# Patient Record
Sex: FEMALE | Race: BLACK OR AFRICAN AMERICAN | NOT HISPANIC OR LATINO | Employment: FULL TIME | URBAN - METROPOLITAN AREA
[De-identification: names, ages, dates, MRNs, and addresses within clinical notes are randomized per-mention and may not be internally consistent; named-entity substitution may affect disease eponyms.]

---

## 2018-04-04 ENCOUNTER — HOSPITAL ENCOUNTER (EMERGENCY)
Facility: HOSPITAL | Age: 58
Discharge: HOME | End: 2018-04-04

## 2018-04-04 VITALS
BODY MASS INDEX: 30.73 KG/M2 | DIASTOLIC BLOOD PRESSURE: 98 MMHG | HEIGHT: 64 IN | SYSTOLIC BLOOD PRESSURE: 178 MMHG | TEMPERATURE: 98 F | OXYGEN SATURATION: 97 % | WEIGHT: 180 LBS | RESPIRATION RATE: 16 BRPM | HEART RATE: 83 BPM

## 2018-04-04 DIAGNOSIS — A38.9 SCARLET FEVER: Primary | ICD-10-CM

## 2018-04-04 PROCEDURE — 99283 EMERGENCY DEPT VISIT LOW MDM: CPT

## 2018-04-04 PROCEDURE — 25000003 PHARM REV CODE 250

## 2018-04-04 RX ORDER — PENICILLIN V POTASSIUM 250 MG/1
500 TABLET, FILM COATED ORAL
Status: COMPLETED | OUTPATIENT
Start: 2018-04-04 | End: 2018-04-04

## 2018-04-04 RX ORDER — HYDROXYZINE HYDROCHLORIDE 25 MG/1
25 TABLET, FILM COATED ORAL EVERY 6 HOURS
Qty: 12 TABLET | Refills: 0 | Status: SHIPPED | OUTPATIENT
Start: 2018-04-04 | End: 2021-11-30

## 2018-04-04 RX ORDER — PENICILLIN V POTASSIUM 250 MG/1
500 TABLET, FILM COATED ORAL 2 TIMES DAILY
Qty: 40 TABLET | Refills: 0 | Status: SHIPPED | OUTPATIENT
Start: 2018-04-04 | End: 2018-04-14

## 2018-04-04 RX ORDER — HYDROXYZINE PAMOATE 25 MG/1
25 CAPSULE ORAL
Status: COMPLETED | OUTPATIENT
Start: 2018-04-04 | End: 2018-04-04

## 2018-04-04 RX ADMIN — PENICILLIN V POTASSIUM 500 MG: 250 TABLET, FILM COATED ORAL at 07:04

## 2018-04-04 RX ADMIN — HYDROXYZINE PAMOATE 25 MG: 25 CAPSULE ORAL at 07:04

## 2018-04-04 NOTE — ED PROVIDER NOTES
Encounter Date: 4/4/2018    SCRIBE #1 NOTE: I, Jaun Scott, am scribing for, and in the presence of,  Dr. Don. I have scribed the entire note.       History     Chief Complaint   Patient presents with    Rash     pt c/o rash to back x 5 days. used benadryl cream with no relief      Time seen by provider: 6:48 PM    This is a 57 y.o. female who presents with complaint of rash to back for the past 5 days. She reports only intermittent itchiness of the rash, and recalls the rash began in the middle of her back and it spread out and towards her neck. This patient denies any trouble breathing or swallowing. She does report that she had a cold and sore throat with associated laryngitis shortly before onset of this rash, which has since resolved. Patient reports using Benadryl cream without any relief. This patient also reports regular consumption of EtOH, but denies any change in diet or use of new soaps or cosmetics. She reports no long-term steroid usage or cancer treatment, and she does not report any known allergies. No other palliative or provocative factors except as noted.      The history is provided by the patient.     Review of patient's allergies indicates:  No Known Allergies  History reviewed. No pertinent past medical history.  History reviewed. No pertinent surgical history.  History reviewed. No pertinent family history.  Social History   Substance Use Topics    Smoking status: Never Smoker    Smokeless tobacco: Never Used    Alcohol use Yes     Review of Systems   HENT: Negative for trouble swallowing.    Respiratory:        No trouble breathing   Skin: Positive for rash.   All other systems reviewed and are negative.      Physical Exam     Initial Vitals [04/04/18 1828]   BP Pulse Resp Temp SpO2   (!) 188/100 83 16 98.1 °F (36.7 °C) 97 %      MAP       129.33         Physical Exam    Nursing note and vitals reviewed.  Constitutional: She appears well-developed and well-nourished.   HENT:   Head:  Normocephalic.   Mouth/Throat: Oropharynx is clear and moist.   Normal oropharynx   Eyes: EOM are normal.   Neck: Normal range of motion. Neck supple.   Pulmonary/Chest: No respiratory distress.   Abdominal: Soft.   Musculoskeletal: Normal range of motion.   Neurological: She is alert and oriented to person, place, and time.   Skin: Skin is warm and dry. Rash noted.   Fine scattered, raised, sandpaperlike rash over back diffusely   Psychiatric: She has a normal mood and affect.         ED Course   Procedures  Labs Reviewed - No data to display          Medical Decision Making:   ED Management:  Patient is nontoxic appearing in the ED.  No persistent emergent issues detected.  Exam benign.  We will discharge home to follow up with PCP.  Patient will return to the ED as needed for any deterioration or any other concerns.  Verbal discharge instructions and return precautions given.    Rash concerning for scarlatina.  Patient is nontoxic and otherwise benign appearing.  We will treat with Atarax and penicillin and follow up with primary care.                      Clinical Impression:     1. Scarlet fever              I, Keyur Don, personally performed the services described in this documentation. All medical record entries made by the scribe were at my direction and in my presence.  I have reviewed the chart and agree that the record reflects my personal performance and is accurate and complete. Keyur Don MD  04/04/18 3507

## 2020-03-30 ENCOUNTER — OFFICE VISIT (OUTPATIENT)
Dept: URGENT CARE | Facility: CLINIC | Age: 60
End: 2020-03-30
Payer: COMMERCIAL

## 2020-03-30 VITALS
OXYGEN SATURATION: 99 % | SYSTOLIC BLOOD PRESSURE: 130 MMHG | HEART RATE: 100 BPM | BODY MASS INDEX: 30.73 KG/M2 | RESPIRATION RATE: 19 BRPM | HEIGHT: 64 IN | DIASTOLIC BLOOD PRESSURE: 80 MMHG | WEIGHT: 180 LBS | TEMPERATURE: 100 F

## 2020-03-30 DIAGNOSIS — R05.9 COUGH: ICD-10-CM

## 2020-03-30 DIAGNOSIS — Z20.822 SUSPECTED COVID-19 VIRUS INFECTION: ICD-10-CM

## 2020-03-30 DIAGNOSIS — R50.9 FEVER, UNSPECIFIED FEVER CAUSE: ICD-10-CM

## 2020-03-30 DIAGNOSIS — J12.9 VIRAL PNEUMONIA: Primary | ICD-10-CM

## 2020-03-30 PROCEDURE — 99203 PR OFFICE/OUTPT VISIT, NEW, LEVL III, 30-44 MIN: ICD-10-PCS | Mod: S$GLB,,, | Performed by: PHYSICIAN ASSISTANT

## 2020-03-30 PROCEDURE — 99203 OFFICE O/P NEW LOW 30 MIN: CPT | Mod: S$GLB,,, | Performed by: PHYSICIAN ASSISTANT

## 2020-03-30 PROCEDURE — 71046 XR CHEST PA AND LATERAL: ICD-10-PCS | Mod: FY,S$GLB,, | Performed by: RADIOLOGY

## 2020-03-30 PROCEDURE — 71046 X-RAY EXAM CHEST 2 VIEWS: CPT | Mod: FY,S$GLB,, | Performed by: RADIOLOGY

## 2020-03-30 RX ORDER — BENZONATATE 100 MG/1
200 CAPSULE ORAL 3 TIMES DAILY PRN
Qty: 40 CAPSULE | Refills: 0 | Status: SHIPPED | OUTPATIENT
Start: 2020-03-30 | End: 2021-11-30

## 2020-03-30 RX ORDER — AZITHROMYCIN 250 MG/1
TABLET, FILM COATED ORAL
Qty: 6 TABLET | Refills: 0 | Status: SHIPPED | OUTPATIENT
Start: 2020-03-30 | End: 2020-04-23 | Stop reason: SDUPTHER

## 2020-03-30 RX ORDER — ALBUTEROL SULFATE 90 UG/1
2 AEROSOL, METERED RESPIRATORY (INHALATION) EVERY 6 HOURS PRN
Qty: 18 G | Refills: 0 | Status: SHIPPED | OUTPATIENT
Start: 2020-03-30 | End: 2022-04-04

## 2020-03-30 NOTE — PROGRESS NOTES
"Subjective:       Patient ID: Veronika Blackmon is a 59 y.o. female.    Vitals:  height is 5' 4" (1.626 m) and weight is 81.6 kg (180 lb). Her oral temperature is 99.7 °F (37.6 °C). Her blood pressure is 130/80 and her pulse is 100. Her respiration is 19 and oxygen saturation is 99%.     Chief Complaint: Cough    Cough   This is a new problem. The current episode started in the past 7 days. The problem has been gradually worsening. The problem occurs constantly. The cough is productive of sputum. Associated symptoms include nasal congestion and postnasal drip. Pertinent negatives include no chest pain, chills, ear pain, fever, headaches, myalgias, rash, sore throat or shortness of breath. Nothing aggravates the symptoms. She has tried OTC cough suppressant for the symptoms. The treatment provided no relief. There is no history of asthma, bronchitis or pneumonia.       Constitution: Negative for chills, fatigue and fever.   HENT: Positive for postnasal drip. Negative for ear pain, congestion and sore throat.    Neck: Negative for painful lymph nodes.   Cardiovascular: Negative for chest pain and leg swelling.   Eyes: Negative for double vision and blurred vision.   Respiratory: Positive for cough. Negative for shortness of breath.    Gastrointestinal: Negative for nausea, vomiting and diarrhea.   Genitourinary: Negative for dysuria, frequency, urgency and history of kidney stones.   Musculoskeletal: Negative for joint pain, joint swelling, muscle cramps and muscle ache.   Skin: Negative for color change, pale, rash and bruising.   Allergic/Immunologic: Negative for seasonal allergies.   Neurological: Negative for dizziness, history of vertigo, light-headedness, passing out and headaches.   Hematologic/Lymphatic: Negative for swollen lymph nodes.   Psychiatric/Behavioral: Negative for nervous/anxious, sleep disturbance and depression. The patient is not nervous/anxious.        Objective:      Physical Exam "   Constitutional: She is oriented to person, place, and time. She appears well-developed and well-nourished. She is cooperative.  Non-toxic appearance. She does not have a sickly appearance. She does not appear ill. No distress.   HENT:   Head: Normocephalic and atraumatic.   Right Ear: Hearing, tympanic membrane, external ear and ear canal normal.   Left Ear: Hearing, tympanic membrane, external ear and ear canal normal.   Nose: Nose normal. No mucosal edema, rhinorrhea or nasal deformity. No epistaxis. Right sinus exhibits no maxillary sinus tenderness and no frontal sinus tenderness. Left sinus exhibits no maxillary sinus tenderness and no frontal sinus tenderness.   Mouth/Throat: Uvula is midline, oropharynx is clear and moist and mucous membranes are normal. No trismus in the jaw. Normal dentition. No uvula swelling. Cobblestoning present. No oropharyngeal exudate, posterior oropharyngeal edema, posterior oropharyngeal erythema or tonsillar abscesses. Tonsils are 1+ on the right. Tonsils are 1+ on the left. No tonsillar exudate.   Eyes: Conjunctivae and lids are normal. No scleral icterus.   Neck: Trachea normal, full passive range of motion without pain and phonation normal. Neck supple. No neck rigidity. No edema and no erythema present.   Cardiovascular: Normal rate, regular rhythm, normal heart sounds, intact distal pulses and normal pulses.   Pulmonary/Chest: Effort normal. No accessory muscle usage or stridor. No respiratory distress. She has decreased breath sounds. She has wheezes. She has no rhonchi. She exhibits no tenderness and no bony tenderness.   Abdominal: Normal appearance.   Musculoskeletal: Normal range of motion. She exhibits no edema or deformity.   Neurological: She is alert and oriented to person, place, and time. She exhibits normal muscle tone. Coordination normal.   Skin: Skin is warm, dry, intact, not diaphoretic and not pale.   Psychiatric: She has a normal mood and affect. Her  speech is normal and behavior is normal. Judgment and thought content normal. Cognition and memory are normal.   Nursing note and vitals reviewed.          X-ray Chest Pa And Lateral    Result Date: 3/30/2020  EXAMINATION: XR CHEST PA AND LATERAL CLINICAL HISTORY: Fever, unspecified TECHNIQUE: PA and lateral views of the chest were performed. COMPARISON: None FINDINGS: Multifocal patchy subsegmental opacities are present throughout both lungs with relative peripheral and basilar predominance compatible with viral pneumonia in this patient with fever and cough. I detect no other pulmonary disease and no pleural fluid, lymph node enlargement, cardiac decompensation, pneumothorax, pneumomediastinum, pneumoperitoneum or significant osseous abnormality.     Abnormal chest radiograph with findings concerning for viral pneumonia in this patient with a history of fever and cough. I recommend that the patient undergo repeat chest radiograph after an interval of 6-12 weeks to confirm radiographic resolution of this disease; time course for repeat chest radiograph will be determined by clinical factors. This report was flagged in Epic as abnormal. Electronically signed by: Gabriela Leo MD Date:    03/30/2020 Time:    13:11      Assessment:       1. Viral pneumonia    2. Fever, unspecified fever cause    3. Cough    4. Suspected Covid-19 Virus Infection        Plan:         Viral pneumonia  -     albuterol (PROVENTIL/VENTOLIN HFA) 90 mcg/actuation inhaler; Inhale 2 puffs into the lungs every 6 (six) hours as needed for Wheezing or Shortness of Breath. Rescue  Dispense: 18 g; Refill: 0  -     benzonatate (TESSALON PERLES) 100 MG capsule; Take 2 capsules (200 mg total) by mouth 3 (three) times daily as needed for Cough.  Dispense: 40 capsule; Refill: 0    Fever, unspecified fever cause  -     X-Ray Chest PA And Lateral; Future; Expected date: 03/30/2020    Cough  -     X-Ray Chest PA And Lateral; Future; Expected date:  03/30/2020    Suspected Covid-19 Virus Infection  -     COVID-19 Home Symptom Monitoring  - Duration (days): 14    Other orders  -     azithromycin (ZITHROMAX Z-KENIA) 250 MG tablet; Take 2 tablets (500 mg) on  Day 1,  followed by 1 tablet (250 mg) once daily on Days 2 through 5.  Dispense: 6 tablet; Refill: 0     Reviewed radiographs with patient.  Discussed likely Coronavirus in nature.  She does not meet current criteria for testing.  Discussed Coronavirus precautions as well as discussed CDC fact she.  Provided patient with a copy of this information. Discussed diagnosis with patient as well as treatment and home care. Discussed return to clinic precautions vs ER precautions. All patients questions answered. Patient verbalized understanding. Patient agreed with plan of care.         Treating Pneumonia  Pneumonia is an infection of one or both of the lungs. Pneumonia:  · Is usually caused by either a virus or a bacteria  · Can be very serious, especially in infants, young children, and older adults. Its also serious for those with other long-term health problems or weakened immune systems.  · Is sometimes treated at home and sometimes in the hospital    Antibiotic medicines  Antibiotics may be prescribed for pneumonia caused by bacteria. They may be pills (oral medicines), or shots (injections). Or they may be given by IV (intravenously) into a vein. If you are taking oral medicines at home:  · Fill your prescription and start taking your medicine as soon as you can.  · You will likely start to feel better in a day or 2, but dont stop taking the antibiotic.  · Use a pill organizer to help you remember to take your medicine.  · Let your healthcare provider know if you have side effects.  · Take your medicine exactly as directed on the label. Talk to your provider or pharmacist if you have any questions.  Antiviral medicines  Antiviral medicine may be prescribed for pneumonia caused by a virus. For example,  antiviral medicine may be prescribed for pneumonia caused by the flu virus. Antibiotics do not work against viruses. If you are taking antiviral medicine at home:  · Fill your prescription and start taking your medicine as soon as you can.  · Talk with your provider or pharmacist about possible side effects.  · Take the medicine exactly as instructed.  To relieve symptoms  There are many medicines that can help relieve symptoms of pneumonia. Some are prescription and some are over-the-counter.  Your healthcare provider may recommend:  · Acetaminophen or ibuprofen to lower your fever and to lessen headache or other pain  · Cough medicine to loosen mucus or to reduce coughing  Make sure you check with your healthcare provider or pharmacist before taking any over-the-counter medicines.  Special treatments  If you are hospitalized for pneumonia, you may have other therapies, including:  · Inhaled medicines to help with breathing or chest congestion  · Supplemental oxygen to increase low oxygen levels  Drink fluids and eat healthy  You should eat healthy to help your body fight the infection. Drinking a lot of fluids helps to replace fluids lost from fever and to loosen mucus in your chest.  · Diet. Make healthy food choices, including fruits and vegetables, lean meats and other proteins, 100% whole grain and low- or no-fat dairy products.  · Fluids. Drink at least 6 to 8 tall glasses a day. Water and 100% fruit or vegetable juice are best.  Get plenty of rest and sleep  You may be more tired than usual for a while. It is important to get enough sleep at night. Its also important to rest during the day. Talk with your healthcare provider if coughing or other symptoms are interfering with your sleep.  Preventing the spread of germs  The best thing you can do to prevent spreading germs is to wash your hands often. You should:  · Rub your hand with soap and water for 20 to 30 seconds.  · Clean in between your fingers, the  backs of your hands, and around your nails.  · Dry your hands on a separate towel or use paper towels.  You should also:  · Keep alcohol-based hand  nearby.  · Make sure you also clean surfaces that you touch. Use a product that kills all types of germs.  · Stay away from others until you are feeling better.  When to call your healthcare provider  Call your healthcare provider if you have any of the following:  · Symptoms get worse  · Fever continues  · Shortness of breath gets worse  · Increased mucus or mucus that is darker in color  · Coughing gets worse  · Lips or fingers are bluish in color  · Side effects from your medicine   Date Last Reviewed: 12/1/2016  © 0363-5723 [employer]. [address] [place] [address], [place] [address]. All rights reserved. This information is not intended as a substitute for professional medical care. Always follow your healthcare professional's instructions.      Instructions for Patients Awaiting COVID-19 Test Results    You will either be called with your test result or it will be released to the patient portal.  If you have any questions about your test, please visit [email or web address] or call our COVID-19 information line at [phone].    Prevention steps for patients with confirmed or suspected COVID-19     Stay home except to get medical care.   Separate yourself from other people and animals in your home   Call ahead before visiting your doctor.   Wear a facemask.   Cover your coughs and sneezes.   Clean your hands often.   Avoid sharing personal household items.   Clean all high-touch surfaces every day.   Monitor your symptoms. Seek prompt medical attention if your illness is worsening (e.g., difficulty breathing).    Before seeking care, call your healthcare provider.   If you have a medical emergency and need to call 911, notify the dispatch personnel that you have, or are being evaluated for COVID-19. If possible, put on a  facemask before emergency medical services arrive.   Please stay home and self-quarantine. Per CDC guidance, you can leave home after these three things have happened: You have had no fever for at least 72 hours (that is three full days of no fever without the use ofmedicine that reduces fevers) AND other symptoms have improved (for example, when your cough or shortness of breath have improved) AND at least 7 days have passed since your symptoms first appeared.      Recommended precautions for household members, intimate partners, and caregivers in a nonhealthcare setting of a patient with symptomatic laboratory-confirmed COVID-19 or a patient under investigation.  Household members, intimate partners, and caregivers in a nonhealthcare setting may have close contact with a person with symptomatic, laboratory-confirmed COVID-19 or a person under investigation. Close contacts should monitor their health; they should call their healthcare provider right away if they develop symptoms suggestive of COVID-19 (e.g., fever, cough, shortness of breath).    Close contacts should also follow these recommendations:   Make sure that you understand and can help the patient follow their healthcare provider's instructions for medication(s) and care. You should help the patient with basic needs in the home and provide support for getting groceries, prescriptions, and other personal needs.   Monitor the patient's symptoms. If the patient is getting sicker, call his or her healthcare provider and tell them that the patient has laboratory-confirmed COVID-19. This will help the healthcare provider's office take steps to keep other people in the office or waiting room from getting infected. Ask the healthcare provider to call the local or state health department for additional guidance. If the patient has a medical emergency and you need to call 911, notify the dispatch personnel that the patient has, or is being evaluated for  COVID-19.   Household members should stay in another room or be  from the patient as much as possible. Household members should use a separate bedroom and bathroom, if available.   Prohibit visitors who do not have an essential need to be in the home.   Household members should care for any pets in the home. Do not handle pets or other animals while sick.   Make sure that shared spaces in the home have good air flow, such as by an air conditioner or an opened window, weather permitting.   Perform hand hygiene frequently. Wash your hands often with soap and water for at least 20 seconds or use an alcohol-based hand  that contains 60 to 95% alcohol, covering all surfaces of your hands and rubbing them together until they feel dry. Soap and water should be used preferentially if hands are visibly dirty.   Avoid touching your eyes, nose, and mouth with unwashed hands.   The patient should wear a facemask when you are around other people. If the patient is not able to wear a facemask (for example, because it causes trouble breathing), you, as the caregiver should wear a mask when you are in the same room as the patient.   Wear a disposable facemask and gloves when you touch or have contact with the patient's blood, stool, or body fluids, such as saliva, sputum, nasal mucus, vomit, urine.  o Throw out disposable facemasks and gloves after using them. Do not reuse.  o When removing personal protective equipment, first remove and dispose of gloves. Then, immediately clean your hands with soap and water or alcohol-based hand . Next, remove and dispose of facemask, and immediately clean your hands again with soap and water or alcohol-based hand .   Avoid sharing household items with the patient. You should not share dishes, drinking glasses, cups, eating utensils, towels, bedding, or other items. After the patient uses these items, you should wash them thoroughly (see below Wash  laundry thoroughly).   Clean all high-touch surfaces, such as counters, tabletops, doorknobs, bathroom fixtures, toilets, phones, keyboards, tablets, and bedside tables, every day. Also, clean any surfaces that may have blood, stool, or body fluids on them.   Use a household cleaning spray or wipe, according to the label instructions. Labels contain instructions for safe and effective use of the cleaning product including precautions you should take when applying the product, such as wearing gloves and making sure you have good ventilation during use of the product.   Wash laundry thoroughly.  o Immediately remove and wash clothes or bedding that have blood, stool, or body fluids on them.  o Wear disposable gloves while handling soiled items and keep soiled items away from your body. Clean your hands (with soap and water or an alcohol-based hand ) immediately after removing your gloves.  o Read and follow directions on labels of laundry or clothing items and detergent. In general, using a normal laundry detergent according to washing machine instructions and dry thoroughly using the warmest temperatures recommended on the clothing label.   Place all used disposable gloves, facemasks, and other contaminated items in a lined container before disposing of them with other household waste. Clean your hands (with soap and water or an alcohol-based hand ) immediately after handling these items. Soap and water should be used preferentially if hands are visibly dirty.   Discuss any additional questions with your state or local health department or healthcare provider. Check available hours when contacting your local health department.    For more information see CDC link below.      https://www.cdc.gov/coronavirus/2019-ncov/hcp/guidance-prevent-spread.html#precautions        Sources:  [place], [place] [facility]     Please follow up with your Primary care provider within 2-5  days if your signs and symptoms have not resolved or worsen.     If your condition worsens or fails to improve we recommend that you receive another evaluation at the emergency room immediately or contact your primary medical clinic to discuss your concerns.   You must understand that you have received an Urgent Care treatment only and that you may be released before all of your medical problems are known or treated. You, the patient, will arrange for follow up care as instructed.     RED FLAGS/WARNING SYMPTOMS DISCUSSED WITH PATIENT THAT WOULD WARRANT EMERGENT MEDICAL ATTENTION. PATIENT VERBALIZED UNDERSTANDING.

## 2020-03-30 NOTE — PATIENT INSTRUCTIONS
Treating Pneumonia  Pneumonia is an infection of one or both of the lungs. Pneumonia:  · Is usually caused by either a virus or a bacteria  · Can be very serious, especially in infants, young children, and older adults. Its also serious for those with other long-term health problems or weakened immune systems.  · Is sometimes treated at home and sometimes in the hospital    Antibiotic medicines  Antibiotics may be prescribed for pneumonia caused by bacteria. They may be pills (oral medicines), or shots (injections). Or they may be given by IV (intravenously) into a vein. If you are taking oral medicines at home:  · Fill your prescription and start taking your medicine as soon as you can.  · You will likely start to feel better in a day or 2, but dont stop taking the antibiotic.  · Use a pill organizer to help you remember to take your medicine.  · Let your healthcare provider know if you have side effects.  · Take your medicine exactly as directed on the label. Talk to your provider or pharmacist if you have any questions.  Antiviral medicines  Antiviral medicine may be prescribed for pneumonia caused by a virus. For example, antiviral medicine may be prescribed for pneumonia caused by the flu virus. Antibiotics do not work against viruses. If you are taking antiviral medicine at home:  · Fill your prescription and start taking your medicine as soon as you can.  · Talk with your provider or pharmacist about possible side effects.  · Take the medicine exactly as instructed.  To relieve symptoms  There are many medicines that can help relieve symptoms of pneumonia. Some are prescription and some are over-the-counter.  Your healthcare provider may recommend:  · Acetaminophen or ibuprofen to lower your fever and to lessen headache or other pain  · Cough medicine to loosen mucus or to reduce coughing  Make sure you check with your healthcare provider or pharmacist before taking any over-the-counter  medicines.  Special treatments  If you are hospitalized for pneumonia, you may have other therapies, including:  · Inhaled medicines to help with breathing or chest congestion  · Supplemental oxygen to increase low oxygen levels  Drink fluids and eat healthy  You should eat healthy to help your body fight the infection. Drinking a lot of fluids helps to replace fluids lost from fever and to loosen mucus in your chest.  · Diet. Make healthy food choices, including fruits and vegetables, lean meats and other proteins, 100% whole grain and low- or no-fat dairy products.  · Fluids. Drink at least 6 to 8 tall glasses a day. Water and 100% fruit or vegetable juice are best.  Get plenty of rest and sleep  You may be more tired than usual for a while. It is important to get enough sleep at night. Its also important to rest during the day. Talk with your healthcare provider if coughing or other symptoms are interfering with your sleep.  Preventing the spread of germs  The best thing you can do to prevent spreading germs is to wash your hands often. You should:  · Rub your hand with soap and water for 20 to 30 seconds.  · Clean in between your fingers, the backs of your hands, and around your nails.  · Dry your hands on a separate towel or use paper towels.  You should also:  · Keep alcohol-based hand  nearby.  · Make sure you also clean surfaces that you touch. Use a product that kills all types of germs.  · Stay away from others until you are feeling better.  When to call your healthcare provider  Call your healthcare provider if you have any of the following:  · Symptoms get worse  · Fever continues  · Shortness of breath gets worse  · Increased mucus or mucus that is darker in color  · Coughing gets worse  · Lips or fingers are bluish in color  · Side effects from your medicine   Date Last Reviewed: 12/1/2016  © 0402-8630 [employer]. [address] [place] [address], [place] [address]. All rights reserved.  This information is not intended as a substitute for professional medical care. Always follow your healthcare professional's instructions.      Instructions for Patients Awaiting COVID-19 Test Results    You will either be called with your test result or it will be released to the patient portal.  If you have any questions about your test, please visit [email or web address] or call our COVID-19 information line at [phone].    Prevention steps for patients with confirmed or suspected COVID-19     Stay home except to get medical care.   Separate yourself from other people and animals in your home   Call ahead before visiting your doctor.   Wear a facemask.   Cover your coughs and sneezes.   Clean your hands often.   Avoid sharing personal household items.   Clean all high-touch surfaces every day.   Monitor your symptoms. Seek prompt medical attention if your illness is worsening (e.g., difficulty breathing).    Before seeking care, call your healthcare provider.   If you have a medical emergency and need to call 911, notify the dispatch personnel that you have, or are being evaluated for COVID-19. If possible, put on a facemask before emergency medical services arrive.   Please stay home and self-quarantine. Per CDC guidance, you can leave home after these three things have happened: You have had no fever for at least 72 hours (that is three full days of no fever without the use ofmedicine that reduces fevers) AND other symptoms have improved (for example, when your cough or shortness of breath have improved) AND at least 7 days have passed since your symptoms first appeared.      Recommended precautions for household members, intimate partners, and caregivers in a nonhealthcare setting of a patient with symptomatic laboratory-confirmed COVID-19 or a patient under investigation.  Household members, intimate partners, and caregivers in a nonhealthcare setting may have close contact with a  person with symptomatic, laboratory-confirmed COVID-19 or a person under investigation. Close contacts should monitor their health; they should call their healthcare provider right away if they develop symptoms suggestive of COVID-19 (e.g., fever, cough, shortness of breath).    Close contacts should also follow these recommendations:   Make sure that you understand and can help the patient follow their healthcare provider's instructions for medication(s) and care. You should help the patient with basic needs in the home and provide support for getting groceries, prescriptions, and other personal needs.   Monitor the patient's symptoms. If the patient is getting sicker, call his or her healthcare provider and tell them that the patient has laboratory-confirmed COVID-19. This will help the healthcare provider's office take steps to keep other people in the office or waiting room from getting infected. Ask the healthcare provider to call the local or [facility] health department for additional guidance. If the patient has a medical emergency and you need to call 911, notify the dispatch personnel that the patient has, or is being evaluated for COVID-19.   Household members should stay in another room or be  from the patient as much as possible. Household members should use a separate bedroom and bathroom, if available.   Prohibit visitors who do not have an essential need to be in the home.   Household members should care for any pets in the home. Do not handle pets or other animals while sick.   Make sure that shared spaces in the home have good air flow, such as by an air conditioner or an opened window, weather permitting.   Perform hand hygiene frequently. Wash your hands often with soap and water for at least 20 seconds or use an alcohol-based hand  that contains 60 to 95% alcohol, covering all surfaces of your hands and rubbing them together until they feel dry. Soap and water should be used  preferentially if hands are visibly dirty.   Avoid touching your eyes, nose, and mouth with unwashed hands.   The patient should wear a facemask when you are around other people. If the patient is not able to wear a facemask (for example, because it causes trouble breathing), you, as the caregiver should wear a mask when you are in the same room as the patient.   Wear a disposable facemask and gloves when you touch or have contact with the patient's blood, stool, or body fluids, such as saliva, sputum, nasal mucus, vomit, urine.  o Throw out disposable facemasks and gloves after using them. Do not reuse.  o When removing personal protective equipment, first remove and dispose of gloves. Then, immediately clean your hands with soap and water or alcohol-based hand . Next, remove and dispose of facemask, and immediately clean your hands again with soap and water or alcohol-based hand .   Avoid sharing household items with the patient. You should not share dishes, drinking glasses, cups, eating utensils, towels, bedding, or other items. After the patient uses these items, you should wash them thoroughly (see below Wash laundry thoroughly).   Clean all high-touch surfaces, such as counters, tabletops, doorknobs, bathroom fixtures, toilets, phones, keyboards, tablets, and bedside tables, every day. Also, clean any surfaces that may have blood, stool, or body fluids on them.   Use a household cleaning spray or wipe, according to the label instructions. Labels contain instructions for safe and effective use of the cleaning product including precautions you should take when applying the product, such as wearing gloves and making sure you have good ventilation during use of the product.   Wash laundry thoroughly.  o Immediately remove and wash clothes or bedding that have blood, stool, or body fluids on them.  o Wear disposable gloves while handling soiled items and keep soiled items away from  your body. Clean your hands (with soap and water or an alcohol-based hand ) immediately after removing your gloves.  o Read and follow directions on labels of laundry or clothing items and detergent. In general, using a normal laundry detergent according to washing machine instructions and dry thoroughly using the warmest temperatures recommended on the clothing label.   Place all used disposable gloves, facemasks, and other contaminated items in a lined container before disposing of them with other household waste. Clean your hands (with soap and water or an alcohol-based hand ) immediately after handling these items. Soap and water should be used preferentially if hands are visibly dirty.   Discuss any additional questions with your state or local health department or healthcare provider. Check available hours when contacting your local health department.    For more information see CDC link below.      https://www.cdc.gov/coronavirus/2019-ncov/hcp/guidance-prevent-spread.html#precautions        Sources:  [facility], [facility]     Please follow up with your Primary care provider within 2-5 days if your signs and symptoms have not resolved or worsen.     If your condition worsens or fails to improve we recommend that you receive another evaluation at the emergency room immediately or contact your primary medical clinic to discuss your concerns.   You must understand that you have received an Urgent Care treatment only and that you may be released before all of your medical problems are known or treated. You, the patient, will arrange for follow up care as instructed.     RED FLAGS/WARNING SYMPTOMS DISCUSSED WITH PATIENT THAT WOULD WARRANT EMERGENT MEDICAL ATTENTION. PATIENT VERBALIZED UNDERSTANDING.

## 2020-04-21 ENCOUNTER — OFFICE VISIT (OUTPATIENT)
Dept: URGENT CARE | Facility: CLINIC | Age: 60
End: 2020-04-21
Payer: COMMERCIAL

## 2020-04-21 VITALS
WEIGHT: 180 LBS | HEIGHT: 64 IN | RESPIRATION RATE: 16 BRPM | TEMPERATURE: 98 F | HEART RATE: 90 BPM | OXYGEN SATURATION: 96 % | BODY MASS INDEX: 30.73 KG/M2

## 2020-04-21 DIAGNOSIS — J40 BRONCHITIS: Primary | ICD-10-CM

## 2020-04-21 PROCEDURE — 71046 XR CHEST PA AND LATERAL: ICD-10-PCS | Mod: FY,S$GLB,, | Performed by: RADIOLOGY

## 2020-04-21 PROCEDURE — 71046 X-RAY EXAM CHEST 2 VIEWS: CPT | Mod: FY,S$GLB,, | Performed by: RADIOLOGY

## 2020-04-21 PROCEDURE — 99214 OFFICE O/P EST MOD 30 MIN: CPT | Mod: S$GLB,,, | Performed by: FAMILY MEDICINE

## 2020-04-21 PROCEDURE — 99214 PR OFFICE/OUTPT VISIT, EST, LEVL IV, 30-39 MIN: ICD-10-PCS | Mod: S$GLB,,, | Performed by: FAMILY MEDICINE

## 2020-04-21 RX ORDER — ALBUTEROL SULFATE 90 UG/1
2 AEROSOL, METERED RESPIRATORY (INHALATION) EVERY 6 HOURS PRN
Qty: 18 G | Refills: 3 | Status: SHIPPED | OUTPATIENT
Start: 2020-04-21 | End: 2020-12-05 | Stop reason: SDUPTHER

## 2020-04-21 RX ORDER — BENZONATATE 100 MG/1
200 CAPSULE ORAL 3 TIMES DAILY PRN
Qty: 30 CAPSULE | Refills: 1 | Status: SHIPPED | OUTPATIENT
Start: 2020-04-21 | End: 2021-11-30

## 2020-04-21 NOTE — PATIENT INSTRUCTIONS
What Is Acute Bronchitis?  Acute bronchitis is when the airways in your lungs (bronchial tubes) become red and swollen (inflamed). It is usually caused by a viral infection. But it can also occur because of a bacteria or allergen. Symptoms include a cough that produces yellow or greenish mucus and can last for days or sometimes weeks.  Inside healthy lungs    Air travels in and out of the lungs through the airways. The linings of these airways produce sticky mucus. This mucus traps particles that enter the lungs. Tiny structures called cilia then sweep the particles out of the airways.     Healthy airway: Airways are normally open. Air moves in and out easily.      Healthy cilia: Tiny, hairlike cilia sweep mucus and particles up and out of the airways.   Lungs with bronchitis  Bronchitis often occurs with a cold or the flu virus. The airways become inflamed (red and swollen). There is a deep hacking cough from the extra mucus. Other symptoms may include:  · Wheezing  · Chest discomfort  · Shortness of breath  · Mild fever  A second infection, this time due to bacteria, may then occur. And airways irritated by allergens or smoke are more likely to get infected.        Inflamed airway: Inflammation and extra mucus narrow the airway, causing shortness of breath.      Impaired cilia: Extra mucus impairs cilia, causing congestion and wheezing. Smoking makes the problem worse.   Making a diagnosis  A physical exam, health history, and certain tests help your healthcare provider make the diagnosis.  Health history  Your healthcare provider will ask you about your symptoms.  The exam  Your provider listens to your chest for signs of congestion. He or she may also check your ears, nose, and throat.  Possible tests  · A sputum test for bacteria. This requires a sample of mucus from your lungs.  · A nasal or throat swab. This tests to see if you have a bacterial infection.  · A chest X-ray. This is done if your healthcare  provider thinks you have pneumonia.  · Tests to check for an underlying condition. Other tests may be done to check for things such as allergies, asthma, or COPD (chronic obstructive pulmonary disease). You may need to see a specialist for more lung function testing.  Treating a cough  The main treatment for bronchitis is easing symptoms. Avoiding smoke, allergens, and other things that trigger coughing can often help. If the infection is bacterial, you may be given antibiotics. During the illness, it's important to get plenty of sleep. To ease symptoms:  · Dont smoke. Also avoid secondhand smoke.  · Use a humidifier. Or try breathing in steam from a hot shower. This may help loosen mucus.  · Drink a lot of water and juice. They can soothe the throat and may help thin mucus.  · Sit up or use extra pillows when in bed. This helps to lessen coughing and congestion.  · Ask your provider about using medicine. Ask about using cough medicine, pain and fever medicine, or a decongestant.  Antibiotics  Most cases of bronchitis are caused by cold or flu viruses. They dont need antibiotics to treat them, even if your mucus is thick and green or yellow. Antibiotics dont treat viral illness and antibiotics have not been shown to have any benefit in cases of acute bronchitis. Taking antibiotics when they are not needed increases your risk of getting an infection later that is antibiotic-resistant. Antibiotics can also cause severe cases of diarrhea that require other antibiotics to treat.  It is important that you accept your healthcare provider's opinion to not use antibiotics. Your provider will prescribe antibiotics if the infection is caused by bacteria. If they are prescribed:  · Take all of the medicine. Take the medicine until it is used up, even if symptoms have improved. If you dont, the bronchitis may come back.  · Take the medicines as directed. For instance, some medicines should be taken with food.  · Ask about  side effects. Ask your provider or pharmacist what side effects are common, and what to do about them.  Follow-up care  You should see your provider again in 2 to 3 weeks. By this time, symptoms should have improved. An infection that lasts longer may mean you have a more serious problem.  Prevention  · Avoid tobacco smoke. If you smoke, quit. Stay away from smoky places. Ask friends and family not to smoke around you, or in your home or car.  · Get checked for allergies.  · Ask your provider about getting a yearly flu shot. Also ask about pneumococcal or pneumonia shots.  · Wash your hands often. This helps reduce the chance of picking up viruses that cause colds and flu.  Call your healthcare provider if:  · Symptoms worsen, or you have new symptoms  · Breathing problems worsen or  become severe  · Symptoms dont get better within a week, or within 3 days of taking antibiotics   Date Last Reviewed: 2/1/2017  © 1318-4394 [employer]. [address] [place] [address], [place] [address]. All rights reserved. This information is not intended as a substitute for professional medical care. Always follow your healthcare professional's instructions.

## 2020-04-21 NOTE — PROGRESS NOTES
"Subjective:       Patient ID: Veronika Blackmon is a 60 y.o. female.    Vitals:  height is 5' 4" (1.626 m) and weight is 81.6 kg (180 lb). Her tympanic temperature is 98 °F (36.7 °C). Her pulse is 90. Her respiration is 16 and oxygen saturation is 96%.     Chief Complaint: Cough    60-year-old female with complaint of having persistent but minimal cough and chest congestion was diagnosed with pneumonia 2 weeks ago and treated feels much better but still having some mild cough no fever no chills no achiness was not tested for cough    Cough   This is a new problem. The current episode started 1 to 4 weeks ago (x1-2 weeks). The problem has been unchanged. The problem occurs constantly. The cough is non-productive. Associated symptoms include postnasal drip. Pertinent negatives include no chest pain, chills, fever, headaches, myalgias, rash, sore throat or shortness of breath. Nothing aggravates the symptoms. She has tried OTC inhaler for the symptoms. The treatment provided no relief.       Constitution: Negative for chills, fatigue and fever.   HENT: Positive for congestion and postnasal drip. Negative for sore throat.    Neck: Negative for painful lymph nodes.   Cardiovascular: Negative for chest pain and leg swelling.   Eyes: Negative for double vision and blurred vision.   Respiratory: Positive for cough. Negative for shortness of breath.    Gastrointestinal: Negative for nausea, vomiting and diarrhea.   Genitourinary: Negative for dysuria, frequency, urgency and history of kidney stones.   Musculoskeletal: Negative for joint pain, joint swelling, muscle cramps and muscle ache.   Skin: Negative for color change, pale, rash and bruising.   Allergic/Immunologic: Negative for seasonal allergies.   Neurological: Negative for dizziness, history of vertigo, light-headedness, passing out and headaches.   Hematologic/Lymphatic: Negative for swollen lymph nodes.   Psychiatric/Behavioral: Negative for nervous/anxious, sleep " disturbance and depression. The patient is not nervous/anxious.        Objective:      Physical Exam   Constitutional: She is oriented to person, place, and time. She appears well-developed and well-nourished. She is cooperative.  Non-toxic appearance. She does not appear ill. No distress.   HENT:   Head: Normocephalic and atraumatic.   Right Ear: Hearing, tympanic membrane, external ear and ear canal normal.   Left Ear: Hearing, tympanic membrane, external ear and ear canal normal.   Nose: Nose normal. No mucosal edema, rhinorrhea or nasal deformity. No epistaxis. Right sinus exhibits no maxillary sinus tenderness and no frontal sinus tenderness. Left sinus exhibits no maxillary sinus tenderness and no frontal sinus tenderness.   Mouth/Throat: Uvula is midline, oropharynx is clear and moist and mucous membranes are normal. No trismus in the jaw. Normal dentition. No uvula swelling. No posterior oropharyngeal erythema.   Eyes: Conjunctivae and lids are normal. Right eye exhibits no discharge. Left eye exhibits no discharge. No scleral icterus.   Neck: Trachea normal, normal range of motion, full passive range of motion without pain and phonation normal. Neck supple. No JVD present. No tracheal deviation present. No thyromegaly present.   Cardiovascular: Normal rate, regular rhythm, normal heart sounds, intact distal pulses and normal pulses.   Pulmonary/Chest: Effort normal and breath sounds normal. No stridor. No respiratory distress. She has no wheezes. She has no rales.   Minimal inspiratory wheezes no crackles otherwise good air movement   Abdominal: Soft. Normal appearance and bowel sounds are normal. She exhibits no distension, no pulsatile midline mass and no mass. There is no tenderness.   Musculoskeletal: Normal range of motion. She exhibits no edema or deformity.   Lymphadenopathy:     She has no cervical adenopathy.   Neurological: She is alert and oriented to person, place, and time. She exhibits normal  muscle tone. Coordination normal.   Skin: Skin is warm, dry, intact, not diaphoretic and not pale.   Psychiatric: She has a normal mood and affect. Her speech is normal and behavior is normal. Judgment and thought content normal. Cognition and memory are normal.   Nursing note and vitals reviewed.        Assessment:       1. Bronchitis        Plan:         Bronchitis  -     X-Ray Chest PA And Lateral; Future; Expected date: 04/21/2020    Other orders  -     benzonatate (TESSALON PERLES) 100 MG capsule; Take 2 capsules (200 mg total) by mouth 3 (three) times daily as needed for Cough.  Dispense: 30 capsule; Refill: 1  -     albuterol (PROVENTIL/VENTOLIN HFA) 90 mcg/actuation inhaler; Inhale 2 puffs into the lungs every 6 (six) hours as needed. Rescue  Dispense: 18 g; Refill: 3        S since patient patient was diagnosed with pneumonia will redo the chest x-ray and since she is not running fever at this point will not opted to any COVID 19 testing because she looks overall improved at present

## 2020-04-23 RX ORDER — AZITHROMYCIN 250 MG/1
TABLET, FILM COATED ORAL
Qty: 6 TABLET | Refills: 0 | Status: SHIPPED | OUTPATIENT
Start: 2020-04-23 | End: 2021-11-30

## 2020-12-05 ENCOUNTER — OFFICE VISIT (OUTPATIENT)
Dept: URGENT CARE | Facility: CLINIC | Age: 60
End: 2020-12-05
Payer: COMMERCIAL

## 2020-12-05 VITALS
BODY MASS INDEX: 29.53 KG/M2 | HEIGHT: 64 IN | WEIGHT: 173 LBS | HEART RATE: 80 BPM | SYSTOLIC BLOOD PRESSURE: 206 MMHG | DIASTOLIC BLOOD PRESSURE: 144 MMHG | OXYGEN SATURATION: 97 % | TEMPERATURE: 98 F | RESPIRATION RATE: 20 BRPM

## 2020-12-05 DIAGNOSIS — J45.991 COUGH VARIANT ASTHMA: ICD-10-CM

## 2020-12-05 DIAGNOSIS — J06.9 URI, ACUTE: Primary | ICD-10-CM

## 2020-12-05 PROCEDURE — 3008F BODY MASS INDEX DOCD: CPT | Mod: CPTII,S$GLB,, | Performed by: FAMILY MEDICINE

## 2020-12-05 PROCEDURE — 99213 PR OFFICE/OUTPT VISIT, EST, LEVL III, 20-29 MIN: ICD-10-PCS | Mod: S$GLB,,, | Performed by: FAMILY MEDICINE

## 2020-12-05 PROCEDURE — 99213 OFFICE O/P EST LOW 20 MIN: CPT | Mod: S$GLB,,, | Performed by: FAMILY MEDICINE

## 2020-12-05 PROCEDURE — 3008F PR BODY MASS INDEX (BMI) DOCUMENTED: ICD-10-PCS | Mod: CPTII,S$GLB,, | Performed by: FAMILY MEDICINE

## 2020-12-05 RX ORDER — ALBUTEROL SULFATE 90 UG/1
2 AEROSOL, METERED RESPIRATORY (INHALATION) EVERY 6 HOURS PRN
Qty: 18 G | Refills: 3 | Status: SHIPPED | OUTPATIENT
Start: 2020-12-05 | End: 2020-12-05 | Stop reason: SDUPTHER

## 2020-12-05 RX ORDER — LORATADINE 10 MG/1
10 TABLET ORAL DAILY
Qty: 30 TABLET | Refills: 2 | Status: SHIPPED | OUTPATIENT
Start: 2020-12-05 | End: 2021-11-30

## 2020-12-05 RX ORDER — ALBUTEROL SULFATE 90 UG/1
2 AEROSOL, METERED RESPIRATORY (INHALATION) EVERY 6 HOURS PRN
Qty: 18 G | Refills: 3 | Status: SHIPPED | OUTPATIENT
Start: 2020-12-05 | End: 2021-11-28 | Stop reason: ALTCHOICE

## 2020-12-05 NOTE — PROGRESS NOTES
"Subjective:       Patient ID: Veronika Blackmon is a 60 y.o. female.    Vitals:  height is 5' 4" (1.626 m) and weight is 78.5 kg (173 lb). Her temporal temperature is 98.3 °F (36.8 °C). Her blood pressure is 206/144 (abnormal) and her pulse is 80. Her respiration is 20 and oxygen saturation is 97%.     Chief Complaint: Bronchitis    PATIENT IS HERE FOR A MEDICATION REFILL OF HER INHALER  Patient has history of exercise induced and allergy related asthma and uses inhaler on a as needed basis denies any fever chills or    Cough  This is a recurrent problem. The current episode started more than 1 year ago. The problem has been gradually worsening. The problem occurs every few hours. The cough is non-productive. Pertinent negatives include no chest pain, chills, fever, headaches, myalgias, rash, sore throat or shortness of breath. Nothing aggravates the symptoms. She has tried nothing for the symptoms. Her past medical history is significant for bronchitis.       Constitution: Negative for chills, fatigue and fever.   HENT: Negative for congestion and sore throat.    Neck: Negative for painful lymph nodes.   Cardiovascular: Negative for chest pain and leg swelling.   Eyes: Negative for double vision and blurred vision.   Respiratory: Positive for cough. Negative for shortness of breath.    Gastrointestinal: Negative for nausea, vomiting and diarrhea.   Genitourinary: Negative for dysuria, frequency, urgency and history of kidney stones.   Musculoskeletal: Negative for joint pain, joint swelling, muscle cramps and muscle ache.   Skin: Negative for color change, pale, rash and bruising.   Allergic/Immunologic: Negative for seasonal allergies.   Neurological: Negative for dizziness, history of vertigo, light-headedness, passing out and headaches.   Hematologic/Lymphatic: Negative for swollen lymph nodes.   Psychiatric/Behavioral: Negative for nervous/anxious, sleep disturbance and depression. The patient is not " nervous/anxious.        Objective:      Physical Exam   Constitutional: She is oriented to person, place, and time. She appears well-developed. She is cooperative.  Non-toxic appearance. She does not appear ill. No distress.   HENT:   Head: Normocephalic and atraumatic.   Ears:   Right Ear: Hearing, tympanic membrane, external ear and ear canal normal.   Left Ear: Hearing, tympanic membrane, external ear and ear canal normal.   Nose: Nose normal. No mucosal edema, rhinorrhea or nasal deformity. No epistaxis. Right sinus exhibits no maxillary sinus tenderness and no frontal sinus tenderness. Left sinus exhibits no maxillary sinus tenderness and no frontal sinus tenderness.   Mouth/Throat: Uvula is midline, oropharynx is clear and moist and mucous membranes are normal. No trismus in the jaw. Normal dentition. No uvula swelling. No posterior oropharyngeal erythema.   Eyes: Conjunctivae and lids are normal. Right eye exhibits no discharge. Left eye exhibits no discharge. No scleral icterus.   Neck: Trachea normal, normal range of motion, full passive range of motion without pain and phonation normal. Neck supple.   Cardiovascular: Normal rate, regular rhythm, normal heart sounds and normal pulses.   Pulmonary/Chest: Effort normal and breath sounds normal. No respiratory distress.   Abdominal: Soft. Normal appearance and bowel sounds are normal. She exhibits no distension, no pulsatile midline mass and no mass. There is no abdominal tenderness.   Musculoskeletal: Normal range of motion.         General: No deformity.   Neurological: She is alert and oriented to person, place, and time. She exhibits normal muscle tone. Coordination normal.   Skin: Skin is warm, dry, intact, not diaphoretic and not pale. Psychiatric: Her speech is normal and behavior is normal. Judgment and thought content normal.   Nursing note and vitals reviewed.        Assessment:       1. URI, acute    2. Cough variant asthma        Plan:         URI,  acute    Cough variant asthma    Other orders  -     loratadine (CLARITIN) 10 mg tablet; Take 1 tablet (10 mg total) by mouth once daily.  Dispense: 30 tablet; Refill: 2  -     albuterol (PROVENTIL/VENTOLIN HFA) 90 mcg/actuation inhaler; Inhale 2 puffs into the lungs every 6 (six) hours as needed. Rescue  Dispense: 18 g; Refill: 3

## 2020-12-10 RX ORDER — AZITHROMYCIN 250 MG/1
TABLET, FILM COATED ORAL
Qty: 6 TABLET | Refills: 0 | Status: SHIPPED | OUTPATIENT
Start: 2020-12-10 | End: 2021-11-30

## 2021-02-10 ENCOUNTER — CLINICAL SUPPORT (OUTPATIENT)
Dept: URGENT CARE | Facility: CLINIC | Age: 61
End: 2021-02-10
Payer: COMMERCIAL

## 2021-02-10 DIAGNOSIS — Z78.9 NO KNOWN HEALTH PROBLEMS: Primary | ICD-10-CM

## 2021-02-10 LAB
CTP QC/QA: YES
SARS-COV-2 RDRP RESP QL NAA+PROBE: NEGATIVE

## 2021-02-10 PROCEDURE — U0002: ICD-10-PCS | Mod: QW,S$GLB,, | Performed by: PHYSICIAN ASSISTANT

## 2021-02-10 PROCEDURE — U0002 COVID-19 LAB TEST NON-CDC: HCPCS | Mod: QW,S$GLB,, | Performed by: PHYSICIAN ASSISTANT

## 2021-02-10 PROCEDURE — 99211 OFF/OP EST MAY X REQ PHY/QHP: CPT | Mod: S$GLB,CS,, | Performed by: PHYSICIAN ASSISTANT

## 2021-02-10 PROCEDURE — 99211 PR OFFICE/OUTPT VISIT, EST, LEVL I: ICD-10-PCS | Mod: S$GLB,CS,, | Performed by: PHYSICIAN ASSISTANT

## 2021-11-28 ENCOUNTER — OFFICE VISIT (OUTPATIENT)
Dept: URGENT CARE | Facility: CLINIC | Age: 61
End: 2021-11-28
Payer: COMMERCIAL

## 2021-11-28 VITALS
DIASTOLIC BLOOD PRESSURE: 94 MMHG | SYSTOLIC BLOOD PRESSURE: 170 MMHG | RESPIRATION RATE: 18 BRPM | HEART RATE: 72 BPM | TEMPERATURE: 98 F | HEIGHT: 64 IN | WEIGHT: 179 LBS | BODY MASS INDEX: 30.56 KG/M2

## 2021-11-28 DIAGNOSIS — Z76.0 ENCOUNTER FOR MEDICATION REFILL: ICD-10-CM

## 2021-11-28 DIAGNOSIS — Z76.89 ENCOUNTER TO ESTABLISH CARE: Primary | ICD-10-CM

## 2021-11-28 DIAGNOSIS — J45.21 MILD INTERMITTENT ASTHMA WITH ACUTE EXACERBATION: ICD-10-CM

## 2021-11-28 DIAGNOSIS — R03.0 ELEVATED BLOOD PRESSURE READING: ICD-10-CM

## 2021-11-28 PROCEDURE — 99213 OFFICE O/P EST LOW 20 MIN: CPT | Mod: S$GLB,,, | Performed by: NURSE PRACTITIONER

## 2021-11-28 PROCEDURE — 99213 PR OFFICE/OUTPT VISIT, EST, LEVL III, 20-29 MIN: ICD-10-PCS | Mod: S$GLB,,, | Performed by: NURSE PRACTITIONER

## 2021-11-28 RX ORDER — ALBUTEROL SULFATE 90 UG/1
1-2 AEROSOL, METERED RESPIRATORY (INHALATION) EVERY 6 HOURS PRN
Qty: 54 G | Refills: 0 | Status: SHIPPED | OUTPATIENT
Start: 2021-11-28 | End: 2021-12-05

## 2021-11-30 ENCOUNTER — OFFICE VISIT (OUTPATIENT)
Dept: FAMILY MEDICINE | Facility: CLINIC | Age: 61
End: 2021-11-30
Payer: COMMERCIAL

## 2021-11-30 ENCOUNTER — LAB VISIT (OUTPATIENT)
Dept: LAB | Facility: HOSPITAL | Age: 61
End: 2021-11-30
Attending: FAMILY MEDICINE
Payer: COMMERCIAL

## 2021-11-30 VITALS
OXYGEN SATURATION: 99 % | HEART RATE: 86 BPM | BODY MASS INDEX: 30.98 KG/M2 | HEIGHT: 64 IN | DIASTOLIC BLOOD PRESSURE: 110 MMHG | WEIGHT: 181.44 LBS | SYSTOLIC BLOOD PRESSURE: 180 MMHG

## 2021-11-30 DIAGNOSIS — Z12.4 PAP SMEAR FOR CERVICAL CANCER SCREENING: ICD-10-CM

## 2021-11-30 DIAGNOSIS — I10 ESSENTIAL HYPERTENSION: Primary | ICD-10-CM

## 2021-11-30 DIAGNOSIS — I10 ESSENTIAL HYPERTENSION: ICD-10-CM

## 2021-11-30 DIAGNOSIS — R94.31 ABNORMAL EKG: ICD-10-CM

## 2021-11-30 DIAGNOSIS — Z12.31 ENCOUNTER FOR SCREENING MAMMOGRAM FOR BREAST CANCER: ICD-10-CM

## 2021-11-30 DIAGNOSIS — Z11.59 ENCOUNTER FOR HCV SCREENING TEST FOR LOW RISK PATIENT: ICD-10-CM

## 2021-11-30 DIAGNOSIS — Z11.4 ENCOUNTER FOR SCREENING FOR HIV: ICD-10-CM

## 2021-11-30 LAB
ALBUMIN SERPL BCP-MCNC: 4 G/DL (ref 3.5–5.2)
ALP SERPL-CCNC: 75 U/L (ref 55–135)
ALT SERPL W/O P-5'-P-CCNC: 14 U/L (ref 10–44)
ANION GAP SERPL CALC-SCNC: 10 MMOL/L (ref 8–16)
AST SERPL-CCNC: 21 U/L (ref 10–40)
BASOPHILS # BLD AUTO: 0.05 K/UL (ref 0–0.2)
BASOPHILS NFR BLD: 0.7 % (ref 0–1.9)
BILIRUB SERPL-MCNC: 0.5 MG/DL (ref 0.1–1)
BUN SERPL-MCNC: 12 MG/DL (ref 6–20)
CALCIUM SERPL-MCNC: 9.9 MG/DL (ref 8.7–10.5)
CHLORIDE SERPL-SCNC: 104 MMOL/L (ref 95–110)
CHOLEST SERPL-MCNC: 327 MG/DL (ref 120–199)
CHOLEST/HDLC SERPL: 3.8 {RATIO} (ref 2–5)
CO2 SERPL-SCNC: 27 MMOL/L (ref 23–29)
CREAT SERPL-MCNC: 0.9 MG/DL (ref 0.5–1.4)
DIFFERENTIAL METHOD: ABNORMAL
EOSINOPHIL # BLD AUTO: 0.1 K/UL (ref 0–0.5)
EOSINOPHIL NFR BLD: 0.9 % (ref 0–8)
ERYTHROCYTE [DISTWIDTH] IN BLOOD BY AUTOMATED COUNT: 15.2 % (ref 11.5–14.5)
EST. GFR  (AFRICAN AMERICAN): >60 ML/MIN/1.73 M^2
EST. GFR  (NON AFRICAN AMERICAN): >60 ML/MIN/1.73 M^2
GLUCOSE SERPL-MCNC: 96 MG/DL (ref 70–110)
HCT VFR BLD AUTO: 42.9 % (ref 37–48.5)
HDLC SERPL-MCNC: 86 MG/DL (ref 40–75)
HDLC SERPL: 26.3 % (ref 20–50)
HGB BLD-MCNC: 14.1 G/DL (ref 12–16)
IMM GRANULOCYTES # BLD AUTO: 0.01 K/UL (ref 0–0.04)
IMM GRANULOCYTES NFR BLD AUTO: 0.1 % (ref 0–0.5)
LDLC SERPL CALC-MCNC: 215 MG/DL (ref 63–159)
LYMPHOCYTES # BLD AUTO: 4 K/UL (ref 1–4.8)
LYMPHOCYTES NFR BLD: 52.4 % (ref 18–48)
MCH RBC QN AUTO: 30.9 PG (ref 27–31)
MCHC RBC AUTO-ENTMCNC: 32.9 G/DL (ref 32–36)
MCV RBC AUTO: 94 FL (ref 82–98)
MONOCYTES # BLD AUTO: 0.5 K/UL (ref 0.3–1)
MONOCYTES NFR BLD: 6.6 % (ref 4–15)
NEUTROPHILS # BLD AUTO: 3 K/UL (ref 1.8–7.7)
NEUTROPHILS NFR BLD: 39.3 % (ref 38–73)
NONHDLC SERPL-MCNC: 241 MG/DL
NRBC BLD-RTO: 0 /100 WBC
PLATELET # BLD AUTO: 288 K/UL (ref 150–450)
PMV BLD AUTO: 11.8 FL (ref 9.2–12.9)
POTASSIUM SERPL-SCNC: 3.4 MMOL/L (ref 3.5–5.1)
PROT SERPL-MCNC: 7.8 G/DL (ref 6–8.4)
RBC # BLD AUTO: 4.56 M/UL (ref 4–5.4)
SODIUM SERPL-SCNC: 141 MMOL/L (ref 136–145)
TRIGL SERPL-MCNC: 130 MG/DL (ref 30–150)
TSH SERPL DL<=0.005 MIU/L-ACNC: 1.15 UIU/ML (ref 0.4–4)
WBC # BLD AUTO: 7.69 K/UL (ref 3.9–12.7)

## 2021-11-30 PROCEDURE — 93005 EKG 12-LEAD: ICD-10-PCS | Mod: S$GLB,,, | Performed by: FAMILY MEDICINE

## 2021-11-30 PROCEDURE — 80053 COMPREHEN METABOLIC PANEL: CPT | Performed by: FAMILY MEDICINE

## 2021-11-30 PROCEDURE — 99999 PR PBB SHADOW E&M-EST. PATIENT-LVL IV: ICD-10-PCS | Mod: PBBFAC,,, | Performed by: FAMILY MEDICINE

## 2021-11-30 PROCEDURE — 99204 PR OFFICE/OUTPT VISIT, NEW, LEVL IV, 45-59 MIN: ICD-10-PCS | Mod: S$GLB,,, | Performed by: FAMILY MEDICINE

## 2021-11-30 PROCEDURE — 84443 ASSAY THYROID STIM HORMONE: CPT | Performed by: FAMILY MEDICINE

## 2021-11-30 PROCEDURE — 93010 EKG 12-LEAD: ICD-10-PCS | Mod: S$GLB,,, | Performed by: INTERNAL MEDICINE

## 2021-11-30 PROCEDURE — 85025 COMPLETE CBC W/AUTO DIFF WBC: CPT | Performed by: FAMILY MEDICINE

## 2021-11-30 PROCEDURE — 93005 ELECTROCARDIOGRAM TRACING: CPT | Mod: S$GLB,,, | Performed by: FAMILY MEDICINE

## 2021-11-30 PROCEDURE — 80061 LIPID PANEL: CPT | Performed by: FAMILY MEDICINE

## 2021-11-30 PROCEDURE — 99999 PR PBB SHADOW E&M-EST. PATIENT-LVL IV: CPT | Mod: PBBFAC,,, | Performed by: FAMILY MEDICINE

## 2021-11-30 PROCEDURE — 93010 ELECTROCARDIOGRAM REPORT: CPT | Mod: S$GLB,,, | Performed by: INTERNAL MEDICINE

## 2021-11-30 PROCEDURE — 99204 OFFICE O/P NEW MOD 45 MIN: CPT | Mod: S$GLB,,, | Performed by: FAMILY MEDICINE

## 2021-11-30 PROCEDURE — 86803 HEPATITIS C AB TEST: CPT | Performed by: FAMILY MEDICINE

## 2021-11-30 PROCEDURE — 87389 HIV-1 AG W/HIV-1&-2 AB AG IA: CPT | Performed by: FAMILY MEDICINE

## 2021-11-30 PROCEDURE — 36415 COLL VENOUS BLD VENIPUNCTURE: CPT | Mod: PO | Performed by: FAMILY MEDICINE

## 2021-11-30 RX ORDER — POTASSIUM CHLORIDE 20 MEQ/1
20 TABLET, EXTENDED RELEASE ORAL DAILY
Qty: 30 TABLET | Refills: 0 | Status: SHIPPED | OUTPATIENT
Start: 2021-11-30 | End: 2021-12-23 | Stop reason: SDUPTHER

## 2021-11-30 RX ORDER — AMLODIPINE BESYLATE 5 MG/1
5 TABLET ORAL DAILY
Qty: 30 TABLET | Refills: 0 | Status: SHIPPED | OUTPATIENT
Start: 2021-11-30 | End: 2021-12-23 | Stop reason: SDUPTHER

## 2021-11-30 RX ORDER — CHLORTHALIDONE 25 MG/1
25 TABLET ORAL DAILY
Qty: 30 TABLET | Refills: 0 | Status: SHIPPED | OUTPATIENT
Start: 2021-11-30 | End: 2021-12-23 | Stop reason: SDUPTHER

## 2021-12-01 LAB
HCV AB SERPL QL IA: NEGATIVE
HIV 1+2 AB+HIV1 P24 AG SERPL QL IA: NEGATIVE

## 2021-12-02 ENCOUNTER — OFFICE VISIT (OUTPATIENT)
Dept: CARDIOLOGY | Facility: CLINIC | Age: 61
End: 2021-12-02
Payer: COMMERCIAL

## 2021-12-02 VITALS
HEIGHT: 64 IN | WEIGHT: 177.25 LBS | OXYGEN SATURATION: 99 % | BODY MASS INDEX: 30.26 KG/M2 | DIASTOLIC BLOOD PRESSURE: 86 MMHG | HEART RATE: 80 BPM | SYSTOLIC BLOOD PRESSURE: 136 MMHG

## 2021-12-02 DIAGNOSIS — R94.31 ABNORMAL EKG: ICD-10-CM

## 2021-12-02 DIAGNOSIS — E78.2 MIXED HYPERLIPIDEMIA: Primary | ICD-10-CM

## 2021-12-02 DIAGNOSIS — I10 ESSENTIAL HYPERTENSION: ICD-10-CM

## 2021-12-02 DIAGNOSIS — R94.31 ABNORMAL ELECTROCARDIOGRAM (ECG) (EKG): ICD-10-CM

## 2021-12-02 DIAGNOSIS — I45.10 RBBB: Chronic | ICD-10-CM

## 2021-12-02 PROCEDURE — 99204 PR OFFICE/OUTPT VISIT, NEW, LEVL IV, 45-59 MIN: ICD-10-PCS | Mod: S$GLB,,, | Performed by: INTERNAL MEDICINE

## 2021-12-02 PROCEDURE — 99999 PR PBB SHADOW E&M-EST. PATIENT-LVL III: CPT | Mod: PBBFAC,,, | Performed by: INTERNAL MEDICINE

## 2021-12-02 PROCEDURE — 99204 OFFICE O/P NEW MOD 45 MIN: CPT | Mod: S$GLB,,, | Performed by: INTERNAL MEDICINE

## 2021-12-02 PROCEDURE — 99999 PR PBB SHADOW E&M-EST. PATIENT-LVL III: ICD-10-PCS | Mod: PBBFAC,,, | Performed by: INTERNAL MEDICINE

## 2021-12-02 RX ORDER — ROSUVASTATIN CALCIUM 40 MG/1
40 TABLET, COATED ORAL NIGHTLY
Qty: 90 TABLET | Refills: 3 | Status: SHIPPED | OUTPATIENT
Start: 2021-12-02 | End: 2021-12-23 | Stop reason: SDUPTHER

## 2021-12-07 ENCOUNTER — OFFICE VISIT (OUTPATIENT)
Dept: OBSTETRICS AND GYNECOLOGY | Facility: CLINIC | Age: 61
End: 2021-12-07
Payer: COMMERCIAL

## 2021-12-07 VITALS
DIASTOLIC BLOOD PRESSURE: 70 MMHG | BODY MASS INDEX: 30.71 KG/M2 | WEIGHT: 178.88 LBS | SYSTOLIC BLOOD PRESSURE: 115 MMHG

## 2021-12-07 DIAGNOSIS — Z12.4 PAP SMEAR FOR CERVICAL CANCER SCREENING: ICD-10-CM

## 2021-12-07 DIAGNOSIS — Z01.419 WELL WOMAN EXAM: Primary | ICD-10-CM

## 2021-12-07 PROCEDURE — 88175 CYTOPATH C/V AUTO FLUID REDO: CPT | Performed by: STUDENT IN AN ORGANIZED HEALTH CARE EDUCATION/TRAINING PROGRAM

## 2021-12-07 PROCEDURE — 87624 HPV HI-RISK TYP POOLED RSLT: CPT | Performed by: STUDENT IN AN ORGANIZED HEALTH CARE EDUCATION/TRAINING PROGRAM

## 2021-12-07 PROCEDURE — 99386 PR PREVENTIVE VISIT,NEW,40-64: ICD-10-PCS | Mod: S$GLB,,, | Performed by: STUDENT IN AN ORGANIZED HEALTH CARE EDUCATION/TRAINING PROGRAM

## 2021-12-07 PROCEDURE — 99999 PR PBB SHADOW E&M-EST. PATIENT-LVL III: ICD-10-PCS | Mod: PBBFAC,,, | Performed by: STUDENT IN AN ORGANIZED HEALTH CARE EDUCATION/TRAINING PROGRAM

## 2021-12-07 PROCEDURE — 99999 PR PBB SHADOW E&M-EST. PATIENT-LVL III: CPT | Mod: PBBFAC,,, | Performed by: STUDENT IN AN ORGANIZED HEALTH CARE EDUCATION/TRAINING PROGRAM

## 2021-12-07 PROCEDURE — 87625 HPV TYPES 16 & 18 ONLY: CPT | Performed by: STUDENT IN AN ORGANIZED HEALTH CARE EDUCATION/TRAINING PROGRAM

## 2021-12-07 PROCEDURE — 99386 PREV VISIT NEW AGE 40-64: CPT | Mod: S$GLB,,, | Performed by: STUDENT IN AN ORGANIZED HEALTH CARE EDUCATION/TRAINING PROGRAM

## 2021-12-09 ENCOUNTER — HOSPITAL ENCOUNTER (OUTPATIENT)
Dept: RADIOLOGY | Facility: HOSPITAL | Age: 61
Discharge: HOME | End: 2021-12-09
Attending: INTERNAL MEDICINE
Payer: COMMERCIAL

## 2021-12-09 ENCOUNTER — HOSPITAL ENCOUNTER (OUTPATIENT)
Dept: CARDIOLOGY | Facility: HOSPITAL | Age: 61
Discharge: HOME | End: 2021-12-09
Attending: INTERNAL MEDICINE
Payer: COMMERCIAL

## 2021-12-09 DIAGNOSIS — E78.2 MIXED HYPERLIPIDEMIA: ICD-10-CM

## 2021-12-09 DIAGNOSIS — R94.31 ABNORMAL ELECTROCARDIOGRAM (ECG) (EKG): ICD-10-CM

## 2021-12-09 DIAGNOSIS — R94.31 ABNORMAL EKG: ICD-10-CM

## 2021-12-09 DIAGNOSIS — I10 ESSENTIAL HYPERTENSION: ICD-10-CM

## 2021-12-09 LAB
CV STRESS BASE HR: 83 BPM
DIASTOLIC BLOOD PRESSURE: 99 MMHG
OHS CV CPX 85 PERCENT MAX PREDICTED HEART RATE MALE: 131
OHS CV CPX ESTIMATED METS: 5
OHS CV CPX MAX PREDICTED HEART RATE: 154
OHS CV CPX PATIENT IS FEMALE: 1
OHS CV CPX PATIENT IS MALE: 0
OHS CV CPX PEAK DIASTOLIC BLOOD PRESSURE: 115 MMHG
OHS CV CPX PEAK HEAR RATE: 144 BPM
OHS CV CPX PEAK RATE PRESSURE PRODUCT: NORMAL
OHS CV CPX PEAK SYSTOLIC BLOOD PRESSURE: 185 MMHG
OHS CV CPX PERCENT MAX PREDICTED HEART RATE ACHIEVED: 93
OHS CV CPX RATE PRESSURE PRODUCT PRESENTING: NORMAL
STRESS ECHO POST EXERCISE DUR MIN: 3 MINUTES
STRESS ECHO POST EXERCISE DUR SEC: 7 SECONDS
STRESS ST DEPRESSION: 2 MM
SYSTOLIC BLOOD PRESSURE: 127 MMHG

## 2021-12-09 PROCEDURE — 78452 HT MUSCLE IMAGE SPECT MULT: CPT | Mod: TC

## 2021-12-09 PROCEDURE — 93018 NUCLEAR STRESS TEST (CUPID ONLY): ICD-10-PCS | Mod: ,,, | Performed by: INTERNAL MEDICINE

## 2021-12-09 PROCEDURE — 93016 CV STRESS TEST SUPVJ ONLY: CPT | Mod: ,,, | Performed by: INTERNAL MEDICINE

## 2021-12-09 PROCEDURE — 93016 NUCLEAR STRESS TEST (CUPID ONLY): ICD-10-PCS | Mod: ,,, | Performed by: INTERNAL MEDICINE

## 2021-12-09 PROCEDURE — 78452 HT MUSCLE IMAGE SPECT MULT: CPT | Mod: 26,,, | Performed by: RADIOLOGY

## 2021-12-09 PROCEDURE — 93017 CV STRESS TEST TRACING ONLY: CPT

## 2021-12-09 PROCEDURE — 93018 CV STRESS TEST I&R ONLY: CPT | Mod: ,,, | Performed by: INTERNAL MEDICINE

## 2021-12-09 PROCEDURE — A9502 TC99M TETROFOSMIN: HCPCS

## 2021-12-09 PROCEDURE — 78452 NM MYOCARDIAL PERFUSION SPECT MULTI STUDY: ICD-10-PCS | Mod: 26,,, | Performed by: RADIOLOGY

## 2021-12-10 DIAGNOSIS — I20.89 ANGINAL EQUIVALENT: ICD-10-CM

## 2021-12-10 DIAGNOSIS — R94.39 ABNORMAL CARDIOVASCULAR STRESS TEST: Primary | ICD-10-CM

## 2021-12-10 RX ORDER — METOPROLOL SUCCINATE 25 MG/1
25 TABLET, EXTENDED RELEASE ORAL DAILY
Qty: 30 TABLET | Refills: 11 | Status: ON HOLD | OUTPATIENT
Start: 2021-12-10 | End: 2021-12-14 | Stop reason: SDUPTHER

## 2021-12-10 RX ORDER — CLOPIDOGREL BISULFATE 75 MG/1
75 TABLET ORAL DAILY
Qty: 90 TABLET | Refills: 3 | Status: SHIPPED | OUTPATIENT
Start: 2021-12-10 | End: 2021-12-23

## 2021-12-13 ENCOUNTER — LAB VISIT (OUTPATIENT)
Dept: LAB | Facility: HOSPITAL | Age: 61
End: 2021-12-13
Attending: INTERNAL MEDICINE
Payer: COMMERCIAL

## 2021-12-13 DIAGNOSIS — Z01.818 PREOP TESTING: ICD-10-CM

## 2021-12-13 LAB
ANION GAP SERPL CALC-SCNC: 11 MMOL/L (ref 8–16)
BASOPHILS # BLD AUTO: 0.05 K/UL (ref 0–0.2)
BASOPHILS NFR BLD: 0.8 % (ref 0–1.9)
BUN SERPL-MCNC: 21 MG/DL (ref 6–20)
CALCIUM SERPL-MCNC: 10.2 MG/DL (ref 8.7–10.5)
CHLORIDE SERPL-SCNC: 98 MMOL/L (ref 95–110)
CO2 SERPL-SCNC: 32 MMOL/L (ref 23–29)
CREAT SERPL-MCNC: 1.3 MG/DL (ref 0.5–1.4)
DIFFERENTIAL METHOD: ABNORMAL
EOSINOPHIL # BLD AUTO: 0.1 K/UL (ref 0–0.5)
EOSINOPHIL NFR BLD: 0.9 % (ref 0–8)
ERYTHROCYTE [DISTWIDTH] IN BLOOD BY AUTOMATED COUNT: 14.4 % (ref 11.5–14.5)
EST. GFR  (AFRICAN AMERICAN): 52 ML/MIN/1.73 M^2
EST. GFR  (NON AFRICAN AMERICAN): 45 ML/MIN/1.73 M^2
GLUCOSE SERPL-MCNC: 111 MG/DL (ref 70–110)
HCT VFR BLD AUTO: 44.9 % (ref 37–48.5)
HGB BLD-MCNC: 14.9 G/DL (ref 12–16)
IMM GRANULOCYTES # BLD AUTO: 0.01 K/UL (ref 0–0.04)
IMM GRANULOCYTES NFR BLD AUTO: 0.2 % (ref 0–0.5)
LYMPHOCYTES # BLD AUTO: 3.1 K/UL (ref 1–4.8)
LYMPHOCYTES NFR BLD: 48.3 % (ref 18–48)
MCH RBC QN AUTO: 30.7 PG (ref 27–31)
MCHC RBC AUTO-ENTMCNC: 33.2 G/DL (ref 32–36)
MCV RBC AUTO: 92 FL (ref 82–98)
MONOCYTES # BLD AUTO: 0.6 K/UL (ref 0.3–1)
MONOCYTES NFR BLD: 8.6 % (ref 4–15)
NEUTROPHILS # BLD AUTO: 2.6 K/UL (ref 1.8–7.7)
NEUTROPHILS NFR BLD: 41.2 % (ref 38–73)
NRBC BLD-RTO: 0 /100 WBC
PLATELET # BLD AUTO: 319 K/UL (ref 150–450)
PMV BLD AUTO: 11.2 FL (ref 9.2–12.9)
POTASSIUM SERPL-SCNC: 3.5 MMOL/L (ref 3.5–5.1)
RBC # BLD AUTO: 4.86 M/UL (ref 4–5.4)
SODIUM SERPL-SCNC: 141 MMOL/L (ref 136–145)
WBC # BLD AUTO: 6.4 K/UL (ref 3.9–12.7)

## 2021-12-13 PROCEDURE — 80048 BASIC METABOLIC PNL TOTAL CA: CPT | Performed by: INTERNAL MEDICINE

## 2021-12-13 PROCEDURE — 36415 COLL VENOUS BLD VENIPUNCTURE: CPT | Performed by: INTERNAL MEDICINE

## 2021-12-13 PROCEDURE — 85025 COMPLETE CBC W/AUTO DIFF WBC: CPT | Performed by: INTERNAL MEDICINE

## 2021-12-14 ENCOUNTER — HOSPITAL ENCOUNTER (OUTPATIENT)
Facility: HOSPITAL | Age: 61
Discharge: HOME | End: 2021-12-14
Attending: INTERNAL MEDICINE | Admitting: INTERNAL MEDICINE
Payer: COMMERCIAL

## 2021-12-14 VITALS
SYSTOLIC BLOOD PRESSURE: 135 MMHG | DIASTOLIC BLOOD PRESSURE: 64 MMHG | RESPIRATION RATE: 16 BRPM | BODY MASS INDEX: 30.39 KG/M2 | OXYGEN SATURATION: 98 % | TEMPERATURE: 98 F | WEIGHT: 178 LBS | HEART RATE: 60 BPM | HEIGHT: 64 IN

## 2021-12-14 DIAGNOSIS — I20.89 ANGINAL EQUIVALENT: ICD-10-CM

## 2021-12-14 DIAGNOSIS — Z01.810 PRE-OPERATIVE CARDIOVASCULAR EXAMINATION: ICD-10-CM

## 2021-12-14 DIAGNOSIS — Z01.818 PREOP TESTING: Primary | ICD-10-CM

## 2021-12-14 DIAGNOSIS — R94.39 ABNORMAL CARDIOVASCULAR STRESS TEST: ICD-10-CM

## 2021-12-14 LAB
ESTIMATED AVG GLUCOSE: 134 MG/DL (ref 68–131)
HBA1C MFR BLD: 6.3 % (ref 4–5.6)

## 2021-12-14 PROCEDURE — 99152 PR MOD CONSCIOUS SEDATION, SAME PHYS, 5+ YRS, FIRST 15 MIN: ICD-10-PCS | Mod: ,,, | Performed by: INTERNAL MEDICINE

## 2021-12-14 PROCEDURE — 93458 L HRT ARTERY/VENTRICLE ANGIO: CPT | Performed by: INTERNAL MEDICINE

## 2021-12-14 PROCEDURE — 93010 EKG 12-LEAD: ICD-10-PCS | Mod: ,,, | Performed by: INTERNAL MEDICINE

## 2021-12-14 PROCEDURE — 36415 COLL VENOUS BLD VENIPUNCTURE: CPT | Performed by: INTERNAL MEDICINE

## 2021-12-14 PROCEDURE — 93010 ELECTROCARDIOGRAM REPORT: CPT | Mod: 76,,, | Performed by: INTERNAL MEDICINE

## 2021-12-14 PROCEDURE — 99152 MOD SED SAME PHYS/QHP 5/>YRS: CPT | Mod: ,,, | Performed by: INTERNAL MEDICINE

## 2021-12-14 PROCEDURE — C1887 CATHETER, GUIDING: HCPCS | Performed by: INTERNAL MEDICINE

## 2021-12-14 PROCEDURE — C1769 GUIDE WIRE: HCPCS | Performed by: INTERNAL MEDICINE

## 2021-12-14 PROCEDURE — 63600175 PHARM REV CODE 636 W HCPCS: Performed by: INTERNAL MEDICINE

## 2021-12-14 PROCEDURE — 99152 MOD SED SAME PHYS/QHP 5/>YRS: CPT | Performed by: INTERNAL MEDICINE

## 2021-12-14 PROCEDURE — 93005 ELECTROCARDIOGRAM TRACING: CPT | Mod: 59

## 2021-12-14 PROCEDURE — 25000003 PHARM REV CODE 250: Performed by: INTERNAL MEDICINE

## 2021-12-14 PROCEDURE — 83036 HEMOGLOBIN GLYCOSYLATED A1C: CPT | Performed by: INTERNAL MEDICINE

## 2021-12-14 PROCEDURE — 25500020 PHARM REV CODE 255: Performed by: INTERNAL MEDICINE

## 2021-12-14 PROCEDURE — C1894 INTRO/SHEATH, NON-LASER: HCPCS | Performed by: INTERNAL MEDICINE

## 2021-12-14 RX ORDER — HEPARIN SODIUM 1000 [USP'U]/ML
INJECTION, SOLUTION INTRAVENOUS; SUBCUTANEOUS
Status: DISCONTINUED | OUTPATIENT
Start: 2021-12-14 | End: 2021-12-14 | Stop reason: HOSPADM

## 2021-12-14 RX ORDER — IODIXANOL 320 MG/ML
INJECTION, SOLUTION INTRAVASCULAR
Status: DISCONTINUED | OUTPATIENT
Start: 2021-12-14 | End: 2021-12-14 | Stop reason: HOSPADM

## 2021-12-14 RX ORDER — MIDAZOLAM HYDROCHLORIDE 1 MG/ML
INJECTION, SOLUTION INTRAMUSCULAR; INTRAVENOUS
Status: DISCONTINUED | OUTPATIENT
Start: 2021-12-14 | End: 2021-12-14 | Stop reason: HOSPADM

## 2021-12-14 RX ORDER — FENTANYL CITRATE 50 UG/ML
INJECTION, SOLUTION INTRAMUSCULAR; INTRAVENOUS
Status: DISCONTINUED | OUTPATIENT
Start: 2021-12-14 | End: 2021-12-14 | Stop reason: HOSPADM

## 2021-12-14 RX ORDER — LIDOCAINE HYDROCHLORIDE 10 MG/ML
INJECTION, SOLUTION EPIDURAL; INFILTRATION; INTRACAUDAL; PERINEURAL
Status: DISCONTINUED | OUTPATIENT
Start: 2021-12-14 | End: 2021-12-14 | Stop reason: HOSPADM

## 2021-12-14 RX ORDER — ASPIRIN 81 MG/1
81 TABLET ORAL DAILY
COMMUNITY
End: 2021-12-23 | Stop reason: SDUPTHER

## 2021-12-14 RX ORDER — DIPHENHYDRAMINE HYDROCHLORIDE 50 MG/ML
INJECTION INTRAMUSCULAR; INTRAVENOUS
Status: DISCONTINUED | OUTPATIENT
Start: 2021-12-14 | End: 2021-12-14 | Stop reason: HOSPADM

## 2021-12-14 RX ORDER — HEPARIN SODIUM 200 [USP'U]/100ML
INJECTION, SOLUTION INTRAVENOUS
Status: DISCONTINUED | OUTPATIENT
Start: 2021-12-14 | End: 2021-12-14 | Stop reason: HOSPADM

## 2021-12-14 RX ORDER — METOPROLOL SUCCINATE 25 MG/1
25 TABLET, EXTENDED RELEASE ORAL 2 TIMES DAILY
Qty: 60 TABLET | Refills: 11 | Status: SHIPPED | OUTPATIENT
Start: 2021-12-14 | End: 2021-12-23 | Stop reason: SDUPTHER

## 2021-12-14 RX ORDER — LABETALOL HYDROCHLORIDE 5 MG/ML
INJECTION, SOLUTION INTRAVENOUS
Status: DISCONTINUED | OUTPATIENT
Start: 2021-12-14 | End: 2021-12-14 | Stop reason: HOSPADM

## 2021-12-14 RX ORDER — METHYLPREDNISOLONE SOD SUCC 125 MG
125 VIAL (EA) INJECTION ONCE
Status: DISCONTINUED | OUTPATIENT
Start: 2021-12-14 | End: 2021-12-14 | Stop reason: HOSPADM

## 2021-12-14 RX ORDER — SODIUM CHLORIDE 9 MG/ML
INJECTION, SOLUTION INTRAVENOUS
Status: DISCONTINUED | OUTPATIENT
Start: 2021-12-14 | End: 2021-12-14 | Stop reason: HOSPADM

## 2021-12-14 RX ORDER — SODIUM CHLORIDE 9 MG/ML
INJECTION, SOLUTION INTRAVENOUS CONTINUOUS
Status: DISCONTINUED | OUTPATIENT
Start: 2021-12-14 | End: 2021-12-14 | Stop reason: HOSPADM

## 2021-12-14 RX ORDER — DIPHENHYDRAMINE HCL 25 MG
50 CAPSULE ORAL ONCE
Status: COMPLETED | OUTPATIENT
Start: 2021-12-14 | End: 2021-12-14

## 2021-12-14 RX ADMIN — SODIUM CHLORIDE: 0.9 INJECTION, SOLUTION INTRAVENOUS at 10:12

## 2021-12-14 RX ADMIN — DIPHENHYDRAMINE HYDROCHLORIDE 50 MG: 25 CAPSULE ORAL at 11:12

## 2021-12-15 LAB
CATH EF ESTIMATED: 53 %
CLINICAL INFO: NORMAL
CYTO CVX: NORMAL
CYTOLOGIST CVX/VAG CYTO: NORMAL
CYTOLOGIST CVX/VAG CYTO: NORMAL
CYTOLOGY CMNT CVX/VAG CYTO-IMP: NORMAL
CYTOLOGY PAP THIN PREP EXPLANATION: NORMAL
DATE OF PREVIOUS PAP: NO
DATE PREVIOUS BX: NO
GEN CATEG CVX/VAG CYTO-IMP: NORMAL
HPV I/H RISK 4 DNA CVX QL NAA+PROBE: DETECTED
HPV16 DNA CVX QL PROBE+SIG AMP: NORMAL
HPV18 DNA CVX QL PROBE+SIG AMP: NORMAL
LMP START DATE: NORMAL
MICROORGANISM CVX/VAG CYTO: NORMAL
PATHOLOGIST CVX/VAG CYTO: NORMAL
SERVICE CMNT-IMP: NORMAL
SPECIMEN SOURCE CVX/VAG CYTO: NORMAL
STAT OF ADQ CVX/VAG CYTO-IMP: NORMAL

## 2021-12-16 ENCOUNTER — TELEPHONE (OUTPATIENT)
Dept: OBSTETRICS AND GYNECOLOGY | Facility: CLINIC | Age: 61
End: 2021-12-16
Payer: COMMERCIAL

## 2021-12-17 ENCOUNTER — TELEPHONE (OUTPATIENT)
Dept: OBSTETRICS AND GYNECOLOGY | Facility: CLINIC | Age: 61
End: 2021-12-17
Payer: COMMERCIAL

## 2021-12-22 ENCOUNTER — OFFICE VISIT (OUTPATIENT)
Dept: CARDIOTHORACIC SURGERY | Facility: CLINIC | Age: 61
End: 2021-12-22
Payer: COMMERCIAL

## 2021-12-22 VITALS
RESPIRATION RATE: 18 BRPM | BODY MASS INDEX: 29.49 KG/M2 | DIASTOLIC BLOOD PRESSURE: 84 MMHG | WEIGHT: 172.75 LBS | SYSTOLIC BLOOD PRESSURE: 129 MMHG | OXYGEN SATURATION: 98 % | HEART RATE: 66 BPM | HEIGHT: 64 IN

## 2021-12-22 DIAGNOSIS — I25.118 CORONARY ARTERY DISEASE OF NATIVE ARTERY OF NATIVE HEART WITH STABLE ANGINA PECTORIS: ICD-10-CM

## 2021-12-22 PROBLEM — I25.10 CORONARY ARTERY DISEASE INVOLVING NATIVE CORONARY ARTERY OF NATIVE HEART: Status: ACTIVE | Noted: 2021-12-22

## 2021-12-22 PROCEDURE — 3074F PR MOST RECENT SYSTOLIC BLOOD PRESSURE < 130 MM HG: ICD-10-PCS | Mod: CPTII,S$GLB,, | Performed by: THORACIC SURGERY (CARDIOTHORACIC VASCULAR SURGERY)

## 2021-12-22 PROCEDURE — 1159F PR MEDICATION LIST DOCUMENTED IN MEDICAL RECORD: ICD-10-PCS | Mod: CPTII,S$GLB,, | Performed by: THORACIC SURGERY (CARDIOTHORACIC VASCULAR SURGERY)

## 2021-12-22 PROCEDURE — 3044F PR MOST RECENT HEMOGLOBIN A1C LEVEL <7.0%: ICD-10-PCS | Mod: CPTII,S$GLB,, | Performed by: THORACIC SURGERY (CARDIOTHORACIC VASCULAR SURGERY)

## 2021-12-22 PROCEDURE — 3079F PR MOST RECENT DIASTOLIC BLOOD PRESSURE 80-89 MM HG: ICD-10-PCS | Mod: CPTII,S$GLB,, | Performed by: THORACIC SURGERY (CARDIOTHORACIC VASCULAR SURGERY)

## 2021-12-22 PROCEDURE — 1159F MED LIST DOCD IN RCRD: CPT | Mod: CPTII,S$GLB,, | Performed by: THORACIC SURGERY (CARDIOTHORACIC VASCULAR SURGERY)

## 2021-12-22 PROCEDURE — 99205 OFFICE O/P NEW HI 60 MIN: CPT | Mod: S$GLB,,, | Performed by: THORACIC SURGERY (CARDIOTHORACIC VASCULAR SURGERY)

## 2021-12-22 PROCEDURE — 3008F PR BODY MASS INDEX (BMI) DOCUMENTED: ICD-10-PCS | Mod: CPTII,S$GLB,, | Performed by: THORACIC SURGERY (CARDIOTHORACIC VASCULAR SURGERY)

## 2021-12-22 PROCEDURE — 99205 PR OFFICE/OUTPT VISIT, NEW, LEVL V, 60-74 MIN: ICD-10-PCS | Mod: S$GLB,,, | Performed by: THORACIC SURGERY (CARDIOTHORACIC VASCULAR SURGERY)

## 2021-12-22 PROCEDURE — 3008F BODY MASS INDEX DOCD: CPT | Mod: CPTII,S$GLB,, | Performed by: THORACIC SURGERY (CARDIOTHORACIC VASCULAR SURGERY)

## 2021-12-22 PROCEDURE — 3044F HG A1C LEVEL LT 7.0%: CPT | Mod: CPTII,S$GLB,, | Performed by: THORACIC SURGERY (CARDIOTHORACIC VASCULAR SURGERY)

## 2021-12-22 PROCEDURE — 3074F SYST BP LT 130 MM HG: CPT | Mod: CPTII,S$GLB,, | Performed by: THORACIC SURGERY (CARDIOTHORACIC VASCULAR SURGERY)

## 2021-12-22 PROCEDURE — 99999 PR PBB SHADOW E&M-EST. PATIENT-LVL III: ICD-10-PCS | Mod: PBBFAC,,, | Performed by: THORACIC SURGERY (CARDIOTHORACIC VASCULAR SURGERY)

## 2021-12-22 PROCEDURE — 99999 PR PBB SHADOW E&M-EST. PATIENT-LVL III: CPT | Mod: PBBFAC,,, | Performed by: THORACIC SURGERY (CARDIOTHORACIC VASCULAR SURGERY)

## 2021-12-22 PROCEDURE — 3079F DIAST BP 80-89 MM HG: CPT | Mod: CPTII,S$GLB,, | Performed by: THORACIC SURGERY (CARDIOTHORACIC VASCULAR SURGERY)

## 2021-12-23 ENCOUNTER — OFFICE VISIT (OUTPATIENT)
Dept: CARDIOLOGY | Facility: CLINIC | Age: 61
End: 2021-12-23
Payer: COMMERCIAL

## 2021-12-23 VITALS
BODY MASS INDEX: 29.37 KG/M2 | OXYGEN SATURATION: 98 % | SYSTOLIC BLOOD PRESSURE: 110 MMHG | HEART RATE: 66 BPM | WEIGHT: 172 LBS | DIASTOLIC BLOOD PRESSURE: 78 MMHG | HEIGHT: 64 IN

## 2021-12-23 DIAGNOSIS — I45.10 RBBB: Chronic | ICD-10-CM

## 2021-12-23 DIAGNOSIS — E78.01 FAMILIAL HYPERCHOLESTEROLEMIA: Chronic | ICD-10-CM

## 2021-12-23 DIAGNOSIS — I10 ESSENTIAL HYPERTENSION: ICD-10-CM

## 2021-12-23 DIAGNOSIS — I25.118 CORONARY ARTERY DISEASE OF NATIVE ARTERY OF NATIVE HEART WITH STABLE ANGINA PECTORIS: Primary | ICD-10-CM

## 2021-12-23 DIAGNOSIS — R94.39 ABNORMAL CARDIOVASCULAR STRESS TEST: ICD-10-CM

## 2021-12-23 DIAGNOSIS — E78.2 MIXED HYPERLIPIDEMIA: ICD-10-CM

## 2021-12-23 PROCEDURE — 99214 OFFICE O/P EST MOD 30 MIN: CPT | Mod: S$GLB,,, | Performed by: INTERNAL MEDICINE

## 2021-12-23 PROCEDURE — 3078F DIAST BP <80 MM HG: CPT | Mod: CPTII,S$GLB,, | Performed by: INTERNAL MEDICINE

## 2021-12-23 PROCEDURE — 3008F BODY MASS INDEX DOCD: CPT | Mod: CPTII,S$GLB,, | Performed by: INTERNAL MEDICINE

## 2021-12-23 PROCEDURE — 1159F PR MEDICATION LIST DOCUMENTED IN MEDICAL RECORD: ICD-10-PCS | Mod: CPTII,S$GLB,, | Performed by: INTERNAL MEDICINE

## 2021-12-23 PROCEDURE — 3074F SYST BP LT 130 MM HG: CPT | Mod: CPTII,S$GLB,, | Performed by: INTERNAL MEDICINE

## 2021-12-23 PROCEDURE — 99214 PR OFFICE/OUTPT VISIT, EST, LEVL IV, 30-39 MIN: ICD-10-PCS | Mod: S$GLB,,, | Performed by: INTERNAL MEDICINE

## 2021-12-23 PROCEDURE — 3044F PR MOST RECENT HEMOGLOBIN A1C LEVEL <7.0%: ICD-10-PCS | Mod: CPTII,S$GLB,, | Performed by: INTERNAL MEDICINE

## 2021-12-23 PROCEDURE — 99999 PR PBB SHADOW E&M-EST. PATIENT-LVL III: ICD-10-PCS | Mod: PBBFAC,,, | Performed by: INTERNAL MEDICINE

## 2021-12-23 PROCEDURE — 99999 PR PBB SHADOW E&M-EST. PATIENT-LVL III: CPT | Mod: PBBFAC,,, | Performed by: INTERNAL MEDICINE

## 2021-12-23 PROCEDURE — 3008F PR BODY MASS INDEX (BMI) DOCUMENTED: ICD-10-PCS | Mod: CPTII,S$GLB,, | Performed by: INTERNAL MEDICINE

## 2021-12-23 PROCEDURE — 1159F MED LIST DOCD IN RCRD: CPT | Mod: CPTII,S$GLB,, | Performed by: INTERNAL MEDICINE

## 2021-12-23 PROCEDURE — 3044F HG A1C LEVEL LT 7.0%: CPT | Mod: CPTII,S$GLB,, | Performed by: INTERNAL MEDICINE

## 2021-12-23 PROCEDURE — 3078F PR MOST RECENT DIASTOLIC BLOOD PRESSURE < 80 MM HG: ICD-10-PCS | Mod: CPTII,S$GLB,, | Performed by: INTERNAL MEDICINE

## 2021-12-23 PROCEDURE — 3074F PR MOST RECENT SYSTOLIC BLOOD PRESSURE < 130 MM HG: ICD-10-PCS | Mod: CPTII,S$GLB,, | Performed by: INTERNAL MEDICINE

## 2021-12-23 RX ORDER — AMLODIPINE BESYLATE 5 MG/1
5 TABLET ORAL DAILY
Qty: 30 TABLET | Refills: 0 | Status: SHIPPED | OUTPATIENT
Start: 2021-12-23 | End: 2022-01-21 | Stop reason: SDUPTHER

## 2021-12-23 RX ORDER — ROSUVASTATIN CALCIUM 40 MG/1
40 TABLET, COATED ORAL NIGHTLY
Qty: 90 TABLET | Refills: 3 | Status: SHIPPED | OUTPATIENT
Start: 2021-12-23 | End: 2022-12-27

## 2021-12-23 RX ORDER — CLOPIDOGREL BISULFATE 75 MG/1
75 TABLET ORAL DAILY
Qty: 90 TABLET | Refills: 3 | Status: SHIPPED | OUTPATIENT
Start: 2021-12-23 | End: 2022-03-17 | Stop reason: SDUPTHER

## 2021-12-23 RX ORDER — CHLORTHALIDONE 25 MG/1
25 TABLET ORAL DAILY
Qty: 30 TABLET | Refills: 0 | Status: SHIPPED | OUTPATIENT
Start: 2021-12-23 | End: 2022-01-21 | Stop reason: SDUPTHER

## 2021-12-23 RX ORDER — POTASSIUM CHLORIDE 20 MEQ/1
20 TABLET, EXTENDED RELEASE ORAL DAILY
Qty: 30 TABLET | Refills: 0 | Status: SHIPPED | OUTPATIENT
Start: 2021-12-23 | End: 2022-01-21 | Stop reason: SDUPTHER

## 2021-12-23 RX ORDER — METOPROLOL SUCCINATE 25 MG/1
25 TABLET, EXTENDED RELEASE ORAL 2 TIMES DAILY
Qty: 60 TABLET | Refills: 11 | Status: SHIPPED | OUTPATIENT
Start: 2021-12-23 | End: 2022-01-21 | Stop reason: SDUPTHER

## 2021-12-23 RX ORDER — ASPIRIN 81 MG/1
81 TABLET ORAL DAILY
Qty: 30 TABLET | Refills: 11 | Status: SHIPPED | OUTPATIENT
Start: 2021-12-23 | End: 2022-04-04

## 2021-12-27 PROBLEM — E78.2 MIXED HYPERLIPIDEMIA: Status: ACTIVE | Noted: 2021-12-27

## 2021-12-27 PROBLEM — I10 ESSENTIAL HYPERTENSION: Status: ACTIVE | Noted: 2021-12-27

## 2021-12-27 PROBLEM — R94.39 ABNORMAL CARDIOVASCULAR STRESS TEST: Status: ACTIVE | Noted: 2021-12-27

## 2021-12-27 PROBLEM — E78.01 FAMILIAL HYPERCHOLESTEROLEMIA: Chronic | Status: ACTIVE | Noted: 2021-12-27

## 2022-01-04 ENCOUNTER — OFFICE VISIT (OUTPATIENT)
Dept: FAMILY MEDICINE | Facility: CLINIC | Age: 62
End: 2022-01-04
Payer: COMMERCIAL

## 2022-01-04 VITALS
HEART RATE: 61 BPM | SYSTOLIC BLOOD PRESSURE: 100 MMHG | DIASTOLIC BLOOD PRESSURE: 62 MMHG | WEIGHT: 169.75 LBS | BODY MASS INDEX: 28.98 KG/M2 | HEIGHT: 64 IN | OXYGEN SATURATION: 97 %

## 2022-01-04 DIAGNOSIS — Z12.4 SCREENING FOR CERVICAL CANCER: ICD-10-CM

## 2022-01-04 DIAGNOSIS — Z12.31 ENCOUNTER FOR SCREENING MAMMOGRAM FOR MALIGNANT NEOPLASM OF BREAST: ICD-10-CM

## 2022-01-04 DIAGNOSIS — I10 ESSENTIAL HYPERTENSION: ICD-10-CM

## 2022-01-04 DIAGNOSIS — E78.01 FAMILIAL HYPERCHOLESTEROLEMIA: Chronic | ICD-10-CM

## 2022-01-04 DIAGNOSIS — N17.9 AKI (ACUTE KIDNEY INJURY): ICD-10-CM

## 2022-01-04 DIAGNOSIS — Z23 IMMUNIZATION DUE: ICD-10-CM

## 2022-01-04 DIAGNOSIS — F43.23 ADJUSTMENT DISORDER WITH MIXED ANXIETY AND DEPRESSED MOOD: ICD-10-CM

## 2022-01-04 DIAGNOSIS — E66.3 OVERWEIGHT WITH BODY MASS INDEX (BMI) OF 29 TO 29.9 IN ADULT: ICD-10-CM

## 2022-01-04 DIAGNOSIS — I45.10 RBBB: Chronic | ICD-10-CM

## 2022-01-04 DIAGNOSIS — Z12.11 COLON CANCER SCREENING: ICD-10-CM

## 2022-01-04 DIAGNOSIS — F51.01 PRIMARY INSOMNIA: ICD-10-CM

## 2022-01-04 DIAGNOSIS — Z28.21 INFLUENZA VACCINATION DECLINED: ICD-10-CM

## 2022-01-04 DIAGNOSIS — R73.03 PREDIABETES: ICD-10-CM

## 2022-01-04 DIAGNOSIS — Z00.01 ENCOUNTER FOR GENERAL ADULT MEDICAL EXAMINATION WITH ABNORMAL FINDINGS: Primary | ICD-10-CM

## 2022-01-04 DIAGNOSIS — I25.118 CORONARY ARTERY DISEASE OF NATIVE ARTERY OF NATIVE HEART WITH STABLE ANGINA PECTORIS: ICD-10-CM

## 2022-01-04 DIAGNOSIS — E78.2 MIXED HYPERLIPIDEMIA: ICD-10-CM

## 2022-01-04 PROCEDURE — 1159F PR MEDICATION LIST DOCUMENTED IN MEDICAL RECORD: ICD-10-PCS | Mod: CPTII,S$GLB,, | Performed by: FAMILY MEDICINE

## 2022-01-04 PROCEDURE — 3074F SYST BP LT 130 MM HG: CPT | Mod: CPTII,S$GLB,, | Performed by: FAMILY MEDICINE

## 2022-01-04 PROCEDURE — 3008F PR BODY MASS INDEX (BMI) DOCUMENTED: ICD-10-PCS | Mod: CPTII,S$GLB,, | Performed by: FAMILY MEDICINE

## 2022-01-04 PROCEDURE — 99999 PR PBB SHADOW E&M-EST. PATIENT-LVL III: ICD-10-PCS | Mod: PBBFAC,,, | Performed by: FAMILY MEDICINE

## 2022-01-04 PROCEDURE — 1160F PR REVIEW ALL MEDS BY PRESCRIBER/CLIN PHARMACIST DOCUMENTED: ICD-10-PCS | Mod: CPTII,S$GLB,, | Performed by: FAMILY MEDICINE

## 2022-01-04 PROCEDURE — 3074F PR MOST RECENT SYSTOLIC BLOOD PRESSURE < 130 MM HG: ICD-10-PCS | Mod: CPTII,S$GLB,, | Performed by: FAMILY MEDICINE

## 2022-01-04 PROCEDURE — 90471 IMMUNIZATION ADMIN: CPT | Mod: S$GLB,,, | Performed by: FAMILY MEDICINE

## 2022-01-04 PROCEDURE — 99999 PR PBB SHADOW E&M-EST. PATIENT-LVL III: CPT | Mod: PBBFAC,,, | Performed by: FAMILY MEDICINE

## 2022-01-04 PROCEDURE — 3078F PR MOST RECENT DIASTOLIC BLOOD PRESSURE < 80 MM HG: ICD-10-PCS | Mod: CPTII,S$GLB,, | Performed by: FAMILY MEDICINE

## 2022-01-04 PROCEDURE — 90715 TDAP VACCINE GREATER THAN OR EQUAL TO 7YO IM: ICD-10-PCS | Mod: S$GLB,,, | Performed by: FAMILY MEDICINE

## 2022-01-04 PROCEDURE — 1159F MED LIST DOCD IN RCRD: CPT | Mod: CPTII,S$GLB,, | Performed by: FAMILY MEDICINE

## 2022-01-04 PROCEDURE — 99396 PREV VISIT EST AGE 40-64: CPT | Mod: 25,S$GLB,, | Performed by: FAMILY MEDICINE

## 2022-01-04 PROCEDURE — 90715 TDAP VACCINE 7 YRS/> IM: CPT | Mod: S$GLB,,, | Performed by: FAMILY MEDICINE

## 2022-01-04 PROCEDURE — 3078F DIAST BP <80 MM HG: CPT | Mod: CPTII,S$GLB,, | Performed by: FAMILY MEDICINE

## 2022-01-04 PROCEDURE — 3008F BODY MASS INDEX DOCD: CPT | Mod: CPTII,S$GLB,, | Performed by: FAMILY MEDICINE

## 2022-01-04 PROCEDURE — 1160F RVW MEDS BY RX/DR IN RCRD: CPT | Mod: CPTII,S$GLB,, | Performed by: FAMILY MEDICINE

## 2022-01-04 PROCEDURE — 99396 PR PREVENTIVE VISIT,EST,40-64: ICD-10-PCS | Mod: 25,S$GLB,, | Performed by: FAMILY MEDICINE

## 2022-01-04 PROCEDURE — 90471 TDAP VACCINE GREATER THAN OR EQUAL TO 7YO IM: ICD-10-PCS | Mod: S$GLB,,, | Performed by: FAMILY MEDICINE

## 2022-01-04 RX ORDER — HYDROXYZINE PAMOATE 25 MG/1
CAPSULE ORAL
Qty: 60 CAPSULE | Refills: 2 | Status: SHIPPED | OUTPATIENT
Start: 2022-01-04 | End: 2022-03-17

## 2022-01-04 NOTE — PROGRESS NOTES
Subjective:       Patient ID: Veronika Blackmon is a 61 y.o. female.    Chief Complaint: Establish Care    Patient Active Problem List   Diagnosis    RBBB    Coronary artery disease involving native coronary artery of native heart    Familial hypercholesterolemia    Abnormal cardiovascular stress test    Essential hypertension    Mixed hyperlipidemia    Prediabetes      HPI  60 yo female with significant CAD noted s/p C in 12/2021. Symptomatic with SANDOVAL with exertion and currently off of work duty, applying for short term disability. S/p cardiology eval with recs to consider CABG with CT surg.   Patient is in process of making decisions, has follow up with cardiology.    Presents today to establish care with PCP. Taking medications regularly and tolerating well. Reports that she is having trouble sleeping and staying asleep, generally just tired from that. She is accepting of her current state of health and uses prayer to cope, overall she is proud that she is not overly anxious about how it is impacting her ability to work. Reports that she does not have great appetite but denies fully depressed mood. Not noting weight loss despite decrease in appetite.     Review of Systems   All other systems reviewed and are negative.       Results for orders placed or performed during the hospital encounter of 12/14/21   Hemoglobin A1c   Result Value Ref Range    Hemoglobin A1C 6.3 (H) 4.0 - 5.6 %    Estimated Avg Glucose 134 (H) 68 - 131 mg/dL   Cardiac catheterization   Result Value Ref Range    Cath EF Estimated 53 %     Objective:     Vitals:    01/04/22 0806   BP: 100/62   Pulse: 61        Physical Exam  Vitals and nursing note reviewed.   Constitutional:       General: She is not in acute distress.     Appearance: Normal appearance. She is not ill-appearing, toxic-appearing or diaphoretic.   HENT:      Head: Normocephalic and atraumatic.   Eyes:      General: No scleral icterus.     Conjunctiva/sclera: Conjunctivae  normal.   Cardiovascular:      Rate and Rhythm: Normal rate.   Pulmonary:      Effort: Pulmonary effort is normal. No respiratory distress.   Skin:     Coloration: Skin is not pale.   Neurological:      Mental Status: She is alert. Mental status is at baseline.   Psychiatric:         Attention and Perception: Attention and perception normal.         Mood and Affect: Mood and affect normal.         Speech: Speech normal.         Behavior: Behavior normal.         Cognition and Memory: Cognition and memory normal.         Judgment: Judgment normal.         Assessment:       1. Encounter for general adult medical examination with abnormal findings    2. Coronary artery disease of native artery of native heart with stable angina pectoris    3. Mixed hyperlipidemia    4. Essential hypertension    5. Familial hypercholesterolemia    6. RBBB    7. Screening for cervical cancer    8. Encounter for screening mammogram for malignant neoplasm of breast    9. Colon cancer screening    10. Prediabetes    11. HARSH (acute kidney injury)    12. Overweight with body mass index (BMI) of 29 to 29.9 in adult    13. Influenza vaccination declined    14. Immunization due    15. Adjustment disorder with mixed anxiety and depressed mood    16. Primary insomnia        Plan:         Encounter for general adult medical examination with abnormal findings  - Risk and age appropriate anticipatory guidance. HM reviewed and updated. Recommendations discussed with patient as appropriate.     Coronary artery disease of native artery of native heart with stable angina pectoris  - Plugged in with Cardiology and discussing next steps, CABG. Patient states she also has pending second opinion appt with [facility].     Mixed hyperlipidemia  -     Basic Metabolic Panel; Future; Expected date: 04/04/2022  -     Lipid Panel; Future; Expected date: 04/04/2022    Essential hypertension  -     Basic Metabolic Panel; Future; Expected date: 04/04/2022  - Controlled. Home  BP in more normal range, no hypotensive symptoms    Familial hypercholesterolemia  RBBB  - Pending decision with patient and cardiology/CT surgery for CABG    Screening for cervical cancer  - Up to date, reviewed    Encounter for screening mammogram for malignant neoplasm of breast  - Scheduled    Colon cancer screening  -     Fecal Immunochemical Test (iFOBT); Future; Expected date: 01/04/2022  - Likely s/p CABG and CV health optimization    Prediabetes  HARSH (acute kidney injury)  Overweight with body mass index (BMI) of 29 to 29.9 in adult  - Reviewed labs with patient. Healthy lifestyle (dietary) modifications. Recheck fasting in 3-6 months. Lipid panel out of control on maximum dose high intensity statin. Consider additional meds upon reassessment.     Influenza vaccination declined    Immunization due  -     (In Office Administered) Tdap Vaccine    Adjustment disorder with mixed anxiety and depressed mood  - Appears to be handling well. Symptoms described can be independently from poor CV function vs underlying depression or from primary insomnia. Encourage continued self care and reframing. Continue to direct priority over heart health and next steps with cardiology/CT surgery. Sleep hygiene and treat insomnia.   - Discussed appetite can be reflection on mood. Encourage regular physical activity to build muscle as well as energy.     Primary insomnia  -     hydrOXYzine pamoate (VISTARIL) 25 MG Cap; Take 1-2 caps po nightly prn sleep difficulty  Dispense: 60 capsule; Refill: 2    Patient's questions answered. Plan reviewed with patient at the end of visit. Relevant precautions to chief complaint and reasons to seek medical care or contact the office sooner reviewed with patient.     Follow up in about 3 months (around 4/4/2022) for Hypertension Follow-up (fasting lipid panel and BMP).

## 2022-01-10 ENCOUNTER — TELEPHONE (OUTPATIENT)
Dept: INTERNAL MEDICINE | Facility: CLINIC | Age: 62
End: 2022-01-10
Payer: COMMERCIAL

## 2022-01-10 ENCOUNTER — TELEPHONE (OUTPATIENT)
Dept: CARDIOLOGY | Facility: CLINIC | Age: 62
End: 2022-01-10
Payer: COMMERCIAL

## 2022-01-10 NOTE — TELEPHONE ENCOUNTER
----- Message from Laura Horan sent at 1/10/2022  9:43 AM CST -----  Contact: pt  Type:  Needs Medical Advice    Who Called:  pt  Symptoms (please be specific):  calling to follow up on information [facility]     Would the patient rather a call back or a response via [facility]?  call  Best Call Back Number:  [phone]  Additional Information:

## 2022-01-10 NOTE — TELEPHONE ENCOUNTER
Contacted patient and scheduled patient to be seen this week. Patient denies change in speech and weakness.

## 2022-01-10 NOTE — TELEPHONE ENCOUNTER
Patient states that she has been cold a lot lately, and her right arm has been tingling a lot lately, and her face has been tingling since starting her blood thinner medication.

## 2022-01-10 NOTE — TELEPHONE ENCOUNTER
----- Message from Elisa Manning sent at 1/10/2022  9:48 AM CST -----  Type:  Patient Requesting call back    Who Called:Veronika Blackmon  Would the patient rather a call back or a response via [facility]? call back  Best Call Back Number:[phone]  Additional Information: Patient Requesting call back regarding primary care.

## 2022-01-11 ENCOUNTER — OFFICE VISIT (OUTPATIENT)
Dept: FAMILY MEDICINE | Facility: CLINIC | Age: 62
End: 2022-01-11
Payer: COMMERCIAL

## 2022-01-11 VITALS
OXYGEN SATURATION: 98 % | HEART RATE: 62 BPM | SYSTOLIC BLOOD PRESSURE: 116 MMHG | WEIGHT: 171.31 LBS | DIASTOLIC BLOOD PRESSURE: 70 MMHG | BODY MASS INDEX: 29.4 KG/M2

## 2022-01-11 DIAGNOSIS — R29.810 FACIAL WEAKNESS: ICD-10-CM

## 2022-01-11 DIAGNOSIS — R20.2 TINGLING OF LEFT ARM AND LEFT SIDE OF FACE: Primary | ICD-10-CM

## 2022-01-11 DIAGNOSIS — G45.9 TRANSIENT CEREBRAL ISCHEMIA, UNSPECIFIED TYPE: ICD-10-CM

## 2022-01-11 PROCEDURE — 3074F SYST BP LT 130 MM HG: CPT | Mod: CPTII,S$GLB,, | Performed by: FAMILY MEDICINE

## 2022-01-11 PROCEDURE — 3074F PR MOST RECENT SYSTOLIC BLOOD PRESSURE < 130 MM HG: ICD-10-PCS | Mod: CPTII,S$GLB,, | Performed by: FAMILY MEDICINE

## 2022-01-11 PROCEDURE — 99213 OFFICE O/P EST LOW 20 MIN: CPT | Mod: S$GLB,,, | Performed by: FAMILY MEDICINE

## 2022-01-11 PROCEDURE — 99213 PR OFFICE/OUTPT VISIT, EST, LEVL III, 20-29 MIN: ICD-10-PCS | Mod: S$GLB,,, | Performed by: FAMILY MEDICINE

## 2022-01-11 PROCEDURE — 1160F RVW MEDS BY RX/DR IN RCRD: CPT | Mod: CPTII,S$GLB,, | Performed by: FAMILY MEDICINE

## 2022-01-11 PROCEDURE — 1159F MED LIST DOCD IN RCRD: CPT | Mod: CPTII,S$GLB,, | Performed by: FAMILY MEDICINE

## 2022-01-11 PROCEDURE — 3008F PR BODY MASS INDEX (BMI) DOCUMENTED: ICD-10-PCS | Mod: CPTII,S$GLB,, | Performed by: FAMILY MEDICINE

## 2022-01-11 PROCEDURE — 1159F PR MEDICATION LIST DOCUMENTED IN MEDICAL RECORD: ICD-10-PCS | Mod: CPTII,S$GLB,, | Performed by: FAMILY MEDICINE

## 2022-01-11 PROCEDURE — 3008F BODY MASS INDEX DOCD: CPT | Mod: CPTII,S$GLB,, | Performed by: FAMILY MEDICINE

## 2022-01-11 PROCEDURE — 3078F DIAST BP <80 MM HG: CPT | Mod: CPTII,S$GLB,, | Performed by: FAMILY MEDICINE

## 2022-01-11 PROCEDURE — 3078F PR MOST RECENT DIASTOLIC BLOOD PRESSURE < 80 MM HG: ICD-10-PCS | Mod: CPTII,S$GLB,, | Performed by: FAMILY MEDICINE

## 2022-01-11 PROCEDURE — 99999 PR PBB SHADOW E&M-EST. PATIENT-LVL IV: ICD-10-PCS | Mod: PBBFAC,,, | Performed by: FAMILY MEDICINE

## 2022-01-11 PROCEDURE — 1160F PR REVIEW ALL MEDS BY PRESCRIBER/CLIN PHARMACIST DOCUMENTED: ICD-10-PCS | Mod: CPTII,S$GLB,, | Performed by: FAMILY MEDICINE

## 2022-01-11 PROCEDURE — 99999 PR PBB SHADOW E&M-EST. PATIENT-LVL IV: CPT | Mod: PBBFAC,,, | Performed by: FAMILY MEDICINE

## 2022-01-11 NOTE — PROGRESS NOTES
Subjective:       Patient ID: Veronika Blackmon is a 61 y.o. female.    Chief Complaint: No chief complaint on file.      Patient Active Problem List   Diagnosis    RBBB    Coronary artery disease involving native coronary artery of native heart    Familial hypercholesterolemia    Abnormal cardiovascular stress test    Essential hypertension    Mixed hyperlipidemia    Prediabetes        HPI  60 yo female presents today with some tingling and strange sensations on her face and arm. Reports that it comes and goes. Denies weakness, vision changes, HA, triggers, slurring of speech, trouble swallowing. Reports that it started 1/8/2022.   Pretty bad CAD noted and pending decision on CABG. Has followed up with cardiology. On ASA, Plavix and Rosuvastatin.       Review of Systems   All other systems reviewed and are negative.       Objective:     Vitals:    01/11/22 0749   BP: 116/70   Pulse: 62        Physical Exam  Vitals and nursing note reviewed.   Constitutional:       General: She is not in acute distress.     Appearance: Normal appearance. She is well-developed. She is not ill-appearing, toxic-appearing or diaphoretic.   HENT:      Head: Normocephalic and atraumatic.   Eyes:      General: No scleral icterus.     Conjunctiva/sclera: Conjunctivae normal.   Cardiovascular:      Rate and Rhythm: Normal rate and regular rhythm.   Pulmonary:      Effort: Pulmonary effort is normal. No respiratory distress.      Breath sounds: Normal breath sounds.   Abdominal:      General: There is no distension.      Palpations: Abdomen is soft.      Tenderness: There is no abdominal tenderness.   Musculoskeletal:      Cervical back: Normal range of motion and neck supple.   Skin:     General: Skin is warm.      Coloration: Skin is not pale.   Neurological:      Mental Status: She is alert and oriented to person, place, and time. Mental status is at baseline.      Cranial Nerves: No cranial nerve deficit.      Sensory: No sensory  deficit.      Motor: No abnormal muscle tone.      Comments: Finger to nose intact  Heel to shin intact  Strength and sensation grossly intract BL UE/LE  CN 2-12 grossly intact  PERRLA  Rapid alternating movement intact    Gait normal  Toe walk normal  Heel walk normal  Tandem gait normal    Normal neuro exam. Romberg normal.    Psychiatric:         Attention and Perception: Attention and perception normal.         Mood and Affect: Mood and affect normal.         Speech: Speech normal.         Behavior: Behavior normal.         Thought Content: Thought content normal.         Cognition and Memory: Cognition and memory normal.         Judgment: Judgment normal.         Assessment:       1. Tingling of left arm and left side of face    2. Facial weakness    3. Transient cerebral ischemia, unspecified type          Plan:         Tingling of left arm and left side of face  -     US Carotid Bilateral; Future; Expected date: 01/11/2022  -     MRI Brain W WO Contrast; Future; Expected date: 01/11/2022    Facial weakness  -     US Carotid Bilateral; Future; Expected date: 01/11/2022  -     MRI Brain W WO Contrast; Future; Expected date: 01/11/2022    Imaging as above. Discussed and emphasized early signs/symptoms of stroke. BP controlled. Possible that this may be some delayed effect of controlling her blood pressure from prior higher baseline. Discussed that vascular disease in CAD may also be present elsewhere. Strict ER precautions. Encourage to minimize and/or stop driving if possible and discuss/ review with loved ones signs and symptoms of stroke.     No facial weakness, asymmetry. Not bilateral or uniform or neuropathy in description that warrants eval of vitamin levels. Reviewed meds list, continue. Complete imaging above. Referral to vascular neurology or neurology if appropriate.     Patient's questions answered. Plan reviewed with patient at the end of visit. Relevant precautions to chief complaint and reasons to  seek medical care or contact the office sooner reviewed with patient.     Follow up if symptoms worsen or fail to improve, for Has upcoming visit in April .

## 2022-01-13 ENCOUNTER — IMMUNIZATION (OUTPATIENT)
Dept: INTERNAL MEDICINE | Facility: CLINIC | Age: 62
End: 2022-01-13
Payer: COMMERCIAL

## 2022-01-13 DIAGNOSIS — Z23 NEED FOR VACCINATION: Primary | ICD-10-CM

## 2022-01-13 PROCEDURE — 0004A COVID-19, MRNA, LNP-S, PF, 30 MCG/0.3 ML DOSE VACCINE: CPT | Mod: PBBFAC | Performed by: INTERNAL MEDICINE

## 2022-01-21 DIAGNOSIS — R94.39 ABNORMAL CARDIOVASCULAR STRESS TEST: ICD-10-CM

## 2022-01-21 DIAGNOSIS — I10 ESSENTIAL HYPERTENSION: ICD-10-CM

## 2022-01-21 RX ORDER — AMLODIPINE BESYLATE 5 MG/1
5 TABLET ORAL DAILY
Qty: 90 TABLET | Refills: 3 | Status: SHIPPED | OUTPATIENT
Start: 2022-01-21 | End: 2022-03-17

## 2022-01-21 RX ORDER — POTASSIUM CHLORIDE 20 MEQ/1
20 TABLET, EXTENDED RELEASE ORAL DAILY
Qty: 90 TABLET | Refills: 3 | Status: SHIPPED | OUTPATIENT
Start: 2022-01-21 | End: 2022-03-17

## 2022-01-21 RX ORDER — METOPROLOL SUCCINATE 25 MG/1
25 TABLET, EXTENDED RELEASE ORAL 2 TIMES DAILY
Qty: 180 TABLET | Refills: 3 | Status: SHIPPED | OUTPATIENT
Start: 2022-01-21 | End: 2022-04-28

## 2022-01-21 RX ORDER — CHLORTHALIDONE 25 MG/1
25 TABLET ORAL DAILY
Qty: 90 TABLET | Refills: 3 | Status: SHIPPED | OUTPATIENT
Start: 2022-01-21 | End: 2022-03-17

## 2022-01-21 NOTE — TELEPHONE ENCOUNTER
No new care gaps identified.  Powered by [employer] by [employer]. Reference number: [id number].   1/21/2022 11:46:50 AM CST

## 2022-01-23 ENCOUNTER — HOSPITAL ENCOUNTER (INPATIENT)
Facility: HOSPITAL | Age: 62
LOS: 4 days | Discharge: ANOTHER HOSPITAL | DRG: 302 | End: 2022-01-27
Attending: EMERGENCY MEDICINE | Admitting: HOSPITALIST
Payer: COMMERCIAL

## 2022-01-23 DIAGNOSIS — I46.9 CARDIAC ARREST: ICD-10-CM

## 2022-01-23 DIAGNOSIS — J96.01 ACUTE HYPOXEMIC RESPIRATORY FAILURE: ICD-10-CM

## 2022-01-23 DIAGNOSIS — R41.82 ALTERED MENTAL STATUS: ICD-10-CM

## 2022-01-23 DIAGNOSIS — R07.9 CHEST PAIN: ICD-10-CM

## 2022-01-23 DIAGNOSIS — I21.4 NSTEMI (NON-ST ELEVATED MYOCARDIAL INFARCTION): ICD-10-CM

## 2022-01-23 DIAGNOSIS — G93.1 ANOXIC BRAIN DAMAGE: Primary | ICD-10-CM

## 2022-01-23 DIAGNOSIS — G93.1 BRAIN ANOXIC INJURY: ICD-10-CM

## 2022-01-23 LAB
ALBUMIN SERPL BCP-MCNC: 3.8 G/DL (ref 3.5–5.2)
ALP SERPL-CCNC: 108 U/L (ref 55–135)
ALT SERPL W/O P-5'-P-CCNC: 316 U/L (ref 10–44)
ANION GAP SERPL CALC-SCNC: 22 MMOL/L (ref 8–16)
AST SERPL-CCNC: 397 U/L (ref 10–40)
BASOPHILS # BLD AUTO: 0.05 K/UL (ref 0–0.2)
BASOPHILS NFR BLD: 0.3 % (ref 0–1.9)
BILIRUB SERPL-MCNC: 0.5 MG/DL (ref 0.1–1)
BNP SERPL-MCNC: 23 PG/ML (ref 0–99)
BUN SERPL-MCNC: 21 MG/DL (ref 6–20)
CALCIUM SERPL-MCNC: 10 MG/DL (ref 8.7–10.5)
CHLORIDE SERPL-SCNC: 99 MMOL/L (ref 95–110)
CO2 SERPL-SCNC: 19 MMOL/L (ref 23–29)
CREAT SERPL-MCNC: 2 MG/DL (ref 0.5–1.4)
DIFFERENTIAL METHOD: ABNORMAL
EOSINOPHIL # BLD AUTO: 0.1 K/UL (ref 0–0.5)
EOSINOPHIL NFR BLD: 0.6 % (ref 0–8)
ERYTHROCYTE [DISTWIDTH] IN BLOOD BY AUTOMATED COUNT: 13.5 % (ref 11.5–14.5)
EST. GFR  (AFRICAN AMERICAN): 31 ML/MIN/1.73 M^2
EST. GFR  (NON AFRICAN AMERICAN): 27 ML/MIN/1.73 M^2
GLUCOSE SERPL-MCNC: 220 MG/DL (ref 70–110)
HCT VFR BLD AUTO: 40.3 % (ref 37–48.5)
HGB BLD-MCNC: 12.8 G/DL (ref 12–16)
IMM GRANULOCYTES # BLD AUTO: 0.11 K/UL (ref 0–0.04)
IMM GRANULOCYTES NFR BLD AUTO: 0.7 % (ref 0–0.5)
LYMPHOCYTES # BLD AUTO: 6.6 K/UL (ref 1–4.8)
LYMPHOCYTES NFR BLD: 44.5 % (ref 18–48)
MCH RBC QN AUTO: 30 PG (ref 27–31)
MCHC RBC AUTO-ENTMCNC: 31.8 G/DL (ref 32–36)
MCV RBC AUTO: 94 FL (ref 82–98)
MONOCYTES # BLD AUTO: 0.5 K/UL (ref 0.3–1)
MONOCYTES NFR BLD: 3.4 % (ref 4–15)
NEUTROPHILS # BLD AUTO: 7.5 K/UL (ref 1.8–7.7)
NEUTROPHILS NFR BLD: 50.5 % (ref 38–73)
NRBC BLD-RTO: 0 /100 WBC
PLATELET # BLD AUTO: 314 K/UL (ref 150–450)
PLATELET BLD QL SMEAR: ABNORMAL
PMV BLD AUTO: 10.9 FL (ref 9.2–12.9)
POTASSIUM SERPL-SCNC: 2.7 MMOL/L (ref 3.5–5.1)
PROT SERPL-MCNC: 7.7 G/DL (ref 6–8.4)
RBC # BLD AUTO: 4.27 M/UL (ref 4–5.4)
SODIUM SERPL-SCNC: 140 MMOL/L (ref 136–145)
TROPONIN I SERPL DL<=0.01 NG/ML-MCNC: 0.06 NG/ML (ref 0–0.03)
WBC # BLD AUTO: 14.87 K/UL (ref 3.9–12.7)

## 2022-01-23 PROCEDURE — 83880 ASSAY OF NATRIURETIC PEPTIDE: CPT | Performed by: EMERGENCY MEDICINE

## 2022-01-23 PROCEDURE — 31615 TRCHEOBRNCHSC EST TRACHS INC: CPT

## 2022-01-23 PROCEDURE — 99900035 HC TECH TIME PER 15 MIN (STAT)

## 2022-01-23 PROCEDURE — 93010 ELECTROCARDIOGRAM REPORT: CPT | Mod: 76,,, | Performed by: INTERNAL MEDICINE

## 2022-01-23 PROCEDURE — 96375 TX/PRO/DX INJ NEW DRUG ADDON: CPT | Mod: 59

## 2022-01-23 PROCEDURE — 12000002 HC ACUTE/MED SURGE SEMI-PRIVATE ROOM

## 2022-01-23 PROCEDURE — 94002 VENT MGMT INPAT INIT DAY: CPT

## 2022-01-23 PROCEDURE — 84484 ASSAY OF TROPONIN QUANT: CPT | Performed by: EMERGENCY MEDICINE

## 2022-01-23 PROCEDURE — 83735 ASSAY OF MAGNESIUM: CPT | Performed by: EMERGENCY MEDICINE

## 2022-01-23 PROCEDURE — 85025 COMPLETE CBC W/AUTO DIFF WBC: CPT | Performed by: EMERGENCY MEDICINE

## 2022-01-23 PROCEDURE — 94761 N-INVAS EAR/PLS OXIMETRY MLT: CPT

## 2022-01-23 PROCEDURE — 99291 CRITICAL CARE FIRST HOUR: CPT | Mod: 25

## 2022-01-23 PROCEDURE — 93010 EKG 12-LEAD: ICD-10-PCS | Mod: ,,, | Performed by: INTERNAL MEDICINE

## 2022-01-23 PROCEDURE — 63600175 PHARM REV CODE 636 W HCPCS: Performed by: EMERGENCY MEDICINE

## 2022-01-23 PROCEDURE — 80053 COMPREHEN METABOLIC PANEL: CPT | Performed by: EMERGENCY MEDICINE

## 2022-01-23 PROCEDURE — 94003 VENT MGMT INPAT SUBQ DAY: CPT

## 2022-01-23 PROCEDURE — 31500 INSERT EMERGENCY AIRWAY: CPT

## 2022-01-23 PROCEDURE — 25000003 PHARM REV CODE 250: Performed by: EMERGENCY MEDICINE

## 2022-01-23 PROCEDURE — 93005 ELECTROCARDIOGRAM TRACING: CPT

## 2022-01-23 PROCEDURE — 51702 INSERT TEMP BLADDER CATH: CPT | Mod: 59

## 2022-01-23 PROCEDURE — 96374 THER/PROPH/DIAG INJ IV PUSH: CPT | Mod: 59

## 2022-01-23 RX ORDER — LEVETIRACETAM 500 MG/5ML
2000 INJECTION, SOLUTION, CONCENTRATE INTRAVENOUS
Status: COMPLETED | OUTPATIENT
Start: 2022-01-23 | End: 2022-01-24

## 2022-01-23 RX ORDER — ETOMIDATE 2 MG/ML
INJECTION INTRAVENOUS
Status: COMPLETED
Start: 2022-01-23 | End: 2022-01-23

## 2022-01-23 RX ORDER — ROCURONIUM BROMIDE 10 MG/ML
75 INJECTION, SOLUTION INTRAVENOUS ONCE
Status: COMPLETED | OUTPATIENT
Start: 2022-01-23 | End: 2022-01-23

## 2022-01-23 RX ORDER — ETOMIDATE 2 MG/ML
20 INJECTION INTRAVENOUS
Status: COMPLETED | OUTPATIENT
Start: 2022-01-23 | End: 2022-01-23

## 2022-01-23 RX ORDER — PROPOFOL 10 MG/ML
0-50 INJECTION, EMULSION INTRAVENOUS CONTINUOUS
Status: DISCONTINUED | OUTPATIENT
Start: 2022-01-24 | End: 2022-01-25

## 2022-01-23 RX ORDER — POTASSIUM CHLORIDE 7.45 MG/ML
20 INJECTION INTRAVENOUS
Status: COMPLETED | OUTPATIENT
Start: 2022-01-24 | End: 2022-01-24

## 2022-01-23 RX ORDER — ROCURONIUM BROMIDE 10 MG/ML
INJECTION, SOLUTION INTRAVENOUS
Status: COMPLETED
Start: 2022-01-23 | End: 2022-01-23

## 2022-01-23 RX ORDER — MUPIROCIN 20 MG/G
OINTMENT TOPICAL 2 TIMES DAILY
Status: DISCONTINUED | OUTPATIENT
Start: 2022-01-24 | End: 2022-01-27 | Stop reason: HOSPADM

## 2022-01-23 RX ORDER — PROPOFOL 10 MG/ML
INJECTION, EMULSION INTRAVENOUS
Status: COMPLETED
Start: 2022-01-23 | End: 2022-01-23

## 2022-01-23 RX ORDER — FENTANYL CITRATE 50 UG/ML
50 INJECTION, SOLUTION INTRAMUSCULAR; INTRAVENOUS
Status: COMPLETED | OUTPATIENT
Start: 2022-01-23 | End: 2022-01-23

## 2022-01-23 RX ORDER — MIDAZOLAM HYDROCHLORIDE 1 MG/ML
1 INJECTION INTRAMUSCULAR; INTRAVENOUS
Status: COMPLETED | OUTPATIENT
Start: 2022-01-23 | End: 2022-01-23

## 2022-01-23 RX ADMIN — AMIODARONE HYDROCHLORIDE 1 MG/MIN: 1.8 INJECTION, SOLUTION INTRAVENOUS at 11:01

## 2022-01-23 RX ADMIN — ETOMIDATE 20 MG: 2 INJECTION INTRAVENOUS at 11:01

## 2022-01-23 RX ADMIN — MIDAZOLAM 1 MG: 1 INJECTION INTRAMUSCULAR; INTRAVENOUS at 10:01

## 2022-01-23 RX ADMIN — ROCURONIUM BROMIDE 75 MG: 10 INJECTION, SOLUTION INTRAVENOUS at 11:01

## 2022-01-23 RX ADMIN — FENTANYL CITRATE 50 MCG: 50 INJECTION INTRAMUSCULAR; INTRAVENOUS at 10:01

## 2022-01-23 RX ADMIN — POTASSIUM CHLORIDE 10 MEQ: 7.46 INJECTION, SOLUTION INTRAVENOUS at 11:01

## 2022-01-23 RX ADMIN — LORAZEPAM 1 MG: 2 INJECTION INTRAMUSCULAR; INTRAVENOUS at 09:01

## 2022-01-23 NOTE — LETTER
"Veronika Esquivel" Neymar was seen and treated in our emergency department on 1/23/2022  She    If you have any questions or concerns, please don't hesitate to call.    Rc Blackmon was here in the ICU with his Mother, Mrs Blackmon on 1/24/22 Monday and also on Wednesday 1/26/22, please excuse him from work during this time. Thank you, ODIN Pedro       [email or web address]      "

## 2022-01-23 NOTE — Clinical Note
Diagnosis: Anoxic brain damage [348.1.ICD-9-CM]   Admitting Provider:: MINA GARCIA [[id number]]   Future Attending Provider: TREVON ZAVALA [[id number]]   Reason for IP Medical Treatment  (Clinical interventions that can only be accomplished in the IP setting? ) :: anoxic brain injury   Estimated Length of Stay:: 2 midnights   I certify that Inpatient services for greater than or equal to 2 midnights are medically necessary:: Yes   Plans for Post-Acute care--if anticipated (pick the single best option):: E. LTAC Placement

## 2022-01-24 ENCOUNTER — ANESTHESIA EVENT (OUTPATIENT)
Dept: INTENSIVE CARE | Facility: HOSPITAL | Age: 62
DRG: 302 | End: 2022-01-24
Payer: COMMERCIAL

## 2022-01-24 ENCOUNTER — ANESTHESIA (OUTPATIENT)
Dept: INTENSIVE CARE | Facility: HOSPITAL | Age: 62
DRG: 302 | End: 2022-01-24
Payer: COMMERCIAL

## 2022-01-24 ENCOUNTER — PATIENT OUTREACH (OUTPATIENT)
Dept: ADMINISTRATIVE | Facility: OTHER | Age: 62
End: 2022-01-24
Payer: COMMERCIAL

## 2022-01-24 PROBLEM — N17.9 AKI (ACUTE KIDNEY INJURY): Status: ACTIVE | Noted: 2022-01-24

## 2022-01-24 PROBLEM — G93.1 BRAIN ANOXIC INJURY: Status: ACTIVE | Noted: 2022-01-24

## 2022-01-24 PROBLEM — I46.9 CARDIAC ARREST: Status: ACTIVE | Noted: 2022-01-24

## 2022-01-24 PROBLEM — R00.1 SINUS BRADYCARDIA: Status: ACTIVE | Noted: 2022-01-24

## 2022-01-24 PROBLEM — E87.6 HYPOKALEMIA: Status: ACTIVE | Noted: 2022-01-24

## 2022-01-24 LAB
ALBUMIN SERPL BCP-MCNC: 3.4 G/DL (ref 3.5–5.2)
ALBUMIN SERPL BCP-MCNC: 3.7 G/DL (ref 3.5–5.2)
ALLENS TEST: ABNORMAL
ALP SERPL-CCNC: 84 U/L (ref 55–135)
ALP SERPL-CCNC: 96 U/L (ref 55–135)
ALT SERPL W/O P-5'-P-CCNC: 242 U/L (ref 10–44)
ALT SERPL W/O P-5'-P-CCNC: 284 U/L (ref 10–44)
ANION GAP SERPL CALC-SCNC: 12 MMOL/L (ref 8–16)
ANION GAP SERPL CALC-SCNC: 14 MMOL/L (ref 8–16)
ANION GAP SERPL CALC-SCNC: 15 MMOL/L (ref 8–16)
ANION GAP SERPL CALC-SCNC: 17 MMOL/L (ref 8–16)
ANION GAP SERPL CALC-SCNC: 18 MMOL/L (ref 8–16)
AORTIC ROOT ANNULUS: 2.73 CM
APTT BLDCRRT: 22.1 SEC (ref 21–32)
APTT BLDCRRT: 46.4 SEC (ref 21–32)
APTT BLDCRRT: 49 SEC (ref 21–32)
APTT BLDCRRT: 90.1 SEC (ref 21–32)
ASCENDING AORTA: 2.79 CM
AST SERPL-CCNC: 394 U/L (ref 10–40)
AST SERPL-CCNC: 399 U/L (ref 10–40)
AV INDEX (PROSTH): 0.63
AV MEAN GRADIENT: 3 MMHG
AV PEAK GRADIENT: 4 MMHG
AV VALVE AREA: 1.98 CM2
AV VELOCITY RATIO: 0.53
BACTERIA #/AREA URNS HPF: ABNORMAL /HPF
BASOPHILS # BLD AUTO: 0.03 K/UL (ref 0–0.2)
BASOPHILS # BLD AUTO: 0.06 K/UL (ref 0–0.2)
BASOPHILS NFR BLD: 0.2 % (ref 0–1.9)
BASOPHILS NFR BLD: 0.3 % (ref 0–1.9)
BILIRUB SERPL-MCNC: 0.4 MG/DL (ref 0.1–1)
BILIRUB SERPL-MCNC: 0.6 MG/DL (ref 0.1–1)
BILIRUB UR QL STRIP: NEGATIVE
BSA FOR ECHO PROCEDURE: 1.84 M2
BUN SERPL-MCNC: 22 MG/DL (ref 6–20)
BUN SERPL-MCNC: 24 MG/DL (ref 6–20)
CALCIUM SERPL-MCNC: 9 MG/DL (ref 8.7–10.5)
CALCIUM SERPL-MCNC: 9.2 MG/DL (ref 8.7–10.5)
CALCIUM SERPL-MCNC: 9.3 MG/DL (ref 8.7–10.5)
CALCIUM SERPL-MCNC: 9.4 MG/DL (ref 8.7–10.5)
CALCIUM SERPL-MCNC: 9.6 MG/DL (ref 8.7–10.5)
CHLORIDE SERPL-SCNC: 100 MMOL/L (ref 95–110)
CHLORIDE SERPL-SCNC: 100 MMOL/L (ref 95–110)
CHLORIDE SERPL-SCNC: 103 MMOL/L (ref 95–110)
CHLORIDE SERPL-SCNC: 104 MMOL/L (ref 95–110)
CHLORIDE SERPL-SCNC: 97 MMOL/L (ref 95–110)
CK SERPL-CCNC: 329 U/L (ref 20–180)
CLARITY UR: ABNORMAL
CO2 SERPL-SCNC: 19 MMOL/L (ref 23–29)
CO2 SERPL-SCNC: 22 MMOL/L (ref 23–29)
CO2 SERPL-SCNC: 22 MMOL/L (ref 23–29)
CO2 SERPL-SCNC: 23 MMOL/L (ref 23–29)
CO2 SERPL-SCNC: 25 MMOL/L (ref 23–29)
COLOR UR: YELLOW
CREAT SERPL-MCNC: 1.6 MG/DL (ref 0.5–1.4)
CREAT SERPL-MCNC: 1.7 MG/DL (ref 0.5–1.4)
CREAT SERPL-MCNC: 1.9 MG/DL (ref 0.5–1.4)
CREAT SERPL-MCNC: 2 MG/DL (ref 0.5–1.4)
CREAT SERPL-MCNC: 2 MG/DL (ref 0.5–1.4)
CTP QC/QA: YES
CV ECHO LV RWT: 0.46 CM
D DIMER PPP IA.FEU-MCNC: 15.52 MG/L FEU
DELSYS: ABNORMAL
DIFFERENTIAL METHOD: ABNORMAL
DIFFERENTIAL METHOD: ABNORMAL
DOP CALC AO PEAK VEL: 1.04 M/S
DOP CALC AO VTI: 19 CM
DOP CALC LVOT AREA: 3.1 CM2
DOP CALC LVOT DIAMETER: 2 CM
DOP CALC LVOT PEAK VEL: 0.55 M/S
DOP CALC LVOT STROKE VOLUME: 37.68 CM3
DOP CALCLVOT PEAK VEL VTI: 12 CM
E WAVE DECELERATION TIME: 216.35 MSEC
E/A RATIO: 0.83
E/E' RATIO: 14.25 M/S
ECHO LV POSTERIOR WALL: 0.98 CM (ref 0.6–1.1)
EJECTION FRACTION: 30 %
EOSINOPHIL # BLD AUTO: 0 K/UL (ref 0–0.5)
EOSINOPHIL # BLD AUTO: 0 K/UL (ref 0–0.5)
EOSINOPHIL NFR BLD: 0 % (ref 0–8)
EOSINOPHIL NFR BLD: 0.1 % (ref 0–8)
ERYTHROCYTE [DISTWIDTH] IN BLOOD BY AUTOMATED COUNT: 13.4 % (ref 11.5–14.5)
ERYTHROCYTE [DISTWIDTH] IN BLOOD BY AUTOMATED COUNT: 13.5 % (ref 11.5–14.5)
ERYTHROCYTE [SEDIMENTATION RATE] IN BLOOD BY WESTERGREN METHOD: 18 MM/H
EST. GFR  (AFRICAN AMERICAN): 31 ML/MIN/1.73 M^2
EST. GFR  (AFRICAN AMERICAN): 31 ML/MIN/1.73 M^2
EST. GFR  (AFRICAN AMERICAN): 33 ML/MIN/1.73 M^2
EST. GFR  (AFRICAN AMERICAN): 38 ML/MIN/1.73 M^2
EST. GFR  (AFRICAN AMERICAN): 40 ML/MIN/1.73 M^2
EST. GFR  (NON AFRICAN AMERICAN): 27 ML/MIN/1.73 M^2
EST. GFR  (NON AFRICAN AMERICAN): 27 ML/MIN/1.73 M^2
EST. GFR  (NON AFRICAN AMERICAN): 28 ML/MIN/1.73 M^2
EST. GFR  (NON AFRICAN AMERICAN): 33 ML/MIN/1.73 M^2
EST. GFR  (NON AFRICAN AMERICAN): 35 ML/MIN/1.73 M^2
FIO2: 30
FIO2: 40
FIO2: 50
FRACTIONAL SHORTENING: 7 % (ref 28–44)
GLUCOSE SERPL-MCNC: 140 MG/DL (ref 70–110)
GLUCOSE SERPL-MCNC: 140 MG/DL (ref 70–110)
GLUCOSE SERPL-MCNC: 143 MG/DL (ref 70–110)
GLUCOSE SERPL-MCNC: 143 MG/DL (ref 70–110)
GLUCOSE SERPL-MCNC: 262 MG/DL (ref 70–110)
GLUCOSE SERPL-MCNC: 262 MG/DL (ref 70–110)
GLUCOSE SERPL-MCNC: 316 MG/DL (ref 70–110)
GLUCOSE SERPL-MCNC: 316 MG/DL (ref 70–110)
GLUCOSE SERPL-MCNC: 334 MG/DL (ref 70–110)
GLUCOSE UR QL STRIP: ABNORMAL
HCO3 UR-SCNC: 25.4 MMOL/L (ref 24–28)
HCO3 UR-SCNC: 25.9 MMOL/L (ref 24–28)
HCO3 UR-SCNC: 26.9 MMOL/L (ref 24–28)
HCT VFR BLD AUTO: 33.7 % (ref 37–48.5)
HCT VFR BLD AUTO: 36.8 % (ref 37–48.5)
HGB BLD-MCNC: 11.4 G/DL (ref 12–16)
HGB BLD-MCNC: 12.1 G/DL (ref 12–16)
HGB UR QL STRIP: ABNORMAL
HYALINE CASTS #/AREA URNS LPF: 1 /LPF
IMM GRANULOCYTES # BLD AUTO: 0.09 K/UL (ref 0–0.04)
IMM GRANULOCYTES # BLD AUTO: 0.17 K/UL (ref 0–0.04)
IMM GRANULOCYTES NFR BLD AUTO: 0.5 % (ref 0–0.5)
IMM GRANULOCYTES NFR BLD AUTO: 0.8 % (ref 0–0.5)
INR PPP: 1.1 (ref 0.8–1.2)
INR PPP: 1.2 (ref 0.8–1.2)
INTERVENTRICULAR SEPTUM: 0.88 CM (ref 0.6–1.1)
IVRT: 108.47 MSEC
KETONES UR QL STRIP: NEGATIVE
LA MAJOR: 5.65 CM
LA MINOR: 4.63 CM
LA WIDTH: 4.56 CM
LACTATE SERPL-SCNC: 2.4 MMOL/L (ref 0.5–2.2)
LACTATE SERPL-SCNC: 4.6 MMOL/L (ref 0.5–2.2)
LEFT ATRIUM SIZE: 3.25 CM
LEFT ATRIUM VOLUME INDEX MOD: 36.8 ML/M2
LEFT ATRIUM VOLUME INDEX: 35.6 ML/M2
LEFT ATRIUM VOLUME MOD: 66.25 CM3
LEFT ATRIUM VOLUME: 64.11 CM3
LEFT INTERNAL DIMENSION IN SYSTOLE: 3.97 CM (ref 2.1–4)
LEFT VENTRICLE DIASTOLIC VOLUME INDEX: 45.19 ML/M2
LEFT VENTRICLE DIASTOLIC VOLUME: 81.35 ML
LEFT VENTRICLE MASS INDEX: 71 G/M2
LEFT VENTRICLE SYSTOLIC VOLUME INDEX: 38.1 ML/M2
LEFT VENTRICLE SYSTOLIC VOLUME: 68.63 ML
LEFT VENTRICULAR INTERNAL DIMENSION IN DIASTOLE: 4.26 CM (ref 3.5–6)
LEFT VENTRICULAR MASS: 126.99 G
LEUKOCYTE ESTERASE UR QL STRIP: NEGATIVE
LV LATERAL E/E' RATIO: 19 M/S
LV SEPTAL E/E' RATIO: 11.4 M/S
LYMPHOCYTES # BLD AUTO: 3.3 K/UL (ref 1–4.8)
LYMPHOCYTES # BLD AUTO: 3.4 K/UL (ref 1–4.8)
LYMPHOCYTES NFR BLD: 16 % (ref 18–48)
LYMPHOCYTES NFR BLD: 19.6 % (ref 18–48)
MAGNESIUM SERPL-MCNC: 2 MG/DL (ref 1.6–2.6)
MAGNESIUM SERPL-MCNC: 2.2 MG/DL (ref 1.6–2.6)
MAGNESIUM SERPL-MCNC: 2.5 MG/DL (ref 1.6–2.6)
MCH RBC QN AUTO: 30.4 PG (ref 27–31)
MCH RBC QN AUTO: 30.7 PG (ref 27–31)
MCHC RBC AUTO-ENTMCNC: 32.9 G/DL (ref 32–36)
MCHC RBC AUTO-ENTMCNC: 33.8 G/DL (ref 32–36)
MCV RBC AUTO: 91 FL (ref 82–98)
MCV RBC AUTO: 93 FL (ref 82–98)
MICROSCOPIC COMMENT: ABNORMAL
MODE: ABNORMAL
MONOCYTES # BLD AUTO: 1.1 K/UL (ref 0.3–1)
MONOCYTES # BLD AUTO: 1.5 K/UL (ref 0.3–1)
MONOCYTES NFR BLD: 6.2 % (ref 4–15)
MONOCYTES NFR BLD: 7.5 % (ref 4–15)
MV A" WAVE DURATION": 13.13 MSEC
MV PEAK A VEL: 0.69 M/S
MV PEAK E VEL: 0.57 M/S
MV STENOSIS PRESSURE HALF TIME: 62.74 MS
MV VALVE AREA P 1/2 METHOD: 3.51 CM2
NEUTROPHILS # BLD AUTO: 12.6 K/UL (ref 1.8–7.7)
NEUTROPHILS # BLD AUTO: 15.5 K/UL (ref 1.8–7.7)
NEUTROPHILS NFR BLD: 73.4 % (ref 38–73)
NEUTROPHILS NFR BLD: 75.4 % (ref 38–73)
NITRITE UR QL STRIP: NEGATIVE
NRBC BLD-RTO: 0 /100 WBC
NRBC BLD-RTO: 0 /100 WBC
PCO2 BLDA: 43 MMHG (ref 35–45)
PCO2 BLDA: 47.1 MMHG (ref 35–45)
PCO2 BLDA: 48.6 MMHG (ref 35–45)
PEEP: 5
PH SMN: 7.33 [PH] (ref 7.35–7.45)
PH SMN: 7.36 [PH] (ref 7.35–7.45)
PH SMN: 7.38 [PH] (ref 7.35–7.45)
PH UR STRIP: 7 [PH] (ref 5–8)
PHOSPHATE SERPL-MCNC: 2.2 MG/DL (ref 2.7–4.5)
PHOSPHATE SERPL-MCNC: 2.6 MG/DL (ref 2.7–4.5)
PHOSPHATE SERPL-MCNC: 3.1 MG/DL (ref 2.7–4.5)
PHOSPHATE SERPL-MCNC: 3.9 MG/DL (ref 2.7–4.5)
PISA TR MAX VEL: 2.27 M/S
PLATELET # BLD AUTO: 313 K/UL (ref 150–450)
PLATELET # BLD AUTO: 341 K/UL (ref 150–450)
PMV BLD AUTO: 10.5 FL (ref 9.2–12.9)
PMV BLD AUTO: 11.3 FL (ref 9.2–12.9)
PO2 BLDA: 159 MMHG (ref 80–100)
PO2 BLDA: 30 MMHG (ref 40–60)
PO2 BLDA: 46 MMHG (ref 80–100)
POC BE: 0 MMOL/L
POC BE: 0 MMOL/L
POC BE: 1 MMOL/L
POC SATURATED O2: 54 % (ref 95–100)
POC SATURATED O2: 78 % (ref 95–100)
POC SATURATED O2: 99 % (ref 95–100)
POC TCO2: 27 MMOL/L (ref 23–27)
POC TCO2: 27 MMOL/L (ref 23–27)
POC TCO2: 28 MMOL/L (ref 24–29)
POCT GLUCOSE: 270 MG/DL (ref 70–110)
POCT GLUCOSE: 285 MG/DL (ref 70–110)
POTASSIUM SERPL-SCNC: 2.3 MMOL/L (ref 3.5–5.1)
POTASSIUM SERPL-SCNC: 2.6 MMOL/L (ref 3.5–5.1)
POTASSIUM SERPL-SCNC: 3.5 MMOL/L (ref 3.5–5.1)
POTASSIUM SERPL-SCNC: 3.7 MMOL/L (ref 3.5–5.1)
POTASSIUM SERPL-SCNC: 4.5 MMOL/L (ref 3.5–5.1)
PROT SERPL-MCNC: 7.1 G/DL (ref 6–8.4)
PROT SERPL-MCNC: 7.3 G/DL (ref 6–8.4)
PROT UR QL STRIP: ABNORMAL
PROTHROMBIN TIME: 10.9 SEC (ref 9–12.5)
PROTHROMBIN TIME: 11.9 SEC (ref 9–12.5)
PULM VEIN S/D RATIO: 0.73
PV PEAK D VEL: 0.26 M/S
PV PEAK S VEL: 0.19 M/S
RA MAJOR: 5.05 CM
RA WIDTH: 3.83 CM
RBC # BLD AUTO: 3.71 M/UL (ref 4–5.4)
RBC # BLD AUTO: 3.98 M/UL (ref 4–5.4)
RBC #/AREA URNS HPF: 15 /HPF (ref 0–4)
RIGHT VENTRICULAR END-DIASTOLIC DIMENSION: 2.11 CM
RV TISSUE DOPPLER FREE WALL SYSTOLIC VELOCITY 1 (APICAL 4 CHAMBER VIEW): 9.51 CM/S
SAMPLE: ABNORMAL
SARS-COV-2 RDRP RESP QL NAA+PROBE: NEGATIVE
SITE: ABNORMAL
SODIUM SERPL-SCNC: 136 MMOL/L (ref 136–145)
SODIUM SERPL-SCNC: 137 MMOL/L (ref 136–145)
SODIUM SERPL-SCNC: 139 MMOL/L (ref 136–145)
SODIUM SERPL-SCNC: 139 MMOL/L (ref 136–145)
SODIUM SERPL-SCNC: 140 MMOL/L (ref 136–145)
SP GR UR STRIP: 1.02 (ref 1–1.03)
SP02: 100
SQUAMOUS #/AREA URNS HPF: 1 /HPF
STJ: 2.49 CM
TDI LATERAL: 0.03 M/S
TDI SEPTAL: 0.05 M/S
TDI: 0.04 M/S
TR MAX PG: 21 MMHG
TRICUSPID ANNULAR PLANE SYSTOLIC EXCURSION: 1.77 CM
TROPONIN I SERPL DL<=0.01 NG/ML-MCNC: 1.14 NG/ML (ref 0–0.03)
TROPONIN I SERPL DL<=0.01 NG/ML-MCNC: 2.58 NG/ML (ref 0–0.03)
URN SPEC COLLECT METH UR: ABNORMAL
UROBILINOGEN UR STRIP-ACNC: ABNORMAL EU/DL
VT: 400
WBC # BLD AUTO: 17.16 K/UL (ref 3.9–12.7)
WBC # BLD AUTO: 20.5 K/UL (ref 3.9–12.7)
WBC #/AREA URNS HPF: 8 /HPF (ref 0–5)

## 2022-01-24 PROCEDURE — 94003 VENT MGMT INPAT SUBQ DAY: CPT

## 2022-01-24 PROCEDURE — 1158F ADVNC CARE PLAN TLK DOCD: CPT | Mod: CPTII,,, | Performed by: STUDENT IN AN ORGANIZED HEALTH CARE EDUCATION/TRAINING PROGRAM

## 2022-01-24 PROCEDURE — 25000003 PHARM REV CODE 250: Performed by: EMERGENCY MEDICINE

## 2022-01-24 PROCEDURE — 83735 ASSAY OF MAGNESIUM: CPT | Performed by: NURSE PRACTITIONER

## 2022-01-24 PROCEDURE — 27200966 HC CLOSED SUCTION SYSTEM

## 2022-01-24 PROCEDURE — 85730 THROMBOPLASTIN TIME PARTIAL: CPT | Performed by: NURSE PRACTITIONER

## 2022-01-24 PROCEDURE — 99291 PR CRITICAL CARE, E/M 30-74 MINUTES: ICD-10-PCS | Mod: ,,, | Performed by: INTERNAL MEDICINE

## 2022-01-24 PROCEDURE — 63600175 PHARM REV CODE 636 W HCPCS: Performed by: SURGERY

## 2022-01-24 PROCEDURE — 36600 WITHDRAWAL OF ARTERIAL BLOOD: CPT

## 2022-01-24 PROCEDURE — 81000 URINALYSIS NONAUTO W/SCOPE: CPT | Performed by: NURSE PRACTITIONER

## 2022-01-24 PROCEDURE — 63600175 PHARM REV CODE 636 W HCPCS: Performed by: EMERGENCY MEDICINE

## 2022-01-24 PROCEDURE — 25000003 PHARM REV CODE 250: Performed by: SURGERY

## 2022-01-24 PROCEDURE — 85379 FIBRIN DEGRADATION QUANT: CPT | Performed by: NURSE PRACTITIONER

## 2022-01-24 PROCEDURE — 87070 CULTURE OTHR SPECIMN AEROBIC: CPT | Performed by: NURSE PRACTITIONER

## 2022-01-24 PROCEDURE — 25000003 PHARM REV CODE 250: Performed by: NURSE PRACTITIONER

## 2022-01-24 PROCEDURE — 80048 BASIC METABOLIC PNL TOTAL CA: CPT | Performed by: NURSE PRACTITIONER

## 2022-01-24 PROCEDURE — 99497 ADVNCD CARE PLAN 30 MIN: CPT | Mod: 25,,, | Performed by: STUDENT IN AN ORGANIZED HEALTH CARE EDUCATION/TRAINING PROGRAM

## 2022-01-24 PROCEDURE — 84100 ASSAY OF PHOSPHORUS: CPT | Mod: 91 | Performed by: NURSE PRACTITIONER

## 2022-01-24 PROCEDURE — 93005 ELECTROCARDIOGRAM TRACING: CPT

## 2022-01-24 PROCEDURE — 85610 PROTHROMBIN TIME: CPT | Performed by: NURSE PRACTITIONER

## 2022-01-24 PROCEDURE — 85730 THROMBOPLASTIN TIME PARTIAL: CPT | Mod: 91 | Performed by: HOSPITALIST

## 2022-01-24 PROCEDURE — 99900035 HC TECH TIME PER 15 MIN (STAT)

## 2022-01-24 PROCEDURE — 93010 ELECTROCARDIOGRAM REPORT: CPT | Mod: ,,, | Performed by: INTERNAL MEDICINE

## 2022-01-24 PROCEDURE — 85025 COMPLETE CBC W/AUTO DIFF WBC: CPT | Mod: 91 | Performed by: NURSE PRACTITIONER

## 2022-01-24 PROCEDURE — 83605 ASSAY OF LACTIC ACID: CPT | Performed by: NURSE PRACTITIONER

## 2022-01-24 PROCEDURE — 20000000 HC ICU ROOM

## 2022-01-24 PROCEDURE — 84484 ASSAY OF TROPONIN QUANT: CPT | Mod: 91 | Performed by: EMERGENCY MEDICINE

## 2022-01-24 PROCEDURE — 87040 BLOOD CULTURE FOR BACTERIA: CPT | Mod: 59 | Performed by: NURSE PRACTITIONER

## 2022-01-24 PROCEDURE — 63600175 PHARM REV CODE 636 W HCPCS: Performed by: NURSE PRACTITIONER

## 2022-01-24 PROCEDURE — U0002 COVID-19 LAB TEST NON-CDC: HCPCS | Performed by: EMERGENCY MEDICINE

## 2022-01-24 PROCEDURE — 25000003 PHARM REV CODE 250: Performed by: STUDENT IN AN ORGANIZED HEALTH CARE EDUCATION/TRAINING PROGRAM

## 2022-01-24 PROCEDURE — 25000003 PHARM REV CODE 250: Performed by: HOSPITALIST

## 2022-01-24 PROCEDURE — 99223 PR INITIAL HOSPITAL CARE,LEVL III: ICD-10-PCS | Mod: ,,, | Performed by: STUDENT IN AN ORGANIZED HEALTH CARE EDUCATION/TRAINING PROGRAM

## 2022-01-24 PROCEDURE — 82550 ASSAY OF CK (CPK): CPT | Performed by: NURSE PRACTITIONER

## 2022-01-24 PROCEDURE — 94761 N-INVAS EAR/PLS OXIMETRY MLT: CPT

## 2022-01-24 PROCEDURE — 83520 IMMUNOASSAY QUANT NOS NONAB: CPT | Performed by: NURSE PRACTITIONER

## 2022-01-24 PROCEDURE — 1158F PR ADVANCE CARE PLANNING DISCUSS DOCUMENTED IN MEDICAL RECORD: ICD-10-PCS | Mod: CPTII,,, | Performed by: STUDENT IN AN ORGANIZED HEALTH CARE EDUCATION/TRAINING PROGRAM

## 2022-01-24 PROCEDURE — 87205 SMEAR GRAM STAIN: CPT | Performed by: NURSE PRACTITIONER

## 2022-01-24 PROCEDURE — 99223 1ST HOSP IP/OBS HIGH 75: CPT | Mod: ,,, | Performed by: STUDENT IN AN ORGANIZED HEALTH CARE EDUCATION/TRAINING PROGRAM

## 2022-01-24 PROCEDURE — 93010 EKG 12-LEAD: ICD-10-PCS | Mod: ,,, | Performed by: INTERNAL MEDICINE

## 2022-01-24 PROCEDURE — 99497 PR ADVNCD CARE PLAN 30 MIN: ICD-10-PCS | Mod: 25,,, | Performed by: STUDENT IN AN ORGANIZED HEALTH CARE EDUCATION/TRAINING PROGRAM

## 2022-01-24 PROCEDURE — 82803 BLOOD GASES ANY COMBINATION: CPT

## 2022-01-24 PROCEDURE — 84484 ASSAY OF TROPONIN QUANT: CPT | Performed by: NURSE PRACTITIONER

## 2022-01-24 PROCEDURE — 99291 CRITICAL CARE FIRST HOUR: CPT | Mod: ,,, | Performed by: INTERNAL MEDICINE

## 2022-01-24 PROCEDURE — 80053 COMPREHEN METABOLIC PANEL: CPT | Mod: 91 | Performed by: NURSE PRACTITIONER

## 2022-01-24 PROCEDURE — 63600175 PHARM REV CODE 636 W HCPCS: Performed by: HOSPITALIST

## 2022-01-24 RX ORDER — MAGNESIUM SULFATE HEPTAHYDRATE 40 MG/ML
2 INJECTION, SOLUTION INTRAVENOUS
Status: DISCONTINUED | OUTPATIENT
Start: 2022-01-24 | End: 2022-01-27 | Stop reason: HOSPADM

## 2022-01-24 RX ORDER — GLUCAGON 1 MG
1 KIT INJECTION
Status: DISCONTINUED | OUTPATIENT
Start: 2022-01-24 | End: 2022-01-27 | Stop reason: HOSPADM

## 2022-01-24 RX ORDER — POTASSIUM CHLORIDE 29.8 MG/ML
40 INJECTION INTRAVENOUS EVERY 4 HOURS
Status: COMPLETED | OUTPATIENT
Start: 2022-01-24 | End: 2022-01-24

## 2022-01-24 RX ORDER — FAMOTIDINE 10 MG/ML
20 INJECTION INTRAVENOUS DAILY
Status: DISCONTINUED | OUTPATIENT
Start: 2022-01-24 | End: 2022-01-25

## 2022-01-24 RX ORDER — HEPARIN SODIUM,PORCINE/D5W 25000/250
0-40 INTRAVENOUS SOLUTION INTRAVENOUS CONTINUOUS
Status: DISCONTINUED | OUTPATIENT
Start: 2022-01-24 | End: 2022-01-27

## 2022-01-24 RX ORDER — ONDANSETRON 2 MG/ML
4 INJECTION INTRAMUSCULAR; INTRAVENOUS EVERY 8 HOURS PRN
Status: DISCONTINUED | OUTPATIENT
Start: 2022-01-24 | End: 2022-01-27 | Stop reason: HOSPADM

## 2022-01-24 RX ORDER — ACETAMINOPHEN 325 MG/1
650 TABLET ORAL EVERY 4 HOURS PRN
Status: DISCONTINUED | OUTPATIENT
Start: 2022-01-24 | End: 2022-01-25 | Stop reason: SDUPTHER

## 2022-01-24 RX ORDER — NAPROXEN SODIUM 220 MG/1
81 TABLET, FILM COATED ORAL DAILY
Status: DISCONTINUED | OUTPATIENT
Start: 2022-01-25 | End: 2022-01-27 | Stop reason: HOSPADM

## 2022-01-24 RX ORDER — ACETAMINOPHEN 650 MG/20.3ML
650 LIQUID ORAL EVERY 6 HOURS
Status: DISCONTINUED | OUTPATIENT
Start: 2022-01-24 | End: 2022-01-25

## 2022-01-24 RX ORDER — ATROPINE SULFATE 0.1 MG/ML
0.5 INJECTION INTRAVENOUS EVERY 5 MIN PRN
Status: DISCONTINUED | OUTPATIENT
Start: 2022-01-24 | End: 2022-01-27 | Stop reason: HOSPADM

## 2022-01-24 RX ORDER — INSULIN ASPART 100 [IU]/ML
0-5 INJECTION, SOLUTION INTRAVENOUS; SUBCUTANEOUS EVERY 6 HOURS PRN
Status: DISCONTINUED | OUTPATIENT
Start: 2022-01-24 | End: 2022-01-27 | Stop reason: HOSPADM

## 2022-01-24 RX ORDER — CLOPIDOGREL BISULFATE 75 MG/1
75 TABLET ORAL DAILY
Status: DISCONTINUED | OUTPATIENT
Start: 2022-01-25 | End: 2022-01-27

## 2022-01-24 RX ORDER — SODIUM CHLORIDE 0.9 % (FLUSH) 0.9 %
10 SYRINGE (ML) INJECTION
Status: DISCONTINUED | OUTPATIENT
Start: 2022-01-24 | End: 2022-01-27 | Stop reason: HOSPADM

## 2022-01-24 RX ORDER — POTASSIUM CHLORIDE 29.8 MG/ML
40 INJECTION INTRAVENOUS EVERY 4 HOURS
Status: DISCONTINUED | OUTPATIENT
Start: 2022-01-24 | End: 2022-01-24

## 2022-01-24 RX ORDER — CLOPIDOGREL BISULFATE 75 MG/1
600 TABLET ORAL ONCE
Status: COMPLETED | OUTPATIENT
Start: 2022-01-24 | End: 2022-01-24

## 2022-01-24 RX ORDER — NOREPINEPHRINE BITARTRATE/D5W 4MG/250ML
0-3 PLASTIC BAG, INJECTION (ML) INTRAVENOUS CONTINUOUS
Status: DISCONTINUED | OUTPATIENT
Start: 2022-01-24 | End: 2022-01-24

## 2022-01-24 RX ORDER — POTASSIUM CHLORIDE 7.45 MG/ML
10 INJECTION INTRAVENOUS
Status: DISPENSED | OUTPATIENT
Start: 2022-01-24 | End: 2022-01-24

## 2022-01-24 RX ORDER — BUSPIRONE HYDROCHLORIDE 5 MG/1
30 TABLET ORAL ONCE
Status: DISCONTINUED | OUTPATIENT
Start: 2022-01-24 | End: 2022-01-25

## 2022-01-24 RX ORDER — ATORVASTATIN CALCIUM 40 MG/1
80 TABLET, FILM COATED ORAL NIGHTLY
Status: DISCONTINUED | OUTPATIENT
Start: 2022-01-24 | End: 2022-01-27 | Stop reason: HOSPADM

## 2022-01-24 RX ADMIN — LEVETIRACETAM 2000 MG: 100 INJECTION, SOLUTION INTRAVENOUS at 12:01

## 2022-01-24 RX ADMIN — POTASSIUM BICARBONATE 50 MEQ: 978 TABLET, EFFERVESCENT ORAL at 11:01

## 2022-01-24 RX ADMIN — POTASSIUM CHLORIDE 40 MEQ: 400 INJECTION, SOLUTION INTRAVENOUS at 12:01

## 2022-01-24 RX ADMIN — HEPARIN SODIUM 12 UNITS/KG/HR: 5000 INJECTION, SOLUTION INTRAVENOUS; SUBCUTANEOUS at 04:01

## 2022-01-24 RX ADMIN — POTASSIUM CHLORIDE 10 MEQ: 7.46 INJECTION, SOLUTION INTRAVENOUS at 03:01

## 2022-01-24 RX ADMIN — POTASSIUM CHLORIDE 10 MEQ: 7.46 INJECTION, SOLUTION INTRAVENOUS at 04:01

## 2022-01-24 RX ADMIN — ACETAMINOPHEN ORAL SOLUTION 650 MG: 650 SOLUTION ORAL at 11:01

## 2022-01-24 RX ADMIN — MUPIROCIN: 20 OINTMENT TOPICAL at 08:01

## 2022-01-24 RX ADMIN — POTASSIUM CHLORIDE 10 MEQ: 7.46 INJECTION, SOLUTION INTRAVENOUS at 12:01

## 2022-01-24 RX ADMIN — POTASSIUM CHLORIDE 10 MEQ: 7.46 INJECTION, SOLUTION INTRAVENOUS at 06:01

## 2022-01-24 RX ADMIN — NOREPINEPHRINE BITARTRATE 0.05 MCG/KG/MIN: 1 INJECTION, SOLUTION, CONCENTRATE INTRAVENOUS at 03:01

## 2022-01-24 RX ADMIN — PROPOFOL 30 MCG/KG/MIN: 10 INJECTION, EMULSION INTRAVENOUS at 07:01

## 2022-01-24 RX ADMIN — PROPOFOL 20 MCG/KG/MIN: 10 INJECTION, EMULSION INTRAVENOUS at 04:01

## 2022-01-24 RX ADMIN — CLOPIDOGREL 600 MG: 75 TABLET, FILM COATED ORAL at 06:01

## 2022-01-24 RX ADMIN — ACETAMINOPHEN ORAL SOLUTION 650 MG: 650 SOLUTION ORAL at 06:01

## 2022-01-24 RX ADMIN — POTASSIUM BICARBONATE 50 MEQ: 978 TABLET, EFFERVESCENT ORAL at 08:01

## 2022-01-24 RX ADMIN — PROPOFOL 15 MCG/KG/MIN: 10 INJECTION, EMULSION INTRAVENOUS at 02:01

## 2022-01-24 RX ADMIN — NOREPINEPHRINE BITARTRATE 0.1 MCG/KG/MIN: 1 INJECTION, SOLUTION, CONCENTRATE INTRAVENOUS at 06:01

## 2022-01-24 RX ADMIN — POTASSIUM CHLORIDE 40 MEQ: 400 INJECTION, SOLUTION INTRAVENOUS at 08:01

## 2022-01-24 RX ADMIN — FAMOTIDINE 20 MG: 10 INJECTION, SOLUTION INTRAVENOUS at 08:01

## 2022-01-24 RX ADMIN — ACETAMINOPHEN ORAL SOLUTION 650 MG: 650 SOLUTION ORAL at 05:01

## 2022-01-24 RX ADMIN — MUPIROCIN: 20 OINTMENT TOPICAL at 09:01

## 2022-01-24 RX ADMIN — AMIODARONE HYDROCHLORIDE 1 MG/MIN: 1.8 INJECTION, SOLUTION INTRAVENOUS at 05:01

## 2022-01-24 RX ADMIN — SODIUM CHLORIDE, SODIUM LACTATE, POTASSIUM CHLORIDE, AND CALCIUM CHLORIDE 500 ML: .6; .31; .03; .02 INJECTION, SOLUTION INTRAVENOUS at 05:01

## 2022-01-24 RX ADMIN — ATORVASTATIN CALCIUM 80 MG: 40 TABLET, FILM COATED ORAL at 09:01

## 2022-01-24 NOTE — NURSING
Pt agitatied prior to line placement. MD Tk at bedside sedation max for placement per MD. R.radial and R tlc lines being placed.

## 2022-01-24 NOTE — EICU
EICU BRIEF INITIAL EVALUATION:       HISTORY:      61-year-old woman with witnessed collapse.  EMS was called and they found her pulseless and apneic.  ROSC after 1 round of compressions and 1 shock but patient was posturing  History of multivessel coronary artery disease, TIA, carotid artery disease, hypertension, hyperlipidemia.  Patient reportedly did not hit her head.   She was started on targeted temperature management , heparin drip, amiodarone drip    DVT prophylaxis on heparin drip  GI prophylaxis famotidine    Camera  BP 89/55 (68), P 74, RR 18/18, O2 sat 98  On vent 400/18/5/40%.  Peak pressure 21  No posturing at present    Chest x-ray with ETT approximately 4 cm from verónica.  Gastric tube with side hole and tip well below diaphragm    Coronary angiography 12/14/2021 with 3 vessel coronary artery disease, EF 50-55%.  She was referred for cardiac surgery evaluation and they felt she was not a surgical candidate and was 6 scheduled for a 2nd opinion  Nuclear stress test 12/09/2021 with reversal ischemia anterior wall with associated wall motion abnormality new.  Possible inferoseptal reversible ischemia as well.  Ejection fraction 36%  Head CT with multifocal areas of white matter low density suspicious for global anoxia    CAMERA ASSESSMENT: Yes      TELEMETRY: Reviewed      NOTES: Reviewed     LABS: Reviewed      IMAGING: Personally reviewed.      DISCUSSED with bedside nurse     ASSESSMENT AND PLAN:     Status post cardiac arrest continue ventilator support, heparin drip, amiodarone drip.  Continue to trend troponin    Anoxic brain injury-new continue TTM    Hyperlipidemia-can restart statin when taking p.o.    Hypertension-blood pressure low, Levophed ordered.  Will treat p.r.n. for SBP greater than 160    Hypokalemia-Replacement has already been ordered.  Pending central line placement for more aggressive repletion    HARSH - avoid nephrotoxins, renally dose medications.  Monitor renal function and urine  output.  Gentle hydration        SHAY Castaneda MD  [facility] Attending  [phone]

## 2022-01-24 NOTE — CONSULTS
[place] - Intensive Care  Cardiology  Consult Note    Patient Name: Veronika Blackmon  MRN: [id number]  Admission Date: 1/23/2022  Hospital Length of Stay: 1 days  Code Status: Full Code   Attending Provider: Andrea Boucher, *   Consulting Provider: PRISCILLA Wheatley, JANNA  Primary Care Physician: Sahara Carrillo MD  Principal Problem:Cardiac arrest    Patient information was obtained from relative(s), past medical records and ER records.     [facility]  Consult performed by: PRISCILLA Woodward, JANNA  Consult ordered by: Rosy Lo NP  Reason for consult: cardiac arrest        Subjective:     Chief Complaint:  Cardiac arrest     HPI:   HPI retrieved from chart- patient is intubated and sedated. 62yo female with CAD, HTN, HLP, cardiac arrest, hypokalemia, HARSH and RBBB who presented to the hospital with out of hospital cardiac arrest. She was at home and collapsed after arguing with the son. She was noted to be pulseless and apneic upon arrival of EMS with ROSC after 1 round of compressions and 1 shock. Apparently, she felt as though she would pass out so her daughter lowered her to the ground, no head trauma. Upon  arrival to ED, she was intubated and was given keppra 2g as well as ativan, versed,  and fentanyl as well. Her CT head demonstrated  global anoxia. She was started on IV Amiodarone drip as well. Her initial troponin 0.055 with trend up to 1.136-2.579. Lactic acid 4.6 down to 2.4. She underwent C last month with severe multivessel CAD and was evaluated for CABG by [facility] on 12/22/21 but was deemed not a candidate and was in the process of getting a second opinion at [facility] but had not yet set up an appt. She was being medically managed on ASA, plavix, and statin in the meantime.       Hospital Course:    1/24/2022 Presented with cardiac arrest. Cardiology consulted. On IV Amiodarone, Heparin, Levophed and Propofol. Echocardiogram pending     No past medical  history on file.    Past Surgical History:   Procedure Laterality Date    CORONARY ANGIOGRAPHY N/A 12/14/2021    Procedure: ANGIOGRAM, CORONARY ARTERY;  Surgeon: Jm Escobar MD;  Location: [facility] CATH LAB/EP;  Service: Cardiology;  Laterality: N/A;    LEFT HEART CATHETERIZATION Left 12/14/2021    Procedure: Left heart cath;  Surgeon: Jm Escobar MD;  Location: [facility] CATH LAB/EP;  Service: Cardiology;  Laterality: Left;       Review of patient's allergies indicates:  No Known Allergies    No current facility-administered medications on file prior to encounter.     Current Outpatient Medications on File Prior to Encounter   Medication Sig    albuterol (PROVENTIL/VENTOLIN HFA) 90 mcg/actuation inhaler Inhale 2 puffs into the lungs every 6 (six) hours as needed for Wheezing or Shortness of Breath. Rescue    amLODIPine (NORVASC) 5 MG tablet Take 1 tablet (5 mg total) by mouth once daily.    aspirin (ECOTRIN) 81 MG EC tablet Take 1 tablet (81 mg total) by mouth once daily.    chlorthalidone (HYGROTEN) 25 MG Tab Take 1 tablet (25 mg total) by mouth once daily.    clopidogreL (PLAVIX) 75 mg tablet Take 1 tablet (75 mg total) by mouth once daily.    hydrOXYzine pamoate (VISTARIL) 25 MG Cap Take 1-2 caps po nightly prn sleep difficulty    metoprolol succinate (TOPROL-XL) 25 MG 24 hr tablet Take 1 tablet (25 mg total) by mouth 2 (two) times daily.    potassium chloride SA (K-DUR,KLOR-CON) 20 MEQ tablet Take 1 tablet (20 mEq total) by mouth once daily.    rosuvastatin (CRESTOR) 40 MG Tab Take 1 tablet (40 mg total) by mouth every evening.     Family History     Problem Relation (Age of Onset)    Heart attack Brother (62)        Tobacco Use    Smoking status: Never Smoker    Smokeless tobacco: Never Used   Substance and Sexual Activity    Alcohol use: Yes    Drug use: No    Sexual activity: Not on file     Review of Systems   Unable to perform ROS: intubated     Objective:     Vital Signs (Most  Recent):  Temp: 97.52 °F (36.4 °C) (01/24/22 1200)  Pulse: (!) 51 (01/24/22 1200)  Resp: (!) 25 (01/24/22 1200)  BP: 134/61 (01/24/22 1200)  SpO2: 98 % (01/24/22 1200) Vital Signs (24h Range):  Temp:  [94.46 °F (34.7 °C)-100 °F (37.8 °C)] 97.52 °F (36.4 °C)  Pulse:  [] 51  Resp:  [0-38] 25  SpO2:  [90 %-100 %] 98 %  BP: ()/() 134/61  Arterial Line BP: ()/(50-82) 122/71     Weight: 75 kg (165 lb 5.5 oz)  Body mass index is 28.38 kg/m².    SpO2: 98 %  O2 Device (Oxygen Therapy): ventilator      Intake/Output Summary (Last 24 hours) at 1/24/2022 1225  Last data filed at 1/24/2022 1203  Gross per 24 hour   Intake 2022.14 ml   Output 855 ml   Net 1167.14 ml       Lines/Drains/Airways     Central Venous Catheter Line            Percutaneous Central Line Insertion/Assessment - Triple Lumen  01/24/22 0355 right internal jugular <1 day          Drain                 NG/OG Tube 01/23/22 2328 orogastric 14 Fr. Center mouth <1 day         Urethral Catheter 01/23/22 2215 16 Fr. <1 day          Airway                 Airway - Non-Surgical 01/23/22 2322 Endotracheal Tube <1 day          Arterial Line            Arterial Line 01/24/22 0330 Right Radial <1 day          Peripheral Intravenous Line                 Peripheral IV - Single Lumen 01/23/22 2210 20 G Left Wrist <1 day         Peripheral IV - Single Lumen 01/23/22 2211 20 G Right Wrist <1 day         Peripheral IV - Single Lumen 01/24/22 0005 18 G Right Antecubital <1 day         Peripheral IV - Single Lumen 01/24/22 0044 18 G Left Antecubital <1 day                Physical Exam  Constitutional:       Interventions: She is intubated.   Cardiovascular:      Rate and Rhythm: Bradycardia present. Frequent extrasystoles are present.     Heart sounds: No murmur heard.  No gallop.    Pulmonary:      Effort: She is intubated.   Neurological:      Comments: Intubated and sedated          Significant Labs:   ABG:   Recent Labs   Lab 01/24/22  0138  01/24/22  0453 01/24/22  0730   PH 7.335* 7.379 7.364   PCO2 48.6* 43.0 47.1*   HCO3 25.9 25.4 26.9   POCSATURATED 78* 99 54*   BE 0 0 1   , BMP:   Recent Labs   Lab 01/23/22 2214 01/23/22 2214 01/24/22  0156 01/24/22  0508 01/24/22  1058   *   < > 262*  262* 334* 316*  316*      < > 140 136 137   K 2.7*   < > 2.3* 2.6* 3.5   CL 99   < > 100 100 97   CO2 19*   < > 22* 22* 25   BUN 21*   < > 22* 22* 24*   CREATININE 2.0*   < > 2.0* 1.9* 2.0*   CALCIUM 10.0   < > 9.3 9.0 9.2   MG 2.5  --   --  2.0 2.2    < > = values in this interval not displayed.   , CBC   Recent Labs   Lab 01/23/22 2214 01/23/22 2214 01/24/22  0155 01/24/22  0155 01/24/22  0508   WBC 14.87*  --  20.50*  --  17.16*   HGB 12.8  --  12.1  --  11.4*   HCT 40.3   < > 36.8*   < > 33.7*     --  341  --  313    < > = values in this interval not displayed.    and Troponin   Recent Labs   Lab 01/23/22 2214 01/24/22  0156 01/24/22  0508   TROPONINI 0.055* 1.136* 2.579*       Significant Imaging: Echocardiogram: Transthoracic echo (TTE) complete (Cupid Only): pending     Assessment and Plan:     * Cardiac arrest  - out of hospital arrest with ROSC after CPR and defibrillation  - appears to have been down for up to 10 minutes prior to EMS arrival per daughter  - treated with IV Amiodarone overnight; will stop this AM given bradycardia  - telemetry reviewed frequent PVCs with intermittent junctional rhythm and SB; no recurrent VTach noted  - etiology of arrest ischemic vs electrolyte derangement  - echo pending  - continue supportive care     Sinus bradycardia  - HR 40s this AM down to 30s overnight  - telemetry reviewed with SB with intermittent junctional bradycardia; no AVB or pauses noted  - will hold IV Amiodarone and continue to monitor closely     HARSH (acute kidney injury)  - creatinine 1.9-2.0 upon admission  - baseline creatinine .9  - multifactoral but most concerning for ATN with hypotension and cardiac arrest      Hypokalemia  - presented with K+ 2.7; aggressively replaced with continued hypokalemia  - repeat K+ this AM 2.6- IV K+ rider as well as Klyte ordered  - goal K+ >4.0    Mixed hyperlipidemia  - continue statin therapy     Essential hypertension  - hold antihypertensives given pressor use     Coronary artery disease involving native coronary artery of native heart  - multivessel CAD per [facility]  - strong family history and positive stress test  - seen by CTS at [facility] but deemed not a candidate for CABG; in process of obtaining second opinion; followed by Dr. Escobar as an outpatient  - conservative medical management with IV Heparin drip, ASA, statin and Plavix; no BB due to pressor use  - repeat echo pending         VTE Risk Mitigation (From admission, onward)         Ordered     heparin 25,000 units in dextrose 5% (100 units/ml) IV bolus from bag - ADDITIONAL PRN BOLUS - 60 units/kg (max bolus 4000 units)  As needed (PRN)        Question:  Heparin Infusion Adjustment (DO NOT MODIFY ANSWER)  Answer:  \\[email or web address]\epic\Images\Pharmacy\HeparinInfusions\heparin LOW INTENSITY nomogram for [facility] [id number].pdf    01/24/22 0113     heparin 25,000 units in dextrose 5% (100 units/ml) IV bolus from bag - ADDITIONAL PRN BOLUS - 30 units/kg (max bolus 4000 units)  As needed (PRN)        Question:  Heparin Infusion Adjustment (DO NOT MODIFY ANSWER)  Answer:  \\[email or web address]\epic\Images\Pharmacy\HeparinInfusions\heparin LOW INTENSITY nomogram for [facility] [id number].pdf    01/24/22 0113     heparin 25,000 units in dextrose 5% 250 mL (100 units/mL) infusion LOW INTENSITY nomogram - [facility]  Continuous        Question Answer Comment   Heparin Infusion Adjustment (DO NOT MODIFY ANSWER) \\[email or web address]\epic\Images\Pharmacy\HeparinInfusions\heparin LOW INTENSITY nomogram for [facility] [id number].pdf    Begin at (in units/kg/hr) 12        01/24/22 0113     Place sequential compression device  Until discontinued         01/24/22 0111     IP VTE LOW RISK PATIENT  Once          01/24/22 0111                Thank you for your consult. I will follow-up with patient. Please contact us if you have any additional questions.    PRISCILLA Wheatley, ANP  Cardiology   [place] - Intensive Care

## 2022-01-24 NOTE — ANESTHESIA PROCEDURE NOTES
Arterial    Diagnosis: post-cardiac arrest    Patient location during procedure: ICU  Procedure start time: 1/24/2022 3:30 AM  Timeout: 1/24/2022 3:30 AM  Procedure end time: 1/24/2022 3:31 AM    Staffing  Authorizing Provider: Alycia Frey MD  Performing Provider: Alycia Frey MD    Anesthesiologist was present at the time of the procedure.    Preanesthetic Checklist  Completed: patient identified, IV checked, site marked, risks and benefits discussed, surgical consent, monitors and equipment checked, pre-op evaluation, timeout performed and anesthesia consent givenArterial  Skin Prep: chlorhexidine gluconate  Local Infiltration: none  Orientation: right  Location: radial    Catheter Size: 20 G  Catheter placement by Ultrasound guidance. Heme positive aspiration all ports.  Vessel Caliber: medium, patent  Needle advanced into vessel with real time Ultrasound guidance.Insertion Attempts: 1  Assessment  Dressing: secured with tape and tegaderm  Patient: Tolerated well

## 2022-01-24 NOTE — ASSESSMENT & PLAN NOTE
- out of hospital arrest with ROSC after CPR and defibrillation  - appears to have been down for up to 10 minutes prior to EMS arrival per daughter  - treated with IV Amiodarone overnight; will stop this AM given bradycardia  - telemetry reviewed frequent PVCs with intermittent junctional rhythm and SB; no recurrent VTach noted  - etiology of arrest ischemic vs electrolyte derangement  - echo pending  - continue supportive care

## 2022-01-24 NOTE — ASSESSMENT & PLAN NOTE
Coronary artery disease involving native coronary artery of native heart    Recently deemed not a candidate for CABG  CT head with global anoxia    -initiate heparin drip  -cont amiodarone drip  -initiate TTM protocol  -repeat labs pending  -consult cardiology  -consult pulmonology for vent management  -propofol for sedation  -consult anesthesia for central and arterial line placement    Discussed patient condition with daughter. Patient remains a full code at this time.

## 2022-01-24 NOTE — ED NOTES
Attempted report again, told nurse was not assigned, stated will call back once charge nurse assigns patient.

## 2022-01-24 NOTE — ASSESSMENT & PLAN NOTE
- presented with K+ 2.7; aggressively replaced with continued hypokalemia  - repeat K+ this AM 2.6- IV K+ rider as well as Klyte ordered  - goal K+ >4.0

## 2022-01-24 NOTE — PROGRESS NOTES
Health Maintenance Due   Topic Date Due    Pneumococcal Vaccines (Age 0-64) (1 of 2 - PPSV23) Never done    Mammogram  Never done    Colorectal Cancer Screening  Never done    Shingles Vaccine (1 of 2) Never done    Influenza Vaccine (1) Never done     Updates were requested from care everywhere.  Chart was reviewed for overdue Proactive [facility] Encounters (SON[facility]) topics (CRS, Breast Cancer Screening, Eye exam)  Health Maintenance has been updated.  LINKS immunization registry triggered.  Immunizations were reconciled.

## 2022-01-24 NOTE — SUBJECTIVE & OBJECTIVE
Interval History: s/p cardiac arrest. Paused sedation this afternoon and following commands. Discussed with son and daughter at bedside. Will need to determine revascularization plan.    Review of Systems   Unable to perform ROS: Intubated     Objective:     Vital Signs (Most Recent):  Temp: (!) 95.9 °F (35.5 °C) (01/24/22 1730)  Pulse: (!) 58 (01/24/22 1730)  Resp: (!) 27 (01/24/22 1730)  BP: 118/70 (01/24/22 1730)  SpO2: 99 % (01/24/22 1730) Vital Signs (24h Range):  Temp:  [94.46 °F (34.7 °C)-100 °F (37.8 °C)] 95.9 °F (35.5 °C)  Pulse:  [] 58  Resp:  [0-38] 27  SpO2:  [90 %-100 %] 99 %  BP: ()/() 118/70  Arterial Line BP: ()/(50-87) 110/70     Weight: 74.8 kg (165 lb)  Body mass index is 28.32 kg/m².    Intake/Output Summary (Last 24 hours) at 1/24/2022 1739  Last data filed at 1/24/2022 1705  Gross per 24 hour   Intake 2242.62 ml   Output 1905 ml   Net 337.62 ml      Physical Exam  Vitals and nursing note reviewed.   Constitutional:       Appearance: She is ill-appearing.      Interventions: She is intubated.   HENT:      Head: Normocephalic and atraumatic.      Nose: Nose normal.      Mouth/Throat:      Mouth: Mucous membranes are moist.   Eyes:      Pupils: Pupils are equal, round, and reactive to light.   Cardiovascular:      Rate and Rhythm: Regular rhythm. Tachycardia present.      Pulses: Normal pulses.      Heart sounds: Normal heart sounds.   Pulmonary:      Effort: She is intubated.      Comments: Vented breath sounds  Abdominal:      General: Bowel sounds are normal.      Palpations: Abdomen is soft.   Musculoskeletal:         General: No swelling.      Cervical back: Neck supple.   Skin:     General: Skin is warm and dry.   Neurological:      Comments: Pupils equal and reactive, following commands off sedation         Significant Labs: All pertinent labs within the past 24 hours have been reviewed.    Significant Imaging: I have reviewed all pertinent imaging results/findings  within the past 24 hours.

## 2022-01-24 NOTE — ASSESSMENT & PLAN NOTE
- multivessel CAD per [facility]  - strong family history and positive stress test  - seen by CTS at [facility] but deemed not a candidate for CABG; in process of obtaining second opinion; followed by Dr. Escobar as an outpatient  - conservative medical management with IV Heparin drip, ASA, statin and Plavix; no BB due to pressor use  - repeat echo pending

## 2022-01-24 NOTE — ASSESSMENT & PLAN NOTE
- HR 40s this AM down to 30s overnight  - telemetry reviewed with SB with intermittent junctional bradycardia; no AVB or pauses noted  - will hold IV Amiodarone and continue to monitor closely

## 2022-01-24 NOTE — H&P
[facility] Medicine  History & Physical    Patient Name: Veronika Blackmon  MRN: [id number]  Patient Class: IP- Inpatient  Admission Date: 1/23/2022  Attending Physician: Andrea Boucher, *   Primary Care Provider: Sahara Carrillo MD         Patient information was obtained from relative(s), past medical records and ER records.     Subjective:     Principal Problem:Cardiac arrest    Chief Complaint:   Chief Complaint   Patient presents with    Altered Mental Status     Collapsed after arguing with son, pulseless and apneic upon FD arrival, 1 round compressions with 1 shock, nasally intubated, posturing         HPI: Veronika Blackmon is a 62 yo female with a pmh of multivessel CAD and HTN who was brought is after collapsing at home. She was dealing with a family argument and stated she felt as though she would pass out so her daughter lowered her to the ground, no head trauma. The patient was unresponsive from that point on per daughter. On EMS arrival, the patient was pulseless and apneic. EMS performed chest compressions and gave 1 shock then nasally intubated the patient who was noted to be posturing with clenched teeth. No sedation given for intubation in the field. On arrival to ED, an ETT was placed. Propofol was held due to hypotension. The patient continued to have posturing and was given keppra 2g. She was given ativan, versed,  and fentanyl as well. CT head showing global anoxia. Amiodarone drip was initiated. Renal and liver function worsening on labs. Initial troponin 0.055. The patient was having SANDOVAL and underwent a LHC last month when she was found to have severe multivessel CAD. She was evaluated for CABG by CTS on 12/22/21 but was deemed not a candidate. She was referred by cardiology for a second opinion at [facility] but had not yet set up an appt. She was being medically managed on ASA, plavix, and statin in the meantime. She has a strong family history of cardiac disease.       No past  medical history on file.    Past Surgical History:   Procedure Laterality Date    CORONARY ANGIOGRAPHY N/A 12/14/2021    Procedure: ANGIOGRAM, CORONARY ARTERY;  Surgeon: Jm Escobar MD;  Location: [facility] CATH LAB/EP;  Service: Cardiology;  Laterality: N/A;    LEFT HEART CATHETERIZATION Left 12/14/2021    Procedure: Left heart cath;  Surgeon: Jm Escobar MD;  Location: [facility] CATH LAB/EP;  Service: Cardiology;  Laterality: Left;       Review of patient's allergies indicates:  No Known Allergies    No current facility-administered medications on file prior to encounter.     Current Outpatient Medications on File Prior to Encounter   Medication Sig    albuterol (PROVENTIL/VENTOLIN HFA) 90 mcg/actuation inhaler Inhale 2 puffs into the lungs every 6 (six) hours as needed for Wheezing or Shortness of Breath. Rescue    amLODIPine (NORVASC) 5 MG tablet Take 1 tablet (5 mg total) by mouth once daily.    aspirin (ECOTRIN) 81 MG EC tablet Take 1 tablet (81 mg total) by mouth once daily.    chlorthalidone (HYGROTEN) 25 MG Tab Take 1 tablet (25 mg total) by mouth once daily.    clopidogreL (PLAVIX) 75 mg tablet Take 1 tablet (75 mg total) by mouth once daily.    hydrOXYzine pamoate (VISTARIL) 25 MG Cap Take 1-2 caps po nightly prn sleep difficulty    metoprolol succinate (TOPROL-XL) 25 MG 24 hr tablet Take 1 tablet (25 mg total) by mouth 2 (two) times daily.    potassium chloride SA (K-DUR,KLOR-CON) 20 MEQ tablet Take 1 tablet (20 mEq total) by mouth once daily.    rosuvastatin (CRESTOR) 40 MG Tab Take 1 tablet (40 mg total) by mouth every evening.     Family History     Problem Relation (Age of Onset)    Heart attack Brother (62)        Tobacco Use    Smoking status: Never Smoker    Smokeless tobacco: Never Used   Substance and Sexual Activity    Alcohol use: Yes    Drug use: No    Sexual activity: Not on file     Review of Systems   Unable to perform ROS: Patient unresponsive     Objective:     Vital  Signs (Most Recent):  Temp: 100 °F (37.8 °C) (01/23/22 2240)  Pulse: (!) 52 (01/24/22 0143)  Resp: (!) 23 (01/24/22 0143)  BP: 123/60 (01/24/22 0100)  SpO2: 99 % (01/24/22 0143) Vital Signs (24h Range):  Temp:  [100 °F (37.8 °C)] 100 °F (37.8 °C)  Pulse:  [] 52  Resp:  [5-38] 23  SpO2:  [90 %-100 %] 99 %  BP: ()/() 123/60     Weight: 77.1 kg (170 lb)  Body mass index is 29.18 kg/m².    Physical Exam  Vitals and nursing note reviewed.   Constitutional:       Appearance: She is ill-appearing.   HENT:      Head: Normocephalic and atraumatic.      Nose: Nose normal.      Mouth/Throat:      Mouth: Mucous membranes are moist.   Eyes:      Pupils: Pupils are equal, round, and reactive to light.   Cardiovascular:      Rate and Rhythm: Regular rhythm. Tachycardia present.      Pulses: Normal pulses.      Heart sounds: Normal heart sounds.   Pulmonary:      Comments: Vented breath sounds  Abdominal:      General: Bowel sounds are normal.      Palpations: Abdomen is soft.   Musculoskeletal:         General: No swelling.      Cervical back: Neck supple.   Skin:     General: Skin is warm and dry.   Neurological:      Comments: Pupils equal and reactive, unresponsive to pain off sedation           CRANIAL NERVES     CN III, IV, VI   Pupils are equal, round, and reactive to light.       Significant Labs: All pertinent labs within the past 24 hours have been reviewed.    Significant Imaging: I have reviewed all pertinent imaging results/findings within the past 24 hours.    Assessment/Plan:     * Cardiac arrest  Coronary artery disease involving native coronary artery of native heart    Recently deemed not a candidate for CABG  CT head with global anoxia    -initiate heparin drip  -cont amiodarone drip  -initiate TTM protocol  -repeat labs pending  -consult cardiology  -consult pulmonology for vent management  -propofol for sedation  -consult anesthesia for central and arterial line placement  - echo  pending    Discussed patient condition with daughter. Patient remains a full code at this time.     Hypokalemia  replete      Mixed hyperlipidemia  Hold home meds      Essential hypertension  Hold home meds        Coronary artery disease involving native coronary artery of native heart  See above    Anoxic Brain Injury  2/2 cardiac arrest  -TTM protocol   -pupils reactive, not responding off sedation    HARSH  s/p cardiac arrest  -IV fluids      VTE Risk Mitigation (From admission, onward)         Ordered     heparin 25,000 units in dextrose 5% (100 units/ml) IV bolus from bag - ADDITIONAL PRN BOLUS - 60 units/kg (max bolus 4000 units)  As needed (PRN)        Question:  Heparin Infusion Adjustment (DO NOT MODIFY ANSWER)  Answer:  [email or web address]\epic\Images\Pharmacy\HeparinInfusions\heparin LOW INTENSITY nomogram for [facility] [id number].pdf    01/24/22 0113     heparin 25,000 units in dextrose 5% (100 units/ml) IV bolus from bag - ADDITIONAL PRN BOLUS - 30 units/kg (max bolus 4000 units)  As needed (PRN)        Question:  Heparin Infusion Adjustment (DO NOT MODIFY ANSWER)  Answer:  \\[email or web address]\epic\Images\Pharmacy\HeparinInfusions\heparin LOW INTENSITY nomogram for [facility] [id number].pdf    01/24/22 0113     heparin 25,000 units in dextrose 5% (100 units/ml) IV bolus from bag INITIAL BOLUS (max bolus 4000 units)  Once        Question:  Heparin Infusion Adjustment (DO NOT MODIFY ANSWER)  Answer:  \\[email or web address]\epic\Images\Pharmacy\HeparinInfusions\heparin LOW INTENSITY nomogram for [facility] [id number].pdf    01/24/22 0113     heparin 25,000 units in dextrose 5% 250 mL (100 units/mL) infusion LOW INTENSITY nomogram - [facility]  Continuous        Question Answer Comment   Heparin Infusion Adjustment (DO NOT MODIFY ANSWER) \\[email or web address]\epic\Images\Pharmacy\HeparinInfusions\heparin LOW INTENSITY nomogram for [facility] [id number].pdf    Begin at (in units/kg/hr) 12        01/24/22 0113     Place sequential compression device  Until discontinued         01/24/22  0111     IP VTE LOW RISK PATIENT  Once         01/24/22 0111              Critical care time spent on the evaluation and treatment of severe organ dysfunction, review of pertinent labs and imaging studies, discussions with consulting providers and discussions with patient/family: 90 minutes.     Rosy Lo NP  Department of [facility] Medicine   [place] - Intensive Care

## 2022-01-24 NOTE — ED PROVIDER NOTES
Encounter Date: 1/23/2022       History     Chief Complaint   Patient presents with    Altered Mental Status     Collapsed after arguing with son, pulseless and apneic upon FD arrival, 1 round compressions with 1 shock, nasally intubated, posturing      61-year-old female with a history of CAD, on Plavix, hypertension, hyperlipidemia presenting with sudden collapse after an argument with her son  Per her daughter, it the daughter caught her and lowered her to the ground, did not hit her head  EMS arrived and shocked her once, did CPR, and nasally intubated her as her jaw was clenched  Fingerstick within normal limits, and no sedation was given    The history is provided by the EMS personnel and a relative. The history is limited by the condition of the patient.     Review of patient's allergies indicates:  No Known Allergies  No past medical history on file.  Past Surgical History:   Procedure Laterality Date    CORONARY ANGIOGRAPHY N/A 12/14/2021    Procedure: ANGIOGRAM, CORONARY ARTERY;  Surgeon: Jm Escobar MD;  Location: [facility] CATH LAB/EP;  Service: Cardiology;  Laterality: N/A;    LEFT HEART CATHETERIZATION Left 12/14/2021    Procedure: Left heart cath;  Surgeon: Jm Escobar MD;  Location: [facility] CATH LAB/EP;  Service: Cardiology;  Laterality: Left;     Family History   Problem Relation Age of Onset    Heart attack Brother 62     Social History     Tobacco Use    Smoking status: Never Smoker    Smokeless tobacco: Never Used   Substance Use Topics    Alcohol use: Yes    Drug use: No     Review of Systems   Unable to perform ROS: Acuity of condition       Physical Exam     Initial Vitals   BP Pulse Resp Temp SpO2   01/23/22 2202 01/23/22 2200 01/23/22 2207 01/23/22 2240 01/23/22 2200   90/65 (!) 46 (!) 24 100 °F (37.8 °C) 100 %      MAP       --                Physical Exam    Nursing note and vitals reviewed.  Constitutional:   EXAM  General:  Intubated, it is 3 T on no sedation.     Head:  normocephalic and atraumatic     Eyes: Conjunctivae are clear without exudates or hemorrhage. Sclera is non-icteric. PERRL. Pinpoint. Eyelids are normal in appearance without swelling or lesions.     Ears: The external ear and ear canal are non-tender and without swelling. The canal is clear without discharge.      Nose: Nares are patent bilaterally.     Neck:  In C-collar, exam deferred     Cardiac: Regular rate.     Respiratory:  Intubated.  Clear lung sounds.      Abdominal: Non-distended.     Extremities: Upper and lower extremities are atraumatic in appearance without deformity. No swelling or erythema.      Skin: Appropriate color for ethnicity.     Neurological:  Intermittently posturing, decerebrate.      Psychiatric: Unable to assess      In light of current/ongoing global covid-19 pandemic, all my encounters w pt were with full ppe including but not limited to gown, gloves, n95, eye protection OR from >6 ft away.             ED Course   Intubation    Date/Time: 1/24/2022 12:22 AM  Location procedure was performed: [facility] EMERGENCY DEPARTMENT  Performed by: Kendal Dave MD  Authorized by: Kendal Dave MD   Consent Done: Emergent Situation  Indications: airway protection  Intubation method: video-assisted  Patient status: paralyzed (RSI)  Preoxygenation: BVM  Pretreatment medications: fentanyl  Sedatives: etomidate  Paralytic: rocuronium  Laryngoscope size: Mac 3  Tube size: 7.5 mm  Tube type: cuffed  Number of attempts: 1  Cricoid pressure: no  Cords visualized: yes  Breath sounds: equal and equal and absent over the epigastrium  Cuff inflated: yes  ETT to lip: 25 cm  Tube secured with: ETT merlos  Chest x-ray interpreted by radiologist.  Patient tolerance: Patient tolerated the procedure well with no immediate complications  Complications: No        Labs Reviewed   CBC W/ AUTO DIFFERENTIAL - Abnormal; Notable for the following components:       Result Value    WBC 14.87 (*)     MCHC 31.8 (*)     Immature  Granulocytes 0.7 (*)     Immature Grans (Abs) 0.11 (*)     Lymph # 6.6 (*)     Mono % 3.4 (*)     All other components within normal limits   COMPREHENSIVE METABOLIC PANEL - Abnormal; Notable for the following components:    Potassium 2.7 (*)     CO2 19 (*)     Glucose 220 (*)     BUN 21 (*)     Creatinine 2.0 (*)      (*)      (*)     Anion Gap 22 (*)     eGFR if  31 (*)     eGFR if non  27 (*)     All other components within normal limits    Narrative:        K critical result(s) called and verbal readback obtained from   homero in er by IMTIAZ 01/23/2022 23:03   TROPONIN I - Abnormal; Notable for the following components:    Troponin I 0.055 (*)     All other components within normal limits   CBC W/ AUTO DIFFERENTIAL - Abnormal; Notable for the following components:    WBC 20.50 (*)     RBC 3.98 (*)     Hematocrit 36.8 (*)     Immature Granulocytes 0.8 (*)     Gran # (ANC) 15.5 (*)     Immature Grans (Abs) 0.17 (*)     Mono # 1.5 (*)     Gran % 75.4 (*)     Lymph % 16.0 (*)     All other components within normal limits    Narrative:     (if patient is on warfarin prior to heparin therapy)  Obtain from central line or arterial line as peripheral  checks may be inaccurate.   ISTAT PROCEDURE - Abnormal; Notable for the following components:    POC PH 7.335 (*)     POC PCO2 48.6 (*)     POC PO2 46 (*)     POC SATURATED O2 78 (*)     All other components within normal limits   CULTURE, RESPIRATORY   CULTURE, BLOOD   CULTURE, BLOOD   B-TYPE NATRIURETIC PEPTIDE   MAGNESIUM   MAGNESIUM   COMPREHENSIVE METABOLIC PANEL   MAGNESIUM   LACTIC ACID, PLASMA   CBC W/ AUTO DIFFERENTIAL   URINALYSIS, REFLEX TO URINE CULTURE   D DIMER, QUANTITATIVE   APTT   APTT   PROTIME-INR   COMPREHENSIVE METABOLIC PANEL   BASIC METABOLIC PANEL   MAGNESIUM   PHOSPHORUS   CK   CBC W/ AUTO DIFFERENTIAL   PROTIME-INR   APTT   NEURON SPECIFIC ENOLASE, SERUM   GLUCOSE, RANDOM   GLUCOSE, RANDOM   GLUCOSE,  RANDOM   GLUCOSE, RANDOM   TROPONIN I   URINALYSIS MICROSCOPIC   SARS-COV-2 RDRP GENE     EKG Readings: (Independently Interpreted)   Sinus tachycardia, bigeminy, RBBB, no ST elevations       Imaging Results          X-Ray Chest AP Portable (Final result)  Result time 01/23/22 23:54:27    Final result by Manfred Rahman MD (01/23/22 23:54:27)                 Impression:      Status post endotracheal intubation without evidence of pneumothorax    Status post gastric tube placement which terminates within the stomach lumen.    Cardiomegaly.      Electronically signed by: Manfred Rahman  Date:    01/23/2022  Time:    23:54             Narrative:    EXAMINATION:  XR CHEST AP PORTABLE    CLINICAL HISTORY:  Chest Pain;    TECHNIQUE:  Single frontal view of the chest was performed.    COMPARISON:  April 21, 2020    FINDINGS:  Single view of the chest reveals placement of a endotracheal tube which terminates 39 mm above the verónica.  Also there is been placement of a gastric tube which terminates below the level of the left hemidiaphragm over the stomach bubble.    No indication of a pneumothorax.  There is cardiomegaly present.                               CT Cervical Spine Without Contrast (Edited Result - FINAL)  Result time 01/23/22 23:35:44    Addendum 1 of 1 by Bong Sigala MD (01/23/22 23:35:44)      Is cervical spondylosis is noted throughout the cervical spine.  There is no evidence of cortical fracture or subluxation.  The posterior elements appear intact.    The central neural canal and neural foramen are patent.    Atherosclerotic calcifications are noted within the carotid bulbs bilaterally.  The visceral spaces are noteworthy for nasopharyngeal airway 2 extending to the verónica.  The lung apices demonstrate patchy ground-glass opacities without nodular consistency or consolidation.  There is no pneumothorax.    Impression cervical spine: No acute findings in the cervical spine with generalized  spondylosis in airway as described.      Electronically signed by: Bong Sigala  Date:    01/23/2022  Time:    23:35               Final result by Bong Sigala MD (01/23/22 22:59:51)                 Impression:      Multifocal areas of low density involving primarily white matter.  Correlate for global anoxia.    No evidence of major arterial infarction, hemorrhage or herniation.    Naso pharyngeal airway.    Findings were discussed withKendal Dave MD via epic secure chat with confirmation of receipt    This report was flagged in Epic as abnormal.      Electronically signed by: Bong Sigala  Date:    01/23/2022  Time:    22:59             Narrative:    EXAMINATION:  CT HEAD WITHOUT CONTRAST; CT CERVICAL SPINE WITHOUT CONTRAST    CLINICAL HISTORY:  Altered mental status, nontraumatic (Ped 0-18y);; Abnormal posture;    TECHNIQUE:  Low dose axial images were obtained through the head and cervical spine.  Coronal and sagittal reformations were also performed. Contrast was not administered.    COMPARISON:  None.    FINDINGS:  Brain:    There is vague low-density in the deep white matter of the is left occipital pole without loss of gray-white junction.  The remainder of the brain parenchyma appears to contain low densities in the heads of the caudate nucleus bilaterally and in the basal ganglia.  The remainder of the brain parenchyma attenuation appears unremarkable.  There is no pathologic fluid collection or ventriculomegaly.    The calvarium is intact.  Naso pharyngeal airway is present.  Air cells are clear.  Orbits unremarkable.  The calvarium appears intact.                               CT Head Without Contrast (Edited Result - FINAL)  Result time 01/23/22 23:35:44    Addendum 1 of 1 by Bong Sigala MD (01/23/22 23:35:44)      Is cervical spondylosis is noted throughout the cervical spine.  There is no evidence of cortical fracture or subluxation.  The posterior elements appear  intact.    The central neural canal and neural foramen are patent.    Atherosclerotic calcifications are noted within the carotid bulbs bilaterally.  The visceral spaces are noteworthy for nasopharyngeal airway 2 extending to the verónica.  The lung apices demonstrate patchy ground-glass opacities without nodular consistency or consolidation.  There is no pneumothorax.    Impression cervical spine: No acute findings in the cervical spine with generalized spondylosis in airway as described.      Electronically signed by: Bong Sigala  Date:    01/23/2022  Time:    23:35               Final result by Bong Sigala MD (01/23/22 22:59:51)                 Impression:      Multifocal areas of low density involving primarily white matter.  Correlate for global anoxia.    No evidence of major arterial infarction, hemorrhage or herniation.    Naso pharyngeal airway.    Findings were discussed withKendal Dave MD via epic secure chat with confirmation of receipt    This report was flagged in Epic as abnormal.      Electronically signed by: Bong Sigala  Date:    01/23/2022  Time:    22:59             Narrative:    EXAMINATION:  CT HEAD WITHOUT CONTRAST; CT CERVICAL SPINE WITHOUT CONTRAST    CLINICAL HISTORY:  Altered mental status, nontraumatic (Ped 0-18y);; Abnormal posture;    TECHNIQUE:  Low dose axial images were obtained through the head and cervical spine.  Coronal and sagittal reformations were also performed. Contrast was not administered.    COMPARISON:  None.    FINDINGS:  Brain:    There is vague low-density in the deep white matter of the is left occipital pole without loss of gray-white junction.  The remainder of the brain parenchyma appears to contain low densities in the heads of the caudate nucleus bilaterally and in the basal ganglia.  The remainder of the brain parenchyma attenuation appears unremarkable.  There is no pathologic fluid collection or ventriculomegaly.    The calvarium is intact.   Naso pharyngeal airway is present.  Air cells are clear.  Orbits unremarkable.  The calvarium appears intact.                                 Medications   propofol (DIPRIVAN) 10 mg/mL infusion (15 mcg/kg/min × 77.1 kg Intravenous New Bag 1/24/22 0207)   mupirocin 2 % ointment (has no administration in time range)   sodium chloride 0.9% flush 10 mL (has no administration in time range)   acetaminophen tablet 650 mg (has no administration in time range)   famotidine (PF) injection 20 mg (has no administration in time range)   ondansetron injection 4 mg (has no administration in time range)   heparin 25,000 units in dextrose 5% (100 units/ml) IV bolus from bag INITIAL BOLUS (max bolus 4000 units) (has no administration in time range)   heparin 25,000 units in dextrose 5% 250 mL (100 units/mL) infusion LOW INTENSITY nomogram - [facility] (has no administration in time range)   heparin 25,000 units in dextrose 5% (100 units/ml) IV bolus from bag - ADDITIONAL PRN BOLUS - 60 units/kg (max bolus 4000 units) (has no administration in time range)   heparin 25,000 units in dextrose 5% (100 units/ml) IV bolus from bag - ADDITIONAL PRN BOLUS - 30 units/kg (max bolus 4000 units) (has no administration in time range)   potassium chloride 10 mEq in 100 mL IVPB (has no administration in time range)   busPIRone tablet 30 mg (has no administration in time range)   acetaminophen oral solution 650 mg (has no administration in time range)   magnesium sulfate 2g in water 50mL IVPB (premix) (has no administration in time range)   lorazepam (ATIVAN) injection 1 mg (1 mg Intravenous Given 1/23/22 2159)   fentaNYL 50 mcg/mL injection 50 mcg (50 mcg Intravenous Given 1/23/22 2209)   midazolam (VERSED) 1 mg/mL injection 1 mg (1 mg Intravenous Given 1/23/22 2209)   propofoL (DIPRIVAN) 10 mg/mL infusion (  Override Pull 1/23/22 2300)   etomidate (AMIDATE) 2 mg/mL injection (  Override Pull 1/23/22 2315)   rocuronium 10 mg/mL injection (  Override Pull  1/23/22 2315)   etomidate injection 20 mg (20 mg Intravenous Given 1/23/22 2311)   rocuronium injection 75 mg (75 mg Intravenous Given 1/23/22 2312)   potassium chloride 10 mEq in 100 mL IVPB (0 mEq Intravenous Stopped 1/24/22 0143)   amiodarone 360 mg/200 mL (1.8 mg/mL) infusion (1 mg/min Intravenous New Bag 1/23/22 2356)   levETIRAcetam injection 2,000 mg (2,000 mg Intravenous Given 1/24/22 0006)     Medical Decision Making:   Clinical Tests:   Lab Tests: Ordered and Reviewed  Radiological Study: Ordered and Reviewed  Medical Tests: Ordered and Reviewed  ED Management:  61-year-old female presenting in cardiac arrest, now with hypoxic brain injury.  Given her history of very significant CAD, most likely had a cardiac event and subsequent anoxic brain injury.  She was nasally intubated upon arrival, and that was removed and I orally intubated her.  C-collar cleared by me, placed by EMS for presumed trauma, although a better history obtained via daughter revealed that she never hit her head on the ground.  Patient was given Ativan, and now on a propofol drip for intermittent posturing.  Labs significant for hypokalemia, and mildly elevated troponin, as well as evidence of end-organ damage with elevated liver enzymes and acute kidney injury.  I spoke with Dr. Escobar, who recommended an amiodarone drip as well as hypothermic protocol.  Patient's daughter informed of the findings, guarded prognosis, and patient admitted to the ICU.            Attending Attestation:         Attending Critical Care:   Critical Care Comments: I have personally provided 65 minutes of critical care time exclusive of time spent on separately billable procedures. Time includes review of laboratory data, radiology results, discussion with consultants, and monitoring for potential decompensation. Interventions were performed as documented above.                      Clinical Impression:   Final diagnoses:  [R41.82] Altered mental  status  [R07.9] Chest pain  [G93.1] Anoxic brain damage (Primary)  [I46.9] Cardiac arrest          ED Disposition Condition    Admit               Kendal Dave MD  01/24/22 4205

## 2022-01-24 NOTE — PLAN OF CARE
Patient on vent with documented settings.  Alarms are set and functioning with adequate volumes.  AMBU bag and mask at bedside. VBG drawn and reported to RN. Will continue to monitor.

## 2022-01-24 NOTE — CONSULTS
Consult Note  Palliative Care      Consult Requested By: Andrea Boucher, *  Reason for Consult: Goals of care    SUBJECTIVE:     History of Present Illness:  Disease Process: Advanced Cardiac Disease    61F with PMH of severe CAD (and family hx of same) with recent onset of exertional dyspnea for which she underwent LHC confirming multi-vessel CAD; pt was not a candidate for CABG and was awaiting a second opinion while on DAPT and statin.    Pt comes from home and was arguing with her son late 1/23 when she felt lightheaded and family lowered her to the floor at which point she became unresponsive. Family called 911 and pt was found pulseless and apneic, underwent CPR and shocked x 1 with ROSC, then intubated nasally (due to jaw clenching and posturing) and BIBEMS.    ETT placed in ED and pt was noted with GCS 3T off sedation. CT head showed multifocal hypodensities in the bilateral white matter and basal ganglia consistent with global anoxic injury. Pt was started on TTM and heparin gtt. Became agitated when central line placement was attempted requiring max sedation with propofol. Subsequently developed bradycardia with ectopy requiring levophed and amio, atropine brought to bedside.    Palliative has been consulted for goals of care.    At time of writing pt is intubated on CMV and on propofol, moderately sedated and still on TTM. Primary team reports pt's neuro exam is better than expected given her grave initial presentation apparently owing to effective bystander CPR. Pt was able to follow complex commands today during SAT and may be a candidate for extubation tomorrow if cardiac issues (labile BP, ectopy) can be optimized.     She has no formal MPOA or advance directives. Her daughter, Rizwana, is at bedside and reports that she and her brother Rc are pt's primary caretakers; she has another adult son who is estranged so Rizwana and Rc are co-equal legal NOK and will be making decisions for pt while  she is incapacitated. At baseline pt is independent with both ambulation and ADLs, has no DME.    Reviewed clinical course including pt's severe CAD recently assessed as non-interventionable by CT surgery. Family has appropriate understanding that if pt cannot be revascularized her prognosis is bleak given the near certainty of recurrent unstable arrhythmia or ACS. CT surgery has been re-consulted by ICU team in light of improving neuro exam to reassess for any potential life-prolonging interventions. Rizwana wishes for reintubation if warranted and would accept repeat CPR given response to date; remains FULL CODE at this time. She reports that her brother will be visiting at bedside tomorrow morning.    No past medical history on file.  Past Surgical History:   Procedure Laterality Date    CORONARY ANGIOGRAPHY N/A 12/14/2021    Procedure: ANGIOGRAM, CORONARY ARTERY;  Surgeon: Jm Escobar MD;  Location: [facility] CATH LAB/EP;  Service: Cardiology;  Laterality: N/A;    LEFT HEART CATHETERIZATION Left 12/14/2021    Procedure: Left heart cath;  Surgeon: Jm Escobar MD;  Location: [facility] CATH LAB/EP;  Service: Cardiology;  Laterality: Left;     Family History   Problem Relation Age of Onset    Heart attack Brother 62     Social History     Tobacco Use    Smoking status: Never Smoker    Smokeless tobacco: Never Used   Substance Use Topics    Alcohol use: Yes    Drug use: No       Mental Status: Non-responsive, intubated    ECOG Performance Status Grade: 1 - Ambulates, capable of light work    Review of Systems:  Review of systems not obtained due to patient factors intubated.    OBJECTIVE:     Physical Exam  Gen: Acutely ill appearing middle aged female lying in bed, intubated and moderately sedated in NAD. On TTM.  HEENT: ETT and OGT in place. Small dried blood around oropharynx. Pupils constricted and midline, does not open eyes spontaneously.  CV: RRR S1S2 no m/r/g with occasional extrasystoles  Pulm: CTAB  anteriorly with symmetric chest rise, no r/w/r, no dyssynchrony  Abdomen: Soft, nt/nd with hypoactive BS  Extremities: Cool to touch.  Neuro: No focal deficits.    Pain Assessment: No pain reported at this time    Decision-Making Capacity: Family answered questions, Patient unable to communicate due to disease severity/cognitive impairment    Advanced Directives:  Living Will: No  Do Not Resuscitate Status: No  Medical Power of : No  Registered Organ Donor: No    Living Arrangements: Lives with family, Lives in home    Psychosocial, Spiritual, Cultural:  Patient's most important priorities:  Unable to assess; intubated    ASSESSMENT/PLAN:     61F with PMH of severe CAD (and family hx of same) with recent onset of exertional dyspnea for which she underwent LHC confirming multi-vessel CAD; pt was not a candidate for CABG and was awaiting a second opinion while on DAPT and statin when she suffered a cardiac arrest at home suspected 2/2 pulseless VT. ROSC achieved in field and admitted to ICU for post arrest care with NCHCT findings worrisome for global anoxia but significantly improved neuro exam. Remains intubated due to frequent ectopy but with good pulm status. Team contemplating extubation tomorrow and has recalled CT surgery to reassess for any means of coronary revascularization or cardiac transplant. Palliative following for goals of care.    Pt's CAD is demonstrably life limiting given presenting cardiac arrest but poor CABG candidate at baseline, failed GDMT and condition has only worsened since CPR. Family remains hopeful for tx as per pt's last expressed wishes. If pt's coronaries remain inoperable then we have little to offer beyond comfort measures. Pt is hospice appropriate however given her curative-restorative goals we will need further input from CT surgery to guide family decision making.    Recommendations:  Medical: post arrest care per ICU  Symptom Management: sedation per  ICU  Psychosocial: incapacitated; has two supportive adult children and good home environment  Legal: Pt's daughter and son, Rizwana and Rc, are co-equal NOK  Prognosis: poor    Total time spent: 98 minutes  > 50% of 73 minute visit spent in chart review, face to face discussion of symptoms assessment and coordination of care with primary and ICU teams.    25 min ACP time spent: goals of care, emotional support, formulating and communicating prognosis and exploring previous wishes, expectations and information from CT surgery visits.    Ervin Hernandez MD  Hospice and Palliative Medicine  Palliative Care Pager: [phone]    Advance Care Planning     Date: 01/24/2022    [facility]  I engaged the family in a conversation about advance care planning and we specifically addressed what the goals of care would be moving forward, in light of the patient's change in clinical status, specifically cardiac arrest.  We did specifically address the patient's likely prognosis, which is poor.  We explored the patient's values and preferences for future care.  The family endorses that what is most important right now is to focus on comfort and QOL     Accordingly, we have decided that the best plan to meet the patient's goals includes continuing with treatment    I did not explain the role for hospice care at this stage of the patient's illness, including its ability to help the patient live with the best quality of life possible.  We will not be making a hospice referral.    I spent a total of 25 minutes engaging the family in this advance care planning discussion.

## 2022-01-24 NOTE — HPI
Veronika Blackmon is a 62 yo female with a pmh of multivessel CAD and HTN who was brought is after collapsing at home. She was dealing with a family argument and stated she felt as though she would pass out so her daughter lowered her to the ground, no head trauma. The patient was unresponsive from that point on per daughter. On EMS arrival, the patient was pulseless and apneic. EMS performed chest compressions and gave 1 shock then nasally intubated the patient who was noted to be posturing with clenched teeth. No sedation given for intubation in the field. On arrival to ED, an ETT was placed. Propofol was held due to hypotension. The patient continued to have posturing and was given keppra 2g. She was given ativan, versed,  and fentanyl as well. CT head showing global anoxia. Amiodarone drip was initiated. Renal and liver function worsening on labs. Initial troponin 0.055. The patient was having SANDOVAL and underwent a LHC last month when she was found to have severe multivessel CAD. She was evaluated for CABG by CTS on 12/22/21 but was deemed not a candidate. She was referred by cardiology for a second opinion at [facility] but had not yet set up an appt. She was being medically managed on ASA, plavix, and statin in the meantime. She has a strong family history of cardiac disease.

## 2022-01-24 NOTE — SUBJECTIVE & OBJECTIVE
No past medical history on file.    Past Surgical History:   Procedure Laterality Date    CORONARY ANGIOGRAPHY N/A 12/14/2021    Procedure: ANGIOGRAM, CORONARY ARTERY;  Surgeon: Jm Escobar MD;  Location: [facility] CATH LAB/EP;  Service: Cardiology;  Laterality: N/A;    LEFT HEART CATHETERIZATION Left 12/14/2021    Procedure: Left heart cath;  Surgeon: Jm Escobar MD;  Location: [facility] CATH LAB/EP;  Service: Cardiology;  Laterality: Left;       Review of patient's allergies indicates:  No Known Allergies    No current facility-administered medications on file prior to encounter.     Current Outpatient Medications on File Prior to Encounter   Medication Sig    albuterol (PROVENTIL/VENTOLIN HFA) 90 mcg/actuation inhaler Inhale 2 puffs into the lungs every 6 (six) hours as needed for Wheezing or Shortness of Breath. Rescue    amLODIPine (NORVASC) 5 MG tablet Take 1 tablet (5 mg total) by mouth once daily.    aspirin (ECOTRIN) 81 MG EC tablet Take 1 tablet (81 mg total) by mouth once daily.    chlorthalidone (HYGROTEN) 25 MG Tab Take 1 tablet (25 mg total) by mouth once daily.    clopidogreL (PLAVIX) 75 mg tablet Take 1 tablet (75 mg total) by mouth once daily.    hydrOXYzine pamoate (VISTARIL) 25 MG Cap Take 1-2 caps po nightly prn sleep difficulty    metoprolol succinate (TOPROL-XL) 25 MG 24 hr tablet Take 1 tablet (25 mg total) by mouth 2 (two) times daily.    potassium chloride SA (K-DUR,KLOR-CON) 20 MEQ tablet Take 1 tablet (20 mEq total) by mouth once daily.    rosuvastatin (CRESTOR) 40 MG Tab Take 1 tablet (40 mg total) by mouth every evening.     Family History     Problem Relation (Age of Onset)    Heart attack Brother (62)        Tobacco Use    Smoking status: Never Smoker    Smokeless tobacco: Never Used   Substance and Sexual Activity    Alcohol use: Yes    Drug use: No    Sexual activity: Not on file     Review of Systems   Unable to perform ROS: intubated     Objective:     Vital Signs  (Most Recent):  Temp: 97.52 °F (36.4 °C) (01/24/22 1200)  Pulse: (!) 51 (01/24/22 1200)  Resp: (!) 25 (01/24/22 1200)  BP: 134/61 (01/24/22 1200)  SpO2: 98 % (01/24/22 1200) Vital Signs (24h Range):  Temp:  [94.46 °F (34.7 °C)-100 °F (37.8 °C)] 97.52 °F (36.4 °C)  Pulse:  [] 51  Resp:  [0-38] 25  SpO2:  [90 %-100 %] 98 %  BP: ()/() 134/61  Arterial Line BP: ()/(50-82) 122/71     Weight: 75 kg (165 lb 5.5 oz)  Body mass index is 28.38 kg/m².    SpO2: 98 %  O2 Device (Oxygen Therapy): ventilator      Intake/Output Summary (Last 24 hours) at 1/24/2022 1225  Last data filed at 1/24/2022 1203  Gross per 24 hour   Intake 2022.14 ml   Output 855 ml   Net 1167.14 ml       Lines/Drains/Airways     Central Venous Catheter Line            Percutaneous Central Line Insertion/Assessment - Triple Lumen  01/24/22 0355 right internal jugular <1 day          Drain                 NG/OG Tube 01/23/22 2328 orogastric 14 Fr. Center mouth <1 day         Urethral Catheter 01/23/22 2215 16 Fr. <1 day          Airway                 Airway - Non-Surgical 01/23/22 2322 Endotracheal Tube <1 day          Arterial Line            Arterial Line 01/24/22 0330 Right Radial <1 day          Peripheral Intravenous Line                 Peripheral IV - Single Lumen 01/23/22 2210 20 G Left Wrist <1 day         Peripheral IV - Single Lumen 01/23/22 2211 20 G Right Wrist <1 day         Peripheral IV - Single Lumen 01/24/22 0005 18 G Right Antecubital <1 day         Peripheral IV - Single Lumen 01/24/22 0044 18 G Left Antecubital <1 day                Physical Exam  Constitutional:       Interventions: She is intubated.   Cardiovascular:      Rate and Rhythm: Bradycardia present. Frequent extrasystoles are present.     Heart sounds: No murmur heard.  No gallop.    Pulmonary:      Effort: She is intubated.   Neurological:      Comments: Intubated and sedated          Significant Labs:   ABG:   Recent Labs   Lab 01/24/22  0007  01/24/22  0453 01/24/22  0730   PH 7.335* 7.379 7.364   PCO2 48.6* 43.0 47.1*   HCO3 25.9 25.4 26.9   POCSATURATED 78* 99 54*   BE 0 0 1   , BMP:   Recent Labs   Lab 01/23/22 2214 01/23/22 2214 01/24/22  0156 01/24/22  0508 01/24/22  1058   *   < > 262*  262* 334* 316*  316*      < > 140 136 137   K 2.7*   < > 2.3* 2.6* 3.5   CL 99   < > 100 100 97   CO2 19*   < > 22* 22* 25   BUN 21*   < > 22* 22* 24*   CREATININE 2.0*   < > 2.0* 1.9* 2.0*   CALCIUM 10.0   < > 9.3 9.0 9.2   MG 2.5  --   --  2.0 2.2    < > = values in this interval not displayed.   , CBC   Recent Labs   Lab 01/23/22 2214 01/23/22 2214 01/24/22  0155 01/24/22  0155 01/24/22  0508   WBC 14.87*  --  20.50*  --  17.16*   HGB 12.8  --  12.1  --  11.4*   HCT 40.3   < > 36.8*   < > 33.7*     --  341  --  313    < > = values in this interval not displayed.    and Troponin   Recent Labs   Lab 01/23/22 2214 01/24/22  0156 01/24/22  0508   TROPONINI 0.055* 1.136* 2.579*       Significant Imaging: Echocardiogram: Transthoracic echo (TTE) complete (Cupid Only): pending

## 2022-01-24 NOTE — SUBJECTIVE & OBJECTIVE
No past medical history on file.    Past Surgical History:   Procedure Laterality Date    CORONARY ANGIOGRAPHY N/A 12/14/2021    Procedure: ANGIOGRAM, CORONARY ARTERY;  Surgeon: Jm Escobar MD;  Location: [facility] CATH LAB/EP;  Service: Cardiology;  Laterality: N/A;    LEFT HEART CATHETERIZATION Left 12/14/2021    Procedure: Left heart cath;  Surgeon: Jm Escobar MD;  Location: [facility] CATH LAB/EP;  Service: Cardiology;  Laterality: Left;       Review of patient's allergies indicates:  No Known Allergies    No current facility-administered medications on file prior to encounter.     Current Outpatient Medications on File Prior to Encounter   Medication Sig    albuterol (PROVENTIL/VENTOLIN HFA) 90 mcg/actuation inhaler Inhale 2 puffs into the lungs every 6 (six) hours as needed for Wheezing or Shortness of Breath. Rescue    amLODIPine (NORVASC) 5 MG tablet Take 1 tablet (5 mg total) by mouth once daily.    aspirin (ECOTRIN) 81 MG EC tablet Take 1 tablet (81 mg total) by mouth once daily.    chlorthalidone (HYGROTEN) 25 MG Tab Take 1 tablet (25 mg total) by mouth once daily.    clopidogreL (PLAVIX) 75 mg tablet Take 1 tablet (75 mg total) by mouth once daily.    hydrOXYzine pamoate (VISTARIL) 25 MG Cap Take 1-2 caps po nightly prn sleep difficulty    metoprolol succinate (TOPROL-XL) 25 MG 24 hr tablet Take 1 tablet (25 mg total) by mouth 2 (two) times daily.    potassium chloride SA (K-DUR,KLOR-CON) 20 MEQ tablet Take 1 tablet (20 mEq total) by mouth once daily.    rosuvastatin (CRESTOR) 40 MG Tab Take 1 tablet (40 mg total) by mouth every evening.     Family History     Problem Relation (Age of Onset)    Heart attack Brother (62)        Tobacco Use    Smoking status: Never Smoker    Smokeless tobacco: Never Used   Substance and Sexual Activity    Alcohol use: Yes    Drug use: No    Sexual activity: Not on file     Review of Systems   Unable to perform ROS: Patient unresponsive     Objective:      Vital Signs (Most Recent):  Temp: 100 °F (37.8 °C) (01/23/22 2240)  Pulse: (!) 52 (01/24/22 0143)  Resp: (!) 23 (01/24/22 0143)  BP: 123/60 (01/24/22 0100)  SpO2: 99 % (01/24/22 0143) Vital Signs (24h Range):  Temp:  [100 °F (37.8 °C)] 100 °F (37.8 °C)  Pulse:  [] 52  Resp:  [5-38] 23  SpO2:  [90 %-100 %] 99 %  BP: ()/() 123/60     Weight: 77.1 kg (170 lb)  Body mass index is 29.18 kg/m².    Physical Exam  Vitals and nursing note reviewed.   Constitutional:       Appearance: She is ill-appearing.   HENT:      Head: Normocephalic and atraumatic.      Nose: Nose normal.      Mouth/Throat:      Mouth: Mucous membranes are moist.   Eyes:      Pupils: Pupils are equal, round, and reactive to light.   Cardiovascular:      Rate and Rhythm: Regular rhythm. Tachycardia present.      Pulses: Normal pulses.      Heart sounds: Normal heart sounds.   Pulmonary:      Comments: Vented breath sounds  Abdominal:      General: Bowel sounds are normal.      Palpations: Abdomen is soft.   Musculoskeletal:         General: No swelling.      Cervical back: Neck supple.   Skin:     General: Skin is warm and dry.   Neurological:      Comments: Pupils equal and reactive, unresponsive to pain off sedation           CRANIAL NERVES     CN III, IV, VI   Pupils are equal, round, and reactive to light.       Significant Labs: All pertinent labs within the past 24 hours have been reviewed.    Significant Imaging: I have reviewed all pertinent imaging results/findings within the past 24 hours.

## 2022-01-24 NOTE — ASSESSMENT & PLAN NOTE
- creatinine 1.9-2.0 upon admission  - baseline creatinine .9  - multifactoral but most concerning for ATN with hypotension and cardiac arrest

## 2022-01-24 NOTE — PLAN OF CARE
Tmax 99.9 with hypothermia protocol initiated at 0246 with goal reached at 0540. Sats >95 on current vent settings. UOP per norman ~375ml during shift. Bradycardic with ectopy noted overnight. BP stable with pressor currently infusing. K replacements completed overnight with infusion running. Propofol, levo, amio, heparin gtts currently infusing. LR over 4 hours infusing. B/L wrist restraints in place. Atropine at bedside. TTM protocol maintaining. Mariana(friend) currently at bedside with updates given to Rizwana(daughter) overnight. Tejinder checks for safety done during shift. Report given to ODIN Jacobson.

## 2022-01-24 NOTE — NURSING TRANSFER
Nursing Transfer Note      1/24/2022     Reason patient is being transferred:TTM post arrest    Transfer From: ED    Transfer via stretcher    Transfer with Vent to O2, cardiac monitoring    Transported by [facility] RN    Medicines sent: Gtts    Any special needs or follow-up needed: TTM    Chart send with patient: Yes    Notified: Rizwana(daughter) and Mariana(friend) at bedside    Patient reassessed at: 0225 1/24/2022     Upon arrival to floor: cardiac monitor applied, patient oriented to room, call bell in reach and bed in lowest position

## 2022-01-24 NOTE — ANESTHESIA PROCEDURE NOTES
Central Line    Diagnosis: s/p cardiac arrest   Patient location during procedure: ICU  Procedure start time: 1/24/2022 3:55 AM  Timeout: 1/24/2022 3:50 AM  Procedure end time: 1/24/2022 4:20 AM    Staffing  Authorizing Provider: Alycia Frey MD  Performing Provider: Alycia Frey MD    Staffing  Anesthesiologist: Alycia Frey MD  Anesthesiologist was present at the time of the procedure.  Preanesthetic Checklist  Completed: patient identified, IV checked, site marked, risks and benefits discussed, surgical consent, monitors and equipment checked, pre-op evaluation, timeout performed and anesthesia consent given  Indication   Indication: vascular access, med administration     Anesthesia   local infiltration    Central Line   Skin Prep: skin prepped with ChloraPrep, skin prep agent completely dried prior to procedure  Sterile Barriers Followed: Yes    All five maximal barriers used- gloves, gown, cap, mask, and large sterile sheet    hand hygiene performed prior to central venous catheter insertion  Location: right internal jugular.   Catheter type: triple lumen  Catheter Size: 7 Fr  Ultrasound: vascular probe with ultrasound  Vessel Caliber: large, patent, compressibility normal  Needle advanced into vessel with real time Ultrasound guidance.  sterile gel and probe cover used in ultrasound-guided central venous catheter insertion  Manometry: none  Insertion Attempts: 1   Securement:line sutured, chlorhexidine patch, sterile dressing applied and blood return through all ports    Post-Procedure   X-Ray: placement verified by x-ray, successful placement and no pneumothorax on x-ray  Adverse Events:none      Guidewire   Additional Notes  Procedure performed using sterile technique. Patient intubated on propofol with norephinephrine for pressor support. 3 cc of lidocaine used for local anesthetic skin wheel. Successful right internal jugular vein CVC placement under ultrasound guidance. No  complications noted.    Medications:  Medication Administration Time: 1/24/2022 3:55 AM

## 2022-01-24 NOTE — ASSESSMENT & PLAN NOTE
Coronary artery disease involving native coronary artery of native heart    Recently deemed not a candidate for CABG due to lack of targets; seeking 2nd opinion at   CT head with global anoxia  - likely 2/2 arrhythmia in the setting of known severe CAD; will need to determine strategy for revascularization    -initiated heparin drip; ASA, statin, plavix  -initially on amiodarone drip; hold for bradycardia  -continue TTM protocol  -serial labs per protocol  -consult cardiology  -consult pulmonology for vent management  -propofol for sedation  -echo with EF 30%, segmental WMA

## 2022-01-24 NOTE — EICU
eICU Physician Virtual/Remote Brief Evaluation Note      Message from RN  Patient bradycardic in 50s, Levophed  Requesting Levophed to be concentrated and atropine p.r.n.  On TPN status post cardiac arrest  Chart reviewed  Levophed concentrated and atropine p.r.n. ordered      SHAY Castaneda MD  [facility] Attending  [phone]    This report has been created through the use of M-Modal dictation software. Typographical and content errors may occur with this process. While efforts are made to detect and correct such errors, in some cases errors will persist. For this reason, wording in this document should be considered in the proper context and not strictly verbatim

## 2022-01-24 NOTE — PLAN OF CARE
The pt's currently intubated in the ICU,so the sw met with the pt's dtr Rizwana Neymar [phone] who was at bedside to complete the assessment. The pt lives with Rizwana in [place]. The pt's independent with her adl's and doesn't use dme. 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 The pt has a very supportive family. The sw completed the white board in the pt's room and gave her dtr a d/c brochure. The sw encouraged her to call if she has any questions or concerns. The sw will continue to follow the pt throughout her transitions of care and will assist with any d/c needs.        01/24/22 9083   Discharge Assessment   Assessment Type Discharge Planning Assessment   Confirmed/corrected address, phone number and insurance Yes   Confirmed Demographics Correct on Facesheet   Source of Information family   If unable to respond/provide information was family/caregiver contacted? Yes   Contact Name/Number Rizwana Blackmon(dtr)[phone]   When was your last doctors appointment?   (about 2 weeks ago)   Communicated DEYSI with patient/caregiver Date not available/Unable to determine   Reason For Admission Cardiac arrest   Lives With child(geetha), adult   Do you expect to return to your current living situation? Yes   Do you have help at home or someone to help you manage your care at home? Yes   Who are your caregiver(s) and their phone number(s)? Rizwana Blackmon(dtr)[phone]   Prior to hospitilization cognitive status: Alert/Oriented   Current cognitive status: Coma/Sedated/Intubated   Walking or Climbing Stairs Difficulty none   Dressing/Bathing Difficulty none   Home Accessibility not wheelchair accessible;stairs within home   Stairs, Within Home, Primary 13   Equipment Currently Used at Home none   Readmission within 30 days? No   Patient currently being followed by outpatient case management? No   Do you currently have service(s) that help you manage your care at home? No   Do you take prescription medications? Yes   Do  you have prescription coverage? Yes   Coverage BCBS   Do you have any problems affording any of your prescribed medications? No  (the pt receives her meds affordably at [facility] in [place])   Is the patient taking medications as prescribed? yes   Who is going to help you get home at discharge? Rizwana Blackmon(dtr)[phone]   How do you get to doctors appointments? car, drives self   Are you on dialysis? No   Do you take coumadin? No   Discharge Plan A Home with family   Discharge Plan B Home Health   DME Needed Upon Discharge  other (see comments)  (TBD)   Discharge Plan discussed with: Adult children   Discharge Barriers Identified None   Relationship/Environment   Name(s) of Who Lives With Patient Rizwana Blackmon(dtr)[phone]

## 2022-01-24 NOTE — HPI
HPI retrieved from chart- patient is intubated and sedated. 62yo female with CAD, HTN, HLP, cardiac arrest, hypokalemia, HARSH and RBBB who presented to the hospital with out of hospital cardiac arrest. She was at home and collapsed after arguing with the son. She was noted to be pulseless and apneic upon arrival of EMS with ROSC after 1 round of compressions and 1 shock. Apparently, she felt as though she would pass out so her daughter lowered her to the ground, no head trauma. Upon  arrival to ED, she was intubated and was given keppra 2g as well as ativan, versed,  and fentanyl as well. Her CT head demonstrated  global anoxia. She was started on IV Amiodarone drip as well. Her initial troponin 0.055 with trend up to 1.136-2.579. Lactic acid 4.6 down to 2.4. She underwent LHC last month with severe multivessel CAD and was evaluated for CABG by [facility] on 12/22/21 but was deemed not a candidate and was in the process of getting a second opinion at [facility] but had not yet set up an appt. She was being medically managed on ASA, plavix, and statin in the meantime.

## 2022-01-24 NOTE — ASSESSMENT & PLAN NOTE
2/2 cardiac arrest  -TTM protocol  -evidence of anoxic injury on admission CT, but currently is following commands when sedation paused

## 2022-01-24 NOTE — HOSPITAL COURSE
1/24/2022 Presented with cardiac arrest. Cardiology consulted. On IV Amiodarone, Heparin, Levophed and Propofol. Echocardiogram pending   1/25/2022 HR and BP stable. No recurrent VTach noted overnight. Occasional intermittent junctional rhythm. Off pressors. More alert and responsive today. SBT in process with plans for extubation per Pulm CC today. Echo with EF 30% and LV WMA. CBC WNL. BMP with K+ 3.4 BUN 21 creatinine 1.6  1/26/2022 Extubated yesterday. Hypotensive overnight with 250cc LR with resumption of low dose Levophed. SBP 90s with MAP 66. No arrhythmias overnight on telemetry. H&H 8.9/26.3 K+ 3.6-replacement ordered. Creatinine up slightly to 2.0  1/27/2022 Back on Levophed this AM. H&H down to 7.8/23.7. Creatinine down to 1.9. HR and BP stable. No recurrent VTach noted on telemetry overnight. Awaiting transfer to [facility]

## 2022-01-25 LAB
ALBUMIN SERPL BCP-MCNC: 3.3 G/DL (ref 3.5–5.2)
ALLENS TEST: ABNORMAL
ALP SERPL-CCNC: 86 U/L (ref 55–135)
ALT SERPL W/O P-5'-P-CCNC: 170 U/L (ref 10–44)
ANION GAP SERPL CALC-SCNC: 13 MMOL/L (ref 8–16)
ANION GAP SERPL CALC-SCNC: 13 MMOL/L (ref 8–16)
ANION GAP SERPL CALC-SCNC: 14 MMOL/L (ref 8–16)
ANION GAP SERPL CALC-SCNC: 17 MMOL/L (ref 8–16)
ANION GAP SERPL CALC-SCNC: 18 MMOL/L (ref 8–16)
ANION GAP SERPL CALC-SCNC: 18 MMOL/L (ref 8–16)
APTT BLDCRRT: 51 SEC (ref 21–32)
AST SERPL-CCNC: 177 U/L (ref 10–40)
BASOPHILS # BLD AUTO: 0.07 K/UL (ref 0–0.2)
BASOPHILS NFR BLD: 0.4 % (ref 0–1.9)
BILIRUB SERPL-MCNC: 0.8 MG/DL (ref 0.1–1)
BUN SERPL-MCNC: 17 MG/DL (ref 6–20)
BUN SERPL-MCNC: 21 MG/DL (ref 6–20)
BUN SERPL-MCNC: 22 MG/DL (ref 6–20)
BUN SERPL-MCNC: 23 MG/DL (ref 6–20)
CALCIUM SERPL-MCNC: 8.9 MG/DL (ref 8.7–10.5)
CALCIUM SERPL-MCNC: 9.2 MG/DL (ref 8.7–10.5)
CALCIUM SERPL-MCNC: 9.3 MG/DL (ref 8.7–10.5)
CALCIUM SERPL-MCNC: 9.7 MG/DL (ref 8.7–10.5)
CALCIUM SERPL-MCNC: 9.9 MG/DL (ref 8.7–10.5)
CALCIUM SERPL-MCNC: 9.9 MG/DL (ref 8.7–10.5)
CHLORIDE SERPL-SCNC: 100 MMOL/L (ref 95–110)
CHLORIDE SERPL-SCNC: 100 MMOL/L (ref 95–110)
CHLORIDE SERPL-SCNC: 101 MMOL/L (ref 95–110)
CHLORIDE SERPL-SCNC: 101 MMOL/L (ref 95–110)
CHLORIDE SERPL-SCNC: 103 MMOL/L (ref 95–110)
CHLORIDE SERPL-SCNC: 99 MMOL/L (ref 95–110)
CO2 SERPL-SCNC: 20 MMOL/L (ref 23–29)
CO2 SERPL-SCNC: 21 MMOL/L (ref 23–29)
CO2 SERPL-SCNC: 21 MMOL/L (ref 23–29)
CO2 SERPL-SCNC: 23 MMOL/L (ref 23–29)
CO2 SERPL-SCNC: 23 MMOL/L (ref 23–29)
CO2 SERPL-SCNC: 24 MMOL/L (ref 23–29)
CREAT SERPL-MCNC: 1.5 MG/DL (ref 0.5–1.4)
CREAT SERPL-MCNC: 1.6 MG/DL (ref 0.5–1.4)
CREAT SERPL-MCNC: 1.7 MG/DL (ref 0.5–1.4)
CREAT SERPL-MCNC: 1.8 MG/DL (ref 0.5–1.4)
DELSYS: ABNORMAL
DIFFERENTIAL METHOD: ABNORMAL
EOSINOPHIL # BLD AUTO: 0 K/UL (ref 0–0.5)
EOSINOPHIL NFR BLD: 0.1 % (ref 0–8)
ERYTHROCYTE [DISTWIDTH] IN BLOOD BY AUTOMATED COUNT: 13.4 % (ref 11.5–14.5)
ERYTHROCYTE [SEDIMENTATION RATE] IN BLOOD BY WESTERGREN METHOD: 18 MM/H
EST. GFR  (AFRICAN AMERICAN): 35 ML/MIN/1.73 M^2
EST. GFR  (AFRICAN AMERICAN): 38 ML/MIN/1.73 M^2
EST. GFR  (AFRICAN AMERICAN): 40 ML/MIN/1.73 M^2
EST. GFR  (AFRICAN AMERICAN): 44 ML/MIN/1.73 M^2
EST. GFR  (NON AFRICAN AMERICAN): 30 ML/MIN/1.73 M^2
EST. GFR  (NON AFRICAN AMERICAN): 33 ML/MIN/1.73 M^2
EST. GFR  (NON AFRICAN AMERICAN): 35 ML/MIN/1.73 M^2
EST. GFR  (NON AFRICAN AMERICAN): 38 ML/MIN/1.73 M^2
FIO2: 30
GLUCOSE SERPL-MCNC: 128 MG/DL (ref 70–110)
GLUCOSE SERPL-MCNC: 128 MG/DL (ref 70–110)
GLUCOSE SERPL-MCNC: 131 MG/DL (ref 70–110)
GLUCOSE SERPL-MCNC: 136 MG/DL (ref 70–110)
GLUCOSE SERPL-MCNC: 136 MG/DL (ref 70–110)
GLUCOSE SERPL-MCNC: 149 MG/DL (ref 70–110)
GLUCOSE SERPL-MCNC: 149 MG/DL (ref 70–110)
GLUCOSE SERPL-MCNC: 154 MG/DL (ref 70–110)
GLUCOSE SERPL-MCNC: 154 MG/DL (ref 70–110)
GLUCOSE SERPL-MCNC: 159 MG/DL (ref 70–110)
GLUCOSE SERPL-MCNC: 159 MG/DL (ref 70–110)
HCO3 UR-SCNC: 26.1 MMOL/L (ref 24–28)
HCT VFR BLD AUTO: 35.8 % (ref 37–48.5)
HGB BLD-MCNC: 12 G/DL (ref 12–16)
IMM GRANULOCYTES # BLD AUTO: 0.1 K/UL (ref 0–0.04)
IMM GRANULOCYTES NFR BLD AUTO: 0.6 % (ref 0–0.5)
INR PPP: 1 (ref 0.8–1.2)
LACTATE SERPL-SCNC: 1.2 MMOL/L (ref 0.5–2.2)
LYMPHOCYTES # BLD AUTO: 2.9 K/UL (ref 1–4.8)
LYMPHOCYTES NFR BLD: 17.7 % (ref 18–48)
MAGNESIUM SERPL-MCNC: 2 MG/DL (ref 1.6–2.6)
MAGNESIUM SERPL-MCNC: 2.1 MG/DL (ref 1.6–2.6)
MAGNESIUM SERPL-MCNC: 2.2 MG/DL (ref 1.6–2.6)
MAGNESIUM SERPL-MCNC: 2.3 MG/DL (ref 1.6–2.6)
MAGNESIUM SERPL-MCNC: 2.4 MG/DL (ref 1.6–2.6)
MAGNESIUM SERPL-MCNC: 2.4 MG/DL (ref 1.6–2.6)
MCH RBC QN AUTO: 29.9 PG (ref 27–31)
MCHC RBC AUTO-ENTMCNC: 33.5 G/DL (ref 32–36)
MCV RBC AUTO: 89 FL (ref 82–98)
MODE: ABNORMAL
MONOCYTES # BLD AUTO: 1.1 K/UL (ref 0.3–1)
MONOCYTES NFR BLD: 6.8 % (ref 4–15)
NEUTROPHILS # BLD AUTO: 12.1 K/UL (ref 1.8–7.7)
NEUTROPHILS NFR BLD: 74.4 % (ref 38–73)
NRBC BLD-RTO: 0 /100 WBC
PCO2 BLDA: 40.9 MMHG (ref 35–45)
PEEP: 5
PH SMN: 7.41 [PH] (ref 7.35–7.45)
PHOSPHATE SERPL-MCNC: 2.8 MG/DL (ref 2.7–4.5)
PHOSPHATE SERPL-MCNC: 3.5 MG/DL (ref 2.7–4.5)
PHOSPHATE SERPL-MCNC: 3.9 MG/DL (ref 2.7–4.5)
PHOSPHATE SERPL-MCNC: 3.9 MG/DL (ref 2.7–4.5)
PHOSPHATE SERPL-MCNC: 4.3 MG/DL (ref 2.7–4.5)
PLATELET # BLD AUTO: 269 K/UL (ref 150–450)
PMV BLD AUTO: 10.6 FL (ref 9.2–12.9)
PO2 BLDA: 104 MMHG (ref 80–100)
POC BE: 2 MMOL/L
POC SATURATED O2: 98 % (ref 95–100)
POC TCO2: 27 MMOL/L (ref 23–27)
POCT GLUCOSE: 126 MG/DL (ref 70–110)
POCT GLUCOSE: 150 MG/DL (ref 70–110)
POTASSIUM SERPL-SCNC: 3.3 MMOL/L (ref 3.5–5.1)
POTASSIUM SERPL-SCNC: 3.4 MMOL/L (ref 3.5–5.1)
POTASSIUM SERPL-SCNC: 3.4 MMOL/L (ref 3.5–5.1)
POTASSIUM SERPL-SCNC: 3.6 MMOL/L (ref 3.5–5.1)
POTASSIUM SERPL-SCNC: 4 MMOL/L (ref 3.5–5.1)
POTASSIUM SERPL-SCNC: 4.3 MMOL/L (ref 3.5–5.1)
PROT SERPL-MCNC: 7.4 G/DL (ref 6–8.4)
PROTHROMBIN TIME: 10.7 SEC (ref 9–12.5)
RBC # BLD AUTO: 4.01 M/UL (ref 4–5.4)
SAMPLE: ABNORMAL
SITE: ABNORMAL
SODIUM SERPL-SCNC: 137 MMOL/L (ref 136–145)
SODIUM SERPL-SCNC: 139 MMOL/L (ref 136–145)
SODIUM SERPL-SCNC: 139 MMOL/L (ref 136–145)
SODIUM SERPL-SCNC: 140 MMOL/L (ref 136–145)
SP02: 97
TROPONIN I SERPL DL<=0.01 NG/ML-MCNC: 0.64 NG/ML (ref 0–0.03)
TROPONIN I SERPL DL<=0.01 NG/ML-MCNC: 0.66 NG/ML (ref 0–0.03)
VT: 400
WBC # BLD AUTO: 16.22 K/UL (ref 3.9–12.7)

## 2022-01-25 PROCEDURE — 83735 ASSAY OF MAGNESIUM: CPT | Mod: 91 | Performed by: NURSE PRACTITIONER

## 2022-01-25 PROCEDURE — 99233 PR SUBSEQUENT HOSPITAL CARE,LEVL III: ICD-10-PCS | Mod: ,,, | Performed by: STUDENT IN AN ORGANIZED HEALTH CARE EDUCATION/TRAINING PROGRAM

## 2022-01-25 PROCEDURE — 80048 BASIC METABOLIC PNL TOTAL CA: CPT | Mod: 91 | Performed by: NURSE PRACTITIONER

## 2022-01-25 PROCEDURE — 82803 BLOOD GASES ANY COMBINATION: CPT

## 2022-01-25 PROCEDURE — 94003 VENT MGMT INPAT SUBQ DAY: CPT

## 2022-01-25 PROCEDURE — 63600175 PHARM REV CODE 636 W HCPCS: Performed by: INTERNAL MEDICINE

## 2022-01-25 PROCEDURE — 84100 ASSAY OF PHOSPHORUS: CPT | Mod: 91 | Performed by: NURSE PRACTITIONER

## 2022-01-25 PROCEDURE — 93010 EKG 12-LEAD: ICD-10-PCS | Mod: ,,, | Performed by: INTERNAL MEDICINE

## 2022-01-25 PROCEDURE — 85025 COMPLETE CBC W/AUTO DIFF WBC: CPT | Performed by: NURSE PRACTITIONER

## 2022-01-25 PROCEDURE — 85730 THROMBOPLASTIN TIME PARTIAL: CPT | Performed by: NURSE PRACTITIONER

## 2022-01-25 PROCEDURE — 93010 ELECTROCARDIOGRAM REPORT: CPT | Mod: ,,, | Performed by: INTERNAL MEDICINE

## 2022-01-25 PROCEDURE — 36415 COLL VENOUS BLD VENIPUNCTURE: CPT | Performed by: NURSE PRACTITIONER

## 2022-01-25 PROCEDURE — 99900035 HC TECH TIME PER 15 MIN (STAT)

## 2022-01-25 PROCEDURE — 80053 COMPREHEN METABOLIC PANEL: CPT | Performed by: NURSE PRACTITIONER

## 2022-01-25 PROCEDURE — 85610 PROTHROMBIN TIME: CPT | Performed by: NURSE PRACTITIONER

## 2022-01-25 PROCEDURE — 99233 SBSQ HOSP IP/OBS HIGH 50: CPT | Mod: ,,, | Performed by: STUDENT IN AN ORGANIZED HEALTH CARE EDUCATION/TRAINING PROGRAM

## 2022-01-25 PROCEDURE — 99291 CRITICAL CARE FIRST HOUR: CPT | Mod: ,,, | Performed by: INTERNAL MEDICINE

## 2022-01-25 PROCEDURE — 37799 UNLISTED PX VASCULAR SURGERY: CPT

## 2022-01-25 PROCEDURE — 25000003 PHARM REV CODE 250: Performed by: HOSPITALIST

## 2022-01-25 PROCEDURE — 25000003 PHARM REV CODE 250: Performed by: STUDENT IN AN ORGANIZED HEALTH CARE EDUCATION/TRAINING PROGRAM

## 2022-01-25 PROCEDURE — 25000003 PHARM REV CODE 250: Performed by: NURSE PRACTITIONER

## 2022-01-25 PROCEDURE — 63600175 PHARM REV CODE 636 W HCPCS: Performed by: NURSE PRACTITIONER

## 2022-01-25 PROCEDURE — 84484 ASSAY OF TROPONIN QUANT: CPT | Performed by: INTERNAL MEDICINE

## 2022-01-25 PROCEDURE — 83735 ASSAY OF MAGNESIUM: CPT | Mod: 91 | Performed by: INTERNAL MEDICINE

## 2022-01-25 PROCEDURE — 97535 SELF CARE MNGMENT TRAINING: CPT

## 2022-01-25 PROCEDURE — 63600175 PHARM REV CODE 636 W HCPCS: Performed by: EMERGENCY MEDICINE

## 2022-01-25 PROCEDURE — 92610 EVALUATE SWALLOWING FUNCTION: CPT

## 2022-01-25 PROCEDURE — 20000000 HC ICU ROOM

## 2022-01-25 PROCEDURE — 93005 ELECTROCARDIOGRAM TRACING: CPT

## 2022-01-25 PROCEDURE — 94761 N-INVAS EAR/PLS OXIMETRY MLT: CPT

## 2022-01-25 PROCEDURE — 84100 ASSAY OF PHOSPHORUS: CPT | Mod: 91 | Performed by: INTERNAL MEDICINE

## 2022-01-25 PROCEDURE — 27000221 HC OXYGEN, UP TO 24 HOURS

## 2022-01-25 PROCEDURE — 83605 ASSAY OF LACTIC ACID: CPT | Performed by: INTERNAL MEDICINE

## 2022-01-25 PROCEDURE — 99900017 HC EXTUBATION W/PARAMETERS (STAT)

## 2022-01-25 PROCEDURE — 99291 PR CRITICAL CARE, E/M 30-74 MINUTES: ICD-10-PCS | Mod: ,,, | Performed by: INTERNAL MEDICINE

## 2022-01-25 PROCEDURE — 80048 BASIC METABOLIC PNL TOTAL CA: CPT | Performed by: NURSE PRACTITIONER

## 2022-01-25 PROCEDURE — 80048 BASIC METABOLIC PNL TOTAL CA: CPT | Mod: 91 | Performed by: INTERNAL MEDICINE

## 2022-01-25 RX ORDER — POTASSIUM CHLORIDE 20 MEQ/1
40 TABLET, EXTENDED RELEASE ORAL ONCE
Status: COMPLETED | OUTPATIENT
Start: 2022-01-25 | End: 2022-01-25

## 2022-01-25 RX ORDER — BUSPIRONE HYDROCHLORIDE 5 MG/1
30 TABLET ORAL ONCE
Status: DISCONTINUED | OUTPATIENT
Start: 2022-01-25 | End: 2022-01-25

## 2022-01-25 RX ORDER — SODIUM CHLORIDE 9 MG/ML
INJECTION, SOLUTION INTRAVENOUS CONTINUOUS
Status: DISCONTINUED | OUTPATIENT
Start: 2022-01-25 | End: 2022-01-27 | Stop reason: HOSPADM

## 2022-01-25 RX ORDER — NOREPINEPHRINE BITARTRATE/D5W 8 MG/250ML
0-3 PLASTIC BAG, INJECTION (ML) INTRAVENOUS CONTINUOUS
Status: DISCONTINUED | OUTPATIENT
Start: 2022-01-25 | End: 2022-01-25

## 2022-01-25 RX ORDER — POTASSIUM CHLORIDE 29.8 MG/ML
40 INJECTION INTRAVENOUS ONCE
Status: COMPLETED | OUTPATIENT
Start: 2022-01-25 | End: 2022-01-25

## 2022-01-25 RX ORDER — ACETAMINOPHEN 650 MG/20.3ML
650 LIQUID ORAL EVERY 6 HOURS PRN
Status: DISCONTINUED | OUTPATIENT
Start: 2022-01-25 | End: 2022-01-27 | Stop reason: HOSPADM

## 2022-01-25 RX ORDER — TALC
6 POWDER (GRAM) TOPICAL NIGHTLY PRN
Status: DISCONTINUED | OUTPATIENT
Start: 2022-01-25 | End: 2022-01-27

## 2022-01-25 RX ADMIN — ACETAMINOPHEN ORAL SOLUTION 650 MG: 650 SOLUTION ORAL at 05:01

## 2022-01-25 RX ADMIN — MELATONIN TAB 3 MG 6 MG: 3 TAB at 11:01

## 2022-01-25 RX ADMIN — CLOPIDOGREL 75 MG: 75 TABLET, FILM COATED ORAL at 09:01

## 2022-01-25 RX ADMIN — ACETAMINOPHEN ORAL SOLUTION 650 MG: 650 SOLUTION ORAL at 12:01

## 2022-01-25 RX ADMIN — SODIUM CHLORIDE: 0.9 INJECTION, SOLUTION INTRAVENOUS at 02:01

## 2022-01-25 RX ADMIN — ONDANSETRON 4 MG: 2 INJECTION INTRAMUSCULAR; INTRAVENOUS at 08:01

## 2022-01-25 RX ADMIN — PROPOFOL 15 MCG/KG/MIN: 10 INJECTION, EMULSION INTRAVENOUS at 05:01

## 2022-01-25 RX ADMIN — MUPIROCIN: 20 OINTMENT TOPICAL at 08:01

## 2022-01-25 RX ADMIN — ASPIRIN 81 MG CHEWABLE TABLET 81 MG: 81 TABLET CHEWABLE at 09:01

## 2022-01-25 RX ADMIN — SODIUM CHLORIDE 25 ML/HR: 0.9 INJECTION, SOLUTION INTRAVENOUS at 09:01

## 2022-01-25 RX ADMIN — MUPIROCIN: 20 OINTMENT TOPICAL at 09:01

## 2022-01-25 RX ADMIN — FAMOTIDINE 20 MG: 10 INJECTION, SOLUTION INTRAVENOUS at 09:01

## 2022-01-25 RX ADMIN — ATORVASTATIN CALCIUM 80 MG: 40 TABLET, FILM COATED ORAL at 08:01

## 2022-01-25 RX ADMIN — SODIUM CHLORIDE, SODIUM LACTATE, POTASSIUM CHLORIDE, AND CALCIUM CHLORIDE 250 ML: .6; .31; .03; .02 INJECTION, SOLUTION INTRAVENOUS at 10:01

## 2022-01-25 RX ADMIN — POTASSIUM PHOSPHATE, MONOBASIC POTASSIUM PHOSPHATE, DIBASIC 15 MMOL: 224; 236 INJECTION, SOLUTION, CONCENTRATE INTRAVENOUS at 02:01

## 2022-01-25 RX ADMIN — HEPARIN SODIUM 12 UNITS/KG/HR: 5000 INJECTION, SOLUTION INTRAVENOUS; SUBCUTANEOUS at 07:01

## 2022-01-25 RX ADMIN — POTASSIUM CHLORIDE 40 MEQ: 1500 TABLET, EXTENDED RELEASE ORAL at 02:01

## 2022-01-25 RX ADMIN — POTASSIUM CHLORIDE 40 MEQ: 400 INJECTION, SOLUTION INTRAVENOUS at 11:01

## 2022-01-25 NOTE — ASSESSMENT & PLAN NOTE
Coronary artery disease involving native coronary artery of native heart    Recently deemed not a candidate for CABG due to lack of targets; seeking 2nd opinion at   CT head with global anoxia  - likely 2/2 arrhythmia in the setting of known severe CAD; will need to determine strategy for revascularization    -initiated heparin drip; ASA, statin, plavix  -initially on amiodarone drip; hold for bradycardia  -completed TTM protocol  -consulted cardiology  -consult pulmonology for vent management  -propofol for sedation  -echo with EF 30%, segmental WMA  -Successfully extubated 1/25  -Cardio to discuss possible CABG with

## 2022-01-25 NOTE — ASSESSMENT & PLAN NOTE
- HR 40s down to 30s yesterday; HR 50s-60s overnight  - continue to monitor on telemetry   - will continue to hold IV Amiodarone for now; consider low dose Amio once extubated

## 2022-01-25 NOTE — CONSULTS
"Pulm/CC Fellow Consult Note    Attending Physician: Andrea Boucher, *      Date of Admit: 1/23/2022    Chief Complaint: Ventilator management     History of Present Illness:   Ms. MARKS is a 61 y.o. F w/ CAD (on Plavix), HTN, HLD, that presented to [facility] after cardiac arrest suffered PTA at home. Per Son and Daughter,  Pt was in an argument with her son when she expressed sensation that she was going to "pass out," when she then collapsed suddenly to the floor. Daughter caught the Pt before falling to the floor; denies any head trauma. Upon EMS arrival, Pt found apneic and pulseless; initiated CPR and x1 shock; nasally intubated the Pt 2/2 her jaw being clenched; fingerstick BG WNL.     Last month, Pt visited the ED for SANDOVAL and subsequently underwent left heart cath. Found to have significant multi-vessel CAD (see Cath report below) and is followed by CTS and Cardio for frequent PVC's and continued management. Per CTS note: Pt is not a candidate for CABG is undergoing assessment and discussion for PCI vs. med management. Currently on Metoprolol, with recent dosage increase last month. See below for Myocardial Perfusion Study. At home, is on Plavix, Rosuvastatin, and ASA. Has a referral to [facility] for second opinion but has not yet had that appointment.     On arrival to ED: Pt intubated. given Keppra 2g along with Ativan, Versed, Fentanyl. CT Head indicated global anoxia. Amiodarone drip initiated for continued ectopy. Renal and liver function declining, suspect 2/2 arrest. Initial Trop at 0.055. Pulm/ICU Team consulted for ventilator management s/p cardiac arrest.     Past Medical History:  No past medical history on file.    Past Surgical History:  Past Surgical History:   Procedure Laterality Date    CORONARY ANGIOGRAPHY N/A 12/14/2021    Procedure: ANGIOGRAM, CORONARY ARTERY;  Surgeon: Jm Escobar MD;  Location: [facility] CATH LAB/EP;  Service: Cardiology;  Laterality: N/A;    LEFT HEART " "CATHETERIZATION Left 2021    Procedure: Left heart cath;  Surgeon: Jm Escobar MD;  Location: [facility] CATH LAB/EP;  Service: Cardiology;  Laterality: Left;       Allergies:  Review of patient's allergies indicates:  No Known Allergies      Family History:  Family History   Problem Relation Age of Onset    Heart attack Brother 62       Social History:  Social History     Tobacco Use    Smoking status: Never Smoker    Smokeless tobacco: Never Used   Substance Use Topics    Alcohol use: Yes    Drug use: No       Review of Systems:  Comprehensive ROS negative except as per HPI       Objective:   Last 24 Hour Vital Signs:  BP  Min: 74/50  Max: 196/97  Temp  Av.6 °F (35.9 °C)  Min: 94.46 °F (34.7 °C)  Max: 100 °F (37.8 °C)  Pulse  Av  Min: 30  Max: 149  Resp  Av.3  Min: 0  Max: 38  SpO2  Av.8 %  Min: 90 %  Max: 100 %  Height  Av' 4" (162.6 cm)  Min: 5' 4" (162.6 cm)  Max: 5' 4" (162.6 cm)  Weight  Av.7 kg (166 lb 12.5 oz)  Min: 74.8 kg (165 lb)  Max: 77.1 kg (170 lb)  Body mass index is 28.32 kg/m².  I/O last 3 completed shifts:  In: 2266.4 [I.V.:856.4; NG/GT:450; IV Piggyback:960]  Out: 0 [Urine:2080]    Physical Exam:  Vitals and nursing note reviewed.  Constitutional:       Appearance: She is ill-appearing. On ventilator.   HENT:     Head: Normocephalic and atraumatic.     Nose: Nose normal.     Mouth/Throat:     Mouth: Mucous membranes are moist.  Eyes:     Pupils: Pupils are equal, round, and reactive to light.  Cardiovascular:     Rate and Rhythm: Regular rhythm. Bradycardic to 46.      Pulses: 1+ R DP/PT. Undetectable L DP/PT     Heart sounds: Normal heart sounds.  Pulmonary:     Comments: Vented breath sounds  Abdominal:     General: Bowel sounds are normal.     Palpations: Abdomen is soft.  Musculoskeletal:         General: No swelling.  Skin:     General: Skin is warm and dry.  Neurological:     Comments: Pupils equal and reactive, unresponsive to pain off sedation. " Following commands.   Being cooled to 36 Celsius per TTM protocol     Personal Review and Summary of Interval Diagnostics    Laboratory Studies:   Recent Labs   Lab 01/24/22  0730   PH 7.364   PCO2 47.1*   PO2 30*   HCO3 26.9   POCSATURATED 54*   BE 1     Recent Labs   Lab 01/24/22  0508   WBC 17.16*   RBC 3.71*   HGB 11.4*   HCT 33.7*      MCV 91   MCH 30.7   MCHC 33.8     Recent Labs   Lab 01/24/22  1653      K 4.5      CO2 23   BUN 22*   CREATININE 1.7*   MG 2.2       Microbiology Data:   Microbiology Results (last 7 days)     Procedure Component Value Units Date/Time    Blood culture [[id number]] Collected: 01/24/22 0200    Order Status: Completed Specimen: Blood Updated: 01/24/22 1715     Blood Culture, Routine No Growth to date    Blood culture [[id number]] Collected: 01/24/22 0156    Order Status: Completed Specimen: Blood Updated: 01/24/22 1715     Blood Culture, Routine No Growth to date    Culture, Respiratory with Gram Stain [[id number]] Collected: 01/24/22 0334    Order Status: Completed Specimen: Respiratory from Sputum Updated: 01/24/22 1200     Gram Stain (Respiratory) <10 epithelial cells per low power field.     Gram Stain (Respiratory) Rare WBC's     Gram Stain (Respiratory) Rare Gram positive cocci        Previous results:  Cardiac Cath 12/14/2021  · There was three vessel coronary artery disease.  · The ejection fraction was 50-55% by visual estimate.  · The pre-procedure left ventricular end diastolic pressure was 7.  · The Prox LAD lesion was 70% stenosed.  · The Mid LAD lesion was 80% stenosed.  · The Dist LAD lesion was 95% stenosed.  · The 1st Diag lesion was 95% stenosed.  · The 1st Mrg lesion was 90% stenosed.  · The Prox Cx to Mid Cx lesion was 80% stenosed.  · The Prox RCA to Mid RCA lesion was 100% stenosed.  · The estimated blood loss was none.    NM Myocardial Perfusion Spect 12/02/21   1. Findings concerning for reversible ischemia in the anterior wall.  There is an  associated wall motion abnormality.  2. Possible separate area of reversible ischemia along the inferoseptal wall, though artifact at this location can occasionally give a similar appearance.  3. Left ventricular dysfunction.  Estimated ejection fraction 36% during stress and 45% during rest.  This report was flagged in Epic as abnormal.    Chest Imaging:   -A/P CXR: No indication of a pneumothorax.  There is cardiomegaly present.  -CT Head: Multifocal areas of low density involving primarily white matter.  Correlate for global anoxia. No evidence of major arterial infarction, hemorrhage or herniation. Naso pharyngeal airway.   -Echo - Final Results pending at this time.  -CT Cervical Spine w/o: Impression cervical spine: No acute findings in the cervical spine with generalized spondylosis in airway as described.     Assessment & Plan:     Ms. MARKS is a 61 y.o. F w/ CAD (on Plavix), HTN, HLD, that presented to [facility] after cardiac arrest suffered PTA at home. ROSC achieved after CPR and defib x1 on scene; nasally intubated. Upon arrival to the ED orally intubated and placed on Amio and Levo GTT with significant hemodynamic variability/instability. Initial K+ at 2.6; after repletion, currently 3.5. CT Head without acute bleed, but notable for signs of anoxic brain injury. HARSH and Transaminitis present; suspect 2/2 to arrest. ICU/Pulm Team consulted for ventilator management. Currently being cooled per TTM protocol.        Neuro  #S/p Cardiac Arrest  -Was being cooled to 36 C, per TTM protocol  -Rewarming initiated.  -CT Head: indicated global anoxia. No bleed, infarction, or herniation.  -On minimal sedation with propofol. Titrate to a RASS of 0 to -1.   -Continue to assess neuro status    CVS  #S/p Cardiac Arrest  -Given MVCAD and hx of PVC's: Suspect arrest 2/2 to arrhythmia induced by sympathetic surge in the context of an argument with a relative  -Significant hemodynamic instability o/n with frequent atopy  and currently bradycardic to the 40's. Improved with with Amio GTT  -Continue Amio GTT  -Currently, Levo GTT stopped.   -Echo - Final Results pending at this time.  -Monitor lytes.    Pulm  #On Mechanical Ventilation for airway protection  -Initial VB.36 / 47 / 30 /26.9  -Satting well on current vent setting: VC / FIO2 30% / PEEP 5  -Given HD instability, recommend continued intubation   -SBT/SAT in AM for possible extubation tomorrow    Renal   #HARSH  -Bun/Cr 24/2.0. Baseline 12 / 0.9  -Suspect 2/2 hypoperfusion 2/2 cardiac arrest  -Adequate UOP   -Continue to monitor    FEN/GI  F: Net -750 since admission.   E: Initial K+ 2.6. Repleted to 3.5. Continue to monitor.   N: Was NPO. Tube feeds started.   GI: Transaminitis AST//394. Suspect shock liver. Continue to monitor.    Endo  -Glucose 334 today  -Insulin initially held given hypokalemia. Hypokalemia now resolved.  -SSI ordered      HEME/ID  -H/H 11.4/33.7. Stable  -WBC 17. Suspect 2/2 stress response  -No Abx. No suspicion for infection at this time.    PPX:   -Heparin GTT  -Famotidine IV    Dario Torres, MS4  L[employer] BARON, EM Candidate     I evaluated the patient and made changes to the above information as deemed appropriate.    Rhianna Chakraborty MD  Fellow  Pulmonary & CCM

## 2022-01-25 NOTE — PLAN OF CARE
Problem: SLP Goal  Goal: SLP Goal  Description: Short Term Goals:  1. Pt will participate in ongoing swallow assessment to determine least restrictive diet.   2.Pt will tolerate clear liquid diet with no audible dysphagia signs.  3. Further assessment of cognition to be completed next date of service.     Outcome: Ongoing, Progressing   Clinical swallow eval initiated, rec: clear liquids, whole meds by one by [facility] staff. Pt is awake and alert, extubated at 9am to NC. Dtr at [facility] and notified of POC.

## 2022-01-25 NOTE — PLAN OF CARE
Pt remains intubated, sedated, with restraints in place. TTM continued per orders, with cooling phase completed, and rewarming phase started @ 0540. Propofol and Heparin gtt remain infusing. Pt continues to respond to voice, and able to follow simple commands. NSR on monitor. SpO2 maintained >98%, vent settings: TV: 400/ R: 16/ P: 5/ FiO2: 30%. Total UOP: 1180mL. Safety, skin, and Q1H neuro assessments in place, and maintained during shift.

## 2022-01-25 NOTE — PROGRESS NOTES
[place] - Intensive Care  Cardiology  Progress Note    Patient Name: Veronika Blackmon  MRN: [id number]  Admission Date: 1/23/2022  Hospital Length of Stay: 2 days  Code Status: Full Code   Attending Physician: Aly oTvar MD   Primary Care Physician: Sahara Carrillo MD  Expected Discharge Date: 1/26/2022  Principal Problem:Cardiac arrest    Subjective:     Hospital Course:   1/24/2022 Presented with cardiac arrest. Cardiology consulted. On IV Amiodarone, Heparin, Levophed and Propofol. Echocardiogram pending   1/25/2022 HR and BP stable. No recurrent VTach noted overnight. Occasional intermittent junctional rhythm. Off pressors. More alert and responsive today. SBT in process with plans for extubation per Pulm CC today. Echo with EF 30% and LV WMA. CBC WNL. BMP with K+ 3.4 BUN 21 creatinine 1.6          Review of Systems   Unable to perform ROS: intubated     Objective:     Vital Signs (Most Recent):  Temp: 98.2 °F (36.8 °C) (01/25/22 1100)  Pulse: 82 (01/25/22 1156)  Resp: (!) 25 (01/25/22 1156)  BP: (!) 88/55 (01/25/22 1130)  SpO2: 98 % (01/25/22 1156) Vital Signs (24h Range):  Temp:  [95.5 °F (35.3 °C)-98.2 °F (36.8 °C)] 98.2 °F (36.8 °C)  Pulse:  [46-82] 82  Resp:  [6-35] 25  SpO2:  [92 %-100 %] 98 %  BP: ()/(50-92) 88/55  Arterial Line BP: ()/(51-87) 88/56     Weight: 82 kg (180 lb 12.4 oz)  Body mass index is 31.03 kg/m².     SpO2: 98 %  O2 Device (Oxygen Therapy): nasal cannula      Intake/Output Summary (Last 24 hours) at 1/25/2022 1254  Last data filed at 1/25/2022 1211  Gross per 24 hour   Intake 955.2 ml   Output 2570 ml   Net -1614.8 ml       Lines/Drains/Airways     Central Venous Catheter Line            Percutaneous Central Line Insertion/Assessment - Triple Lumen  01/24/22 0355 right internal jugular 1 day          Drain                 Urethral Catheter 01/23/22 2215 16 Fr. 1 day          Arterial Line            Arterial Line 01/24/22 0330 Right Radial 1 day          Peripheral Intravenous Line                  Peripheral IV - Single Lumen 01/23/22 2210 20 G Left Wrist 1 day         Peripheral IV - Single Lumen 01/23/22 2211 20 G Right Wrist 1 day         Peripheral IV - Single Lumen 01/24/22 0005 18 G Right Antecubital 1 day         Peripheral IV - Single Lumen 01/24/22 0044 18 G Left Antecubital 1 day                Physical Exam  Constitutional:       General: She is not in acute distress.     Appearance: She is well-developed and well-nourished.   Cardiovascular:      Rate and Rhythm: Normal rate and regular rhythm.      Heart sounds: No murmur heard.  No gallop.    Pulmonary:      Effort: Pulmonary effort is normal. No respiratory distress.      Breath sounds: Normal breath sounds. No wheezing.   Abdominal:      General: Bowel sounds are normal. There is no distension.      Palpations: Abdomen is soft.      Tenderness: There is no abdominal tenderness.   Skin:     General: Skin is warm and dry.   Neurological:      Mental Status: She is alert.      Comments: Intubated; off sedation and following commands          Significant Labs:   BMP:   Recent Labs   Lab 01/25/22  0551 01/25/22  0910 01/25/22  1147   * 136*  136* 154*  154*    137 137   K 3.4* 3.4* 3.3*   CL 99 100 100   CO2 21* 24 23   BUN 21* 22* 22*   CREATININE 1.6* 1.5* 1.7*   CALCIUM 9.9 9.3 9.2   MG 2.3 2.1 2.4   , CBC   Recent Labs   Lab 01/24/22  0155 01/24/22  0155 01/24/22  0508 01/24/22  0508 01/25/22  0551   WBC 20.50*  --  17.16*  --  16.22*   HGB 12.1  --  11.4*  --  12.0   HCT 36.8*   < > 33.7*   < > 35.8*     --  313  --  269    < > = values in this interval not displayed.    and Troponin   Recent Labs   Lab 01/23/22  2214 01/24/22  0156 01/24/22  0508   TROPONINI 0.055* 1.136* 2.579*       Significant Imaging: Echocardiogram:   Transthoracic echo (TTE) complete ([facility] Only):   Results for orders placed or performed during the hospital encounter of 01/23/22   Echo   Result Value Ref Range    BSA 1.84 m2    TDI  "SEPTAL 0.05 m/s    LV LATERAL E/E' RATIO 19.00 m/s    LV SEPTAL E/E' RATIO 11.40 m/s    LA WIDTH 4.56 cm    TDI LATERAL 0.03 m/s    LVIDd 4.26 3.5 - 6.0 cm    IVS 0.88 0.6 - 1.1 cm    Posterior Wall 0.98 0.6 - 1.1 cm    Ao root annulus 2.73 cm    LVIDs 3.97 2.1 - 4.0 cm    FS 7 28 - 44 %    LA volume 64.11 cm3    STJ 2.49 cm    Ascending aorta 2.79 cm    LV mass 126.99 g    LA size 3.25 cm    RVDD 2.11 cm    TAPSE 1.77 cm    RV S' 9.51 cm/s    Left Ventricle Relative Wall Thickness 0.46 cm    AV mean gradient 3 mmHg    AV valve area 1.98 cm2    AV Velocity Ratio 0.53     AV index (prosthetic) 0.63     MV valve area p 1/2 method 3.51 cm2    E/A ratio 0.83     Mean e' 0.04 m/s    E wave deceleration time 216.35 msec    IVRT 108.47 msec    MV "A" wave duration 13.13 msec    Pulm vein S/D ratio 0.73     LVOT diameter 2.00 cm    LVOT area 3.1 cm2    LVOT peak jordi 0.55 m/s    LVOT peak VTI 12.00 cm    Ao peak jordi 1.04 m/s    Ao VTI 19.00 cm    LVOT stroke volume 37.68 cm3    AV peak gradient 4 mmHg    E/E' ratio 14.25 m/s    MV Peak E Jordi 0.57 m/s    TR Max Jordi 2.27 m/s    MV stenosis pressure 1/2 time 62.74 ms    MV Peak A Jordi 0.69 m/s    PV Peak S Jordi 0.19 m/s    PV Peak D Jordi 0.26 m/s    LV Systolic Volume 68.63 mL    LV Systolic Volume Index 38.1 mL/m2    LV Diastolic Volume 81.35 mL    LV Diastolic Volume Index 45.19 mL/m2    LA Volume Index 35.6 mL/m2    LV Mass Index 71 g/m2    RA Major Axis 5.05 cm    Left Atrium Minor Axis 4.63 cm    Left Atrium Major Axis 5.65 cm    Triscuspid Valve Regurgitation Peak Gradient 21 mmHg    LA Volume Index (Mod) 36.8 mL/m2    LA volume (mod) 66.25 cm3    RA Width 3.83 cm    EF 30 %    Narrative    · The left ventricle is normal in size with concentric remodeling and   moderately decreased systolic function.  · The estimated ejection fraction is 30%.  · There are segmental left ventricular wall motion abnormalities.  · Grade II left ventricular diastolic dysfunction.  · With normal " right ventricular systolic function.  · Mechanically ventilated; cannot use inferior caval vein diameter to   estimate central venous pressure.        Assessment and Plan:     Brief HPI: Seen this morning on AM rounds with daughter at bedside. Patient off sedation and following commands. Discussed cardiac  POC as detailed below-verbalized understanding and agrees with POC     * Cardiac arrest  - out of hospital arrest with ROSC after CPR and defibrillation  - appears to have been down for up to 10 minutes prior to EMS arrival per daughter  - treated with IV Amiodarone overnight but stopped yesterday due to  bradycardia  - telemetry reviewed with improvement of frequent PVCs; continued intermittent junctional rhythm and SB but improving; once extubated and able to take po medications will likely start low dose Amiodarone   - etiology of arrest ischemic vs electrolyte derangement- coneerned about ischemic etiology given extend of CAD   - echo with EF 30% and WMA   - continue supportive care     Sinus bradycardia  - HR 40s down to 30s yesterday; HR 50s-60s overnight  - continue to monitor on telemetry   - will continue to hold IV Amiodarone for now; consider low dose Amio once extubated    HARSH (acute kidney injury)  - creatinine 1.9-2.0 upon admission; down to 1.6 this AM   - baseline creatinine .9  - multifactoral but most concerning for ATN with hypotension and cardiac arrest     Hypokalemia  - presented with K+ 2.7; aggressively replaced with continued hypokalemia  - repeat K+ this AM 3.0   - goal K+ >4.0    Mixed hyperlipidemia  - continue statin therapy     Essential hypertension  - SBP 80s-100s  - continue to hold antihypertensives   - no BB due to previously stated reason     Coronary artery disease involving native coronary artery of native heart  - multivessel CAD per [facility]  - strong family history and positive stress test  - seen by CTS at [facility] but deemed not a candidate for CABG; in process of obtaining  second opinion; followed by Dr. Escobar as an outpatient  - continue ASA, Plavix and statin as well as IV Heparin; no BB due to bradycardia   - repeat echo yesterday with EF 30% and LV WMA         VTE Risk Mitigation (From admission, onward)         Ordered     heparin 25,000 units in dextrose 5% (100 units/ml) IV bolus from bag - ADDITIONAL PRN BOLUS - 60 units/kg (max bolus 4000 units)  As needed (PRN)        Question:  Heparin Infusion Adjustment (DO NOT MODIFY ANSWER)  Answer:  [email or web address]\Images\Pharmacy\HeparinInfusions\heparin LOW INTENSITY nomogram for [facility] [id number].pdf    01/24/22 0113     heparin 25,000 units in dextrose 5% (100 units/ml) IV bolus from bag - ADDITIONAL PRN BOLUS - 30 units/kg (max bolus 4000 units)  As needed (PRN)        Question:  Heparin Infusion Adjustment (DO NOT MODIFY ANSWER)  Answer:  [email or web address]\Images\Pharmacy\HeparinInfusions\heparin LOW INTENSITY nomogram for [facility] [id number].[email or web address]    01/24/22 0113     heparin 25,000 units in dextrose 5% 250 mL (100 units/mL) infusion LOW INTENSITY nomogram - [facility]  Continuous        Question Answer Comment   Heparin Infusion Adjustment (DO NOT MODIFY ANSWER) [email or web address]\Images\Pharmacy\HeparinInfusions\heparin LOW INTENSITY nomogram for [facility] [id number].[email or web address]    Begin at (in units/kg/hr) 12        01/24/22 0113     Place sequential compression device  Until discontinued         01/24/22 0111     IP VTE LOW RISK PATIENT  Once         01/24/22 0111              > 40 minutes was spent in the care of this patient for evaluation, critical thinking and decision making. Also discussion with patient's daughter as to present condition. Not all time was spent in direct face to face with patient as time was spent reviewing ECGs, CXR, labs, medications and past studies.      Robyn Crow, APRN, ANP  Cardiology  [facility] - Intensive Care

## 2022-01-25 NOTE — NURSING
Pt successfully extubated to 2L NC, SpO2: 97%. VSS. Alert and calm. Restraints d/c. Pt c/o nausea, PRN zofran admin. POC reviewed with pt and family at bedside. Report given to ODIN Pedro assuming care at this time.

## 2022-01-25 NOTE — ASSESSMENT & PLAN NOTE
- multivessel CAD per [facility]  - strong family history and positive stress test  - seen by CTS at [facility] but deemed not a candidate for CABG; in process of obtaining second opinion; followed by Dr. Escobar as an outpatient  - continue ASA, Plavix and statin as well as IV Heparin; no BB due to bradycardia   - repeat echo yesterday with EF 30% and LV WMA

## 2022-01-25 NOTE — ASSESSMENT & PLAN NOTE
2/2 cardiac arrest  -TTM protocol  -evidence of anoxic injury on admission CT, but currently is following commands when sedation paused  -mental status at baseline. No neuro deficits

## 2022-01-25 NOTE — PLAN OF CARE
Patient on vent with documented settings.  Alarms are set and functioning with adequate volumes.  AMBU bag and mask at bedside. Will continue to monitor.
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
## 2022-01-25 NOTE — ASSESSMENT & PLAN NOTE
- out of hospital arrest with ROSC after CPR and defibrillation  - appears to have been down for up to 10 minutes prior to EMS arrival per daughter  - treated with IV Amiodarone overnight but stopped yesterday due to  bradycardia  - telemetry reviewed with improvement of frequent PVCs; continued intermittent junctional rhythm and SB but improving; once extubated and able to take po medications will likely start low dose Amiodarone   - etiology of arrest ischemic vs electrolyte derangement- coneerned about ischemic etiology given extend of CAD   - echo with EF 30% and WMA   - continue supportive care

## 2022-01-25 NOTE — PLAN OF CARE
PT IS AAOX4, VSS, NADN, NO C/O PAIN/SOB/N/V, EATING AND DRINKING, SR NOTED ON MON. ON 1L O2 PER NC, O2 SATS WNLS. HEP GTT INFUSING AS ORDERED. DAUGHTER AT BS. PT TO GET TRANSFERRED TO ANOTHER FACILITY FOR CABG APPROVED BY DR. MENDEZ. CARDIO.

## 2022-01-25 NOTE — PROGRESS NOTES
Palliative Care Daily Progress Note     S: Medical record reviewed, followed up with patient regarding patient's condition. Pt was successfully extubated to 2LNC this AM and is conversant, neurologically intact, fatigued but with pleasant affect. She does not recall most of the past few days and family reports she only recently learned she had suffered a cardiac arrest. Family reports they are sending imaging over to CT surgeon Dr. Paz at [facility] who was originally planning to see pt as a second opinion regarding CABG and has accepted her for transfer.    Decreasing ICU needs and pt is closer to being stepped down to telemetry avina. Remains on heparin gtt, off pressors, minimal O2 req 1 L/min. Both children are at bedside. Offered encouragement to all and pt is very appreciative.    B: Code Status: Full Code   Advanced Directives:   Son and daughter are NOK, other adult son is estranged  Family/Support: [place] and involved.   Physician's Plan of Care Goal is proceed with transfer for CABG.   Labs: noncontributory   Diagnostics: noncontributory     A:   Physical Exam  Gen: Fatigued appearing middle aged female sitting up in bed, AAOx2 on NC. On TTM.  HEENT: PERRL+EOMI, MMM  CV: RRR S1S2 no m/r/g  Pulm: CTAB anteriorly with symmetric chest rise, no r/w/r  Abdomen: Soft, nt/nd with normoactive BS  Extremities: Cool to touch.  Neuro: No focal deficits.    Patient's current condition fair.   Patient Symptom Assessment Flowsheet has been completed.   Family is at bedside.  Discharge Planning: Transfer to [facility] CT surgery under Dr. Paz     R: Support offered at this time.   Palliative Care Team will sign off at this time.    Total time spent: 40 minutes  > 50% of 40 minute visit spent in chart review, face to face discussion of symptoms assessment, coordination of care with ICU team, and transfer planning.    Ervin Hernandez MD  Hospice and Palliative Medicine  Palliative Care Pager: [phone]

## 2022-01-25 NOTE — MEDICAL/APP STUDENT
"Pulm/CC Fellow Consult Note  01/25/2022       Attending Physician: Aly Tovar MD    Chief Complaint: Ventilator management, s/p cardiac arrest     History of Present Illness:   Ms. MARKS is a 61 y.o. F w/ CAD (on Plavix), HTN, HLD, that presented to [facility] after cardiac arrest suffered PTA at home. Per Son and Daughter,  Pt was in an argument with her son when she expressed sensation that she was going to "pass out," when she then collapsed suddenly to the floor. Daughter caught the Pt before falling to the floor; denies any head trauma. Upon EMS arrival, Pt found apneic and pulseless; initiated CPR and x1 shock; nasally intubated the Pt 2/2 her jaw being clenched; fingerstick BG WNL.      Last month, Pt visited the ED for SANDOVAL and subsequently underwent left heart cath. Found to have significant multi-vessel CAD (see Cath report below) and is followed by CTS and Cardio for frequent PVC's and continued management. Per CTS note: Pt is not a candidate for CABG is undergoing assessment and discussion for PCI vs. med management. Currently on Metoprolol, with recent dosage increase last month. See below for Myocardial Perfusion Study. At home, is on Plavix, Rosuvastatin, and ASA. Has a referral to [facility] for second opinion but has not yet had that appointment.      On arrival to ED: Pt intubated. given Keppra 2g along with Ativan, Versed, Fentanyl. CT Head indicated global anoxia. Amiodarone drip initiated for continued ectopy. Renal and liver function declining, suspect 2/2 arrest. Initial Trop at 0.055. Pulm/ICU Team consulted for ventilator management s/p cardiac arrest.      Past Medical History:  No past medical history on file.    Past Surgical History:  Past Surgical History:   Procedure Laterality Date    CORONARY ANGIOGRAPHY N/A 12/14/2021    Procedure: ANGIOGRAM, CORONARY ARTERY;  Surgeon: Jm Escobar MD;  Location: [facility] CATH LAB/EP;  Service: Cardiology;  Laterality: N/A;    LEFT HEART " "CATHETERIZATION Left 2021    Procedure: Left heart cath;  Surgeon: Jm Escobar MD;  Location: [facility] CATH LAB/EP;  Service: Cardiology;  Laterality: Left;       Allergies:  Review of patient's allergies indicates:  No Known Allergies      Family History:  Family History   Problem Relation Age of Onset    Heart attack Brother 62       Social History:  Social History     Tobacco Use    Smoking status: Never Smoker    Smokeless tobacco: Never Used   Substance Use Topics    Alcohol use: Yes    Drug use: No       Review of Systems:  Comprehensive ROS negative except as per HPI       Objective:   Last 24 Hour Vital Signs:  BP  Min: 81/52  Max: 143/82  Temp  Av.5 °F (35.8 °C)  Min: 94.46 °F (34.7 °C)  Max: 97.88 °F (36.6 °C)  Pulse  Av  Min: 45  Max: 73  Resp  Av.4  Min: 0  Max: 35  SpO2  Av.3 %  Min: 92 %  Max: 100 %  Height  Av' 4" (162.6 cm)  Min: 5' 4" (162.6 cm)  Max: 5' 4" (162.6 cm)  Weight  Av.4 kg (172 lb 14.2 oz)  Min: 74.8 kg (165 lb)  Max: 82 kg (180 lb 12.4 oz)  Body mass index is 31.03 kg/m².  I/O last 3 completed shifts:  In: 2846.2 [I.V.:1046.2; NG/GT:660; IV Piggyback:1140]  Out: 3260 [Urine:3260]    Physical Exam:  General: NAD. AxOx3   HENT:  NC/AT; anicteric sclera; OP clear with MMM  Cardio:  Regular rate and rhythm with quiet heart sounds. no murmurs or rubs  Resp:  On 1L NC. CTAB. Breathing unlabored; no wheezes, crackles or rhonchi  Abdom: Soft, NTND with normoactive bowel sounds  Extrem: No clubbing, cyanosis. B/L feet cool to touch  Pulses: Pulses: 1+ R DP/PT. Undetectable L DP/PT  Neuro:  No gross focal motor deficits. Sensation grossly intact B/L. PERRL.    Personal Review and Summary of Interval Diagnostics    Laboratory Studies:   Recent Labs   Lab 22  0344   PH 7.414   PCO2 40.9   PO2 104*   HCO3 26.1   POCSATURATED 98   BE 2     Recent Labs   Lab 22  0551   WBC 16.22*   RBC 4.01   HGB 12.0   HCT 35.8*      MCV 89   MCH 29.9 "   MCHC 33.5     Recent Labs   Lab 01/25/22  0551      K 3.4*   CL 99   CO2 21*   BUN 21*   CREATININE 1.6*   MG 2.3       Microbiology Data:   Microbiology Results (last 7 days)     Procedure Component Value Units Date/Time    Culture, Respiratory with Gram Stain [[id number]] Collected: 01/24/22 0334    Order Status: Completed Specimen: Respiratory from Sputum Updated: 01/25/22 0740     Respiratory Culture Normal respiratory calvin     Gram Stain (Respiratory) <10 epithelial cells per low power field.     Gram Stain (Respiratory) Rare WBC's     Gram Stain (Respiratory) Rare Gram positive cocci    Blood culture [[id number]] Collected: 01/24/22 0200    Order Status: Completed Specimen: Blood Updated: 01/24/22 1715     Blood Culture, Routine No Growth to date    Blood culture [[id number]] Collected: 01/24/22 0156    Order Status: Completed Specimen: Blood Updated: 01/24/22 1715     Blood Culture, Routine No Growth to date          Chest Imaging:   TTE Resulting 01/24:  · The left ventricle is normal in size with concentric remodeling and moderately decreased systolic function.  · The estimated ejection fraction is 30%.  · There are segmental left ventricular wall motion abnormalities.  · Grade II left ventricular diastolic dysfunction.  · With normal right ventricular systolic function.  · Mechanically ventilated; cannot use inferior caval vein diameter to estimate central venous pressure.      Assessment & Plan:   Ms. MARKS is a 61 y.o. F w/ CAD (on Plavix), HTN, HLD, that presented to [facility] after cardiac arrest suffered PTA at home. ROSC achieved after CPR and defib x1 on scene; nasally intubated. Upon arrival to the ED orally intubated and placed on Amio and Levo GTT with significant hemodynamic variability/instability. Initial K+ 2.6; repletion; currently 3.4. CT Head notable for signs of anoxic brain injury. HARSH and Transaminitis present intiially; suspect 2/2 to arrest; showing improvement. ICU/Pulm Team  consulted for ventilator management. Off of Levo and Amio GTT. Extubated this AM. Cooled initially per TTM protocol; re-warming now complete. Now AxOx3, answering appropriately. No complaints.    Neuro  #S/p Cardiac Arrest  -Was being cooled to 36 C, per TTM protocol  -Rewarming initiated at 05:40 this AM; now complete.  -CT Head: indicated global anoxia. No bleed, infarction, or herniation.  -On minimal sedation with propofol o/n titrated to RASS of 0 to -1.   -Sedation turned off this AM for SAT/SBT - passed       CVS  #S/p Cardiac Arrest  Given MVCAD and hx of PVC's: Suspect arrest 2/2 to arrhythmia induced by sympathetic surge in the context of argument with a relative  -HDS today. Bradycardia improved. Rate in 60's now.  -Off Levo GTT since yesterday PM  -Off Amio GTT since yesterday AM  -Echo resulted  - See above for results. EF 30%.   -Trend Troponin q6hr. Last 2.58 .  -Repeat EKG ordered. Monitor  -Lytes stable. Monitor.   -Cardio following. Appreciate reccs.     Pulm  #Intubated initialy for airway protection, s/p cardiac arrest  -AB.4 / 40.9 / 104 / 26.1 / 98%  -SBT/SAT this AM - Passed.   -Satting well on 1L NC.     Renal   #HARSH  Suspect 2/2 hypoperfusion 2/2 cardiac arrest  -BUN/Cr 21/2.0 on admit. Baseline 12 / 0.9.   -Trending toward baseline: 21/1.6  -Adequate UOP   -Monitor     FEN/GI  F: Net - since admission.   E: Initial K+ 2.6. Repleted to 3.4. Avoid any hypokalemia-causing drugs. Continue to monitor.   N: Cardiac diet   GI:   #Transaminitis  Suspect 2/2 shock liver  -Continuing to improve. AST//316 initially. Now 177/170   -Continue to monitor.      Endo  -Glucose 154 today  -SSI ordered      HEME/ID  -H/H 12/35.8. Stable  -WBC 16. Suspect 2/2 stress response  -No Abx. No suspicion for infection at this time.  -Monitor     PPX:   -Heparin GTT  -Famotidine IV    Dario Torres, MS4  [facility] [employer]     I evaluated the patient and made changes to the above  information as deemed appropriate.      Rhianna Chakraborty MD  Fellow  Pulmonary & CCM

## 2022-01-25 NOTE — ASSESSMENT & PLAN NOTE
- creatinine 1.9-2.0 upon admission; down to 1.6 this AM   - baseline creatinine .9  - multifactoral but most concerning for ATN with hypotension and cardiac arrest

## 2022-01-25 NOTE — ASSESSMENT & PLAN NOTE
- presented with K+ 2.7; aggressively replaced with continued hypokalemia  - repeat K+ this AM 3.0   - goal K+ >4.0

## 2022-01-25 NOTE — SUBJECTIVE & OBJECTIVE
Interval History: Tolerated SAT/SBT this AM and was successfully extubated. Mental status improved significantly. A&O x3 and follows commands appropriately. No neuro deficits.     Review of Systems   Constitutional: Positive for fatigue. Negative for activity change, appetite change, chills, diaphoresis and fever.   HENT: Negative for congestion, postnasal drip, rhinorrhea, sinus pressure, sinus pain and sneezing.    Respiratory: Negative for cough, chest tightness, shortness of breath, wheezing and stridor.    Cardiovascular: Negative for chest pain, palpitations and leg swelling.   Gastrointestinal: Negative for abdominal distention, abdominal pain, blood in stool, constipation, diarrhea and nausea.   Endocrine: Negative for cold intolerance and heat intolerance.   Genitourinary: Negative for dysuria, flank pain and hematuria.   Musculoskeletal: Negative for gait problem.   Neurological: Negative for dizziness and weakness.   Psychiatric/Behavioral: Negative for agitation and behavioral problems.     Objective:     Vital Signs (Most Recent):  Temp: 98.1 °F (36.7 °C) (01/25/22 1745)  Pulse: 87 (01/25/22 1740)  Resp: 17 (01/25/22 1740)  BP: 90/60 (01/25/22 1725)  SpO2: 97 % (01/25/22 1740) Vital Signs (24h Range):  Temp:  [94 °F (34.4 °C)-99.3 °F (37.4 °C)] 98.1 °F (36.7 °C)  Pulse:  [57-88] 87  Resp:  [6-35] 17  SpO2:  [91 %-100 %] 97 %  BP: ()/(50-80) 90/60  Arterial Line BP: ()/(50-83) 85/57     Weight: 113 kg (249 lb 1.9 oz)  Body mass index is 42.76 kg/m².    Intake/Output Summary (Last 24 hours) at 1/25/2022 1752  Last data filed at 1/25/2022 1746  Gross per 24 hour   Intake 1014.11 ml   Output 1700 ml   Net -685.89 ml      Physical Exam  Vitals and nursing note reviewed.   Constitutional:       Appearance: She is ill-appearing.   HENT:      Head: Normocephalic and atraumatic.      Nose: Nose normal.      Mouth/Throat:      Mouth: Mucous membranes are moist.   Eyes:      Pupils: Pupils are equal,  round, and reactive to light.   Cardiovascular:      Rate and Rhythm: Normal rate and regular rhythm.      Pulses: Normal pulses.      Heart sounds: Normal heart sounds.   Abdominal:      General: Bowel sounds are normal.      Palpations: Abdomen is soft.   Musculoskeletal:         General: No swelling.      Cervical back: Neck supple.   Skin:     General: Skin is warm and dry.   Neurological:      General: No focal deficit present.      Mental Status: She is oriented to person, place, and time. Mental status is at baseline.   Psychiatric:         Mood and Affect: Mood normal.         Significant Labs: All pertinent labs within the past 24 hours have been reviewed.    Significant Imaging: I have reviewed all pertinent imaging results/findings within the past 24 hours.

## 2022-01-25 NOTE — SUBJECTIVE & OBJECTIVE
Review of Systems   Unable to perform ROS: intubated     Objective:     Vital Signs (Most Recent):  Temp: 98.2 °F (36.8 °C) (01/25/22 1100)  Pulse: 82 (01/25/22 1156)  Resp: (!) 25 (01/25/22 1156)  BP: (!) 88/55 (01/25/22 1130)  SpO2: 98 % (01/25/22 1156) Vital Signs (24h Range):  Temp:  [95.5 °F (35.3 °C)-98.2 °F (36.8 °C)] 98.2 °F (36.8 °C)  Pulse:  [46-82] 82  Resp:  [6-35] 25  SpO2:  [92 %-100 %] 98 %  BP: ()/(50-92) 88/55  Arterial Line BP: ()/(51-87) 88/56     Weight: 82 kg (180 lb 12.4 oz)  Body mass index is 31.03 kg/m².     SpO2: 98 %  O2 Device (Oxygen Therapy): nasal cannula      Intake/Output Summary (Last 24 hours) at 1/25/2022 1254  Last data filed at 1/25/2022 1211  Gross per 24 hour   Intake 955.2 ml   Output 2570 ml   Net -1614.8 ml       Lines/Drains/Airways     Central Venous Catheter Line            Percutaneous Central Line Insertion/Assessment - Triple Lumen  01/24/22 0355 right internal jugular 1 day          Drain                 Urethral Catheter 01/23/22 2215 16 Fr. 1 day          Arterial Line            Arterial Line 01/24/22 0330 Right Radial 1 day          Peripheral Intravenous Line                 Peripheral IV - Single Lumen 01/23/22 2210 20 G Left Wrist 1 day         Peripheral IV - Single Lumen 01/23/22 2211 20 G Right Wrist 1 day         Peripheral IV - Single Lumen 01/24/22 0005 18 G Right Antecubital 1 day         Peripheral IV - Single Lumen 01/24/22 0044 18 G Left Antecubital 1 day                Physical Exam  Constitutional:       General: She is not in acute distress.     Appearance: She is well-developed and well-nourished.   Cardiovascular:      Rate and Rhythm: Normal rate and regular rhythm.      Heart sounds: No murmur heard.  No gallop.    Pulmonary:      Effort: Pulmonary effort is normal. No respiratory distress.      Breath sounds: Normal breath sounds. No wheezing.   Abdominal:      General: Bowel sounds are normal. There is no distension.       "Palpations: Abdomen is soft.      Tenderness: There is no abdominal tenderness.   Skin:     General: Skin is warm and dry.   Neurological:      Mental Status: She is alert.      Comments: Intubated; off sedation and following commands          Significant Labs:   BMP:   Recent Labs   Lab 01/25/22  0551 01/25/22  0910 01/25/22  1147   * 136*  136* 154*  154*    137 137   K 3.4* 3.4* 3.3*   CL 99 100 100   CO2 21* 24 23   BUN 21* 22* 22*   CREATININE 1.6* 1.5* 1.7*   CALCIUM 9.9 9.3 9.2   MG 2.3 2.1 2.4   , CBC   Recent Labs   Lab 01/24/22  0155 01/24/22  0155 01/24/22  0508 01/24/22  0508 01/25/22  0551   WBC 20.50*  --  17.16*  --  16.22*   HGB 12.1  --  11.4*  --  12.0   HCT 36.8*   < > 33.7*   < > 35.8*     --  313  --  269    < > = values in this interval not displayed.    and Troponin   Recent Labs   Lab 01/23/22  2214 01/24/22  0156 01/24/22  0508   TROPONINI 0.055* 1.136* 2.579*       Significant Imaging: Echocardiogram:   Transthoracic echo (TTE) complete (Cupid Only):   Results for orders placed or performed during the hospital encounter of 01/23/22   Echo   Result Value Ref Range    BSA 1.84 m2    TDI SEPTAL 0.05 m/s    LV LATERAL E/E' RATIO 19.00 m/s    LV SEPTAL E/E' RATIO 11.40 m/s    LA WIDTH 4.56 cm    TDI LATERAL 0.03 m/s    LVIDd 4.26 3.5 - 6.0 cm    IVS 0.88 0.6 - 1.1 cm    Posterior Wall 0.98 0.6 - 1.1 cm    Ao root annulus 2.73 cm    LVIDs 3.97 2.1 - 4.0 cm    FS 7 28 - 44 %    LA volume 64.11 cm3    STJ 2.49 cm    Ascending aorta 2.79 cm    LV mass 126.99 g    LA size 3.25 cm    RVDD 2.11 cm    TAPSE 1.77 cm    RV S' 9.51 cm/s    Left Ventricle Relative Wall Thickness 0.46 cm    AV mean gradient 3 mmHg    AV valve area 1.98 cm2    AV Velocity Ratio 0.53     AV index (prosthetic) 0.63     MV valve area p 1/2 method 3.51 cm2    E/A ratio 0.83     Mean e' 0.04 m/s    E wave deceleration time 216.35 msec    IVRT 108.47 msec    MV "A" wave duration 13.13 msec    Pulm vein S/D " ratio 0.73     LVOT diameter 2.00 cm    LVOT area 3.1 cm2    LVOT peak jordi 0.55 m/s    LVOT peak VTI 12.00 cm    Ao peak jordi 1.04 m/s    Ao VTI 19.00 cm    LVOT stroke volume 37.68 cm3    AV peak gradient 4 mmHg    E/E' ratio 14.25 m/s    MV Peak E Jordi 0.57 m/s    TR Max Jordi 2.27 m/s    MV stenosis pressure 1/2 time 62.74 ms    MV Peak A Jordi 0.69 m/s    PV Peak S Jordi 0.19 m/s    PV Peak D Jordi 0.26 m/s    LV Systolic Volume 68.63 mL    LV Systolic Volume Index 38.1 mL/m2    LV Diastolic Volume 81.35 mL    LV Diastolic Volume Index 45.19 mL/m2    LA Volume Index 35.6 mL/m2    LV Mass Index 71 g/m2    RA Major Axis 5.05 cm    Left Atrium Minor Axis 4.63 cm    Left Atrium Major Axis 5.65 cm    Triscuspid Valve Regurgitation Peak Gradient 21 mmHg    LA Volume Index (Mod) 36.8 mL/m2    LA volume (mod) 66.25 cm3    RA Width 3.83 cm    EF 30 %    Narrative    · The left ventricle is normal in size with concentric remodeling and   moderately decreased systolic function.  · The estimated ejection fraction is 30%.  · There are segmental left ventricular wall motion abnormalities.  · Grade II left ventricular diastolic dysfunction.  · With normal right ventricular systolic function.  · Mechanically ventilated; cannot use inferior caval vein diameter to   estimate central venous pressure.

## 2022-01-26 LAB
ALBUMIN SERPL BCP-MCNC: 3 G/DL (ref 3.5–5.2)
ALP SERPL-CCNC: 89 U/L (ref 55–135)
ALT SERPL W/O P-5'-P-CCNC: 103 U/L (ref 10–44)
ANION GAP SERPL CALC-SCNC: 10 MMOL/L (ref 8–16)
ANION GAP SERPL CALC-SCNC: 10 MMOL/L (ref 8–16)
ANION GAP SERPL CALC-SCNC: 9 MMOL/L (ref 8–16)
APTT BLDCRRT: 40.2 SEC (ref 21–32)
AST SERPL-CCNC: 79 U/L (ref 10–40)
BACTERIA SPEC AEROBE CULT: NORMAL
BACTERIA SPEC AEROBE CULT: NORMAL
BASOPHILS # BLD AUTO: 0.07 K/UL (ref 0–0.2)
BASOPHILS # BLD AUTO: 0.07 K/UL (ref 0–0.2)
BASOPHILS NFR BLD: 0.4 % (ref 0–1.9)
BASOPHILS NFR BLD: 0.4 % (ref 0–1.9)
BILIRUB SERPL-MCNC: 0.9 MG/DL (ref 0.1–1)
BUN SERPL-MCNC: 24 MG/DL (ref 6–20)
BUN SERPL-MCNC: 25 MG/DL (ref 6–20)
BUN SERPL-MCNC: 25 MG/DL (ref 6–20)
CALCIUM SERPL-MCNC: 8.8 MG/DL (ref 8.7–10.5)
CHLORIDE SERPL-SCNC: 102 MMOL/L (ref 95–110)
CHLORIDE SERPL-SCNC: 103 MMOL/L (ref 95–110)
CHLORIDE SERPL-SCNC: 103 MMOL/L (ref 95–110)
CO2 SERPL-SCNC: 24 MMOL/L (ref 23–29)
CO2 SERPL-SCNC: 24 MMOL/L (ref 23–29)
CO2 SERPL-SCNC: 26 MMOL/L (ref 23–29)
CREAT SERPL-MCNC: 2 MG/DL (ref 0.5–1.4)
DIFFERENTIAL METHOD: ABNORMAL
DIFFERENTIAL METHOD: ABNORMAL
EOSINOPHIL # BLD AUTO: 0 K/UL (ref 0–0.5)
EOSINOPHIL # BLD AUTO: 0 K/UL (ref 0–0.5)
EOSINOPHIL NFR BLD: 0 % (ref 0–8)
EOSINOPHIL NFR BLD: 0.1 % (ref 0–8)
ERYTHROCYTE [DISTWIDTH] IN BLOOD BY AUTOMATED COUNT: 13.7 % (ref 11.5–14.5)
ERYTHROCYTE [DISTWIDTH] IN BLOOD BY AUTOMATED COUNT: 13.7 % (ref 11.5–14.5)
EST. GFR  (AFRICAN AMERICAN): 31 ML/MIN/1.73 M^2
EST. GFR  (NON AFRICAN AMERICAN): 27 ML/MIN/1.73 M^2
FERRITIN SERPL-MCNC: 1093 NG/ML (ref 20–300)
GLUCOSE SERPL-MCNC: 171 MG/DL (ref 70–110)
GLUCOSE SERPL-MCNC: 175 MG/DL (ref 70–110)
GLUCOSE SERPL-MCNC: 175 MG/DL (ref 70–110)
GRAM STN SPEC: NORMAL
HCT VFR BLD AUTO: 25.9 % (ref 37–48.5)
HCT VFR BLD AUTO: 26.3 % (ref 37–48.5)
HGB BLD-MCNC: 8.4 G/DL (ref 12–16)
HGB BLD-MCNC: 8.7 G/DL (ref 12–16)
HGB BLD-MCNC: 8.9 G/DL (ref 12–16)
IMM GRANULOCYTES # BLD AUTO: 0.12 K/UL (ref 0–0.04)
IMM GRANULOCYTES # BLD AUTO: 0.14 K/UL (ref 0–0.04)
IMM GRANULOCYTES NFR BLD AUTO: 0.7 % (ref 0–0.5)
IMM GRANULOCYTES NFR BLD AUTO: 0.8 % (ref 0–0.5)
INR PPP: 1.1 (ref 0.8–1.2)
IRON SERPL-MCNC: 30 UG/DL (ref 30–160)
LYMPHOCYTES # BLD AUTO: 2.6 K/UL (ref 1–4.8)
LYMPHOCYTES # BLD AUTO: 2.9 K/UL (ref 1–4.8)
LYMPHOCYTES NFR BLD: 14.5 % (ref 18–48)
LYMPHOCYTES NFR BLD: 16.5 % (ref 18–48)
MAGNESIUM SERPL-MCNC: 2 MG/DL (ref 1.6–2.6)
MAGNESIUM SERPL-MCNC: 2.2 MG/DL (ref 1.6–2.6)
MCH RBC QN AUTO: 30.3 PG (ref 27–31)
MCH RBC QN AUTO: 30.8 PG (ref 27–31)
MCHC RBC AUTO-ENTMCNC: 33.6 G/DL (ref 32–36)
MCHC RBC AUTO-ENTMCNC: 33.8 G/DL (ref 32–36)
MCV RBC AUTO: 90 FL (ref 82–98)
MCV RBC AUTO: 91 FL (ref 82–98)
MONOCYTES # BLD AUTO: 1.4 K/UL (ref 0.3–1)
MONOCYTES # BLD AUTO: 1.6 K/UL (ref 0.3–1)
MONOCYTES NFR BLD: 7.9 % (ref 4–15)
MONOCYTES NFR BLD: 8.9 % (ref 4–15)
NEUTROPHILS # BLD AUTO: 13.2 K/UL (ref 1.8–7.7)
NEUTROPHILS # BLD AUTO: 13.7 K/UL (ref 1.8–7.7)
NEUTROPHILS NFR BLD: 74.5 % (ref 38–73)
NEUTROPHILS NFR BLD: 75.3 % (ref 38–73)
NRBC BLD-RTO: 0 /100 WBC
NRBC BLD-RTO: 0 /100 WBC
NSE SERPL-MCNC: 66 NG/ML
PHOSPHATE SERPL-MCNC: 2.5 MG/DL (ref 2.7–4.5)
PHOSPHATE SERPL-MCNC: 3 MG/DL (ref 2.7–4.5)
PLATELET # BLD AUTO: 235 K/UL (ref 150–450)
PLATELET # BLD AUTO: 244 K/UL (ref 150–450)
PMV BLD AUTO: 10.8 FL (ref 9.2–12.9)
PMV BLD AUTO: 10.9 FL (ref 9.2–12.9)
POCT GLUCOSE: 152 MG/DL (ref 70–110)
POCT GLUCOSE: 156 MG/DL (ref 70–110)
POTASSIUM SERPL-SCNC: 3.5 MMOL/L (ref 3.5–5.1)
POTASSIUM SERPL-SCNC: 3.6 MMOL/L (ref 3.5–5.1)
POTASSIUM SERPL-SCNC: 3.6 MMOL/L (ref 3.5–5.1)
PROT SERPL-MCNC: 6.1 G/DL (ref 6–8.4)
PROTHROMBIN TIME: 11 SEC (ref 9–12.5)
RBC # BLD AUTO: 2.87 M/UL (ref 4–5.4)
RBC # BLD AUTO: 2.89 M/UL (ref 4–5.4)
SATURATED IRON: 12 % (ref 20–50)
SODIUM SERPL-SCNC: 137 MMOL/L (ref 136–145)
TOTAL IRON BINDING CAPACITY: 246 UG/DL (ref 250–450)
TRANSFERRIN SERPL-MCNC: 166 MG/DL (ref 200–375)
TROPONIN I SERPL DL<=0.01 NG/ML-MCNC: 0.57 NG/ML (ref 0–0.03)
VIT B12 SERPL-MCNC: 706 PG/ML (ref 210–950)
WBC # BLD AUTO: 17.74 K/UL (ref 3.9–12.7)
WBC # BLD AUTO: 18.18 K/UL (ref 3.9–12.7)

## 2022-01-26 PROCEDURE — 63600175 PHARM REV CODE 636 W HCPCS: Performed by: NURSE PRACTITIONER

## 2022-01-26 PROCEDURE — 84466 ASSAY OF TRANSFERRIN: CPT | Performed by: INTERNAL MEDICINE

## 2022-01-26 PROCEDURE — 84100 ASSAY OF PHOSPHORUS: CPT | Performed by: INTERNAL MEDICINE

## 2022-01-26 PROCEDURE — 85610 PROTHROMBIN TIME: CPT | Performed by: NURSE PRACTITIONER

## 2022-01-26 PROCEDURE — 63600175 PHARM REV CODE 636 W HCPCS: Performed by: INTERNAL MEDICINE

## 2022-01-26 PROCEDURE — 84484 ASSAY OF TROPONIN QUANT: CPT | Performed by: INTERNAL MEDICINE

## 2022-01-26 PROCEDURE — 99291 PR CRITICAL CARE, E/M 30-74 MINUTES: ICD-10-PCS | Mod: 25,,, | Performed by: INTERNAL MEDICINE

## 2022-01-26 PROCEDURE — 85025 COMPLETE CBC W/AUTO DIFF WBC: CPT | Performed by: NURSE PRACTITIONER

## 2022-01-26 PROCEDURE — 80048 BASIC METABOLIC PNL TOTAL CA: CPT | Performed by: INTERNAL MEDICINE

## 2022-01-26 PROCEDURE — 83520 IMMUNOASSAY QUANT NOS NONAB: CPT | Performed by: NURSE PRACTITIONER

## 2022-01-26 PROCEDURE — 82728 ASSAY OF FERRITIN: CPT | Performed by: INTERNAL MEDICINE

## 2022-01-26 PROCEDURE — 20000000 HC ICU ROOM

## 2022-01-26 PROCEDURE — 92526 ORAL FUNCTION THERAPY: CPT

## 2022-01-26 PROCEDURE — 25000003 PHARM REV CODE 250: Performed by: NURSE PRACTITIONER

## 2022-01-26 PROCEDURE — 93005 ELECTROCARDIOGRAM TRACING: CPT

## 2022-01-26 PROCEDURE — 85025 COMPLETE CBC W/AUTO DIFF WBC: CPT | Mod: 91 | Performed by: STUDENT IN AN ORGANIZED HEALTH CARE EDUCATION/TRAINING PROGRAM

## 2022-01-26 PROCEDURE — 93010 EKG 12-LEAD: ICD-10-PCS | Mod: ,,, | Performed by: INTERNAL MEDICINE

## 2022-01-26 PROCEDURE — 83735 ASSAY OF MAGNESIUM: CPT | Performed by: INTERNAL MEDICINE

## 2022-01-26 PROCEDURE — 93010 ELECTROCARDIOGRAM REPORT: CPT | Mod: ,,, | Performed by: INTERNAL MEDICINE

## 2022-01-26 PROCEDURE — 82607 VITAMIN B-12: CPT | Performed by: INTERNAL MEDICINE

## 2022-01-26 PROCEDURE — 85018 HEMOGLOBIN: CPT | Performed by: INTERNAL MEDICINE

## 2022-01-26 PROCEDURE — 99291 CRITICAL CARE FIRST HOUR: CPT | Mod: 25,,, | Performed by: INTERNAL MEDICINE

## 2022-01-26 PROCEDURE — 92523 SPEECH SOUND LANG COMPREHEN: CPT

## 2022-01-26 PROCEDURE — 84100 ASSAY OF PHOSPHORUS: CPT | Mod: 91 | Performed by: INTERNAL MEDICINE

## 2022-01-26 PROCEDURE — 85730 THROMBOPLASTIN TIME PARTIAL: CPT | Performed by: HOSPITALIST

## 2022-01-26 PROCEDURE — 94761 N-INVAS EAR/PLS OXIMETRY MLT: CPT

## 2022-01-26 PROCEDURE — 80053 COMPREHEN METABOLIC PANEL: CPT | Performed by: INTERNAL MEDICINE

## 2022-01-26 PROCEDURE — 83735 ASSAY OF MAGNESIUM: CPT | Mod: 91 | Performed by: INTERNAL MEDICINE

## 2022-01-26 PROCEDURE — 25000003 PHARM REV CODE 250: Performed by: HOSPITALIST

## 2022-01-26 PROCEDURE — 25000003 PHARM REV CODE 250: Performed by: INTERNAL MEDICINE

## 2022-01-26 RX ORDER — POTASSIUM CHLORIDE 20 MEQ/1
40 TABLET, EXTENDED RELEASE ORAL ONCE
Status: COMPLETED | OUTPATIENT
Start: 2022-01-26 | End: 2022-01-26

## 2022-01-26 RX ORDER — AMIODARONE HYDROCHLORIDE 200 MG/1
200 TABLET ORAL DAILY
Status: DISCONTINUED | OUTPATIENT
Start: 2022-01-26 | End: 2022-01-27 | Stop reason: HOSPADM

## 2022-01-26 RX ADMIN — HEPARIN SODIUM 12 UNITS/KG/HR: 5000 INJECTION, SOLUTION INTRAVENOUS; SUBCUTANEOUS at 08:01

## 2022-01-26 RX ADMIN — POTASSIUM CHLORIDE 40 MEQ: 1500 TABLET, EXTENDED RELEASE ORAL at 09:01

## 2022-01-26 RX ADMIN — AMIODARONE HYDROCHLORIDE 200 MG: 200 TABLET ORAL at 10:01

## 2022-01-26 RX ADMIN — MUPIROCIN: 20 OINTMENT TOPICAL at 08:01

## 2022-01-26 RX ADMIN — CLOPIDOGREL 75 MG: 75 TABLET, FILM COATED ORAL at 08:01

## 2022-01-26 RX ADMIN — SODIUM CHLORIDE, SODIUM LACTATE, POTASSIUM CHLORIDE, AND CALCIUM CHLORIDE 250 ML: .6; .31; .03; .02 INJECTION, SOLUTION INTRAVENOUS at 08:01

## 2022-01-26 RX ADMIN — ATORVASTATIN CALCIUM 80 MG: 40 TABLET, FILM COATED ORAL at 08:01

## 2022-01-26 RX ADMIN — ASPIRIN 81 MG CHEWABLE TABLET 81 MG: 81 TABLET CHEWABLE at 08:01

## 2022-01-26 NOTE — PT/OT/SLP PROGRESS
Occupational Therapy      Patient Name:  Veronika Blackmon   MRN:  [id number]    Patient not seen today secondary to Other (Comment) (low BP).   1/26/2022

## 2022-01-26 NOTE — SUBJECTIVE & OBJECTIVE
Review of Systems   Constitutional: Negative for chills, decreased appetite, diaphoresis and fever.   Cardiovascular: Negative for chest pain, claudication, cyanosis, dyspnea on exertion, irregular heartbeat, leg swelling, near-syncope, orthopnea, palpitations, paroxysmal nocturnal dyspnea and syncope.   Respiratory: Negative for cough, hemoptysis, shortness of breath and wheezing.    Gastrointestinal: Negative for bloating, abdominal pain, constipation, diarrhea, melena, nausea and vomiting.   Neurological: Negative for dizziness and weakness.     Objective:     Vital Signs (Most Recent):  Temp: 98.9 °F (37.2 °C) (01/26/22 0715)  Pulse: 91 (01/26/22 0922)  Resp: (!) 27 (01/26/22 0921)  BP: (!) 90/59 (01/26/22 0900)  SpO2: 98 % (01/26/22 0921) Vital Signs (24h Range):  Temp:  [94 °F (34.4 °C)-99.3 °F (37.4 °C)] 98.9 °F (37.2 °C)  Pulse:  [] 91  Resp:  [9-36] 27  SpO2:  [90 %-100 %] 98 %  BP: ()/(45-69) 90/59  Arterial Line BP: ()/(43-73) 95/53     Weight: 113 kg (249 lb 1.9 oz)  Body mass index is 42.76 kg/m².     SpO2: 98 %  O2 Device (Oxygen Therapy): room air      Intake/Output Summary (Last 24 hours) at 1/26/2022 0928  Last data filed at 1/26/2022 0922  Gross per 24 hour   Intake 1458.16 ml   Output 850 ml   Net 608.16 ml       Lines/Drains/Airways     Central Venous Catheter Line            Percutaneous Central Line Insertion/Assessment - Triple Lumen  01/24/22 0355 right internal jugular 2 days          Drain                 Urethral Catheter 01/23/22 2215 16 Fr. 2 days          Arterial Line            Arterial Line 01/24/22 0330 Right Radial 2 days          Peripheral Intravenous Line                 Peripheral IV - Single Lumen 01/23/22 2210 20 G Left Wrist 2 days         Peripheral IV - Single Lumen 01/23/22 2211 20 G Right Wrist 2 days         Peripheral IV - Single Lumen 01/24/22 0005 18 G Right Antecubital 2 days         Peripheral IV - Single Lumen 01/24/22 0044 18 G Left  Antecubital 2 days                Physical Exam  Constitutional:       General: She is not in acute distress.     Appearance: She is well-developed and well-nourished.   Cardiovascular:      Rate and Rhythm: Normal rate and regular rhythm.      Heart sounds: No murmur heard.  No gallop.    Pulmonary:      Effort: Pulmonary effort is normal. No respiratory distress.      Breath sounds: Normal breath sounds. No wheezing.   Abdominal:      General: Bowel sounds are normal. There is no distension.      Palpations: Abdomen is soft.      Tenderness: There is no abdominal tenderness.   Skin:     General: Skin is warm and dry.   Neurological:      Mental Status: She is alert and oriented to person, place, and time.      Comments: Slight short term memory issues          Significant Labs:   BMP:   Recent Labs   Lab 01/25/22  1147 01/25/22 2027 01/26/22  0514   *  154* 159*  159* 171*  171*    139 137   K 3.3* 4.0 3.6    103 103   CO2 23 23 24   BUN 22* 23* 25*   CREATININE 1.7* 1.8* 2.0*   CALCIUM 9.2 8.9 8.8   MG 2.4 2.0 2.0   , CBC   Recent Labs   Lab 01/25/22  0551 01/25/22  0551 01/26/22  0514 01/26/22  0514 01/26/22  0801   WBC 16.22*  --  18.18*  --  17.74*   HGB 12.0  --  8.9*  --  8.7*   HCT 35.8*   < > 26.3*   < > 25.9*     --  244  --  235    < > = values in this interval not displayed.    and Troponin   Recent Labs   Lab 01/25/22  1643 01/25/22  2200 01/26/22  0514   TROPONINI 0.657* 0.636* 0.567*       Significant Imaging: Echocardiogram:   Transthoracic echo (TTE) complete (Cupid Only):   Results for orders placed or performed during the hospital encounter of 01/23/22   Echo   Result Value Ref Range    BSA 1.84 m2    TDI SEPTAL 0.05 m/s    LV LATERAL E/E' RATIO 19.00 m/s    LV SEPTAL E/E' RATIO 11.40 m/s    LA WIDTH 4.56 cm    TDI LATERAL 0.03 m/s    LVIDd 4.26 3.5 - 6.0 cm    IVS 0.88 0.6 - 1.1 cm    Posterior Wall 0.98 0.6 - 1.1 cm    Ao root annulus 2.73 cm    LVIDs 3.97 2.1 -  "4.0 cm    FS 7 28 - 44 %    LA volume 64.11 cm3    STJ 2.49 cm    Ascending aorta 2.79 cm    LV mass 126.99 g    LA size 3.25 cm    RVDD 2.11 cm    TAPSE 1.77 cm    RV S' 9.51 cm/s    Left Ventricle Relative Wall Thickness 0.46 cm    AV mean gradient 3 mmHg    AV valve area 1.98 cm2    AV Velocity Ratio 0.53     AV index (prosthetic) 0.63     MV valve area p 1/2 method 3.51 cm2    E/A ratio 0.83     Mean e' 0.04 m/s    E wave deceleration time 216.35 msec    IVRT 108.47 msec    MV "A" wave duration 13.13 msec    Pulm vein S/D ratio 0.73     LVOT diameter 2.00 cm    LVOT area 3.1 cm2    LVOT peak jordi 0.55 m/s    LVOT peak VTI 12.00 cm    Ao peak jordi 1.04 m/s    Ao VTI 19.00 cm    LVOT stroke volume 37.68 cm3    AV peak gradient 4 mmHg    E/E' ratio 14.25 m/s    MV Peak E Jordi 0.57 m/s    TR Max Jordi 2.27 m/s    MV stenosis pressure 1/2 time 62.74 ms    MV Peak A Jordi 0.69 m/s    PV Peak S Jordi 0.19 m/s    PV Peak D Jordi 0.26 m/s    LV Systolic Volume 68.63 mL    LV Systolic Volume Index 38.1 mL/m2    LV Diastolic Volume 81.35 mL    LV Diastolic Volume Index 45.19 mL/m2    LA Volume Index 35.6 mL/m2    LV Mass Index 71 g/m2    RA Major Axis 5.05 cm    Left Atrium Minor Axis 4.63 cm    Left Atrium Major Axis 5.65 cm    Triscuspid Valve Regurgitation Peak Gradient 21 mmHg    LA Volume Index (Mod) 36.8 mL/m2    LA volume (mod) 66.25 cm3    RA Width 3.83 cm    EF 30 %    Narrative    · The left ventricle is normal in size with concentric remodeling and   moderately decreased systolic function.  · The estimated ejection fraction is 30%.  · There are segmental left ventricular wall motion abnormalities.  · Grade II left ventricular diastolic dysfunction.  · With normal right ventricular systolic function.  · Mechanically ventilated; cannot use inferior caval vein diameter to   estimate central venous pressure.        "

## 2022-01-26 NOTE — MEDICAL/APP STUDENT
"Pulm/CC Fellow Consult Note  01/26/2022       Attending Physician: Aly Tovar MD  ICU Attending: Jayjay Rockwell MD  ICU Fellow: Dr. Rhianna Chakraborty MD  ICU MS: Dario Torres, MS-4    Date of Admit: 1/23/2022  ICU Day: x3     Chief Complaint: Ventilator management, s/p cardiac arrest     History of Present Illness:   Ms. MARKS is a 61 y.o. F w/ CAD (on Plavix), HTN, HLD, that presented to [facility] after cardiac arrest suffered PTA at home. Per Son and Daughter,  Pt was in an argument with her son when she expressed sensation that she was going to "pass out," when she then collapsed suddenly to the floor. Daughter caught the Pt before falling to the floor; denies any head trauma. Upon EMS arrival, Pt found apneic and pulseless; initiated CPR and x1 shock; nasally intubated the Pt 2/2 her jaw being clenched; fingerstick BG WNL.      Last month, Pt visited the ED for SANDOVAL and subsequently underwent left heart cath. Found to have significant multi-vessel CAD (see Cath report below) and is followed by CTS and Cardio for frequent PVC's and continued management. Per CTS note: Pt is not a candidate for CABG is undergoing assessment and discussion for PCI vs. med management. Currently on Metoprolol, with recent dosage increase last month. See below for Myocardial Perfusion Study. At home, is on Plavix, Rosuvastatin, and ASA. Has a referral to [facility] for second opinion but has not yet had that appointment.      On arrival to ED: Pt intubated. given Keppra 2g along with Ativan, Versed, Fentanyl. CT Head indicated global anoxia. Amiodarone drip initiated for continued ectopy. Renal and liver function declining, suspect 2/2 arrest. Initial Trop at 0.055. Pulm/ICU Team consulted for ventilator management s/p cardiac arrest.     Past Medical History:  No past medical history on file.    Past Surgical History:  Past Surgical History:   Procedure Laterality Date    CORONARY ANGIOGRAPHY N/A 12/14/2021    Procedure: ANGIOGRAM, " CORONARY ARTERY;  Surgeon: Jm Escobar MD;  Location: [facility] CATH LAB/EP;  Service: Cardiology;  Laterality: N/A;    LEFT HEART CATHETERIZATION Left 2021    Procedure: Left heart cath;  Surgeon: Jm Escobar MD;  Location: [facility] CATH LAB/EP;  Service: Cardiology;  Laterality: Left;       Allergies:  Review of patient's allergies indicates:  No Known Allergies      Family History:  Family History   Problem Relation Age of Onset    Heart attack Brother 62       Social History:  Social History     Tobacco Use    Smoking status: Never Smoker    Smokeless tobacco: Never Used   Substance Use Topics    Alcohol use: Yes    Drug use: No       Review of Systems:  Comprehensive ROS negative except as per HPI       Objective:   Last 24 Hour Vital Signs:  BP  Min: 71/51  Max: 128/62  Temp  Av.1 °F (36.7 °C)  Min: 94 °F (34.4 °C)  Max: 99.3 °F (37.4 °C)  Pulse  Av.7  Min: 57  Max: 117  Resp  Av.4  Min: 9  Max: 40  SpO2  Av.3 %  Min: 90 %  Max: 100 %  Weight  Av kg (249 lb 1.9 oz)  Min: 113 kg (249 lb 1.9 oz)  Max: 113 kg (249 lb 1.9 oz)  Body mass index is 42.76 kg/m².  I/O last 3 completed shifts:  In: 1753 [I.V.:928.4; NG/GT:210; IV Piggyback:614.6]  Out: 1695 [Urine:1695]    Physical Exam:  General:          NAD. AxOx3   HENT:             NC/AT; anicteric sclera; OP clear with MMM  Cardio:            Regular rate and rhythm with quiet heart sounds. no murmurs or rubs  Resp:              On 1L NC. CTAB. Breathing unlabored; no wheezes, crackles or rhonchi  Abdom:           Soft, NTND with normoactive bowel sounds  Extrem:           No clubbing, cyanosis. B/L feet cool to touch  Pulses:           Pulses: 1+ R DP/PT. Undetectable L DP/PT  Neuro:             No gross focal motor deficits. Sensation grossly intact B/L. PERRL.    Personal Review and Summary of Interval Diagnostics    Laboratory Studies:   No results for input(s): PH, PCO2, PO2, HCO3, POCSATURATED, BE in the last 24  hours.  Recent Labs   Lab 01/26/22  0801 01/26/22  0801 01/26/22  1145   WBC 17.74*  --   --    RBC 2.87*  --   --    HGB 8.7*   < > 8.4*   HCT 25.9*  --   --      --   --    MCV 90  --   --    MCH 30.3  --   --    MCHC 33.6  --   --     < > = values in this interval not displayed.     Recent Labs   Lab 01/26/22  1145      K 3.5      CO2 26   BUN 24*   CREATININE 2.0*   MG 2.2       Microbiology Data:   Microbiology Results (last 7 days)     Procedure Component Value Units Date/Time    Culture, Respiratory with Gram Stain [[id number]] Collected: 01/24/22 0334    Order Status: Completed Specimen: Respiratory from Sputum Updated: 01/26/22 1057     Respiratory Culture Normal respiratory calvin      No S aureus or Pseudomonas isolated.     Gram Stain (Respiratory) <10 epithelial cells per low power field.     Gram Stain (Respiratory) Rare WBC's     Gram Stain (Respiratory) Rare Gram positive cocci    Blood culture [[id number]] Collected: 01/24/22 0200    Order Status: Completed Specimen: Blood Updated: 01/26/22 1012     Blood Culture, Routine No Growth to date      No Growth to date      No Growth to date    Blood culture [[id number]] Collected: 01/24/22 0156    Order Status: Completed Specimen: Blood Updated: 01/26/22 1012     Blood Culture, Routine No Growth to date      No Growth to date      No Growth to date          Chest Imaging:   TTE Resulting 01/24:  · The left ventricle is normal in size with concentric remodeling and moderately decreased systolic function.  · The estimated ejection fraction is 30%.  · There are segmental left ventricular wall motion abnormalities.  · Grade II left ventricular diastolic dysfunction.  · With normal right ventricular systolic function.  · Mechanically ventilated; cannot use inferior caval vein diameter to estimate central venous pressure.     Assessment & Plan:   Ms. MARKS is a 61 y.o. F w/ CAD (on Plavix), HTN, HLD, that presented to [facility] after cardiac  arrest suffered at home PTA. ROSC achieved after CPR and defib x1 on scene; nasally intubated. Upon arrival to ED, orally intubated and placed on Amio and Levo GTT with significant hemodynamic variability/instability. Initial K+ 2.6; repleted; 3.6 today. CT Head notable for signs of anoxic brain injury. HARSH and Transaminitis present intiially; suspect 2/2 to arrest; showing improvement. ICU/Pulm Team consulted for ventilator management. Cooled initially per TTM protocol; re-warmed. Extubated yesterday AM. Today, AxOx3, unremarkable neuro exam; off sedation; on Levo 0.05 with MAP of 75; on Amio 200 mg PO qd.      Neuro  #S/p Cardiac Arrest  -Initially cooled to 36 C, per TTM protocol. Rewarmed 1/25.   -CT Head: indicated global anoxia. No bleed, infarction, or herniation.  -No longer on sedation.   - AxOx3. Unremarkable neuro exam.     CVS  #S/p Cardiac Arrest  Given MVCAD and hx of PVC's: Suspect arrest 2/2 to arrhythmia induced by sympathetic surge in the context of argument with a relative  -HDS today  -Off Amio GTT since 01/24. Started Amio 200 mg PO qd 01/26.  -Echo resulted 01/24 - See above for results. EF 30%.   -MAPs dipped into low 60's overnight while sleeping. Placed on Levo 0.04 and 250 mL LR bolus with good response.   -MAPs in the 70-80's subsequently; currently at 75 on Levo 0.05.  -Trending Troponin q6hr: 16:43 0.657 / 22:00 0.636 / 05:00 0.567.   -Repeat EKG ordered: Inverted T-waves in numerous leads suggesting inferior and A/L ischemia, with prolonged QT.   -Pt to be transferred to [facility] for CABG with Dr. Jackson Savage stable. Monitor.     #Bradycardia and PVC's  -Initially bradycardic. HR WNL.   -Started on PO low dose Amio  -Cardio following. Appreciate reccs.    Pulm  #Intubated initialy for airway protection, s/p cardiac arrest  -Extubated 01/26 to 1L NC; tolerated well  -Satting well on 1L NC.     Renal   #HARSH  Suspect 2/2 hypoperfusion 2/2 cardiac arrest  -BUN/Cr 21/2.0 on admit.  Baseline 12 / 0.9.   -Mild bump in Cr from yesterday, 1.8 => 2.0   -Low UOP, ~ 30 cc/hr   -Monitor     FEN/GI  F: UOP - 800cc past 24 hours. Net -30cc since admission. UOP minimal.  E: Initial K+ 2.6 => repleted => now 3.6. Replete as needed. Avoid any hypokalemia-causing drugs. Continue to monitor.   N: Cardiac diet   GI:   #Transaminitis  Suspect 2/2 shock liver  -Continuing to improve. AST//316 initially =>177/170 => 79 / 103 today  -Continue to monitor.      Endo  -Glucose 175 today  -Goal of 140-180 while in ICU.     HEME/ID  -H/H 8.4/25.9 today  -Threshold for transfusion 8.0  -WBC 16 => 17.8 today. Suspect 2/2 stress response  -No suspicion for infection; no indication for antibiotics at this time.  -Monitor     PPX:   -Heparin GTT. D/C per Cardio    Dispo: Pt stable; to be transferred to [facility] for eventual CABG.    Dario Torres, MS4  [facility] [employer]     I evaluated the patient and made changes to the above information as deemed appropriate.      Rhianna Chakraborty MD  Fellow  Pulmonary & CCM

## 2022-01-26 NOTE — PLAN OF CARE
POC reviewed with patient.  Patient a bit hypotensive overnight.  NP notified and 250 ml bolus of LR given.  Patient's MAP still less then 65.  NP notified and levophed restarted.  Urine output marginal.  Patient denies chest pain and shortness of breath.  The plan is for patient to be re-evaluated for a CABG by Dr. Paz but I am not sire if the transfer has been initiated.  She is on a heparin gtt and is therapeutic.  aPTT will be drawn in the morning.  Labs trended Q8H.  No acute distress noted overnight.  Will continue to monitor.

## 2022-01-26 NOTE — ASSESSMENT & PLAN NOTE
- out of hospital arrest with ROSC after CPR and defibrillation  - appears to have been down for up to 10 minutes prior to EMS arrival per daughter  - treated with IV Amiodarone overnight but stopped due to bradycardia; HR improved; started on low dose Amiodaorne orally due to VTach arrest   - telemetry reviewed  frequent PVCs improving; no recurrent VTach noted    - etiology of arrest ischemic vs electrolyte derangement- concerned about ischemic etiology given extend of CAD   - echo with EF 30% and WMA   - transfer initiated to [facility] for CABG evaluation by Dr. Paz

## 2022-01-26 NOTE — PT/OT/SLP PROGRESS
Physical Therapy      Patient Name:  Veronika Blackmon   MRN:  [id number]    Patient not seen today secondary to low BPs. Will follow-up.

## 2022-01-26 NOTE — ASSESSMENT & PLAN NOTE
- presented with K+ 2.7; aggressively replaced with continued hypokalemia  - repeat K+ this AM 3.6- replacement ordered this AM   - goal K+ >4.0

## 2022-01-26 NOTE — ASSESSMENT & PLAN NOTE
- SBP 80s-100s  - 250cc LR bolus overnight and Levophed resumed  - continue to hold antihypertensives

## 2022-01-26 NOTE — PT/OT/SLP EVAL
Speech Language Pathology Evaluation  Cognitive/Bedside Swallow    Patient Name:  Veronika Blackmon   MRN:  [id number]  Admitting Diagnosis: Cardiac arrest    Recommendations:                  General Recommendations:  cognitive remediation  Diet recommendations:  Regular, Thin   Aspiration Precautions: standard precautions   General Precautions: Standard, fall,respiratory  Communication strategies:  none    History:     Principal Problem:Cardiac arrest     Chief Complaint:           Chief Complaint   Patient presents with    Altered Mental Status       Collapsed after arguing with son, pulseless and apneic upon FD arrival, 1 round compressions with 1 shock, nasally intubated, posturing          HPI: Veronika Blackmon is a 60 yo female with a pmh of multivessel CAD and HTN who was brought is after collapsing at home. She was dealing with a family argument and stated she felt as though she would pass out so her daughter lowered her to the ground, no head trauma. The patient was unresponsive from that point on per daughter. On EMS arrival, the patient was pulseless and apneic. EMS performed chest compressions and gave 1 shock then nasally intubated the patient who was noted to be posturing with clenched teeth. No sedation given for intubation in the field. On arrival to ED, an ETT was placed. Propofol was held due to hypotension. The patient continued to have posturing and was given keppra 2g. She was given ativan, versed,  and fentanyl as well. CT head showing global anoxia. Amiodarone drip was initiated. Renal and liver function worsening on labs. Initial troponin 0.055. The patient was having SANDOVAL and underwent a LHC last month when she was found to have severe multivessel CAD. She was evaluated for CABG by CTS on 12/22/21 but was deemed not a candidate. She was referred by cardiology for a second opinion at [facility] but had not yet set up an appt. She was being medically managed on ASA, plavix, and statin in the meantime. She  "has a strong family history of cardiac disease.               No past medical history on file.    Past Surgical History:   Procedure Laterality Date    CORONARY ANGIOGRAPHY N/A 12/14/2021    Procedure: ANGIOGRAM, CORONARY ARTERY;  Surgeon: Jm Escobar MD;  Location: [facility] CATH LAB/EP;  Service: Cardiology;  Laterality: N/A;    LEFT HEART CATHETERIZATION Left 12/14/2021    Procedure: Left heart cath;  Surgeon: Jm Escobar MD;  Location: [facility] CATH LAB/EP;  Service: Cardiology;  Laterality: Left;       Social History: Patient lives with Rizwana ibrahim in [facility]    Prior Intubation HX:  Intubated 1/23, ext 1/25     Modified Barium Swallow: n/a      Chest X-Rays: No indication of a pneumothorax     Prior diet: unrestricted      CT of head: There is vague low-density in the deep white matter of the is left occipital pole without loss of gray-white junction.  The remainder of the brain parenchyma appears to contain low densities in the heads of the caudate nucleus bilaterally and in the basal ganglia.  The remainder of the brain parenchyma attenuation appears unremarkable  Multifocal areas of low density involving primarily white matter.  Correlate for global anoxia.      Occupation/hobbies/homemaking: recently stopped working as  at elementary school  12th grade education, can read and write  Cardiologist had asked pt to dcr work schedule      Subjective     Pt seen in room, SLP ok'd treatment with ODIN Bailey  Dtr at bedside on sofa.   Patient goals: "I was not really hungry."    Pain/Comfort:  Pain Rating 1: 0/10    Respiratory Status: NC    Objective:     Cognitive Status:    Arousal/Alertness Appropriate response to stimuli  Attention No obvious deficits observed, adequate   Orientation Oriented x4  Memory Immediate Recall 2/3 unrelated words after 1 and 5 minute delay, recall recalled personal hx with no cues and long term recall some cues needed by dtr at bedside. Dtr reports " she is a little foggy and has forgotten some events.   Safety awareness fair       Receptive Language: WFL      Pragmatics:    Flat affect noted    Expressive Language: WFL, verbal fluent output        Motor Speech:  No dysarthria of speech noted     Voice:   Clear voice     Visual-Spatial:  None noted    Reading:   DNT     Written Expression:   R hand dominant, did not assess      Bedside Swallow Trials:   Consistencies Assessed:  · Juice by straw, diced peaches 1/2 container     Oral Phase:   · WFL    Pharyngeal Phase:   · WFL    Compensatory Strategies  · none    Treatment: Discussed updated POC goals with dtr and patient during session, will continue to warranted skilled ST at next level of care pending PT and OT recs.     Assessment:     Veronika Blackmon is a 61 y.o. female s/p cardiac arrest with brief intubation noted. Patient presents  with an SLP diagnosis of MILD  Cognitive-Linguistic Impairment.      Goals:   Multidisciplinary Problems     SLP Goals        Problem: SLP Goal    Goal Priority Disciplines Outcome   SLP Goal     SLP Ongoing, Progressing   Description: Short Term Goals:  1. Pt will participate in ongoing swallow assessment to determine least restrictive diet.   2.Pt will tolerate clear liquid diet with no audible dysphagia signs.  3. Further assessment of cognition to be completed next date of service.                      Plan:     · Patient to be seen:  3 x/week   · Plan of Care expires:  02/23/22  · Plan of Care reviewed with:  patient,caregiver   · SLP Follow-Up:  Yes       Discharge recommendations:  Discharge Facility/Level of Care Needs:  (TBD pending PT and OT eval)   Barriers to Discharge:  none    Time Tracking:     SLP Treatment Date:   01/26/22  Speech Start Time:  1004  Speech Stop Time:  1028     Speech Total Time (min):  24 min    Billable Minutes: Eval 14  and Treatment Swallowing Dysfunction 10    01/26/2022

## 2022-01-26 NOTE — ASSESSMENT & PLAN NOTE
- HR 40s down to 30s with improvement; HR up to 90s overnight per Epic   - continue to monitor on telemetry   - no BB due to pressor use; started on low dose Amiodarone

## 2022-01-26 NOTE — PLAN OF CARE
Problem: SLP Goal  Goal: SLP Goal  Description: Short Term Goals:  1. Pt will participate in ongoing swallow assessment to determine least restrictive diet.   2.Pt will tolerate clear liquid diet with no audible dysphagia signs.  3. Further assessment of cognition to be completed next date of service.     Outcome: Ongoing, Progressing   Informal cognitive assessment completed this date and pt with fair consumption of reg/cardiac tray with thin liquids. Pt will benefit from continued ST at next level of care.

## 2022-01-26 NOTE — PROGRESS NOTES
[facility] - Intensive Care  Cardiology  Progress Note    Patient Name: Veronika Blackmon  MRN: [id number]  Admission Date: 1/23/2022  Hospital Length of Stay: 3 days  Code Status: Full Code   Attending Physician: Aly Tovar MD   Primary Care Physician: Sahara Carrillo MD  Expected Discharge Date: 1/26/2022  Principal Problem:Cardiac arrest    Subjective:     Hospital Course:   1/24/2022 Presented with cardiac arrest. Cardiology consulted. On IV Amiodarone, Heparin, Levophed and Propofol. Echocardiogram pending   1/25/2022 HR and BP stable. No recurrent VTach noted overnight. Occasional intermittent junctional rhythm. Off pressors. More alert and responsive today. SBT in process with plans for extubation per Pulm CC today. Echo with EF 30% and LV WMA. CBC WNL. BMP with K+ 3.4 BUN 21 creatinine 1.6  1/26/2022 Extubated yesterday. Hypotensive overnight with 250cc LR with resumption of low dose Levophed. SBP 90s with MAP 66. No arrhythmias overnight on telemetry. H&H 8.9/26.3 K+ 3.6-replacement ordered. Creatinine up slightly to 2.0          Review of Systems   Constitutional: Negative for chills, decreased appetite, diaphoresis and fever.   Cardiovascular: Negative for chest pain, claudication, cyanosis, dyspnea on exertion, irregular heartbeat, leg swelling, near-syncope, orthopnea, palpitations, paroxysmal nocturnal dyspnea and syncope.   Respiratory: Negative for cough, hemoptysis, shortness of breath and wheezing.    Gastrointestinal: Negative for bloating, abdominal pain, constipation, diarrhea, melena, nausea and vomiting.   Neurological: Negative for dizziness and weakness.     Objective:     Vital Signs (Most Recent):  Temp: 98.9 °F (37.2 °C) (01/26/22 0715)  Pulse: 91 (01/26/22 0922)  Resp: (!) 27 (01/26/22 0921)  BP: (!) 90/59 (01/26/22 0900)  SpO2: 98 % (01/26/22 0921) Vital Signs (24h Range):  Temp:  [94 °F (34.4 °C)-99.3 °F (37.4 °C)] 98.9 °F (37.2 °C)  Pulse:  [] 91  Resp:  [9-36] 27  SpO2:  [90 %-100 %] 98  %  BP: ()/(45-69) 90/59  Arterial Line BP: ()/(43-73) 95/53     Weight: 113 kg (249 lb 1.9 oz)  Body mass index is 42.76 kg/m².     SpO2: 98 %  O2 Device (Oxygen Therapy): room air      Intake/Output Summary (Last 24 hours) at 1/26/2022 0928  Last data filed at 1/26/2022 0922  Gross per 24 hour   Intake 1458.16 ml   Output 850 ml   Net 608.16 ml       Lines/Drains/Airways     Central Venous Catheter Line            Percutaneous Central Line Insertion/Assessment - Triple Lumen  01/24/22 0355 right internal jugular 2 days          Drain                 Urethral Catheter 01/23/22 2215 16 Fr. 2 days          Arterial Line            Arterial Line 01/24/22 0330 Right Radial 2 days          Peripheral Intravenous Line                 Peripheral IV - Single Lumen 01/23/22 2210 20 G Left Wrist 2 days         Peripheral IV - Single Lumen 01/23/22 2211 20 G Right Wrist 2 days         Peripheral IV - Single Lumen 01/24/22 0005 18 G Right Antecubital 2 days         Peripheral IV - Single Lumen 01/24/22 0044 18 G Left Antecubital 2 days                Physical Exam  Constitutional:       General: She is not in acute distress.     Appearance: She is well-developed and well-nourished.   Cardiovascular:      Rate and Rhythm: Normal rate and regular rhythm.      Heart sounds: No murmur heard.  No gallop.    Pulmonary:      Effort: Pulmonary effort is normal. No respiratory distress.      Breath sounds: Normal breath sounds. No wheezing.   Abdominal:      General: Bowel sounds are normal. There is no distension.      Palpations: Abdomen is soft.      Tenderness: There is no abdominal tenderness.   Skin:     General: Skin is warm and dry.   Neurological:      Mental Status: She is alert and oriented to person, place, and time.      Comments: Slight short term memory issues          Significant Labs:   BMP:   Recent Labs   Lab 01/25/22  1147 01/25/22 2027 01/26/22  0514   *  154* 159*  159* 171*  171*     "139 137   K 3.3* 4.0 3.6    103 103   CO2 23 23 24   BUN 22* 23* 25*   CREATININE 1.7* 1.8* 2.0*   CALCIUM 9.2 8.9 8.8   MG 2.4 2.0 2.0   , CBC   Recent Labs   Lab 01/25/22  0551 01/25/22  0551 01/26/22  0514 01/26/22  0514 01/26/22  0801   WBC 16.22*  --  18.18*  --  17.74*   HGB 12.0  --  8.9*  --  8.7*   HCT 35.8*   < > 26.3*   < > 25.9*     --  244  --  235    < > = values in this interval not displayed.    and Troponin   Recent Labs   Lab 01/25/22  1643 01/25/22  2200 01/26/22  0514   TROPONINI 0.657* 0.636* 0.567*       Significant Imaging: Echocardiogram:   Transthoracic echo (TTE) complete (Cupid Only):   Results for orders placed or performed during the hospital encounter of 01/23/22   Echo   Result Value Ref Range    BSA 1.84 m2    TDI SEPTAL 0.05 m/s    LV LATERAL E/E' RATIO 19.00 m/s    LV SEPTAL E/E' RATIO 11.40 m/s    LA WIDTH 4.56 cm    TDI LATERAL 0.03 m/s    LVIDd 4.26 3.5 - 6.0 cm    IVS 0.88 0.6 - 1.1 cm    Posterior Wall 0.98 0.6 - 1.1 cm    Ao root annulus 2.73 cm    LVIDs 3.97 2.1 - 4.0 cm    FS 7 28 - 44 %    LA volume 64.11 cm3    STJ 2.49 cm    Ascending aorta 2.79 cm    LV mass 126.99 g    LA size 3.25 cm    RVDD 2.11 cm    TAPSE 1.77 cm    RV S' 9.51 cm/s    Left Ventricle Relative Wall Thickness 0.46 cm    AV mean gradient 3 mmHg    AV valve area 1.98 cm2    AV Velocity Ratio 0.53     AV index (prosthetic) 0.63     MV valve area p 1/2 method 3.51 cm2    E/A ratio 0.83     Mean e' 0.04 m/s    E wave deceleration time 216.35 msec    IVRT 108.47 msec    MV "A" wave duration 13.13 msec    Pulm vein S/D ratio 0.73     LVOT diameter 2.00 cm    LVOT area 3.1 cm2    LVOT peak jordi 0.55 m/s    LVOT peak VTI 12.00 cm    Ao peak jordi 1.04 m/s    Ao VTI 19.00 cm    LVOT stroke volume 37.68 cm3    AV peak gradient 4 mmHg    E/E' ratio 14.25 m/s    MV Peak E Jordi 0.57 m/s    TR Max Jordi 2.27 m/s    MV stenosis pressure 1/2 time 62.74 ms    MV Peak A Jordi 0.69 m/s    PV Peak S Jordi 0.19 m/s    PV " Peak D Jordi 0.26 m/s    LV Systolic Volume 68.63 mL    LV Systolic Volume Index 38.1 mL/m2    LV Diastolic Volume 81.35 mL    LV Diastolic Volume Index 45.19 mL/m2    LA Volume Index 35.6 mL/m2    LV Mass Index 71 g/m2    RA Major Axis 5.05 cm    Left Atrium Minor Axis 4.63 cm    Left Atrium Major Axis 5.65 cm    Triscuspid Valve Regurgitation Peak Gradient 21 mmHg    LA Volume Index (Mod) 36.8 mL/m2    LA volume (mod) 66.25 cm3    RA Width 3.83 cm    EF 30 %    Narrative    · The left ventricle is normal in size with concentric remodeling and   moderately decreased systolic function.  · The estimated ejection fraction is 30%.  · There are segmental left ventricular wall motion abnormalities.  · Grade II left ventricular diastolic dysfunction.  · With normal right ventricular systolic function.  · Mechanically ventilated; cannot use inferior caval vein diameter to   estimate central venous pressure.        Assessment and Plan:     Brief HPI: Seen this morning on AM rounds with Dr. Escobar while resting in bed with son and daughter at the bedside. Denies any complaints. Discussed cardiac POC as detailed below-verbalized understanding and agrees with POC     * Cardiac arrest  - out of hospital arrest with ROSC after CPR and defibrillation  - appears to have been down for up to 10 minutes prior to EMS arrival per daughter  - treated with IV Amiodarone overnight but stopped due to bradycardia; HR improved; started on low dose Amiodaorne orally due to VTach arrest   - telemetry reviewed  frequent PVCs improving; no recurrent VTach noted    - etiology of arrest ischemic vs electrolyte derangement- concerned about ischemic etiology given extend of CAD   - echo with EF 30% and WMA   - transfer initiated to [facility] for CABG evaluation by Dr. Paz     Sinus bradycardia  - HR 40s down to 30s with improvement; HR up to 90s overnight per Epic   - continue to monitor on telemetry   - no BB due to pressor use; started on low dose  Amiodarone     HARSH (acute kidney injury)  - creatinine 1.9-2.0 upon admission; trended down to 1.6 and back up to 2.0 this AM   - baseline creatinine .9  - multifactoral but most concerning for ATN with hypotension and cardiac arrest     Hypokalemia  - presented with K+ 2.7; aggressively replaced with continued hypokalemia  - repeat K+ this AM 3.6- replacement ordered this AM   - goal K+ >4.0    Mixed hyperlipidemia  - continue statin therapy     Essential hypertension  - SBP 80s-100s  - 250cc LR bolus overnight and Levophed resumed  - continue to hold antihypertensives       Coronary artery disease involving native coronary artery of native heart  - multivessel CAD per [facility]  - strong family history and positive stress test  - seen by CTS at [facility] but deemed not a candidate for CABG; second opinion with Dr. Paz initiated prior ot cardiac arrest; transfer to [facility] in process for CABG evaluation by Dr. Paz  - continue ASA, Plavix and statin as well as IV Heparin; no BB due to bradycardia and pressor use   - repeat echo  with EF 30% and LV WMA         VTE Risk Mitigation (From admission, onward)         Ordered     heparin 25,000 units in dextrose 5% (100 units/ml) IV bolus from bag - ADDITIONAL PRN BOLUS - 60 units/kg (max bolus 4000 units)  As needed (PRN)        Question:  Heparin Infusion Adjustment (DO NOT MODIFY ANSWER)  Answer:  \[email or web address]\Images\Pharmacy\HeparinInfusions\heparin LOW INTENSITY nomogram for [facility] [id number]    01/24/22 0113     heparin 25,000 units in dextrose 5% (100 units/ml) IV bolus from bag - ADDITIONAL PRN BOLUS - 30 units/kg (max bolus 4000 units)  As needed (PRN)        Question:  Heparin Infusion Adjustment (DO NOT MODIFY ANSWER)  Answer:  \[email or web address]\Images\Pharmacy\HeparinInfusions\heparin LOW INTENSITY nomogram for [facility] [id number]    01/24/22 0113     heparin 25,000 units in dextrose 5% 250 mL (100 units/mL) infusion LOW INTENSITY nomogram - [facility]  Continuous         Question Answer Comment   Heparin Infusion Adjustment (DO NOT MODIFY ANSWER) [email or web address]\epic\Images\Pharmacy\HeparinInfusions\heparin LOW INTENSITY nomogram for [facility] [id number]    Begin at (in units/kg/hr) 12        01/24/22 0113     Place sequential compression device  Until discontinued         01/24/22 0111     IP VTE LOW RISK PATIENT  Once         01/24/22 0111                Robyn Crow, APRN, ANP  Cardiology  [place] - Intensive Care

## 2022-01-26 NOTE — ASSESSMENT & PLAN NOTE
- creatinine 1.9-2.0 upon admission; trended down to 1.6 and back up to 2.0 this AM   - baseline creatinine .9  - multifactoral but most concerning for ATN with hypotension and cardiac arrest

## 2022-01-26 NOTE — ASSESSMENT & PLAN NOTE
- multivessel CAD per [facility]  - strong family history and positive stress test  - seen by CTS at [facility] but deemed not a candidate for CABG; second opinion with Dr. Paz initiated prior ot cardiac arrest; transfer to [facility] in process for CABG evaluation by Dr. Paz  - continue ASA, Plavix and statin as well as IV Heparin; no BB due to bradycardia and pressor use   - repeat echo  with EF 30% and LV WMA

## 2022-01-27 VITALS
DIASTOLIC BLOOD PRESSURE: 72 MMHG | HEART RATE: 83 BPM | WEIGHT: 174.19 LBS | BODY MASS INDEX: 29.74 KG/M2 | SYSTOLIC BLOOD PRESSURE: 112 MMHG | RESPIRATION RATE: 37 BRPM | HEIGHT: 64 IN | OXYGEN SATURATION: 99 % | TEMPERATURE: 98 F

## 2022-01-27 PROBLEM — D64.9 ANEMIA: Status: ACTIVE | Noted: 2022-01-27

## 2022-01-27 PROBLEM — R57.9 SHOCK: Status: ACTIVE | Noted: 2022-01-27

## 2022-01-27 LAB
ABO + RH BLD: NORMAL
ALBUMIN SERPL BCP-MCNC: 2.9 G/DL (ref 3.5–5.2)
ALP SERPL-CCNC: 101 U/L (ref 55–135)
ALT SERPL W/O P-5'-P-CCNC: 80 U/L (ref 10–44)
ANION GAP SERPL CALC-SCNC: 10 MMOL/L (ref 8–16)
APTT BLDCRRT: 26.2 SEC (ref 21–32)
APTT BLDCRRT: 36.1 SEC (ref 21–32)
AST SERPL-CCNC: 63 U/L (ref 10–40)
BASOPHILS # BLD AUTO: 0.07 K/UL (ref 0–0.2)
BASOPHILS NFR BLD: 0.5 % (ref 0–1.9)
BILIRUB SERPL-MCNC: 1 MG/DL (ref 0.1–1)
BLD GP AB SCN CELLS X3 SERPL QL: NORMAL
BLD PROD TYP BPU: NORMAL
BLD PROD TYP BPU: NORMAL
BLOOD UNIT EXPIRATION DATE: NORMAL
BLOOD UNIT EXPIRATION DATE: NORMAL
BLOOD UNIT TYPE CODE: 5100
BLOOD UNIT TYPE CODE: 5100
BLOOD UNIT TYPE: NORMAL
BLOOD UNIT TYPE: NORMAL
BUN SERPL-MCNC: 23 MG/DL (ref 6–20)
CALCIUM SERPL-MCNC: 9 MG/DL (ref 8.7–10.5)
CHLORIDE SERPL-SCNC: 103 MMOL/L (ref 95–110)
CO2 SERPL-SCNC: 24 MMOL/L (ref 23–29)
CODING SYSTEM: NORMAL
CODING SYSTEM: NORMAL
CREAT SERPL-MCNC: 1.9 MG/DL (ref 0.5–1.4)
DIFFERENTIAL METHOD: ABNORMAL
DISPENSE STATUS: NORMAL
DISPENSE STATUS: NORMAL
EOSINOPHIL # BLD AUTO: 0 K/UL (ref 0–0.5)
EOSINOPHIL NFR BLD: 0.1 % (ref 0–8)
ERYTHROCYTE [DISTWIDTH] IN BLOOD BY AUTOMATED COUNT: 13.9 % (ref 11.5–14.5)
EST. GFR  (AFRICAN AMERICAN): 33 ML/MIN/1.73 M^2
EST. GFR  (NON AFRICAN AMERICAN): 28 ML/MIN/1.73 M^2
GLUCOSE SERPL-MCNC: 139 MG/DL (ref 70–110)
HCT VFR BLD AUTO: 23.7 % (ref 37–48.5)
HGB BLD-MCNC: 10.2 G/DL (ref 12–16)
HGB BLD-MCNC: 7.6 G/DL (ref 12–16)
HGB BLD-MCNC: 7.8 G/DL (ref 12–16)
HGB BLD-MCNC: 7.9 G/DL (ref 12–16)
IMM GRANULOCYTES # BLD AUTO: 0.09 K/UL (ref 0–0.04)
IMM GRANULOCYTES NFR BLD AUTO: 0.6 % (ref 0–0.5)
INR PPP: 1.1 (ref 0.8–1.2)
LYMPHOCYTES # BLD AUTO: 3.1 K/UL (ref 1–4.8)
LYMPHOCYTES NFR BLD: 20.3 % (ref 18–48)
MAGNESIUM SERPL-MCNC: 2 MG/DL (ref 1.6–2.6)
MCH RBC QN AUTO: 30.1 PG (ref 27–31)
MCHC RBC AUTO-ENTMCNC: 32.9 G/DL (ref 32–36)
MCV RBC AUTO: 92 FL (ref 82–98)
MONOCYTES # BLD AUTO: 1.5 K/UL (ref 0.3–1)
MONOCYTES NFR BLD: 9.4 % (ref 4–15)
NEUTROPHILS # BLD AUTO: 10.6 K/UL (ref 1.8–7.7)
NEUTROPHILS NFR BLD: 69.1 % (ref 38–73)
NRBC BLD-RTO: 0 /100 WBC
NSE SERPL-MCNC: 23 NG/ML
NUM UNITS TRANS PACKED RBC: NORMAL
NUM UNITS TRANS PACKED RBC: NORMAL
PLATELET # BLD AUTO: 232 K/UL (ref 150–450)
PMV BLD AUTO: 11.2 FL (ref 9.2–12.9)
POCT GLUCOSE: 127 MG/DL (ref 70–110)
POCT GLUCOSE: 141 MG/DL (ref 70–110)
POCT GLUCOSE: 158 MG/DL (ref 70–110)
POCT GLUCOSE: 176 MG/DL (ref 70–110)
POTASSIUM SERPL-SCNC: 3.7 MMOL/L (ref 3.5–5.1)
PROT SERPL-MCNC: 6.3 G/DL (ref 6–8.4)
PROTHROMBIN TIME: 11 SEC (ref 9–12.5)
RBC # BLD AUTO: 2.59 M/UL (ref 4–5.4)
SODIUM SERPL-SCNC: 137 MMOL/L (ref 136–145)
WBC # BLD AUTO: 15.39 K/UL (ref 3.9–12.7)

## 2022-01-27 PROCEDURE — 85018 HEMOGLOBIN: CPT | Mod: 91 | Performed by: INTERNAL MEDICINE

## 2022-01-27 PROCEDURE — 83735 ASSAY OF MAGNESIUM: CPT | Performed by: NURSE PRACTITIONER

## 2022-01-27 PROCEDURE — 85025 COMPLETE CBC W/AUTO DIFF WBC: CPT | Performed by: NURSE PRACTITIONER

## 2022-01-27 PROCEDURE — 99233 PR SUBSEQUENT HOSPITAL CARE,LEVL III: ICD-10-PCS | Mod: ,,, | Performed by: INTERNAL MEDICINE

## 2022-01-27 PROCEDURE — 94761 N-INVAS EAR/PLS OXIMETRY MLT: CPT

## 2022-01-27 PROCEDURE — 99233 SBSQ HOSP IP/OBS HIGH 50: CPT | Mod: ,,, | Performed by: INTERNAL MEDICINE

## 2022-01-27 PROCEDURE — 25000003 PHARM REV CODE 250: Performed by: NURSE PRACTITIONER

## 2022-01-27 PROCEDURE — 83520 IMMUNOASSAY QUANT NOS NONAB: CPT | Performed by: NURSE PRACTITIONER

## 2022-01-27 PROCEDURE — 86920 COMPATIBILITY TEST SPIN: CPT | Performed by: NURSE PRACTITIONER

## 2022-01-27 PROCEDURE — 63600175 PHARM REV CODE 636 W HCPCS: Performed by: NURSE PRACTITIONER

## 2022-01-27 PROCEDURE — 85730 THROMBOPLASTIN TIME PARTIAL: CPT | Mod: 91 | Performed by: INTERNAL MEDICINE

## 2022-01-27 PROCEDURE — 97163 PT EVAL HIGH COMPLEX 45 MIN: CPT

## 2022-01-27 PROCEDURE — 36430 TRANSFUSION BLD/BLD COMPNT: CPT

## 2022-01-27 PROCEDURE — 80053 COMPREHEN METABOLIC PANEL: CPT | Performed by: NURSE PRACTITIONER

## 2022-01-27 PROCEDURE — 63600175 PHARM REV CODE 636 W HCPCS: Performed by: CLINIC/CENTER

## 2022-01-27 PROCEDURE — 85018 HEMOGLOBIN: CPT | Performed by: INTERNAL MEDICINE

## 2022-01-27 PROCEDURE — 86850 RBC ANTIBODY SCREEN: CPT | Performed by: NURSE PRACTITIONER

## 2022-01-27 PROCEDURE — 97165 OT EVAL LOW COMPLEX 30 MIN: CPT

## 2022-01-27 PROCEDURE — P9016 RBC LEUKOCYTES REDUCED: HCPCS | Performed by: NURSE PRACTITIONER

## 2022-01-27 PROCEDURE — 36415 COLL VENOUS BLD VENIPUNCTURE: CPT | Performed by: INTERNAL MEDICINE

## 2022-01-27 PROCEDURE — 85610 PROTHROMBIN TIME: CPT | Performed by: NURSE PRACTITIONER

## 2022-01-27 PROCEDURE — 25000003 PHARM REV CODE 250: Performed by: HOSPITALIST

## 2022-01-27 PROCEDURE — 97535 SELF CARE MNGMENT TRAINING: CPT

## 2022-01-27 RX ORDER — TALC
9 POWDER (GRAM) TOPICAL NIGHTLY PRN
Status: DISCONTINUED | OUTPATIENT
Start: 2022-01-27 | End: 2022-01-27 | Stop reason: HOSPADM

## 2022-01-27 RX ORDER — FUROSEMIDE 10 MG/ML
40 INJECTION INTRAMUSCULAR; INTRAVENOUS
Status: DISCONTINUED | OUTPATIENT
Start: 2022-01-27 | End: 2022-01-27 | Stop reason: HOSPADM

## 2022-01-27 RX ORDER — FENTANYL CITRATE 50 UG/ML
25 INJECTION, SOLUTION INTRAMUSCULAR; INTRAVENOUS ONCE
Status: COMPLETED | OUTPATIENT
Start: 2022-01-27 | End: 2022-01-27

## 2022-01-27 RX ORDER — POTASSIUM CHLORIDE 20 MEQ/1
40 TABLET, EXTENDED RELEASE ORAL ONCE
Status: COMPLETED | OUTPATIENT
Start: 2022-01-27 | End: 2022-01-27

## 2022-01-27 RX ORDER — AMIODARONE HYDROCHLORIDE 200 MG/1
200 TABLET ORAL DAILY
Start: 2022-01-28 | End: 2022-03-17

## 2022-01-27 RX ORDER — PANTOPRAZOLE SODIUM 40 MG/1
40 TABLET, DELAYED RELEASE ORAL DAILY
Start: 2022-01-27 | End: 2022-03-17

## 2022-01-27 RX ORDER — HYDROCODONE BITARTRATE AND ACETAMINOPHEN 500; 5 MG/1; MG/1
TABLET ORAL
Status: DISCONTINUED | OUTPATIENT
Start: 2022-01-27 | End: 2022-01-27 | Stop reason: HOSPADM

## 2022-01-27 RX ORDER — KETOROLAC TROMETHAMINE 30 MG/ML
15 INJECTION, SOLUTION INTRAMUSCULAR; INTRAVENOUS ONCE
Status: COMPLETED | OUTPATIENT
Start: 2022-01-27 | End: 2022-01-27

## 2022-01-27 RX ORDER — PANTOPRAZOLE SODIUM 40 MG/1
40 TABLET, DELAYED RELEASE ORAL DAILY
Status: DISCONTINUED | OUTPATIENT
Start: 2022-01-27 | End: 2022-01-27 | Stop reason: HOSPADM

## 2022-01-27 RX ADMIN — MUPIROCIN: 20 OINTMENT TOPICAL at 09:01

## 2022-01-27 RX ADMIN — FUROSEMIDE 40 MG: 10 INJECTION, SOLUTION INTRAVENOUS at 02:01

## 2022-01-27 RX ADMIN — MELATONIN TAB 3 MG 6 MG: 3 TAB at 12:01

## 2022-01-27 RX ADMIN — PANTOPRAZOLE SODIUM 40 MG: 40 TABLET, DELAYED RELEASE ORAL at 02:01

## 2022-01-27 RX ADMIN — ASPIRIN 81 MG CHEWABLE TABLET 81 MG: 81 TABLET CHEWABLE at 09:01

## 2022-01-27 RX ADMIN — FENTANYL CITRATE 25 MCG: 50 INJECTION INTRAMUSCULAR; INTRAVENOUS at 02:01

## 2022-01-27 RX ADMIN — POTASSIUM CHLORIDE 40 MEQ: 1500 TABLET, EXTENDED RELEASE ORAL at 02:01

## 2022-01-27 RX ADMIN — KETOROLAC TROMETHAMINE 15 MG: 30 INJECTION, SOLUTION INTRAMUSCULAR at 04:01

## 2022-01-27 RX ADMIN — AMIODARONE HYDROCHLORIDE 200 MG: 200 TABLET ORAL at 09:01

## 2022-01-27 NOTE — ASSESSMENT & PLAN NOTE
multivessel CAD   repeat echo with EF 30% and LV WMA   DAPT. Plavix held for planning CABG.  Heparin gtt held due to drop in h/h

## 2022-01-27 NOTE — EICU
E-ICU    Pt complaining of severe pain in chest secondary to chest compressions from yesterday    VSS    Will try 25 mc fentanyl once

## 2022-01-27 NOTE — PROGRESS NOTES
[facility] Medicine  Progress Note    Patient Name: Veronika Blackmon  MRN: [id number]  Patient Class: IP- Inpatient   Admission Date: 1/23/2022  Length of Stay: 4 days  Attending Physician: Aly Tovar MD  Primary Care Provider: Sahara Carrillo MD        Subjective:     Principal Problem:Cardiac arrest        HPI:  Veronika Blackmon is a 60 yo female with a pmh of multivessel CAD and HTN who was brought is after collapsing at home. She was dealing with a family argument and stated she felt as though she would pass out so her daughter lowered her to the ground, no head trauma. The patient was unresponsive from that point on per daughter. On EMS arrival, the patient was pulseless and apneic. EMS performed chest compressions and gave 1 shock then nasally intubated the patient who was noted to be posturing with clenched teeth. No sedation given for intubation in the field. On arrival to ED, an ETT was placed. Propofol was held due to hypotension. The patient continued to have posturing and was given keppra 2g. She was given ativan, versed,  and fentanyl as well. CT head showing global anoxia. Amiodarone drip was initiated. Renal and liver function worsening on labs. Initial troponin 0.055. The patient was having SANDOVAL and underwent a LHC last month when she was found to have severe multivessel CAD. She was evaluated for CABG by CTS on 12/22/21 but was deemed not a candidate. She was referred by cardiology for a second opinion at [facility] but had not yet set up an appt. She was being medically managed on ASA, plavix, and statin in the meantime. She has a strong family history of cardiac disease.       Overview/Hospital Course:  No notes on file    Interval History: Hypotensive overnight requiring low dose levophed. Mental status improved significantly, back to baseline. No neuro deficits. Has c/o mild chest discomfort, likely due to CPR. Drop in H/H with no clear source of bleeding. Remains on heparin drip.  Awaiting transfer to [facility] for CT surgery evaluation.      Review of Systems   Constitutional: Positive for fatigue. Negative for activity change, appetite change, chills, diaphoresis and fever.   HENT: Negative for congestion, postnasal drip, rhinorrhea, sinus pressure, sinus pain and sneezing.    Respiratory: Negative for cough, chest tightness, shortness of breath, wheezing and stridor.    Cardiovascular: Negative for chest pain, palpitations and leg swelling.   Gastrointestinal: Negative for abdominal distention, abdominal pain, blood in stool, constipation, diarrhea and nausea.   Endocrine: Negative for cold intolerance and heat intolerance.   Genitourinary: Negative for dysuria, flank pain and hematuria.   Musculoskeletal: Negative for gait problem.   Neurological: Negative for dizziness and weakness.   Psychiatric/Behavioral: Negative for agitation and behavioral problems.     Objective:     Vital Signs (Most Recent):  Temp: 98.9 °F (37.2 °C) (01/27/22 1209)  Pulse: 82 (01/27/22 1404)  Resp: (!) 33 (01/27/22 1404)  BP: (!) 142/78 (01/27/22 1404)  SpO2: 100 % (01/27/22 1404) Vital Signs (24h Range):  Temp:  [97.7 °F (36.5 °C)-99.2 °F (37.3 °C)] 98.9 °F (37.2 °C)  Pulse:  [66-99] 82  Resp:  [8-51] 33  SpO2:  [88 %-100 %] 100 %  BP: ()/(51-80) 142/78  Arterial Line BP: ()/(47-71) 136/71     Weight: 79 kg (174 lb 2.6 oz)  Body mass index is 29.9 kg/m².    Intake/Output Summary (Last 24 hours) at 1/27/2022 1410  Last data filed at 1/27/2022 1404  Gross per 24 hour   Intake 1129.13 ml   Output 1340 ml   Net -210.87 ml      Physical Exam  Vitals and nursing note reviewed.   Constitutional:       Appearance: She is ill-appearing.   HENT:      Head: Normocephalic and atraumatic.      Nose: Nose normal.      Mouth/Throat:      Mouth: Mucous membranes are moist.   Eyes:      Pupils: Pupils are equal, round, and reactive to light.   Cardiovascular:      Rate and Rhythm: Normal rate and regular  rhythm.      Pulses: Normal pulses.      Heart sounds: Normal heart sounds.   Abdominal:      General: Bowel sounds are normal.      Palpations: Abdomen is soft.   Musculoskeletal:         General: No swelling.      Cervical back: Neck supple.   Skin:     General: Skin is warm and dry.   Neurological:      General: No focal deficit present.      Mental Status: She is oriented to person, place, and time. Mental status is at baseline.   Psychiatric:         Mood and Affect: Mood normal.         Significant Labs: All pertinent labs within the past 24 hours have been reviewed.    Significant Imaging: I have reviewed all pertinent imaging results/findings within the past 24 hours.      Assessment/Plan:      * Cardiac arrest  Coronary artery disease involving native coronary artery of native heart    Recently deemed not a candidate for CABG due to lack of targets; seeking 2nd opinion at   CT head with global anoxia  - likely 2/2 arrhythmia in the setting of known severe CAD; will need to determine strategy for revascularization    -initiated heparin drip; ASA, statin, plavix  -initially on amiodarone drip; hold for bradycardia  -completed TTM protocol  -consulted cardiology  -consult pulmonology for vent management  -propofol for sedation  -echo with EF 30%, segmental WMA  -Successfully extubated 1/25  -Discontinue heparin gtt and plavix. Noted to have drop in h/h.  -Pt has been [facility] by [facility] for CT surgery evaluation. Awaiting transfer.    Shock  Unable to rule out cardiogenic  Cont levophed, wean as tolerated        Anemia  H/H trending down without clear source of bleeding  Check iron profile, b12, folate, occult blood  Trend H/H  Discontinue heparin gtt and plavix      Acute hypoxemic respiratory failure  Due to cardiac arrest      Sinus bradycardia  - hold amio      Brain anoxic injury  2/2 cardiac arrest  -TTM protocol  -evidence of anoxic injury on admission CT, but currently is following commands when  sedation paused  -mental status at baseline. No neuro deficits. No signs of anoxic brain injury.    HARSH (acute kidney injury)  S/p cardiac arrest  -received iv hydration  -stable      Hypokalemia  replete      Mixed hyperlipidemia  Hold home meds      Essential hypertension  Hold home meds        Coronary artery disease involving native coronary artery of native heart  multivessel CAD   repeat echo with EF 30% and LV WMA   DAPT. Plavix held for planning CABG.  Heparin gtt held due to drop in h/h        VTE Risk Mitigation (From admission, onward)         Ordered     Place sequential compression device  Until discontinued         01/24/22 0111     IP VTE LOW RISK PATIENT  Once         01/24/22 0111                Discharge Planning   DEYSI: 1/27/2022     Code Status: Full Code   Is the patient medically ready for discharge?:     Reason for patient still in hospital (select all that apply): Patient unstable, Patient trending condition, Laboratory test, Treatment and Consult recommendations  Discharge Plan A: Home with family            Critical care time spent on the evaluation and treatment of severe organ dysfunction, review of pertinent labs and imaging studies, discussions with consulting providers and discussions with patient/family: 40 minutes.      Aly Tovar MD  Department of Hospital Medicine   [place] - Intensive Care

## 2022-01-27 NOTE — PLAN OF CARE
Pt progressing well. VSS, nadn, no c/o pain/cp/sob today. Rec'ing her 2nd unit of ordered blood. OOB to chair for the 2nd time today, thom'ing well. BG's wnls. On RA, o2 sats wnls, hep gtt off since earlier today. Afebrile. Family at bs. Pt is transferring to [facility] ICU for CABG today after blood complete. See flow sheet for nafisa info.

## 2022-01-27 NOTE — PHYSICIAN QUERY
PT Name: Veronika Blackmon  MR #: [id number]     DOCUMENTATION CLARIFICATION     CDS/: Kelly Torres RN, CCDS              Contact information: [email or web address]  This form is a permanent document in the medical record.     Query Date: January 27, 2022    By submitting this query, we are merely seeking further clarification of documentation.  Please utilize your independent clinical judgment when addressing the question(s) below.    The Medical Record contains the following   Indicators Supporting Clinical Findings Location in Medical Record    Heart Failure documented     X BNP 23 1/23 lab   X EF/Echo The left ventricle is normal in size with concentric remodeling and moderately decreased systolic function.  The estimated ejection fraction is 30%.  There are segmental left ventricular wall motion abnormalities.  Grade II left ventricular diastolic dysfunction.  With normal right ventricular systolic function.  Mechanically ventilated; cannot use inferior caval vein diameter to estimate central venous pressure.     echo with EF 30%, segmental WMA   1/24 echo                          1/27 d/c summary   X Radiology findings No indication of a pneumothorax.  There is cardiomegaly present. 1/23 cxr    Subjective/Objective Respiratory Conditions     X Recent/Current MI presenting in cardiac arrest, now with hypoxic brain injury 1/23 ed note    Heart Transplant, LVAD      Edema, JVD      Ascites     X Diuretics/Meds Lasix 40 mg IV x1, between units of blood Mar- 1/27    Other Treatment     X Other  pmh of multivessel CAD and HTN  1/23 h/p     Heart failure is a clinical diagnosis which includes symptomatic fluid retention, elevated intracardiac pressures, and/or the inability of the heart to deliver adequate blood flow.    Heart Failure with reduced Ejection Fraction (HFrEF) or Systolic Heart Failure (loses ability to contract normally, EF is <40%)    Heart Failure with preserved Ejection Fraction (HFpEF) or Diastolic  Heart Failure (stiff ventricles, does not relax properly, EF is >50%)     Heart Failure with Combined Systolic and Diastolic Failure (stiff ventricles, does not relax properly and EF is <50%)    Mid-range or mildly reduced ejection fraction (HFmrEF) is classified as systolic heart failure.   Common clues to acute exacerbation:  Rapidly progressive symptoms (w/in 2 weeks of presentation), using IV diuretics, using supplemental O2, pulmonary edema on Xray, new or worsening pleural effusion, +JVD or other signs of volume overload, MI w/in 4 weeks, and/or BNP >500  The clinical guidelines noted are only system guidelines, and do not replace the providers clinical judgment.    Provider, please specify the diagnosis associated with the above clinical findings.    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    References:  American Heart Association editorial staff. (2017, May). Ejection Fraction Heart Failure Measurement. American Heart Association. https://www.heart.org/en/health-topics/heart-failure/diagnosing-heart-failure/ejection-fraction-heart-failure-measurement#:~:text=Ejection%20fraction%20(EF)%20is%20a,pushed%20out%20with%20each%20heartbeat  SHAY Howell (2020, December 15). Heart failure with preserved ejection fraction: Clinical manifestations and diagnosis. [employer]. https://www.L[employer].com/contents/heart-failure-with-preserved-ejection-fraction-clinical-manifestations-and-diagnosis.  ICD-10-CM/PCS Coding Clinic Third Quarter ICD-10, Effective with discharges: September 8, 2020 [facility] § Heart failure with mid-range or mildly reduced ejection fraction (2020).  Form No. 75165

## 2022-01-27 NOTE — ASSESSMENT & PLAN NOTE
- out of hospital arrest with ROSC after CPR and defibrillation  - appears to have been down for up to 10 minutes prior to EMS arrival per daughter  - treated with IV Amiodarone overnight but stopped due to bradycardia; HR improved; started on low dose Amiodaorne orally due to VTach arrest   - telemetry reviewed no recurrent VTach or bradycardia noted    - etiology of arrest more concerning for ischemic etiology given extend of CAD   - echo with EF 30% and WMA   - transfer today to [facility] for CABG evaluation by Dr. Paz

## 2022-01-27 NOTE — PLAN OF CARE
Problem: Physical Therapy Goal  Goal: Physical Therapy Goal  Outcome: Met     Patient seen for physical therapy evaluation - co-evaluation with occupational therapy.  Patient presents with no significant deficits requiring physical therapy and appears to be at her baseline.  Noted higher level cognitive deficits.  Refer to SLP for these deficits.  Per chart, patient is being transferred to [facility].  Discharge inpatient physical therapy.

## 2022-01-27 NOTE — SUBJECTIVE & OBJECTIVE
Review of Systems   Constitutional: Negative for chills, decreased appetite, diaphoresis, fever, malaise/fatigue, weight gain and weight loss.   Cardiovascular: Negative for chest pain, claudication, cyanosis, dyspnea on exertion, irregular heartbeat, leg swelling, near-syncope, orthopnea, palpitations, paroxysmal nocturnal dyspnea and syncope.   Respiratory: Negative for cough, shortness of breath, snoring, sputum production and wheezing.    Endocrine: Negative for cold intolerance, heat intolerance, polydipsia, polyphagia and polyuria.   Skin: Negative for color change, dry skin, itching, nail changes and poor wound healing.   Musculoskeletal: Negative for back pain, gout, joint pain and joint swelling.   Gastrointestinal: Negative for bloating, abdominal pain, constipation, diarrhea, hematemesis, hematochezia, melena, nausea and vomiting.   Genitourinary: Negative for dysuria, hematuria and nocturia.   Neurological: Negative for dizziness, headaches, light-headedness, numbness, paresthesias and weakness.   Psychiatric/Behavioral: Negative for altered mental status, depression and memory loss.     Objective:     Vital Signs (Most Recent):  Temp: 98.9 °F (37.2 °C) (01/27/22 1209)  Pulse: 69 (01/27/22 1257)  Resp: (!) 22 (01/27/22 1257)  BP: 127/72 (01/27/22 1245)  SpO2: 96 % (01/27/22 1257) Vital Signs (24h Range):  Temp:  [97.7 °F (36.5 °C)-99.2 °F (37.3 °C)] 98.9 °F (37.2 °C)  Pulse:  [66-99] 69  Resp:  [8-51] 22  SpO2:  [88 %-100 %] 96 %  BP: ()/(51-80) 127/72  Arterial Line BP: ()/(47-65) 115/55     Weight: 79 kg (174 lb 2.6 oz)  Body mass index is 29.9 kg/m².     SpO2: 96 %  O2 Device (Oxygen Therapy): room air      Intake/Output Summary (Last 24 hours) at 1/27/2022 1317  Last data filed at 1/27/2022 1257  Gross per 24 hour   Intake 1161.61 ml   Output 1460 ml   Net -298.39 ml       Lines/Drains/Airways     Central Venous Catheter Line            Percutaneous Central Line Insertion/Assessment -  Triple Lumen  01/24/22 0355 right internal jugular 3 days          Drain                 Urethral Catheter 01/23/22 2215 16 Fr. 3 days          Arterial Line            Arterial Line 01/24/22 0330 Right Radial 3 days          Peripheral Intravenous Line                 Peripheral IV - Single Lumen 01/23/22 2210 20 G Left Wrist 3 days         Peripheral IV - Single Lumen 01/23/22 2211 20 G Right Wrist 3 days         Peripheral IV - Single Lumen 01/24/22 0005 18 G Right Antecubital 3 days         Peripheral IV - Single Lumen 01/24/22 0044 18 G Left Antecubital 3 days                Physical Exam  Constitutional:       General: She is not in acute distress.     Appearance: She is well-developed and well-nourished.   Cardiovascular:      Rate and Rhythm: Normal rate and regular rhythm.      Heart sounds: No murmur heard.  No gallop.    Pulmonary:      Effort: Pulmonary effort is normal. No respiratory distress.      Breath sounds: Normal breath sounds. No wheezing.   Abdominal:      General: Bowel sounds are normal. There is no distension.      Palpations: Abdomen is soft.      Tenderness: There is no abdominal tenderness.   Skin:     General: Skin is warm and dry.   Neurological:      Mental Status: She is alert and oriented to person, place, and time.         Significant Labs:   BMP:   Recent Labs   Lab 01/26/22  0514 01/26/22  1145 01/27/22  0448   *  171*  171* 175*  175* 139*     137 137 137   K 3.6  3.6 3.5 3.7     103 102 103   CO2 24  24 26 24   BUN 25*  25* 24* 23*   CREATININE 2.0*  2.0* 2.0* 1.9*   CALCIUM 8.8  8.8 8.8 9.0   MG 2.0 2.2 2.0   , CBC   Recent Labs   Lab 01/26/22  0514 01/26/22  0514 01/26/22  0801 01/26/22  0801 01/26/22  1145 01/27/22  0027 01/27/22  0448 01/27/22  0947   WBC 18.18*  --  17.74*  --   --   --  15.39*  --    HGB 8.9*   < > 8.7*  --    < > 7.9* 7.8* 7.6*   HCT 26.3*   < > 25.9*   < >  --   --  23.7*  --      --  235  --   --   --  232  --     <  "> = values in this interval not displayed.    and Troponin   Recent Labs   Lab 01/25/22  1643 01/25/22  2200 01/26/22  0514   TROPONINI 0.657* 0.636* 0.567*       Significant Imaging: Echocardiogram:   Transthoracic echo (TTE) complete (Cupid Only):   Results for orders placed or performed during the hospital encounter of 01/23/22   Echo   Result Value Ref Range    BSA 1.84 m2    TDI SEPTAL 0.05 m/s    LV LATERAL E/E' RATIO 19.00 m/s    LV SEPTAL E/E' RATIO 11.40 m/s    LA WIDTH 4.56 cm    TDI LATERAL 0.03 m/s    LVIDd 4.26 3.5 - 6.0 cm    IVS 0.88 0.6 - 1.1 cm    Posterior Wall 0.98 0.6 - 1.1 cm    Ao root annulus 2.73 cm    LVIDs 3.97 2.1 - 4.0 cm    FS 7 28 - 44 %    LA volume 64.11 cm3    STJ 2.49 cm    Ascending aorta 2.79 cm    LV mass 126.99 g    LA size 3.25 cm    RVDD 2.11 cm    TAPSE 1.77 cm    RV S' 9.51 cm/s    Left Ventricle Relative Wall Thickness 0.46 cm    AV mean gradient 3 mmHg    AV valve area 1.98 cm2    AV Velocity Ratio 0.53     AV index (prosthetic) 0.63     MV valve area p 1/2 method 3.51 cm2    E/A ratio 0.83     Mean e' 0.04 m/s    E wave deceleration time 216.35 msec    IVRT 108.47 msec    MV "A" wave duration 13.13 msec    Pulm vein S/D ratio 0.73     LVOT diameter 2.00 cm    LVOT area 3.1 cm2    LVOT peak jordi 0.55 m/s    LVOT peak VTI 12.00 cm    Ao peak jordi 1.04 m/s    Ao VTI 19.00 cm    LVOT stroke volume 37.68 cm3    AV peak gradient 4 mmHg    E/E' ratio 14.25 m/s    MV Peak E Jordi 0.57 m/s    TR Max Jordi 2.27 m/s    MV stenosis pressure 1/2 time 62.74 ms    MV Peak A Jordi 0.69 m/s    PV Peak S Jordi 0.19 m/s    PV Peak D Jordi 0.26 m/s    LV Systolic Volume 68.63 mL    LV Systolic Volume Index 38.1 mL/m2    LV Diastolic Volume 81.35 mL    LV Diastolic Volume Index 45.19 mL/m2    LA Volume Index 35.6 mL/m2    LV Mass Index 71 g/m2    RA Major Axis 5.05 cm    Left Atrium Minor Axis 4.63 cm    Left Atrium Major Axis 5.65 cm    Triscuspid Valve Regurgitation Peak Gradient 21 mmHg    LA Volume " Index (Mod) 36.8 mL/m2    LA volume (mod) 66.25 cm3    RA Width 3.83 cm    EF 30 %    Narrative    · The left ventricle is normal in size with concentric remodeling and   moderately decreased systolic function.  · The estimated ejection fraction is 30%.  · There are segmental left ventricular wall motion abnormalities.  · Grade II left ventricular diastolic dysfunction.  · With normal right ventricular systolic function.  · Mechanically ventilated; cannot use inferior caval vein diameter to   estimate central venous pressure.

## 2022-01-27 NOTE — ASSESSMENT & PLAN NOTE
- multivessel CAD per [facility]  - strong family history and positive stress test  - seen by CTS at [facility] but deemed not a candidate for CABG; second opinion with Dr. Paz initiated prior ot cardiac arrest- plan to transfer to [facility]; for  CABG evaluation by Dr. Paz  - continue ASA and statin;will hold Plavix in preparation for surgery; IV Heparin discontinued due to trend down in H&H; continue to hold BB due to pressor use   - repeat echo  with EF 30% and LV WMA

## 2022-01-27 NOTE — PLAN OF CARE
OT eval performed, report to follow    No current OT needs identified.  Pt with noted higher level cognitive deficits.  Defer to SLP for post acute recs.      Problem: Occupational Therapy Goal  Goal: Occupational Therapy Goal  Outcome: Met

## 2022-01-27 NOTE — PT/OT/SLP EVAL
"Physical Therapy Evaluation    Patient Name:  Veronika Blackmon   MRN:  [id number]    Recommendations:     Discharge Recommendations:  other (see comments) (no PT/OT needs.  Refer to SLP for cog deficit f/u)   Discharge Equipment Recommendations: none   Barriers to discharge: None - patient being transferred to [facility]    Assessment:     Veronika Blackmon is a 61 y.o. female admitted with a medical diagnosis of Cardiac arrest.  She presents with the following impairments/functional limitations:  impaired endurance, and impaired cardiopulmonary response to activity.  Patient seen for physical therapy evaluation - co-evaluation with occupational therapy.  Patient presents with no significant deficits requiring physical therapy and appears to be at her baseline.  Noted higher level cognitive deficits.  Refer to SLP for these deficits.  Per chart, patient is being transferred to [facility].  Discharge inpatient physical therapy.      Recent Surgery: * No surgery found *      Plan:     · Discharge Physical Therapy    Subjective     Chief Complaint: "I want to go home"  Patient/Family Comments/goals: To return home at [facility]  Pain/Comfort:  · Pain Rating 1: 0/10  · Pain Rating Post-Intervention 1: 0/10    Patients cultural, spiritual, Spiritism conflicts given the current situation: no    Living Environment:  Patient lives with daughter in 2  apartment, 1 [facility].  Bathroom (T/S) and Bedroom are upstairs.  1/2 Bathroom downstairs.    Prior to admission, patients level of function was independent.  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    Equipment used at home: none.  DME owned (not currently used): none.  Upon discharge, patient will have assistance from family.    Objective:     Communicated with nurse prior to session.  Patient found supine with arterial line,central line,peripheral IV,norman catheter,blood pressure cuff,pulse ox (continuous),telemetry  upon PT entry to room.    General Precautions: Standard, " respiratory,fall   Orthopedic Precautions:N/A   Braces: N/A  Respiratory Status: Room air    Exams:  · Cognitive Exam:  Patient is oriented to Person, Place, Time and Situation.  Noted difficulty with higher level problem solving and memory.  Patient often answered questions incorrectly regarding home situation.  Daughter present to correct.    · Fine Motor Coordination:    · -       Intact  Rapid alternating ankle DF/PF  · Gross Motor Coordination:  WFL  · Postural Exam:  Patient presented with the following abnormalities:    · -       Rounded shoulders  · -       Forward head  · Sensation:    · -       Intact  light/touch grossly to B LEs  · Skin Integrity/Edema:      · -       Skin integrity: Visible skin intact  · RLE ROM: WFL  · RLE Strength: WFL - 4+ to 5/5 throughout  · LLE ROM: WFL  · LLE Strength: WFL 4+ to 5/5 throughout    Functional Mobility:  · Bed Mobility:     · Rolling Right: stand by assistance  · Scooting: stand by assistance  · Supine to Sit: stand by assistance  · Transfers:     · Sit to Stand:  stand by assistance with no AD.  Stand step transfer to bedside commode with CGA, no LOB.  Stand step transfer to bedside chair with SBA, min LOB, but regains without assist  · Balance: Sitting:  At EOB, no LOB, SBA    Standing:  Static:  No AD, no LOB    Dynamic:  Min LOB with transfer, but able to regain herself    Therapeutic Activities and Exercises:  Educated on role of PT and OT   Educated to call for assist to return BTB.    AM-PAC 6 CLICK MOBILITY  Total Score:19     Patient left up in chair with all lines intact, call button in reach and nurse notified.    GOALS:   Multidisciplinary Problems     Physical Therapy Goals     Not on file          Multidisciplinary Problems (Resolved)        Problem: Physical Therapy Goal    Goal Priority Disciplines Outcome Goal Variances Interventions   Physical Therapy Goal   (Resolved)     PT, PT/OT Met                     History:     No past medical history on  file.    Past Surgical History:   Procedure Laterality Date    CORONARY ANGIOGRAPHY N/A 12/14/2021    Procedure: ANGIOGRAM, CORONARY ARTERY;  Surgeon: Jm Escobar MD;  Location: [facility] CATH LAB/EP;  Service: Cardiology;  Laterality: N/A;    LEFT HEART CATHETERIZATION Left 12/14/2021    Procedure: Left heart cath;  Surgeon: Jm Escobar MD;  Location: [facility] CATH LAB/EP;  Service: Cardiology;  Laterality: Left;       Time Tracking:     PT Received On: 01/27/22  PT Start Time: 1008     PT Stop Time: 1031  PT Total Time (min): 23 min Total Time:  Co-evaluation with OT    Billable Minutes: Evaluation 12 01/27/2022

## 2022-01-27 NOTE — PROGRESS NOTES
[facility]           Pharmacy        Current Drug Shortage     Due to national backorder and [facility] is critically low on inventory of Dextrose 50% (D50) Syringes and Vials, pharmacy has automatically switched from D50% to D10% IVPB at the equivalent dose until resolution of the shortage per P&T approved protocol. At this time, D10% bags are floor stock and can be pulled from this supply when needed.              Ingris Haque, PharmD  [phone]

## 2022-01-27 NOTE — ASSESSMENT & PLAN NOTE
Coronary artery disease involving native coronary artery of native heart    Recently deemed not a candidate for CABG due to lack of targets; seeking 2nd opinion at   CT head with global anoxia  - likely 2/2 arrhythmia in the setting of known severe CAD; will need to determine strategy for revascularization    -initiated heparin drip; ASA, statin, plavix  -initially on amiodarone drip; hold for bradycardia  -completed TTM protocol  -consulted cardiology  -consult pulmonology for vent management  -propofol for sedation  -echo with EF 30%, segmental WMA  -Successfully extubated 1/25  -Discontinue heparin gtt and plavix. Noted to have drop in h/h.  -Pt has been accepted by [facility] for CT surgery evaluation. Awaiting transfer.

## 2022-01-27 NOTE — ASSESSMENT & PLAN NOTE
- creatinine 1.9-2.0 upon admission; trended down to 1.6 and back up to 2.0 yesterday down to 1.9 this AM   - baseline creatinine .9  - multifactoral but most concerning for ATN with hypotension and cardiac arrest

## 2022-01-27 NOTE — SUBJECTIVE & OBJECTIVE
Interval History: Mental status improved significantly, back to baseline. No neuro deficits. Denies chest pain or sob. Drop in H/H with no clear source of bleeding. Remains on heparin drip. Awaiting transfer to [facility] for CT surgery evaluation.      Review of Systems   Constitutional: Positive for fatigue. Negative for activity change, appetite change, chills, diaphoresis and fever.   HENT: Negative for congestion, postnasal drip, rhinorrhea, sinus pressure, sinus pain and sneezing.    Respiratory: Negative for cough, chest tightness, shortness of breath, wheezing and stridor.    Cardiovascular: Negative for chest pain, palpitations and leg swelling.   Gastrointestinal: Negative for abdominal distention, abdominal pain, blood in stool, constipation, diarrhea and nausea.   Endocrine: Negative for cold intolerance and heat intolerance.   Genitourinary: Negative for dysuria, flank pain and hematuria.   Musculoskeletal: Negative for gait problem.   Neurological: Negative for dizziness and weakness.   Psychiatric/Behavioral: Negative for agitation and behavioral problems.     Objective:     Vital Signs (Most Recent):  Temp: 98.9 °F (37.2 °C) (01/27/22 1209)  Pulse: 82 (01/27/22 1404)  Resp: (!) 33 (01/27/22 1404)  BP: (!) 142/78 (01/27/22 1404)  SpO2: 100 % (01/27/22 1404) Vital Signs (24h Range):  Temp:  [97.7 °F (36.5 °C)-99.2 °F (37.3 °C)] 98.9 °F (37.2 °C)  Pulse:  [66-99] 82  Resp:  [8-51] 33  SpO2:  [88 %-100 %] 100 %  BP: ()/(51-80) 142/78  Arterial Line BP: ()/(47-71) 136/71     Weight: 79 kg (174 lb 2.6 oz)  Body mass index is 29.9 kg/m².    Intake/Output Summary (Last 24 hours) at 1/27/2022 1410  Last data filed at 1/27/2022 1404  Gross per 24 hour   Intake 1129.13 ml   Output 1340 ml   Net -210.87 ml      Physical Exam  Vitals and nursing note reviewed.   Constitutional:       Appearance: She is ill-appearing.   HENT:      Head: Normocephalic and atraumatic.      Nose: Nose normal.       Mouth/Throat:      Mouth: Mucous membranes are moist.   Eyes:      Pupils: Pupils are equal, round, and reactive to light.   Cardiovascular:      Rate and Rhythm: Normal rate and regular rhythm.      Pulses: Normal pulses.      Heart sounds: Normal heart sounds.   Abdominal:      General: Bowel sounds are normal.      Palpations: Abdomen is soft.   Musculoskeletal:         General: No swelling.      Cervical back: Neck supple.   Skin:     General: Skin is warm and dry.   Neurological:      General: No focal deficit present.      Mental Status: She is oriented to person, place, and time. Mental status is at baseline.   Psychiatric:         Mood and Affect: Mood normal.         Significant Labs: All pertinent labs within the past 24 hours have been reviewed.    Significant Imaging: I have reviewed all pertinent imaging results/findings within the past 24 hours.

## 2022-01-27 NOTE — DISCHARGE SUMMARY
[facility] Medicine  Discharge Summary      Patient Name: Veronika Blackmon  MRN: [id number]  Patient Class: IP- Inpatient  Admission Date: 1/23/2022  Hospital Length of Stay: 4 days  Discharge Date and Time: No discharge date for patient encounter.  Attending Physician: Aly Tovar MD   Discharging Provider: Aly Tovar MD  Primary Care Provider: Sahara Carrillo MD      HPI:   Veronika Blackmon is a 60 yo female with a pmh of multivessel CAD and HTN who was brought is after collapsing at home. She was dealing with a family argument and stated she felt as though she would pass out so her daughter lowered her to the ground, no head trauma. The patient was unresponsive from that point on per daughter. On EMS arrival, the patient was pulseless and apneic. EMS performed chest compressions and gave 1 shock then nasally intubated the patient who was noted to be posturing with clenched teeth. No sedation given for intubation in the field. On arrival to ED, an ETT was placed. Propofol was held due to hypotension. The patient continued to have posturing and was given keppra 2g. She was given ativan, versed,  and fentanyl as well. CT head showing global anoxia. Amiodarone drip was initiated. Renal and liver function worsening on labs. Initial troponin 0.055. The patient was having SANDOVAL and underwent a LHC last month when she was found to have severe multivessel CAD. She was evaluated for CABG by CTS on 12/22/21 but was deemed not a candidate. She was referred by cardiology for a second opinion at [facility] but had not yet set up an appt. She was being medically managed on ASA, plavix, and statin in the meantime. She has a strong family history of cardiac disease.       * No surgery found *      Hospital Course:   See individual problem list.       Goals of Care Treatment Preferences:  Code Status: Full Code          What is most important right now is to focus on comfort and QOL .  Accordingly, we have decided that  the best plan to meet the patient's goals includes continuing with treatment.      Consults:   Consults (From admission, onward)        Status Ordering Provider     Inpatient consult to Palliative Care  Once        Provider:  (Not yet assigned)    Completed TREVON ZAVALA     Inpatient consult to Anesthesiology  Once        Provider:  (Not yet assigned)    Acknowledged NILES MURRAY     Inpatient consult to Pulmonology  Once        Provider:  (Not yet assigned)    Completed NILES MURRAY     [facility]  Once        Provider:  Jm Escobar MD    Completed NILES MURRAY          * Cardiac arrest  Coronary artery disease involving native coronary artery of native heart    Recently deemed not a candidate for CABG due to lack of targets; seeking 2nd opinion at   CT head with global anoxia  - likely 2/2 arrhythmia in the setting of known severe CAD; will need to determine strategy for revascularization    -initiated heparin drip; ASA, statin, plavix  -initially on amiodarone drip; hold for bradycardia  -completed TTM protocol  -consulted cardiology  -consult pulmonology for vent management  -propofol for sedation  -echo with EF 30%, segmental WMA  -Successfully extubated 1/25  -Discontinue heparin gtt and plavix. Noted to have drop in h/h.  -Pt has been [place] surgery evaluation. Awaiting transfer.    Shock  Unable to rule out cardiogenic  Cont levophed, wean as tolerated        Anemia  H/H trending down without clear source of bleeding  Check iron profile, b12, folate, occult blood  Trend H/H  Discontinue heparin gtt and plavix  H/H cont to drop. No source of bleeding identified  Transfuse 2u pRBC. Cont to trend h/h. May require GI evaluation following transfer.      Acute hypoxemic respiratory failure  Due to cardiac arrest      Brain anoxic injury  2/2 cardiac arrest  -TTM protocol  -evidence of anoxic injury on admission CT, but  currently is following commands when sedation paused  -mental status at baseline. No neuro deficits. No signs of anoxic brain injury.    HARSH (acute kidney injury)  S/p cardiac arrest  Likely ATN  Cont to monitor  Avoid nephrotoxic meds      Hypokalemia  repleted    Mixed hyperlipidemia  Hold home meds      Essential hypertension  Hold home meds        Coronary artery disease involving native coronary artery of native heart  multivessel CAD   repeat echo with EF 30% and LV WMA   DAPT. Plavix held for planning CABG.  Heparin gtt held due to drop in h/h          Final Active Diagnoses:    Diagnosis Date Noted POA    PRINCIPAL PROBLEM:  Cardiac arrest [I46.9] 01/24/2022 Yes    Anemia [D64.9] 01/27/2022 Yes    Shock [R57.9] 01/27/2022 No    Acute hypoxemic respiratory failure [J96.01]  Yes    Hypokalemia [E87.6] 01/24/2022 Yes    HARSH (acute kidney injury) [N17.9] 01/24/2022 Yes    Brain anoxic injury [G93.1] 01/24/2022 Yes    Essential hypertension [I10] 12/27/2021 Yes    Mixed hyperlipidemia [E78.2] 12/27/2021 Yes    Coronary artery disease involving native coronary artery of native heart [I25.10] 12/22/2021 Yes      Problems Resolved During this Admission:       Discharged Condition: poor    Disposition: Another Health Care Inst*    Follow Up:    Patient Instructions:   No discharge procedures on file.    Significant Diagnostic Studies: Labs: All labs within the past 24 hours have been reviewed    Pending Diagnostic Studies:     Procedure Component Value Units Date/Time    Neuron Specific Enolase, Serum [[id number]] Collected: 01/27/22 0448    Order Status: Sent Lab Status: In process Updated: 01/27/22 0957    Specimen: Blood     Neuron Specific Enolase, Serum [[id number]] Collected: 01/26/22 0514    Order Status: Sent Lab Status: In process Updated: 01/26/22 0940    Specimen: Blood     Occult blood x 1, stool [[id number]] Collected: 01/27/22 1016    Order Status: Sent Lab Status: In process Updated: 01/27/22  1016    Specimen: Stool          Medications:  Reconciled Home Medications:      Medication List      START taking these medications    amiodarone 200 MG Tab  Commonly known as: PACERONE  Take 1 tablet (200 mg total) by mouth once daily.  Start taking on: January 28, 2022     pantoprazole 40 MG tablet  Commonly known as: PROTONIX  Take 1 tablet (40 mg total) by mouth once daily.        CONTINUE taking these medications    albuterol 90 mcg/actuation inhaler  Commonly known as: PROVENTIL/VENTOLIN HFA  Inhale 2 puffs into the lungs every 6 (six) hours as needed for Wheezing or Shortness of Breath. Rescue     amLODIPine 5 MG tablet  Commonly known as: NORVASC  Take 1 tablet (5 mg total) by mouth once daily.     aspirin 81 MG EC tablet  Commonly known as: ECOTRIN  Take 1 tablet (81 mg total) by mouth once daily.     chlorthalidone 25 MG Tab  Commonly known as: HYGROTEN  Take 1 tablet (25 mg total) by mouth once daily.     clopidogreL 75 mg tablet  Commonly known as: PLAVIX  Take 1 tablet (75 mg total) by mouth once daily.     hydrOXYzine pamoate 25 MG Cap  Commonly known as: VISTARIL  Take 1-2 caps po nightly prn sleep difficulty     metoprolol succinate 25 MG 24 hr tablet  Commonly known as: TOPROL-XL  Take 1 tablet (25 mg total) by mouth 2 (two) times daily.     potassium chloride SA 20 MEQ tablet  Commonly known as: K-DUR,KLOR-CON  Take 1 tablet (20 mEq total) by mouth once daily.     rosuvastatin 40 MG Tab  Commonly known as: CRESTOR  Take 1 tablet (40 mg total) by mouth every evening.            Indwelling Lines/Drains at time of discharge:   Lines/Drains/Airways     Central Venous Catheter Line            Percutaneous Central Line Insertion/Assessment - Triple Lumen  01/24/22 0355 right internal jugular 3 days          Drain                 Urethral Catheter 01/23/22 5976 16 Fr. 3 days          Arterial Line            Arterial Line 01/24/22 0330 Right Radial 3 days                Time spent on the discharge of  patient: 40 minutes    Critical care time spent on the evaluation and treatment of severe organ dysfunction, review of pertinent labs and imaging studies, discussions with consulting providers and discussions with patient/family: 30 minutes.     Aly Tovar MD  Department of [facility] Medicine  [place] - Intensive Care

## 2022-01-27 NOTE — ASSESSMENT & PLAN NOTE
- HR 40s down to 30s with improvement; HR up to 70s-80s overnight per Epic   - continue to monitor on telemetry   - no BB due to pressor use; continue low dose Amiodarone

## 2022-01-27 NOTE — PT/OT/SLP EVAL
"Occupational Therapy   Evaluation and Discharge Note    Name: Veronika Blackmon  MRN: [id number]  Admitting Diagnosis:  Cardiac arrest   Recent Surgery: * No surgery found *      Recommendations:     Discharge Recommendations: outpatient speech therapy (for cognitive remediation)  Discharge Equipment Recommendations:  none  Barriers to discharge:  None    Assessment:     Veronika Blackmon is a 61 y.o. female with a medical diagnosis of Cardiac arrest. At this time, patient is functioning at their prior level of function and does not require further acute OT services.     Plan:     During this hospitalization, patient does not require further acute OT services.  Please re-consult if situation changes.    · Plan of Care Reviewed with: patient,daughter    Subjective     Chief Complaint: "Im ready to go home  Patient/Family Comments/goals: return to PLOF    Occupational Profile:  Living Environment: Lives w/dtr in 2 story apt, THE, bed/bath upstairs, 1/2 bath down. 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  Patient found HOB elevated with arterial line,blood pressure cuff,central line,norman catheter,telemetry,pulse ox (continuous),peripheral IV upon OT entry to room.    General Precautions: Standard, respiratory,fall   Orthopedic Precautions:    Braces:    Respiratory Status: Room air     Occupational Performance:    Bed Mobility:    · Patient completed Rolling/Turning to Right with stand by assistance  · Patient completed Scooting/Bridging with stand by assistance  · Patient completed Supine to Sit with stand by assistance    Functional Mobility/Transfers:  · Patient completed Sit " <> Stand Transfer with stand by assistance  with  no assistive device   · Patient completed Bed <> Chair Transfer using Step Transfer technique with stand by assistance with no assistive device  · Patient completed Toilet Transfer Step Transfer technique with stand by assistance with  bedside commode  · Functional Mobility: NT    Activities of Daily Living:  · Lower Body Dressing: minimum assistance L sock  · Toileting: stand by assistance      Cognitive/Visual Perceptual:  AO4  impaired problem solving, safety awareness    Physical Exam:  BUE AROM/strength/sesnation/coordination WFL  Good sitting, fair+ standing balance    AMPAC 6 Click ADL:  AMPAC Total Score: 18    Treatment & Education:  Pt/dtr educated on role of OT/POC, general safety. Performed ADL and mobility as above  Education:    Patient left up in chair with all lines intact, call button in reach, nurse notified and daughter present    GOALS:   Multidisciplinary Problems     Occupational Therapy Goals     Not on file          Multidisciplinary Problems (Resolved)        Problem: Occupational Therapy Goal    Goal Priority Disciplines Outcome Interventions   Occupational Therapy Goal   (Resolved)     OT, PT/OT Met                    History:     No past medical history on file.    Past Surgical History:   Procedure Laterality Date    CORONARY ANGIOGRAPHY N/A 12/14/2021    Procedure: ANGIOGRAM, CORONARY ARTERY;  Surgeon: Jm Escobar MD;  Location: [facility] CATH LAB/EP;  Service: Cardiology;  Laterality: N/A;    LEFT HEART CATHETERIZATION Left 12/14/2021    Procedure: Left heart cath;  Surgeon: Jm Escobar MD;  Location: [facility] CATH LAB/EP;  Service: Cardiology;  Laterality: Left;       Time Tracking:     OT Date of Treatment: 01/27/22  OT Start Time: 1005  OT Stop Time: 1031  OT Total Time (min): 26 min    Billable Minutes:Evaluation 15  Self Care/Home Management 11    1/27/2022

## 2022-01-27 NOTE — ASSESSMENT & PLAN NOTE
H/H trending down without clear source of bleeding  Check iron profile, b12, folate, occult blood  Trend H/H  Discontinue heparin gtt and plavix

## 2022-01-27 NOTE — PROGRESS NOTES
[place] - Intensive Care  Cardiology  Progress Note    Patient Name: Veronika Blackmon  MRN: [id number]  Admission Date: 1/23/2022  Hospital Length of Stay: 4 days  Code Status: Full Code   Attending Physician: Aly Tovar MD   Primary Care Physician: Sahara Carrillo MD  Expected Discharge Date: 1/26/2022  Principal Problem:Cardiac arrest    Subjective:     Hospital Course:   1/24/2022 Presented with cardiac arrest. Cardiology consulted. On IV Amiodarone, Heparin, Levophed and Propofol. Echocardiogram pending   1/25/2022 HR and BP stable. No recurrent VTach noted overnight. Occasional intermittent junctional rhythm. Off pressors. More alert and responsive today. SBT in process with plans for extubation per Pulm CC today. Echo with EF 30% and LV WMA. CBC WNL. BMP with K+ 3.4 BUN 21 creatinine 1.6  1/26/2022 Extubated yesterday. Hypotensive overnight with 250cc LR with resumption of low dose Levophed. SBP 90s with MAP 66. No arrhythmias overnight on telemetry. H&H 8.9/26.3 K+ 3.6-replacement ordered. Creatinine up slightly to 2.0  1/27/2022 Back on Levophed this AM. H&H down to 7.8/23.7. Creatinine down to 1.9. HR and BP stable. No recurrent VTach noted on telemetry overnight. Awaiting transfer to [facility]           Review of Systems   Constitutional: Negative for chills, decreased appetite, diaphoresis, fever, malaise/fatigue, weight gain and weight loss.   Cardiovascular: Negative for chest pain, claudication, cyanosis, dyspnea on exertion, irregular heartbeat, leg swelling, near-syncope, orthopnea, palpitations, paroxysmal nocturnal dyspnea and syncope.   Respiratory: Negative for cough, shortness of breath, snoring, sputum production and wheezing.    Endocrine: Negative for cold intolerance, heat intolerance, polydipsia, polyphagia and polyuria.   Skin: Negative for color change, dry skin, itching, nail changes and poor wound healing.   Musculoskeletal: Negative for back pain, gout, joint pain and joint swelling.    Gastrointestinal: Negative for bloating, abdominal pain, constipation, diarrhea, hematemesis, hematochezia, melena, nausea and vomiting.   Genitourinary: Negative for dysuria, hematuria and nocturia.   Neurological: Negative for dizziness, headaches, light-headedness, numbness, paresthesias and weakness.   Psychiatric/Behavioral: Negative for altered mental status, depression and memory loss.     Objective:     Vital Signs (Most Recent):  Temp: 98.9 °F (37.2 °C) (01/27/22 1209)  Pulse: 69 (01/27/22 1257)  Resp: (!) 22 (01/27/22 1257)  BP: 127/72 (01/27/22 1245)  SpO2: 96 % (01/27/22 1257) Vital Signs (24h Range):  Temp:  [97.7 °F (36.5 °C)-99.2 °F (37.3 °C)] 98.9 °F (37.2 °C)  Pulse:  [66-99] 69  Resp:  [8-51] 22  SpO2:  [88 %-100 %] 96 %  BP: ()/(51-80) 127/72  Arterial Line BP: ()/(47-65) 115/55     Weight: 79 kg (174 lb 2.6 oz)  Body mass index is 29.9 kg/m².     SpO2: 96 %  O2 Device (Oxygen Therapy): room air      Intake/Output Summary (Last 24 hours) at 1/27/2022 1317  Last data filed at 1/27/2022 1257  Gross per 24 hour   Intake 1161.61 ml   Output 1460 ml   Net -298.39 ml       Lines/Drains/Airways     Central Venous Catheter Line            Percutaneous Central Line Insertion/Assessment - Triple Lumen  01/24/22 0355 right internal jugular 3 days          Drain                 Urethral Catheter 01/23/22 2215 16 Fr. 3 days          Arterial Line            Arterial Line 01/24/22 0330 Right Radial 3 days          Peripheral Intravenous Line                 Peripheral IV - Single Lumen 01/23/22 2210 20 G Left Wrist 3 days         Peripheral IV - Single Lumen 01/23/22 2211 20 G Right Wrist 3 days         Peripheral IV - Single Lumen 01/24/22 0005 18 G Right Antecubital 3 days         Peripheral IV - Single Lumen 01/24/22 0044 18 G Left Antecubital 3 days                Physical Exam  Constitutional:       General: She is not in acute distress.     Appearance: She is well-developed and  well-nourished.   Cardiovascular:      Rate and Rhythm: Normal rate and regular rhythm.      Heart sounds: No murmur heard.  No gallop.    Pulmonary:      Effort: Pulmonary effort is normal. No respiratory distress.      Breath sounds: Normal breath sounds. No wheezing.   Abdominal:      General: Bowel sounds are normal. There is no distension.      Palpations: Abdomen is soft.      Tenderness: There is no abdominal tenderness.   Skin:     General: Skin is warm and dry.   Neurological:      Mental Status: She is alert and oriented to person, place, and time.         Significant Labs:   BMP:   Recent Labs   Lab 01/26/22  0514 01/26/22  1145 01/27/22  0448   *  171*  171* 175*  175* 139*     137 137 137   K 3.6  3.6 3.5 3.7     103 102 103   CO2 24  24 26 24   BUN 25*  25* 24* 23*   CREATININE 2.0*  2.0* 2.0* 1.9*   CALCIUM 8.8  8.8 8.8 9.0   MG 2.0 2.2 2.0   , CBC   Recent Labs   Lab 01/26/22  0514 01/26/22  0514 01/26/22  0801 01/26/22  0801 01/26/22  1145 01/27/22  0027 01/27/22  0448 01/27/22  0947   WBC 18.18*  --  17.74*  --   --   --  15.39*  --    HGB 8.9*   < > 8.7*  --    < > 7.9* 7.8* 7.6*   HCT 26.3*   < > 25.9*   < >  --   --  23.7*  --      --  235  --   --   --  232  --     < > = values in this interval not displayed.    and Troponin   Recent Labs   Lab 01/25/22  1643 01/25/22  2200 01/26/22  0514   TROPONINI 0.657* 0.636* 0.567*       Significant Imaging: Echocardiogram:   Transthoracic echo (TTE) complete ([facility] Only):   Results for orders placed or performed during the hospital encounter of 01/23/22   Echo   Result Value Ref Range    BSA 1.84 m2    TDI SEPTAL 0.05 m/s    LV LATERAL E/E' RATIO 19.00 m/s    LV SEPTAL E/E' RATIO 11.40 m/s    LA WIDTH 4.56 cm    TDI LATERAL 0.03 m/s    LVIDd 4.26 3.5 - 6.0 cm    IVS 0.88 0.6 - 1.1 cm    Posterior Wall 0.98 0.6 - 1.1 cm    Ao root annulus 2.73 cm    LVIDs 3.97 2.1 - 4.0 cm    FS 7 28 - 44 %    LA volume 64.11 cm3    J  "2.49 cm    Ascending aorta 2.79 cm    LV mass 126.99 g    LA size 3.25 cm    RVDD 2.11 cm    TAPSE 1.77 cm    RV S' 9.51 cm/s    Left Ventricle Relative Wall Thickness 0.46 cm    AV mean gradient 3 mmHg    AV valve area 1.98 cm2    AV Velocity Ratio 0.53     AV index (prosthetic) 0.63     MV valve area p 1/2 method 3.51 cm2    E/A ratio 0.83     Mean e' 0.04 m/s    E wave deceleration time 216.35 msec    IVRT 108.47 msec    MV "A" wave duration 13.13 msec    Pulm vein S/D ratio 0.73     LVOT diameter 2.00 cm    LVOT area 3.1 cm2    LVOT peak jordi 0.55 m/s    LVOT peak VTI 12.00 cm    Ao peak jordi 1.04 m/s    Ao VTI 19.00 cm    LVOT stroke volume 37.68 cm3    AV peak gradient 4 mmHg    E/E' ratio 14.25 m/s    MV Peak E Jordi 0.57 m/s    TR Max Jordi 2.27 m/s    MV stenosis pressure 1/2 time 62.74 ms    MV Peak A Jordi 0.69 m/s    PV Peak S Jordi 0.19 m/s    PV Peak D Jordi 0.26 m/s    LV Systolic Volume 68.63 mL    LV Systolic Volume Index 38.1 mL/m2    LV Diastolic Volume 81.35 mL    LV Diastolic Volume Index 45.19 mL/m2    LA Volume Index 35.6 mL/m2    LV Mass Index 71 g/m2    RA Major Axis 5.05 cm    Left Atrium Minor Axis 4.63 cm    Left Atrium Major Axis 5.65 cm    Triscuspid Valve Regurgitation Peak Gradient 21 mmHg    LA Volume Index (Mod) 36.8 mL/m2    LA volume (mod) 66.25 cm3    RA Width 3.83 cm    EF 30 %    Narrative    · The left ventricle is normal in size with concentric remodeling and   moderately decreased systolic function.  · The estimated ejection fraction is 30%.  · There are segmental left ventricular wall motion abnormalities.  · Grade II left ventricular diastolic dysfunction.  · With normal right ventricular systolic function.  · Mechanically ventilated; cannot use inferior caval vein diameter to   estimate central venous pressure.        Assessment and Plan:     Brief HPI: Seen on AM rounds with Dr. Escobar with daughter at bedside. Denies any complaints currently. Discussed cardiac POC as detailed " below-verbalized understanding and agrees with POC     * Cardiac arrest  - out of hospital arrest with ROSC after CPR and defibrillation  - appears to have been down for up to 10 minutes prior to EMS arrival per daughter  - treated with IV Amiodarone overnight but stopped due to bradycardia; HR improved; started on low dose Amiodaorne orally due to VTach arrest   - telemetry reviewed no recurrent VTach or bradycardia noted    - etiology of arrest more concerning for ischemic etiology given extend of CAD   - echo with EF 30% and WMA   - transfer [facility] to [facility] for CABG evaluation by Dr. Paz     Anemia  - H&H down to 7.8/23.7 this AM from 8.9/26.3  - baseline 11-14/33-42  - transfuse 2U PRBC with Lasix in between ordered; denies any melena, hematemesis  - no recent colonoscopy reports  - stool for OCB; will place on PPI; may need GI evaluation but will defer to [facility] given plans for transfer     Sinus bradycardia  - HR 40s down to 30s with improvement; HR up to 70s-80s overnight per Epic   - continue to monitor on telemetry   - no BB due to pressor use; continue low dose Amiodarone     HARSH (acute kidney injury)  - creatinine 1.9-2.0 upon admission; trended down to 1.6 and back up to 2.0 yesterday down to 1.9 this AM   - baseline creatinine .9  - multifactoral but most concerning for ATN with hypotension and cardiac arrest     Hypokalemia  - presented with K+ 2.7; aggressively replaced with continued hypokalemia  - repeat K+ this AM 3.7- replacement ordered this AM   - goal K+ >4.0    Mixed hyperlipidemia  - continue statin therapy     Essential hypertension  - SBP 90s-110s  - back on Levophed overnight  - continue to hold antihypertensives       Coronary artery disease involving native coronary artery of native heart  - multivessel CAD per [facility]  - strong family history and positive stress test  - seen by CTS at [facility] but deemed not a candidate for CABG; second opinion with Dr. Paz initiated prior ot cardiac  arrest- plan to transfer to [facility] for  CABG evaluation by Dr. Paz  - continue ASA and statin;will hold Plavix in preparation for surgery; IV Heparin discontinued due to trend down in H&H; continue to hold BB due to pressor use   - repeat echo  with EF 30% and LV WMA         VTE Risk Mitigation (From admission, onward)         Ordered     Place sequential compression device  Until discontinued         01/24/22 0111     IP VTE LOW RISK PATIENT  Once         01/24/22 0111                Robyn Crow APRN, ANP  Cardiology  [place] - Intensive Care

## 2022-01-27 NOTE — ASSESSMENT & PLAN NOTE
- presented with K+ 2.7; aggressively replaced with continued hypokalemia  - repeat K+ this AM 3.7- replacement ordered this AM   - goal K+ >4.0

## 2022-01-27 NOTE — MEDICAL/APP STUDENT
"Pulm/CC Fellow Consult Note  01/27/2022     Attending Physician: Aly Tovar MD  ICU Attending: Dr. Dionicio Watson MD  ICU Fellow: Dr. Rhianna Chakraborty MD  ICU MS: Dario Torres MS-4    Date of Admit: 1/23/2022    ICU Day: x4      Chief Complaint: Ventilator management, s/p cardiac arrest x4 days ago     History of Present Illness:   Ms. MARKS is a 61 y.o. F w/ CAD (on Plavix), HTN, HLD, that presented to [facility] after cardiac arrest suffered PTA at home. Per Son and Daughter,  Pt was in an argument with her son when she expressed sensation that she was going to "pass out," when she then collapsed suddenly to the floor. Daughter caught the Pt before falling to the floor; denies any head trauma. Upon EMS arrival, Pt found apneic and pulseless; initiated CPR and x1 shock; nasally intubated the Pt 2/2 her jaw being clenched; fingerstick BG WNL.      Last month, Pt visited the ED for SANDOVAL and subsequently underwent left heart cath. Found to have significant multi-vessel CAD (see Cath report below) and is followed by CTS and Cardio for frequent PVC's and continued management. Per CTS note: Pt is not a candidate for CABG is undergoing assessment and discussion for PCI vs. med management. Currently on Metoprolol, with recent dosage increase last month. See below for Myocardial Perfusion Study. At home, is on Plavix, Rosuvastatin, and ASA. Has a referral to [facility] for second opinion but has not yet had that appointment.      On arrival to ED: Pt intubated. given Keppra 2g along with Ativan, Versed, Fentanyl. CT Head indicated global anoxia. Amiodarone drip initiated for continued ectopy. Renal and liver function declining, suspect 2/2 arrest. Initial Trop at 0.055. Pulm/ICU Team consulted for ventilator management s/p cardiac arrest.     Past Medical History:  No past medical history on file.    Past Surgical History:  Past Surgical History:   Procedure Laterality Date    CORONARY ANGIOGRAPHY N/A 12/14/2021    " Procedure: ANGIOGRAM, CORONARY ARTERY;  Surgeon: Jm Escobar MD;  Location: [facility] CATH LAB/EP;  Service: Cardiology;  Laterality: N/A;    LEFT HEART CATHETERIZATION Left 2021    Procedure: Left heart cath;  Surgeon: Jm Escobar MD;  Location: [facility] CATH LAB/EP;  Service: Cardiology;  Laterality: Left;       Allergies:  Review of patient's allergies indicates:  No Known Allergies      Family History:  Family History   Problem Relation Age of Onset    Heart attack Brother 62       Social History:  Social History     Tobacco Use    Smoking status: Never Smoker    Smokeless tobacco: Never Used   Substance Use Topics    Alcohol use: Yes    Drug use: No       Review of Systems:  Comprehensive ROS negative except as per HPI       Objective:   Last 24 Hour Vital Signs:  BP  Min: 89/54  Max: 131/80  Temp  Av.9 °F (37.2 °C)  Min: 98.3 °F (36.8 °C)  Max: 99.2 °F (37.3 °C)  Pulse  Av  Min: 66  Max: 99  Resp  Av.8  Min: 8  Max: 51  SpO2  Av.2 %  Min: 88 %  Max: 100 %  Weight  Av kg (174 lb 2.6 oz)  Min: 79 kg (174 lb 2.6 oz)  Max: 79 kg (174 lb 2.6 oz)  Body mass index is 29.9 kg/m².  I/O last 3 completed shifts:  In: 1600.6 [P.O.:50; I.V.:1048.9; IV Piggyback:501.7]  Out:  [Urine:]    Physical Exam:  General:          NAD. AxOx3   HENT:             NC/AT; anicteric sclera; OP clear with MMM  Cardio:            Regular rate and rhythm with quiet heart sounds. no murmurs or rubs  Resp:              On 1L NC. CTAB. Breathing unlabored; no wheezes, crackles or rhonchi  Abdom:           Soft, NTND with normoactive bowel sounds  Extrem:           No clubbing, cyanosis. B/L feet cool to touch  Pulses:           Pulses: 1+ R DP/PT. Undetectable L DP/PT  Neuro:             No gross focal motor deficits. Sensation grossly intact B/L. PERRL.    Personal Review and Summary of Interval Diagnostics    Laboratory Studies:   No results for input(s): PH, PCO2, PO2, HCO3, POCSATURATED, BE in  the last 24 hours.  Recent Labs   Lab 01/27/22  0448   WBC 15.39*   RBC 2.59*   HGB 7.8*   HCT 23.7*      MCV 92   MCH 30.1   MCHC 32.9     Recent Labs   Lab 01/27/22  0448      K 3.7      CO2 24   BUN 23*   CREATININE 1.9*   MG 2.0       Microbiology Data:   Microbiology Results (last 7 days)     Procedure Component Value Units Date/Time    Culture, Respiratory with Gram Stain [[id number]] Collected: 01/24/22 0334    Order Status: Completed Specimen: Respiratory from Sputum Updated: 01/26/22 1057     Respiratory Culture Normal respiratory calvin      No S aureus or Pseudomonas isolated.     Gram Stain (Respiratory) <10 epithelial cells per low power field.     Gram Stain (Respiratory) Rare WBC's     Gram Stain (Respiratory) Rare Gram positive cocci    Blood culture [[id number]] Collected: 01/24/22 0200    Order Status: Completed Specimen: Blood Updated: 01/26/22 1012     Blood Culture, Routine No Growth to date      No Growth to date      No Growth to date    Blood culture [[id number]] Collected: 01/24/22 0156    Order Status: Completed Specimen: Blood Updated: 01/26/22 1012     Blood Culture, Routine No Growth to date      No Growth to date      No Growth to date          Chest Imaging:   TTE Resulting 01/24:  · The left ventricle is normal in size with concentric remodeling and moderately decreased systolic function.  · The estimated ejection fraction is 30%.  · There are segmental left ventricular wall motion abnormalities.  · Grade II left ventricular diastolic dysfunction.  · With normal right ventricular systolic function.  · Mechanically ventilated; cannot use inferior caval vein diameter to estimate central venous pressure.     Assessment & Plan:   Ms. MARKS is a 61 y.o. F w/ CAD (on Plavix), HTN, HLD, that presented to [facility] after cardiac arrest suffered at home PTA. ROSC achieved after CPR and defib x1 on scene; nasally intubated. Upon arrival to ED, orally intubated and placed on Amio  and Levo GTT with significant hemodynamic variability/instability. Initial K+ 2.6; repleted; 3.7 today. CT Head notable only for anoxic brain injury. HARSH and Transaminitis present intiially; suspect 2/2 to arrest; improving. ICU/Pulm Team consulted for ventilator management. Cooled initially per TTM protocol; re-warmed. Extubated 1/25 AM.    Today, AxOx3; on Levo 0.08 with MAPs in 80's; Amio 200 mg PO qd. H/H 7.8/23.7; HB 12/35 x2 days prior; transfusing two units of blood. To be transferred to [facility] for CABG after transfusion pending available bed.     Neuro  #S/p Cardiac Arrest  -Initially cooled to 36 C, per TTM protocol. Rewarmed 1/25.   -CT Head: indicated global anoxia. No bleed, infarction, or herniation.  -No longer on sedation.   -AxOx3. Unremarkable neuro exam.     CVS  #S/p Cardiac Arrest  Given MVCAD and hx of PVC's: Suspect arrest 2/2 to arrhythmia inucded by sympathetic surge in the context of argument with a relative  -Off Amio GTT since 01/24. Started Amio 200 mg PO qd 01/26.  -Echo resulted 01/24 - See above for results. EF 30%.   -Trending Troponin q6hr   1/25 - 16:43 0.657 / 22:00 0.636    1/26 - 05:00 0.567   -EKG's   01/24 - RBBB with inferior infarct present     01/26 - Inverted T-waves in numerous leads suggesting inferior and A/L ischemia,   with prolonged QT; RBBB and criteria for inferior infarct resolved   -Back on Levo o/n. Currently 0.08. Titrated to MAPs; steady in 80's.  -Pt to be transferred to [facility] for CABG with Dr. Jackson Savage stable. Monitor.      #Bradycardia and PVC's  -Initially bradycardic. HR WNL.   -On PO low dose Amio  -Cardio following. Appreciate reccs.    Pulm  #Intubated initialy for airway protection, s/p cardiac arrest  -Extubated 01/26 to 1L NC; tolerated well  -Satting well on RA     Renal   #HARSH  Suspect 2/2 hypoperfusion 2/2 cardiac arrest  -BUN/Cr 21/2.0 on admit. Baseline 12 / 0.9.   -Cr 2.0. Stable  -UOP 0.42 cc/kr/hr yesterday. Improved today to  ~0.9 cc//kg/hr.   -Monitor     FEN/GI  F: UOP as above. Net -750cc since admission.   E: Initial K+ 2.6 => repleted => 3.7. Replete as needed. Avoid any hypokalemia-causing drugs.Monitor.   N: Cardiac diet   GI:   #Transaminitis  Suspect 2/2 shock liver  -Continuing to improve. AST/ALT 397/316 initially =>177/170 => 79 / 103 => 63 / 80 today.  -Monitor    Endo  -Glucose 139  -SSI  -ICU Goal of 140-180      HEME/ID  -H/H baseline 11-14/33-42. 8.4/25.9 yesterday. 12/35 x2 days ago.  -Transfusing x2 units 01/27. Denies any melena or hemoptysis. Normal belly exam.  -WBC 15.4 and trending down. Suspect 2/2 stress response  -No suspicion for infection; no indication for antibiotics at this time.  -Monitor     PPX:   -Heparin GTT. D/C per Cardio  -Protonix 40mg PO      Dispo: Pt stable; transfusing x2 pRBC, to be transferred to [facility] for CABG with Dr. Paz once transfusion's complete    Dario Torres, MS4  [facility] [employer]     I evaluated the patient and made changes to the above information as deemed appropriate.      Rhianna Chakraborty MD  Fellow  Pulmonary & [facility]

## 2022-01-27 NOTE — ASSESSMENT & PLAN NOTE
H/H trending down without clear source of bleeding  Check iron profile, b12, folate, occult blood  Trend H/H  Discontinue heparin gtt and plavix  H/H cont to drop. No source of bleeding identified  Transfuse 2u pRBC. Cont to trend h/h. May require GI evaluation following transfer.

## 2022-01-27 NOTE — PLAN OF CARE
01/27/22 1451   Final Note   Assessment Type Final Discharge Note   Anticipated Discharge Disposition   (the pt transferred to [facility],accepting physician is Dr. Paz(CABG))

## 2022-01-27 NOTE — ASSESSMENT & PLAN NOTE
2/2 cardiac arrest  -TTM protocol  -evidence of anoxic injury on admission CT, but currently is following commands when sedation paused  -mental status at baseline. No neuro deficits. No signs of anoxic brain injury.

## 2022-01-27 NOTE — ASSESSMENT & PLAN NOTE
- H&H down to 7.8/23.7 this AM from 8.9/26.3  - baseline 11-14/33-42  - transfuse 2U PRBC with Lasix in between ordered; denies any melena, hematemesis  - no recent colonoscopy reports  - stool for OCB; will place on PPI; may need GI evaluation but will defer to [facility] given plans for transfer

## 2022-01-28 LAB — NSE SERPL-MCNC: 17 NG/ML

## 2022-01-29 LAB
BACTERIA BLD CULT: NORMAL
BACTERIA BLD CULT: NORMAL

## 2022-02-16 NOTE — PROGRESS NOTES
Subjective:   @Patient ID:  Veronika Blackmon is a 61 y.o. female who presents for evaluation of HTN, abnormal EKG, HLP       HPI:   Here for follow up   Admitted 1/23/2022 with cardiac arrest. It was believed to be arrhythmias related. She was arguing with her son. Initial concern about anoxic brain injury but she got extubated and recovered very well neurologically. She was transferred to [facility] for CABG which was done 1/31/2022 by Dr. Paz. She recovered very well and discharged 2/4/2022.     Today accompanied by her daughter.   She is doing very well. Breathing is good. She is recovering very well.   Occasional LLE edema that is better with elevates her leg   She is on Toprol, ASA, and Crestor  BP is well controlled      Historically:   Stress test was done that showed high risk findings with significant EKG changes very early in the stress and persistent in recovery. 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 During the cath she was noted to have frequent PVCs and metoprolol  dose was increased She was sent for CABG evaluation. She was seen by Dr. Mays and believe no good targets so she was declined. She wanted a 2nd opinion. While pending a 2nd opinion she had cardiac arrest  In 1/23/2022. It was believed to be arrhythmias related. She was arguing with her son. Initial concern about anoxic brain injury but she got extubated and recovered very well neurologically. She was transferred to [facility] for CABG which was done 1/31/2022 by Dr. Paz. She recovered very well and discharged 2/4/2022.  EF prior to CABG in [facility] was read as 55%        EKG done as below and concerning lateral changes  No significant claudications     FH is significant for premature CAD, younger brother had MI in his 40s,  majority of her family with CAD, PAD, and DM.     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 Paz in [facility].  LIMA-LAD, SVG-D, SVG RI,  SVG-OM. RCA  and was not bypassed.  ACEI held due HARSH.     She was discharged 2/2/2022  [facility] EF was read as >55%, small pericardial effusion,       - Stress MPI 12/2/2021  . Findings concerning for reversible ischemia in the anterior wall.  There is an associated wall motion abnormality.  2. Possible separate area of reversible ischemia along the inferoseptal wall, though artifact at this location can occasionally give a similar appearance.  3. Left ventricular dysfunction.  Estimated ejection fraction 36% during stress and 45% during rest.  This report was flagged in Epic as abnormal    - [facility] 12/14/2021  · There was three vessel coronary artery disease.  · The ejection fraction was 50-55% by visual estimate.  · The pre-procedure left ventricular end diastolic pressure was 7.  · The Prox LAD lesion was 70% stenosed.  · The Mid LAD lesion was 80% stenosed.  · The Dist LAD lesion was 95% stenosed.  · The 1st Diag lesion was 95% stenosed.  · The 1st Mrg lesion was 90% stenosed.  · The Prox Cx to Mid Cx lesion was 80% stenosed.  · The Prox RCA to Mid RCA lesion was 100% stenosed.  · The estimated blood loss was none.     - Severe MVCAD   - CVT evaluation soon   - Increase Toprol to 25 mg bid  - ASA/Plavix/Statin             - EKG  SR, RBBB, lateral TWI      Patient Active Problem List    Diagnosis Date Noted    Hx of CABG 02/17/2022    Anemia 01/27/2022    Shock 01/27/2022    Acute hypoxemic respiratory failure     Facial weakness     Tingling of left arm and left side of face     Transient cerebral ischemia     Cardiac arrest 01/24/2022    Hypokalemia 01/24/2022    HARSH (acute kidney injury) 01/24/2022    Brain anoxic injury 01/24/2022    Sinus bradycardia 01/24/2022    Prediabetes 01/04/2022    Familial hypercholesterolemia 12/27/2021    Abnormal cardiovascular stress test 12/27/2021    Essential hypertension 12/27/2021    Mixed hyperlipidemia 12/27/2021    Coronary  artery disease involving native coronary artery of native heart 12/22/2021    RBBB 12/02/2021                LAST HbA1c  Lab Results   Component Value Date    HGBA1C 6.3 (H) 01/28/2022       Lipid panel  Lab Results   Component Value Date    CHOL 327 (H) 11/30/2021     Lab Results   Component Value Date    HDL 86 (H) 11/30/2021     Lab Results   Component Value Date    LDLCALC 215.0 (H) 11/30/2021     Lab Results   Component Value Date    TRIG 130 11/30/2021     Lab Results   Component Value Date    CHOLHDL 26.3 11/30/2021            Review of Systems   Constitutional: Negative for chills and fever.   HENT: Negative for hearing loss and nosebleeds.    Eyes: Negative for blurred vision.   Cardiovascular: Positive for dyspnea on exertion. Negative for chest pain, leg swelling and palpitations.   Respiratory: Negative for hemoptysis and shortness of breath.    Hematologic/Lymphatic: Negative for bleeding problem.   Skin: Negative for itching.   Musculoskeletal: Negative for falls.   Gastrointestinal: Negative for abdominal pain and hematochezia.   Genitourinary: Negative for hematuria.   Neurological: Negative for dizziness and loss of balance.   Psychiatric/Behavioral: Negative for altered mental status and depression.       Objective:   Physical Exam  Constitutional:       Appearance: She is well-developed.   HENT:      Head: Normocephalic and atraumatic.   Eyes:      Conjunctiva/sclera: Conjunctivae normal.   Neck:      Vascular: No carotid bruit or JVD.   Cardiovascular:      Rate and Rhythm: Normal rate and regular rhythm.      Pulses:           Carotid pulses are 2+ on the right side and 2+ on the left side.       Radial pulses are 2+ on the right side and 2+ on the left side.        Dorsalis pedis pulses are 2+ on the right side and 0 on the left side.      Heart sounds: Normal heart sounds. No murmur heard.  No friction rub. No gallop.       Comments: monophasic lt DP   Pulmonary:      Effort: Pulmonary effort  is normal. No respiratory distress.      Breath sounds: Normal breath sounds. No stridor. No wheezing.   Musculoskeletal:      Cervical back: Neck supple.   Skin:     General: Skin is warm and dry.   Neurological:      Mental Status: She is alert and oriented to person, place, and time.   Psychiatric:         Behavior: Behavior normal.         Assessment:     1. History of cardiac arrest    2. Essential hypertension    3. Familial hypercholesterolemia    4. Mixed hyperlipidemia    5. Transient cerebral ischemia, unspecified type    6. Coronary artery disease of native artery of native heart with stable angina pectoris    7. Prediabetes    8. Hx of CABG        Plan:   - s/p CABG  - Continue Toprol   - Will check 30 days EM to assess any arrhythmias recurrence and if any will consider lifevest   - Check Echo   - Will refer to rehab after the echo   - Check lipid panel. May consider PCSK 9 inhibitors  - Continue ASA for now and will consider switching to Plavix in the future       Evidence of PAD on exam LT side mainly, Will discuss about ROBIN in the future      I spent 5-10 minutes asking, assessing, assisting, arranging and advising heart healthy diet improvements. This included low-salt meals, portion control and health food alternatives. I also encourage 30 minutes of moderate exercise 3-4x a week.       Pertinent cardiac images and EKG reviewed independently.    Continue with current medical plan and lifestyle changes.  Return sooner for concerns or questions. If symptoms persist go to the ED  I have reviewed all pertinent data including patient's medical history in detail and updated the computerized patient record.     Orders Placed This Encounter   Procedures    Lipid Panel     fasting     Standing Status:   Future     Standing Expiration Date:   2/17/2023    Cardiac event monitor     Standing Status:   Future     Standing Expiration Date:   2/17/2023     Order Specific Question:   Cardiac Event Monitor      Answer:   Auto Trigger     Order Specific Question:   Release to patient     Answer:   Immediate    Echo     Standing Status:   Future     Standing Expiration Date:   2/17/2023     Order Specific Question:   Release to patient     Answer:   Immediate       Follow up as scheduled.     She expressed verbal understanding and agreed with the plan    Patient's Medications   New Prescriptions    No medications on file   Previous Medications    ALBUTEROL (PROVENTIL/VENTOLIN HFA) 90 MCG/ACTUATION INHALER    Inhale 2 puffs into the lungs every 6 (six) hours as needed for Wheezing or Shortness of Breath. Rescue    AMIODARONE (PACERONE) 200 MG TAB    Take 1 tablet (200 mg total) by mouth once daily.    AMLODIPINE (NORVASC) 5 MG TABLET    Take 1 tablet (5 mg total) by mouth once daily.    ASPIRIN (ECOTRIN) 81 MG EC TABLET    Take 1 tablet (81 mg total) by mouth once daily.    CHLORTHALIDONE (HYGROTEN) 25 MG TAB    Take 1 tablet (25 mg total) by mouth once daily.    CLOPIDOGREL (PLAVIX) 75 MG TABLET    Take 1 tablet (75 mg total) by mouth once daily.    HYDROXYZINE PAMOATE (VISTARIL) 25 MG CAP    Take 1-2 caps po nightly prn sleep difficulty    METOPROLOL SUCCINATE (TOPROL-XL) 25 MG 24 HR TABLET    Take 1 tablet (25 mg total) by mouth 2 (two) times daily.    PANTOPRAZOLE (PROTONIX) 40 MG TABLET    Take 1 tablet (40 mg total) by mouth once daily.    POTASSIUM CHLORIDE SA (K-DUR,KLOR-CON) 20 MEQ TABLET    Take 1 tablet (20 mEq total) by mouth once daily.    ROSUVASTATIN (CRESTOR) 40 MG TAB    Take 1 tablet (40 mg total) by mouth every evening.   Modified Medications    No medications on file   Discontinued Medications    No medications on file

## 2022-02-17 ENCOUNTER — OFFICE VISIT (OUTPATIENT)
Dept: CARDIOLOGY | Facility: CLINIC | Age: 62
End: 2022-02-17
Payer: COMMERCIAL

## 2022-02-17 VITALS
SYSTOLIC BLOOD PRESSURE: 118 MMHG | BODY MASS INDEX: 29.24 KG/M2 | HEIGHT: 64 IN | DIASTOLIC BLOOD PRESSURE: 80 MMHG | WEIGHT: 171.31 LBS | OXYGEN SATURATION: 99 % | HEART RATE: 76 BPM

## 2022-02-17 DIAGNOSIS — E78.01 FAMILIAL HYPERCHOLESTEROLEMIA: ICD-10-CM

## 2022-02-17 DIAGNOSIS — G45.9 TRANSIENT CEREBRAL ISCHEMIA, UNSPECIFIED TYPE: ICD-10-CM

## 2022-02-17 DIAGNOSIS — Z95.1 HX OF CABG: Chronic | ICD-10-CM

## 2022-02-17 DIAGNOSIS — I10 ESSENTIAL HYPERTENSION: ICD-10-CM

## 2022-02-17 DIAGNOSIS — R73.03 PREDIABETES: ICD-10-CM

## 2022-02-17 DIAGNOSIS — I25.118 CORONARY ARTERY DISEASE OF NATIVE ARTERY OF NATIVE HEART WITH STABLE ANGINA PECTORIS: ICD-10-CM

## 2022-02-17 DIAGNOSIS — Z86.74 HISTORY OF CARDIAC ARREST: Primary | ICD-10-CM

## 2022-02-17 DIAGNOSIS — E78.2 MIXED HYPERLIPIDEMIA: ICD-10-CM

## 2022-02-17 PROCEDURE — 3079F DIAST BP 80-89 MM HG: CPT | Mod: CPTII,S$GLB,, | Performed by: INTERNAL MEDICINE

## 2022-02-17 PROCEDURE — 99214 PR OFFICE/OUTPT VISIT, EST, LEVL IV, 30-39 MIN: ICD-10-PCS | Mod: S$GLB,,, | Performed by: INTERNAL MEDICINE

## 2022-02-17 PROCEDURE — 99999 PR PBB SHADOW E&M-EST. PATIENT-LVL III: CPT | Mod: PBBFAC,,, | Performed by: INTERNAL MEDICINE

## 2022-02-17 PROCEDURE — 1111F DSCHRG MED/CURRENT MED MERGE: CPT | Mod: CPTII,S$GLB,, | Performed by: INTERNAL MEDICINE

## 2022-02-17 PROCEDURE — 3079F PR MOST RECENT DIASTOLIC BLOOD PRESSURE 80-89 MM HG: ICD-10-PCS | Mod: CPTII,S$GLB,, | Performed by: INTERNAL MEDICINE

## 2022-02-17 PROCEDURE — 99214 OFFICE O/P EST MOD 30 MIN: CPT | Mod: S$GLB,,, | Performed by: INTERNAL MEDICINE

## 2022-02-17 PROCEDURE — 99999 PR PBB SHADOW E&M-EST. PATIENT-LVL III: ICD-10-PCS | Mod: PBBFAC,,, | Performed by: INTERNAL MEDICINE

## 2022-02-17 PROCEDURE — 1159F PR MEDICATION LIST DOCUMENTED IN MEDICAL RECORD: ICD-10-PCS | Mod: CPTII,S$GLB,, | Performed by: INTERNAL MEDICINE

## 2022-02-17 PROCEDURE — 1111F PR DISCHARGE MEDS RECONCILED W/ CURRENT OUTPATIENT MED LIST: ICD-10-PCS | Mod: CPTII,S$GLB,, | Performed by: INTERNAL MEDICINE

## 2022-02-17 PROCEDURE — 3008F BODY MASS INDEX DOCD: CPT | Mod: CPTII,S$GLB,, | Performed by: INTERNAL MEDICINE

## 2022-02-17 PROCEDURE — 3074F SYST BP LT 130 MM HG: CPT | Mod: CPTII,S$GLB,, | Performed by: INTERNAL MEDICINE

## 2022-02-17 PROCEDURE — 3008F PR BODY MASS INDEX (BMI) DOCUMENTED: ICD-10-PCS | Mod: CPTII,S$GLB,, | Performed by: INTERNAL MEDICINE

## 2022-02-17 PROCEDURE — 1159F MED LIST DOCD IN RCRD: CPT | Mod: CPTII,S$GLB,, | Performed by: INTERNAL MEDICINE

## 2022-02-17 PROCEDURE — 3074F PR MOST RECENT SYSTOLIC BLOOD PRESSURE < 130 MM HG: ICD-10-PCS | Mod: CPTII,S$GLB,, | Performed by: INTERNAL MEDICINE

## 2022-02-23 ENCOUNTER — PATIENT MESSAGE (OUTPATIENT)
Dept: ADMINISTRATIVE | Facility: HOSPITAL | Age: 62
End: 2022-02-23
Payer: COMMERCIAL

## 2022-02-23 ENCOUNTER — TELEPHONE (OUTPATIENT)
Dept: CARDIAC REHAB | Facility: CLINIC | Age: 62
End: 2022-02-23
Payer: COMMERCIAL

## 2022-02-23 ENCOUNTER — HOSPITAL ENCOUNTER (OUTPATIENT)
Dept: CARDIOLOGY | Facility: HOSPITAL | Age: 62
Discharge: HOME | End: 2022-02-23
Attending: INTERNAL MEDICINE
Payer: COMMERCIAL

## 2022-02-23 ENCOUNTER — CLINICAL SUPPORT (OUTPATIENT)
Dept: CARDIOLOGY | Facility: HOSPITAL | Age: 62
End: 2022-02-23
Attending: INTERNAL MEDICINE
Payer: COMMERCIAL

## 2022-02-23 VITALS — HEIGHT: 64 IN | WEIGHT: 171 LBS | BODY MASS INDEX: 29.19 KG/M2

## 2022-02-23 DIAGNOSIS — Z86.74 HISTORY OF CARDIAC ARREST: ICD-10-CM

## 2022-02-23 DIAGNOSIS — E78.01 FAMILIAL HYPERCHOLESTEROLEMIA: ICD-10-CM

## 2022-02-23 DIAGNOSIS — E78.2 MIXED HYPERLIPIDEMIA: ICD-10-CM

## 2022-02-23 DIAGNOSIS — Z95.1 HX OF CABG: Primary | ICD-10-CM

## 2022-02-23 LAB
AORTIC ROOT ANNULUS: 3.02 CM
ASCENDING AORTA: 3.09 CM
AV INDEX (PROSTH): 0.62
AV MEAN GRADIENT: 4 MMHG
AV PEAK GRADIENT: 8 MMHG
AV VALVE AREA: 2.05 CM2
AV VELOCITY RATIO: 0.63
BSA FOR ECHO PROCEDURE: 1.87 M2
CV ECHO LV RWT: 0.53 CM
DOP CALC AO PEAK VEL: 1.42 M/S
DOP CALC AO VTI: 27.93 CM
DOP CALC LVOT AREA: 3.3 CM2
DOP CALC LVOT DIAMETER: 2.05 CM
DOP CALC LVOT PEAK VEL: 0.89 M/S
DOP CALC LVOT STROKE VOLUME: 57.17 CM3
DOP CALC MV VTI: 15.52 CM
DOP CALCLVOT PEAK VEL VTI: 17.33 CM
E WAVE DECELERATION TIME: 133.47 MSEC
E/A RATIO: 0.83
E/E' RATIO: 10 M/S
ECHO LV POSTERIOR WALL: 1.27 CM (ref 0.6–1.1)
EJECTION FRACTION: 55 %
FRACTIONAL SHORTENING: 27 % (ref 28–44)
INTERVENTRICULAR SEPTUM: 1.28 CM (ref 0.6–1.1)
LA MAJOR: 5.39 CM
LA MINOR: 4.94 CM
LA WIDTH: 4.26 CM
LEFT ATRIUM SIZE: 3.62 CM
LEFT ATRIUM VOLUME INDEX MOD: 30.7 ML/M2
LEFT ATRIUM VOLUME INDEX: 36.9 ML/M2
LEFT ATRIUM VOLUME MOD: 56.17 CM3
LEFT ATRIUM VOLUME: 67.57 CM3
LEFT INTERNAL DIMENSION IN SYSTOLE: 3.52 CM (ref 2.1–4)
LEFT VENTRICLE DIASTOLIC VOLUME INDEX: 58.67 ML/M2
LEFT VENTRICLE DIASTOLIC VOLUME: 107.36 ML
LEFT VENTRICLE MASS INDEX: 131 G/M2
LEFT VENTRICLE SYSTOLIC VOLUME INDEX: 28.2 ML/M2
LEFT VENTRICLE SYSTOLIC VOLUME: 51.65 ML
LEFT VENTRICULAR INTERNAL DIMENSION IN DIASTOLE: 4.8 CM (ref 3.5–6)
LEFT VENTRICULAR MASS: 238.95 G
LV LATERAL E/E' RATIO: 8.13 M/S
LV SEPTAL E/E' RATIO: 13 M/S
MV MEAN GRADIENT: 1 MMHG
MV PEAK A VEL: 0.78 M/S
MV PEAK E VEL: 0.65 M/S
MV PEAK GRADIENT: 2 MMHG
MV STENOSIS PRESSURE HALF TIME: 38.71 MS
MV VALVE AREA BY CONTINUITY EQUATION: 3.68 CM2
MV VALVE AREA P 1/2 METHOD: 5.68 CM2
PISA TR MAX VEL: 2.28 M/S
PV PEAK VELOCITY: 0.86 CM/S
RA MAJOR: 4.9 CM
RA PRESSURE: 8 MMHG
RA WIDTH: 3.42 CM
RIGHT VENTRICULAR END-DIASTOLIC DIMENSION: 2.62 CM
RV TISSUE DOPPLER FREE WALL SYSTOLIC VELOCITY 1 (APICAL 4 CHAMBER VIEW): 9.89 CM/S
STJ: 2.39 CM
TDI LATERAL: 0.08 M/S
TDI SEPTAL: 0.05 M/S
TDI: 0.07 M/S
TR MAX PG: 21 MMHG
TRICUSPID ANNULAR PLANE SYSTOLIC EXCURSION: 1.2 CM
TV REST PULMONARY ARTERY PRESSURE: 29 MMHG

## 2022-02-23 PROCEDURE — 93306 ECHO (CUPID ONLY): ICD-10-PCS | Mod: 26,,, | Performed by: INTERNAL MEDICINE

## 2022-02-23 PROCEDURE — 93306 TTE W/DOPPLER COMPLETE: CPT

## 2022-02-23 PROCEDURE — 93271 ECG/MONITORING AND ANALYSIS: CPT

## 2022-02-23 PROCEDURE — 93306 TTE W/DOPPLER COMPLETE: CPT | Mod: 26,,, | Performed by: INTERNAL MEDICINE

## 2022-02-23 RX ORDER — EVOLOCUMAB 140 MG/ML
140 INJECTION, SOLUTION SUBCUTANEOUS
Qty: 2 ML | Refills: 6 | OUTPATIENT
Start: 2022-02-23 | End: 2022-03-17 | Stop reason: SDUPTHER

## 2022-02-23 NOTE — TELEPHONE ENCOUNTER
"Information packet sent to patient, which includes "Your Guide to Living with Heart Disease".  Letter was also sent to patient.    Daphne Blackwell RN  Cardiac Rehab Nurse    "

## 2022-02-24 ENCOUNTER — TELEPHONE (OUTPATIENT)
Dept: CARDIOLOGY | Facility: CLINIC | Age: 62
End: 2022-02-24
Payer: COMMERCIAL

## 2022-02-24 NOTE — TELEPHONE ENCOUNTER
Pt called to confirm that even monitor was mailed out to her. Informed pt that event monitor will be mailed to her and to allow 5-7 business days to receive monitor in the mail. Pt verbalized understanding

## 2022-02-24 NOTE — TELEPHONE ENCOUNTER
----- Message from Bonifacio Pablo sent at 2/24/2022 12:55 PM CST -----  Type:  Needs Medical Advice    Who Called: self  Reason:speak with nurse :)  Would the patient rather a call back or a response via [facility]? call  Best Call Back Number: [phone]   Additional Information: none

## 2022-02-25 ENCOUNTER — PATIENT MESSAGE (OUTPATIENT)
Dept: CARDIOLOGY | Facility: CLINIC | Age: 62
End: 2022-02-25
Payer: COMMERCIAL

## 2022-03-04 ENCOUNTER — TELEPHONE (OUTPATIENT)
Dept: CARDIAC REHAB | Facility: CLINIC | Age: 62
End: 2022-03-04
Payer: MEDICAID

## 2022-03-04 ENCOUNTER — TELEPHONE (OUTPATIENT)
Dept: CARDIOLOGY | Facility: CLINIC | Age: 62
End: 2022-03-04
Payer: MEDICAID

## 2022-03-04 NOTE — TELEPHONE ENCOUNTER
----- Message from Maddy Gonzalez sent at 3/4/2022  8:55 AM CST -----  Contact: [phone]  Type:  Needs Medical Advice    Who Called: Pt  Reason for call; regarding something went wrong with her Holter Monitor  Would the patient rather a call back or a response via [facility]? Call back  Best Call Back Number: [phone]

## 2022-03-04 NOTE — TELEPHONE ENCOUNTER
----- Message from Brittany Marquez sent at 3/4/2022 10:13 AM CST -----  Good Morning,  The patient is requesting a call back regarding her event monitor.    Thank you

## 2022-03-04 NOTE — TELEPHONE ENCOUNTER
Returned call to pt and her daughter Rizwana Wagoner questioned what she needs to do with the wires that came in the box.  Notified her that these are included in case she was to experience skin irritation from the patch and advised to call the customer service number included with device for further questions or concerns in regards to device.    Verbalized understanding.

## 2022-03-09 DIAGNOSIS — Z12.31 OTHER SCREENING MAMMOGRAM: ICD-10-CM

## 2022-03-10 ENCOUNTER — TELEPHONE (OUTPATIENT)
Dept: CARDIOLOGY | Facility: CLINIC | Age: 62
End: 2022-03-10
Payer: MEDICAID

## 2022-03-10 NOTE — TELEPHONE ENCOUNTER
----- Message from Joann Deleon sent at 3/10/2022  2:38 PM CST -----  Needs advice from nurse:      Who Called:pt  Regarding:returning a call  Would the patient rather a call back or [facility]IA [facility]?  Best Call Back number:[phone]  Additional Info:

## 2022-03-10 NOTE — TELEPHONE ENCOUNTER
Call was transferred from call center    Informed pt of echo results from Dr. Escobar as well as Rx of Repatha sent to [facility]     Pt would like to know if she has any restrictions such as driving and traveling    Informed pt will send a message to Dr. Escobar     Please advise  ND

## 2022-03-10 NOTE — TELEPHONE ENCOUNTER
----- Message from Grazyna Martinez sent at 3/10/2022 12:01 PM CST -----  Regarding: Results  Type:  Test Results    Who Called: pt  Name of Test (Lab/Mammo/Etc): Lab  Date of Test: 2-23-22  Ordering Provider: Yousef  Where the test was performed: [facility]  Would the patient rather a call back or a response via [facility]? call  Best Call Back Number: [phone]

## 2022-03-10 NOTE — TELEPHONE ENCOUNTER
Called the pt in regards to this message.   The pt did not answer, but I left a detailed voice message as well as a call back number.    ND

## 2022-03-11 ENCOUNTER — TELEPHONE (OUTPATIENT)
Dept: CARDIOLOGY | Facility: CLINIC | Age: 62
End: 2022-03-11
Payer: MEDICAID

## 2022-03-11 DIAGNOSIS — J90 PLEURAL EFFUSION: Primary | ICD-10-CM

## 2022-03-11 NOTE — TELEPHONE ENCOUNTER
----- Message from Jm Escobar MD sent at 3/10/2022  5:45 PM CST -----  Discussed the Echo. Heart pumping function is good and back to normal. She has some fluids around her lung ( Pleural effusion) when is expected post surgery. Will check chest x-ray next visit. Thanks   ----- Message -----  From: Danni Mascorro MA  Sent: 3/9/2022   8:48 AM CST  To: Jm Escobar MD    Can you please see her echo she is calling for her results.  Thanks

## 2022-03-14 ENCOUNTER — PATIENT MESSAGE (OUTPATIENT)
Dept: ADMINISTRATIVE | Facility: HOSPITAL | Age: 62
End: 2022-03-14
Payer: MEDICAID

## 2022-03-14 ENCOUNTER — TELEPHONE (OUTPATIENT)
Dept: CARDIOLOGY | Facility: CLINIC | Age: 62
End: 2022-03-14
Payer: MEDICAID

## 2022-03-14 NOTE — TELEPHONE ENCOUNTER
Scheduled pt follow up appointment with Dr. Escobar on 03/17/2022 at 3:40pm. Pt verbalized understanding

## 2022-03-14 NOTE — TELEPHONE ENCOUNTER
----- Message from Jaky Molina sent at 3/14/2022 12:15 PM CDT -----  Regarding: appt  Contact: [phone]  Patient is requesting a call back regarding  Yousef told her she needed to have an appointment this month. She does not want to wait until April.   Would the patient rather a call back or a response via [facility]?  call  Best Call Back Number:  [phone]  Additional Information:

## 2022-03-15 ENCOUNTER — PATIENT OUTREACH (OUTPATIENT)
Dept: ADMINISTRATIVE | Facility: OTHER | Age: 62
End: 2022-03-15
Payer: MEDICAID

## 2022-03-17 ENCOUNTER — HOSPITAL ENCOUNTER (OUTPATIENT)
Dept: RADIOLOGY | Facility: HOSPITAL | Age: 62
Discharge: HOME | End: 2022-03-17
Attending: INTERNAL MEDICINE
Payer: COMMERCIAL

## 2022-03-17 ENCOUNTER — OFFICE VISIT (OUTPATIENT)
Dept: CARDIOLOGY | Facility: CLINIC | Age: 62
End: 2022-03-17
Payer: COMMERCIAL

## 2022-03-17 VITALS
WEIGHT: 168.63 LBS | OXYGEN SATURATION: 98 % | SYSTOLIC BLOOD PRESSURE: 136 MMHG | HEIGHT: 64 IN | BODY MASS INDEX: 28.79 KG/M2 | DIASTOLIC BLOOD PRESSURE: 80 MMHG | HEART RATE: 83 BPM

## 2022-03-17 DIAGNOSIS — I10 ESSENTIAL HYPERTENSION: ICD-10-CM

## 2022-03-17 DIAGNOSIS — Z86.74 HISTORY OF CARDIAC ARREST: ICD-10-CM

## 2022-03-17 DIAGNOSIS — I25.10 CORONARY ARTERY DISEASE INVOLVING NATIVE CORONARY ARTERY OF NATIVE HEART WITHOUT ANGINA PECTORIS: ICD-10-CM

## 2022-03-17 DIAGNOSIS — I45.10 RBBB: Chronic | ICD-10-CM

## 2022-03-17 DIAGNOSIS — E78.01 FAMILIAL HYPERCHOLESTEROLEMIA: ICD-10-CM

## 2022-03-17 DIAGNOSIS — Z95.1 HX OF CABG: ICD-10-CM

## 2022-03-17 DIAGNOSIS — R94.39 ABNORMAL CARDIOVASCULAR STRESS TEST: ICD-10-CM

## 2022-03-17 DIAGNOSIS — E78.2 MIXED HYPERLIPIDEMIA: Primary | ICD-10-CM

## 2022-03-17 DIAGNOSIS — J90 PLEURAL EFFUSION: ICD-10-CM

## 2022-03-17 PROCEDURE — 71046 XR CHEST PA AND LATERAL: ICD-10-PCS | Mod: 26,,, | Performed by: RADIOLOGY

## 2022-03-17 PROCEDURE — 71046 X-RAY EXAM CHEST 2 VIEWS: CPT | Mod: 26,,, | Performed by: RADIOLOGY

## 2022-03-17 PROCEDURE — 3008F BODY MASS INDEX DOCD: CPT | Mod: CPTII,S$GLB,, | Performed by: INTERNAL MEDICINE

## 2022-03-17 PROCEDURE — 3079F PR MOST RECENT DIASTOLIC BLOOD PRESSURE 80-89 MM HG: ICD-10-PCS | Mod: CPTII,S$GLB,, | Performed by: INTERNAL MEDICINE

## 2022-03-17 PROCEDURE — 3008F PR BODY MASS INDEX (BMI) DOCUMENTED: ICD-10-PCS | Mod: CPTII,S$GLB,, | Performed by: INTERNAL MEDICINE

## 2022-03-17 PROCEDURE — 99214 OFFICE O/P EST MOD 30 MIN: CPT | Mod: S$GLB,,, | Performed by: INTERNAL MEDICINE

## 2022-03-17 PROCEDURE — 3075F SYST BP GE 130 - 139MM HG: CPT | Mod: CPTII,S$GLB,, | Performed by: INTERNAL MEDICINE

## 2022-03-17 PROCEDURE — 1159F PR MEDICATION LIST DOCUMENTED IN MEDICAL RECORD: ICD-10-PCS | Mod: CPTII,S$GLB,, | Performed by: INTERNAL MEDICINE

## 2022-03-17 PROCEDURE — 99999 PR PBB SHADOW E&M-EST. PATIENT-LVL III: CPT | Mod: PBBFAC,,, | Performed by: INTERNAL MEDICINE

## 2022-03-17 PROCEDURE — 99999 PR PBB SHADOW E&M-EST. PATIENT-LVL III: ICD-10-PCS | Mod: PBBFAC,,, | Performed by: INTERNAL MEDICINE

## 2022-03-17 PROCEDURE — 99214 PR OFFICE/OUTPT VISIT, EST, LEVL IV, 30-39 MIN: ICD-10-PCS | Mod: S$GLB,,, | Performed by: INTERNAL MEDICINE

## 2022-03-17 PROCEDURE — 3075F PR MOST RECENT SYSTOLIC BLOOD PRESS GE 130-139MM HG: ICD-10-PCS | Mod: CPTII,S$GLB,, | Performed by: INTERNAL MEDICINE

## 2022-03-17 PROCEDURE — 3079F DIAST BP 80-89 MM HG: CPT | Mod: CPTII,S$GLB,, | Performed by: INTERNAL MEDICINE

## 2022-03-17 PROCEDURE — 71046 X-RAY EXAM CHEST 2 VIEWS: CPT | Mod: TC,FY

## 2022-03-17 PROCEDURE — 1159F MED LIST DOCD IN RCRD: CPT | Mod: CPTII,S$GLB,, | Performed by: INTERNAL MEDICINE

## 2022-03-17 RX ORDER — EVOLOCUMAB 140 MG/ML
140 INJECTION, SOLUTION SUBCUTANEOUS
Qty: 2 ML | Refills: 6 | OUTPATIENT
Start: 2022-03-17 | End: 2022-04-07 | Stop reason: SDUPTHER

## 2022-03-17 RX ORDER — CLOPIDOGREL BISULFATE 75 MG/1
75 TABLET ORAL DAILY
Qty: 90 TABLET | Refills: 3 | Status: SHIPPED | OUTPATIENT
Start: 2022-03-17 | End: 2023-03-07 | Stop reason: SDUPTHER

## 2022-03-17 RX ORDER — EVOLOCUMAB 140 MG/ML
140 INJECTION, SOLUTION SUBCUTANEOUS
Qty: 2 ML | Refills: 6 | OUTPATIENT
Start: 2022-03-17 | End: 2022-03-17

## 2022-03-17 NOTE — PROGRESS NOTES
Subjective:   @Patient ID:  Veronika Blackmon is a 61 y.o. female who presents for evaluation of HTN, abnormal EKG, HLP       HPI:   Here for follow up   She has been doing well  No chest pain, no issues since last visit  She is wearing the event monitor  No syncope or pre syncope  Repeat echo with preserved EF  She will start cardiac rehab this month  PCSK9 inhibitors started but not started yet             Historically:   Stress test was done that showed high risk findings with significant EKG changes very early in the stress and persistent in recovery. 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 During the cath she was noted to have frequent PVCs and metoprolol  dose was increased She was sent for CABG evaluation. She was seen by Dr. Mays and believe no good targets so she was declined. She wanted a 2nd opinion. While pending a 2nd opinion she had cardiac arrest  In 1/23/2022. It was believed to be arrhythmias related. She was arguing with her son. Initial concern about anoxic brain injury but she got extubated and recovered very well neurologically. She was transferred to [facility] for CABG which was done 1/31/2022 by Dr. Paz. She recovered very well and discharged 2/4/2022.  EF prior to CABG in [facility] was read as 55%    Admitted 1/23/2022 with cardiac arrest. It was believed to be arrhythmias related. She was arguing with her son. Initial concern about anoxic brain injury but she got extubated and recovered very well neurologically. She was transferred to [facility] for CABG which was done 1/31/2022 by Dr. Paz. She recovered very well and discharged 2/4/2022.       EKG done as below and concerning lateral changes  No significant claudications     FH is significant for premature CAD, younger brother had MI in his 40s,  majority of her family with CAD, PAD, and DM.     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 Paz in [facility].   LIMA-LAD, SVG-D, SVG RI, SVG-OM. RCA  and was not bypassed.  ACEI held due HARSH.     She was discharged 2/2/2022  Had repeat Echo in [facility] 1/28/2022 EF was read as >55%, small pericardial effusion,       - Stress MPI 12/2/2021  . Findings concerning for reversible ischemia in the anterior wall.  There is an associated wall motion abnormality.  2. Possible separate area of reversible ischemia along the inferoseptal wall, though artifact at this location can occasionally give a similar appearance.  3. Left ventricular dysfunction.  Estimated ejection fraction 36% during stress and 45% during rest.  This report was flagged in Epic as abnormal    - [facility] 12/14/2021  · There was three vessel coronary artery disease.  · The ejection fraction was 50-55% by visual estimate.  · The pre-procedure left ventricular end diastolic pressure was 7.  · The Prox LAD lesion was 70% stenosed.  · The Mid LAD lesion was 80% stenosed.  · The Dist LAD lesion was 95% stenosed.  · The 1st Diag lesion was 95% stenosed.  · The 1st Mrg lesion was 90% stenosed.  · The Prox Cx to Mid Cx lesion was 80% stenosed.  · The Prox RCA to Mid RCA lesion was 100% stenosed.  · The estimated blood loss was none.     - Severe MVCAD   - CVT evaluation soon   - Increase Toprol to 25 mg bid  - ASA/Plavix/Statin         Echo 2/23/2022   · The left ventricle is normal in size with mild concentric hypertrophy and normal systolic function.  · There are segmental left ventricular wall motion abnormalities with inferobasilar hypokiesis.  · There is abnormal septal wall motion consistent with post-operative status.  · The estimated ejection fraction is 55%.  · Grade I left ventricular diastolic dysfunction.  · Mild aortic regurgitation.  · Mild tricuspid regurgitation.  · Mild pulmonic regurgitation.  · Normal right ventricular size with normal right ventricular systolic function.  · There is no pulmonary hypertension.  · There is a moderate left pleural  effusion.        - EKG  SR, RBBB, lateral TWI      Patient Active Problem List    Diagnosis Date Noted    Hx of CABG 2022    Anemia 2022    Shock 2022    Acute hypoxemic respiratory failure     Facial weakness     Tingling of left arm and left side of face     Transient cerebral ischemia     History of cardiac arrest 2022    Hypokalemia 2022    HARSH (acute kidney injury) 2022    Brain anoxic injury 2022    Sinus bradycardia 2022    Prediabetes 2022    Familial hypercholesterolemia 2021    Abnormal cardiovascular stress test 2021    Essential hypertension 2021    Mixed hyperlipidemia 2021    Coronary artery disease involving native coronary artery of native heart 2021    RBBB 2021         Right Arm BP - Sittin/80  Left Arm BP - Sittin/80      LAST HbA1c  Lab Results   Component Value Date    HGBA1C 6.3 (H) 2022       Lipid panel  Lab Results   Component Value Date    CHOL 188 2022    CHOL 327 (H) 2021     Lab Results   Component Value Date    HDL 50 2022    HDL 86 (H) 2021     Lab Results   Component Value Date    LDLCALC 118.4 2022    LDLCALC 215.0 (H) 2021     Lab Results   Component Value Date    TRIG 98 2022    TRIG 130 2021     Lab Results   Component Value Date    CHOLHDL 26.6 2022    CHOLHDL 26.3 2021            Review of Systems   Constitutional: Negative for chills and fever.   HENT: Negative for hearing loss and nosebleeds.    Eyes: Negative for blurred vision.   Cardiovascular: Positive for dyspnea on exertion. Negative for chest pain, leg swelling and palpitations.   Respiratory: Negative for hemoptysis and shortness of breath.    Hematologic/Lymphatic: Negative for bleeding problem.   Skin: Negative for itching.   Musculoskeletal: Negative for falls.   Gastrointestinal: Negative for abdominal pain and hematochezia.    Genitourinary: Negative for hematuria.   Neurological: Negative for dizziness and loss of balance.   Psychiatric/Behavioral: Negative for altered mental status and depression.       Objective:   Physical Exam  Constitutional:       Appearance: She is well-developed.   HENT:      Head: Normocephalic and atraumatic.   Eyes:      Conjunctiva/sclera: Conjunctivae normal.   Neck:      Vascular: No carotid bruit or JVD.   Cardiovascular:      Rate and Rhythm: Normal rate and regular rhythm.      Pulses:           Carotid pulses are 2+ on the right side and 2+ on the left side.       Radial pulses are 2+ on the right side and 2+ on the left side.        Dorsalis pedis pulses are 2+ on the right side and 0 on the left side.      Heart sounds: Normal heart sounds. No murmur heard.    No friction rub. No gallop.      Comments: monophasic lt DP   Pulmonary:      Effort: Pulmonary effort is normal. No respiratory distress.      Breath sounds: Normal breath sounds. No stridor. No wheezing.   Musculoskeletal:      Cervical back: Neck supple.   Skin:     General: Skin is warm and dry.   Neurological:      Mental Status: She is alert and oriented to person, place, and time.   Psychiatric:         Behavior: Behavior normal.         Assessment:     1. Mixed hyperlipidemia    2. Hx of CABG    3. Familial hypercholesterolemia    4. Abnormal cardiovascular stress test    5. Coronary artery disease involving native coronary artery of native heart without angina pectoris    6. History of cardiac arrest    7. Essential hypertension    8. RBBB        Plan:   - s/p CABG  - Continue Toprol. Will consider increasing the dose next visit  - Will check 30 days EM to assess any arrhythmias recurrence and if any will consider lifevest /EP evaluation for EPS  - Cardiac rehab  - LDL still not at gaol with high intense statin. PCSK 9 inhibitors started  - Switch ASA to plavix     6 weeks f/u       Evidence of PAD on exam LT side mainly, Will discuss  about ROBIN in the future      I spent 5-10 minutes asking, assessing, assisting, arranging and advising heart healthy diet improvements. This included low-salt meals, portion control and health food alternatives. I also encourage 30 minutes of moderate exercise 3-4x a week.       Pertinent cardiac images and EKG reviewed independently.    Continue with current medical plan and lifestyle changes.  Return sooner for concerns or questions. If symptoms persist go to the ED  I have reviewed all pertinent data including patient's medical history in detail and updated the computerized patient record.     No orders of the defined types were placed in this encounter.      Follow up as scheduled.     She expressed verbal understanding and agreed with the plan    Patient's Medications   New Prescriptions    EVOLOCUMAB (REPATHA SURECLICK) 140 MG/ML PNIJ    Inject 1 mL (140 mg total) into the skin every 14 (fourteen) days.   Previous Medications    ALBUTEROL (PROVENTIL/VENTOLIN HFA) 90 MCG/ACTUATION INHALER    Inhale 2 puffs into the lungs every 6 (six) hours as needed for Wheezing or Shortness of Breath. Rescue    ASPIRIN (ECOTRIN) 81 MG EC TABLET    Take 1 tablet (81 mg total) by mouth once daily.    METOPROLOL SUCCINATE (TOPROL-XL) 25 MG 24 HR TABLET    Take 1 tablet (25 mg total) by mouth 2 (two) times daily.    ROSUVASTATIN (CRESTOR) 40 MG TAB    Take 1 tablet (40 mg total) by mouth every evening.   Modified Medications    Modified Medication Previous Medication    CLOPIDOGREL (PLAVIX) 75 MG TABLET clopidogreL (PLAVIX) 75 mg tablet       Take 1 tablet (75 mg total) by mouth once daily.    Take 1 tablet (75 mg total) by mouth once daily.    EVOLOCUMAB (REPATHA SURECLICK) 140 MG/ML PNIJ evolocumab (REPATHA SURECLICK) 140 mg/mL PnIj       Inject 1 mL (140 mg total) into the skin every 14 (fourteen) days.    Inject 1 mL (140 mg total) into the skin every 14 (fourteen) days.   Discontinued Medications    AMIODARONE (PACERONE) 200  MG TAB    Take 1 tablet (200 mg total) by mouth once daily.    AMLODIPINE (NORVASC) 5 MG TABLET    Take 1 tablet (5 mg total) by mouth once daily.    CHLORTHALIDONE (HYGROTEN) 25 MG TAB    Take 1 tablet (25 mg total) by mouth once daily.    HYDROXYZINE PAMOATE (VISTARIL) 25 MG CAP    Take 1-2 caps po nightly prn sleep difficulty    PANTOPRAZOLE (PROTONIX) 40 MG TABLET    Take 1 tablet (40 mg total) by mouth once daily.    POTASSIUM CHLORIDE SA (K-DUR,KLOR-CON) 20 MEQ TABLET    Take 1 tablet (20 mEq total) by mouth once daily.

## 2022-03-18 ENCOUNTER — TELEPHONE (OUTPATIENT)
Dept: CARDIOLOGY | Facility: CLINIC | Age: 62
End: 2022-03-18
Payer: MEDICAID

## 2022-03-18 NOTE — TELEPHONE ENCOUNTER
----- Message from Jm Escobar MD sent at 3/17/2022  4:23 PM CDT -----  Please let her know the chest x-ray result is good. No significant fluids around the lungs. Thanks

## 2022-03-23 ENCOUNTER — PATIENT OUTREACH (OUTPATIENT)
Dept: ADMINISTRATIVE | Facility: HOSPITAL | Age: 62
End: 2022-03-23
Payer: MEDICAID

## 2022-03-23 NOTE — PROGRESS NOTES
Chart audit performed LINKS triggered and updated Care everywhere triggered & updated Patient outreach regarding Health Maintenance- Appt sched, request [employer]

## 2022-04-01 ENCOUNTER — LAB VISIT (OUTPATIENT)
Dept: LAB | Facility: HOSPITAL | Age: 62
End: 2022-04-01
Attending: FAMILY MEDICINE
Payer: MEDICAID

## 2022-04-01 DIAGNOSIS — E78.2 MIXED HYPERLIPIDEMIA: ICD-10-CM

## 2022-04-01 DIAGNOSIS — I10 ESSENTIAL HYPERTENSION: ICD-10-CM

## 2022-04-01 LAB
ANION GAP SERPL CALC-SCNC: 7 MMOL/L (ref 8–16)
BUN SERPL-MCNC: 15 MG/DL (ref 6–20)
CALCIUM SERPL-MCNC: 10.1 MG/DL (ref 8.7–10.5)
CHLORIDE SERPL-SCNC: 106 MMOL/L (ref 95–110)
CHOLEST SERPL-MCNC: 191 MG/DL (ref 120–199)
CHOLEST/HDLC SERPL: 2.9 {RATIO} (ref 2–5)
CO2 SERPL-SCNC: 28 MMOL/L (ref 23–29)
CREAT SERPL-MCNC: 1 MG/DL (ref 0.5–1.4)
EST. GFR  (AFRICAN AMERICAN): >60 ML/MIN/1.73 M^2
EST. GFR  (NON AFRICAN AMERICAN): >60 ML/MIN/1.73 M^2
GLUCOSE SERPL-MCNC: 101 MG/DL (ref 70–110)
HDLC SERPL-MCNC: 67 MG/DL (ref 40–75)
HDLC SERPL: 35.1 % (ref 20–50)
LDLC SERPL CALC-MCNC: 109.6 MG/DL (ref 63–159)
NONHDLC SERPL-MCNC: 124 MG/DL
POTASSIUM SERPL-SCNC: 3.4 MMOL/L (ref 3.5–5.1)
SODIUM SERPL-SCNC: 141 MMOL/L (ref 136–145)
TRIGL SERPL-MCNC: 72 MG/DL (ref 30–150)

## 2022-04-01 PROCEDURE — 80048 BASIC METABOLIC PNL TOTAL CA: CPT | Performed by: FAMILY MEDICINE

## 2022-04-01 PROCEDURE — 36415 COLL VENOUS BLD VENIPUNCTURE: CPT | Mod: PO | Performed by: FAMILY MEDICINE

## 2022-04-01 PROCEDURE — 80061 LIPID PANEL: CPT | Performed by: FAMILY MEDICINE

## 2022-04-04 ENCOUNTER — OFFICE VISIT (OUTPATIENT)
Dept: FAMILY MEDICINE | Facility: CLINIC | Age: 62
End: 2022-04-04
Payer: COMMERCIAL

## 2022-04-04 VITALS
WEIGHT: 171.06 LBS | OXYGEN SATURATION: 99 % | HEIGHT: 64 IN | SYSTOLIC BLOOD PRESSURE: 136 MMHG | HEART RATE: 73 BPM | BODY MASS INDEX: 29.2 KG/M2 | DIASTOLIC BLOOD PRESSURE: 80 MMHG

## 2022-04-04 DIAGNOSIS — E87.6 HYPOKALEMIA: ICD-10-CM

## 2022-04-04 DIAGNOSIS — I10 ESSENTIAL HYPERTENSION: Primary | ICD-10-CM

## 2022-04-04 DIAGNOSIS — Z95.1 HX OF CABG: Chronic | ICD-10-CM

## 2022-04-04 DIAGNOSIS — F51.01 PRIMARY INSOMNIA: ICD-10-CM

## 2022-04-04 PROBLEM — N17.9 AKI (ACUTE KIDNEY INJURY): Status: RESOLVED | Noted: 2022-01-24 | Resolved: 2022-04-04

## 2022-04-04 PROCEDURE — 3080F PR MOST RECENT DIASTOLIC BLOOD PRESSURE >= 90 MM HG: ICD-10-PCS | Mod: CPTII,S$GLB,, | Performed by: FAMILY MEDICINE

## 2022-04-04 PROCEDURE — 4010F ACE/ARB THERAPY RXD/TAKEN: CPT | Mod: CPTII,S$GLB,, | Performed by: FAMILY MEDICINE

## 2022-04-04 PROCEDURE — 99999 PR PBB SHADOW E&M-EST. PATIENT-LVL IV: CPT | Mod: PBBFAC,,, | Performed by: FAMILY MEDICINE

## 2022-04-04 PROCEDURE — 99213 OFFICE O/P EST LOW 20 MIN: CPT | Mod: S$GLB,,, | Performed by: FAMILY MEDICINE

## 2022-04-04 PROCEDURE — 1160F PR REVIEW ALL MEDS BY PRESCRIBER/CLIN PHARMACIST DOCUMENTED: ICD-10-PCS | Mod: CPTII,S$GLB,, | Performed by: FAMILY MEDICINE

## 2022-04-04 PROCEDURE — 3077F SYST BP >= 140 MM HG: CPT | Mod: CPTII,S$GLB,, | Performed by: FAMILY MEDICINE

## 2022-04-04 PROCEDURE — 1160F RVW MEDS BY RX/DR IN RCRD: CPT | Mod: CPTII,S$GLB,, | Performed by: FAMILY MEDICINE

## 2022-04-04 PROCEDURE — 1159F MED LIST DOCD IN RCRD: CPT | Mod: CPTII,S$GLB,, | Performed by: FAMILY MEDICINE

## 2022-04-04 PROCEDURE — 99213 PR OFFICE/OUTPT VISIT, EST, LEVL III, 20-29 MIN: ICD-10-PCS | Mod: S$GLB,,, | Performed by: FAMILY MEDICINE

## 2022-04-04 PROCEDURE — 3077F PR MOST RECENT SYSTOLIC BLOOD PRESSURE >= 140 MM HG: ICD-10-PCS | Mod: CPTII,S$GLB,, | Performed by: FAMILY MEDICINE

## 2022-04-04 PROCEDURE — 99999 PR PBB SHADOW E&M-EST. PATIENT-LVL IV: ICD-10-PCS | Mod: PBBFAC,,, | Performed by: FAMILY MEDICINE

## 2022-04-04 PROCEDURE — 4010F PR ACE/ARB THEARPY RXD/TAKEN: ICD-10-PCS | Mod: CPTII,S$GLB,, | Performed by: FAMILY MEDICINE

## 2022-04-04 PROCEDURE — 3008F PR BODY MASS INDEX (BMI) DOCUMENTED: ICD-10-PCS | Mod: CPTII,S$GLB,, | Performed by: FAMILY MEDICINE

## 2022-04-04 PROCEDURE — 3008F BODY MASS INDEX DOCD: CPT | Mod: CPTII,S$GLB,, | Performed by: FAMILY MEDICINE

## 2022-04-04 PROCEDURE — 3080F DIAST BP >= 90 MM HG: CPT | Mod: CPTII,S$GLB,, | Performed by: FAMILY MEDICINE

## 2022-04-04 PROCEDURE — 1159F PR MEDICATION LIST DOCUMENTED IN MEDICAL RECORD: ICD-10-PCS | Mod: CPTII,S$GLB,, | Performed by: FAMILY MEDICINE

## 2022-04-04 RX ORDER — LOSARTAN POTASSIUM 50 MG/1
50 TABLET ORAL DAILY
Qty: 90 TABLET | Refills: 3 | Status: SHIPPED | OUTPATIENT
Start: 2022-04-04 | End: 2022-04-28

## 2022-04-04 RX ORDER — HYDROXYZINE HYDROCHLORIDE 25 MG/1
25 TABLET, FILM COATED ORAL NIGHTLY PRN
Qty: 60 TABLET | Refills: 1 | Status: SHIPPED | OUTPATIENT
Start: 2022-04-04 | End: 2022-06-13

## 2022-04-04 RX ORDER — LOSARTAN POTASSIUM 50 MG/1
50 TABLET ORAL DAILY
Qty: 90 TABLET | Refills: 3 | Status: SHIPPED | OUTPATIENT
Start: 2022-04-04 | End: 2022-04-04

## 2022-04-04 RX ORDER — LOSARTAN POTASSIUM 100 MG/1
TABLET ORAL
Qty: 90 TABLET | Refills: 3 | Status: SHIPPED | OUTPATIENT
Start: 2022-04-04 | End: 2022-04-04

## 2022-04-04 NOTE — PROGRESS NOTES
Subjective:       Patient ID: Veronika Blackmon is a 61 y.o. female.    Chief Complaint: Hypertension (Follow up )    Patient Active Problem List   Diagnosis    RBBB    Atherosclerosis of native coronary artery of native heart    Familial hypercholesterolemia    Abnormal cardiovascular stress test    Essential hypertension    Mixed hyperlipidemia    Prediabetes    History of cardiac arrest    Hypokalemia    Brain anoxic injury    Sinus bradycardia    Facial weakness    Tingling of left arm and left side of face    Transient cerebral ischemia    Acute hypoxemic respiratory failure    Anemia    Shock    Hx of CABG      HPI  62 yo female presents today for follow up of elevated blood pressure.   Prior history of CAD. Had consulted with Dr. Mays and noted to have no good targets, declined for CABG. Patient awaiting 2nd opinion. Cardiac arrest on 1/23/2022, possibly arrhythmia related. Transferred to [facility] for CABG, done on 1/31/2022 by Dr. Paz. Recovered and discharged on 2/4/2022.   PCSK 9 inhibitor started, On plavix since last Cardiology visit 3/2022.     Previously had HARSH, ACEi stopped at that time. Recovered.     Reports insomnia with some poor sleep habits. Reports that tylenol PM did not sufficiently work in the past.     Review of Systems   All other systems reviewed and are negative.         Objective:     Vitals:    04/04/22 0921   BP: 136/80   Pulse:         Physical Exam  Vitals and nursing note reviewed.   Constitutional:       General: She is not in acute distress.     Appearance: Normal appearance. She is not ill-appearing, toxic-appearing or diaphoretic.   HENT:      Head: Normocephalic and atraumatic.   Eyes:      General: No scleral icterus.     Conjunctiva/sclera: Conjunctivae normal.   Cardiovascular:      Rate and Rhythm: Normal rate.      Heart sounds: Normal heart sounds.   Pulmonary:      Effort: Pulmonary effort is normal. No respiratory distress.      Breath sounds: Normal  breath sounds.   Abdominal:      General: Bowel sounds are normal.   Skin:     Coloration: Skin is not pale.   Neurological:      Mental Status: She is alert. Mental status is at baseline.   Psychiatric:         Attention and Perception: Attention and perception normal.         Mood and Affect: Mood and affect normal.         Speech: Speech normal.         Behavior: Behavior normal.         Cognition and Memory: Cognition and memory normal.         Judgment: Judgment normal.         Assessment:       1. Essential hypertension    2. Hypokalemia    3. Hx of CABG    4. Primary insomnia        Plan:         Essential hypertension  -     losartan (COZAAR) 50 MG tablet; Take 1 tablet (50 mg total) by mouth once daily.  Dispense: 90 tablet; Refill: 3    Hypokalemia    Hx of CABG    Primary insomnia  -     hydrOXYzine HCL (ATARAX) 25 MG tablet; Take 1 tablet (25 mg total) by mouth nightly as needed (insomnia).  Dispense: 60 tablet; Refill: 1      Reviewed records with patient. Start on HTN med and recheck in visit in 1 week.     Patient's questions answered. Plan reviewed with patient at the end of visit. Relevant precautions to chief complaint and reasons to seek medical care or contact the office sooner reviewed with patient.     Follow up in about 3 months (around 7/4/2022) for Hypertension Follow-up.

## 2022-04-07 DIAGNOSIS — Z95.1 HX OF CABG: ICD-10-CM

## 2022-04-07 DIAGNOSIS — E78.01 FAMILIAL HYPERCHOLESTEROLEMIA: ICD-10-CM

## 2022-04-07 RX ORDER — EVOLOCUMAB 140 MG/ML
140 INJECTION, SOLUTION SUBCUTANEOUS
Qty: 2 ML | Refills: 6 | Status: SHIPPED | OUTPATIENT
Start: 2022-04-07 | End: 2022-12-01 | Stop reason: SDUPTHER

## 2022-04-08 ENCOUNTER — TELEPHONE (OUTPATIENT)
Dept: CARDIOLOGY | Facility: CLINIC | Age: 62
End: 2022-04-08
Payer: MEDICAID

## 2022-04-08 DIAGNOSIS — I25.10 CORONARY ARTERY DISEASE, UNSPECIFIED VESSEL OR LESION TYPE, UNSPECIFIED WHETHER ANGINA PRESENT, UNSPECIFIED WHETHER NATIVE OR TRANSPLANTED HEART: ICD-10-CM

## 2022-04-08 DIAGNOSIS — I10 HYPERTENSION, UNSPECIFIED TYPE: Primary | ICD-10-CM

## 2022-04-08 NOTE — TELEPHONE ENCOUNTER
----- Message from Jm Escobar MD sent at 4/8/2022  3:04 PM CDT -----  Please ask her to increase the dose and have bmp after 2 weeks. Thanks  ----- Message -----  From: Danni Mascorro MA  Sent: 4/7/2022   2:30 PM CDT  To: MD Dr Gerardo Corbin saw her the other day and changed her Losartan from 100mg a day to 50 mg a day.  Blood pressures on 04/05/2022  8:29 144/93, 04/06/2022 4:29 pm 150/87, 8:17 150/87, 04/07/2022 08:40 am 148/101,  04/07/2022 9:42 160/105 at 0100 pm 156/106.  Please advise.  ----- Message -----  From: Ariadna Gonzalez  Sent: 4/7/2022  10:41 AM CDT  To: [facility] Staff    Type:  Sooner Apoointment Request    Caller is requesting a sooner appointment.  Caller declined first available appointment listed below.  Caller will not accept being placed on the waitlist and is requesting a message be sent to doctor.  Name of Caller: Pt   When is the first available appointment? Pt has an appt 04/28   Symptoms: high BP   Would the patient rather a call back or a response via [facility]? Call back   Best Call Back Number:[phone]  Additional Information:

## 2022-04-18 ENCOUNTER — SPECIALTY PHARMACY (OUTPATIENT)
Dept: PHARMACY | Facility: CLINIC | Age: 62
End: 2022-04-18
Payer: MEDICAID

## 2022-04-18 ENCOUNTER — PATIENT MESSAGE (OUTPATIENT)
Dept: PHARMACY | Facility: CLINIC | Age: 62
End: 2022-04-18
Payer: MEDICAID

## 2022-04-18 NOTE — TELEPHONE ENCOUNTER
Repatha PA submitted to Medicaid plan via [facility] (Key: [id number]).    PA not required on commercial plan.

## 2022-04-18 NOTE — TELEPHONE ENCOUNTER
Repatha approved through 10/15/22. Case ID: [id number].     Medicaid plan - co-pay $0. BI/FA not required.

## 2022-04-26 ENCOUNTER — PATIENT OUTREACH (OUTPATIENT)
Dept: ADMINISTRATIVE | Facility: OTHER | Age: 62
End: 2022-04-26
Payer: MEDICAID

## 2022-04-26 NOTE — PROGRESS NOTES
Health Maintenance Due   Topic Date Due    Colorectal Cancer Screening  Never done    Shingles Vaccine (1 of 2) Never done    Influenza Vaccine (1) Never done     Updates were requested from care everywhere.  Chart was reviewed for overdue Proactive [facility] Encounters (SONYA) topics (CRS, Breast Cancer Screening, Eye exam)  Health Maintenance has been updated.  LINKS immunization registry triggered.  Immunizations were reconciled.

## 2022-04-27 ENCOUNTER — TELEPHONE (OUTPATIENT)
Dept: FAMILY MEDICINE | Facility: CLINIC | Age: 62
End: 2022-04-27
Payer: MEDICAID

## 2022-04-28 ENCOUNTER — OFFICE VISIT (OUTPATIENT)
Dept: FAMILY MEDICINE | Facility: CLINIC | Age: 62
End: 2022-04-28
Payer: COMMERCIAL

## 2022-04-28 ENCOUNTER — PATIENT MESSAGE (OUTPATIENT)
Dept: ADMINISTRATIVE | Facility: OTHER | Age: 62
End: 2022-04-28
Payer: MEDICAID

## 2022-04-28 ENCOUNTER — SPECIALTY PHARMACY (OUTPATIENT)
Dept: PHARMACY | Facility: CLINIC | Age: 62
End: 2022-04-28
Payer: MEDICAID

## 2022-04-28 ENCOUNTER — OFFICE VISIT (OUTPATIENT)
Dept: CARDIOLOGY | Facility: CLINIC | Age: 62
End: 2022-04-28
Payer: COMMERCIAL

## 2022-04-28 VITALS
HEIGHT: 64 IN | WEIGHT: 166.88 LBS | DIASTOLIC BLOOD PRESSURE: 86 MMHG | SYSTOLIC BLOOD PRESSURE: 142 MMHG | HEART RATE: 83 BPM | OXYGEN SATURATION: 98 % | BODY MASS INDEX: 28.49 KG/M2

## 2022-04-28 VITALS
WEIGHT: 168.88 LBS | SYSTOLIC BLOOD PRESSURE: 142 MMHG | DIASTOLIC BLOOD PRESSURE: 88 MMHG | BODY MASS INDEX: 28.83 KG/M2 | HEIGHT: 64 IN | OXYGEN SATURATION: 98 % | HEART RATE: 76 BPM

## 2022-04-28 DIAGNOSIS — D64.9 ANEMIA, UNSPECIFIED TYPE: ICD-10-CM

## 2022-04-28 DIAGNOSIS — I10 ESSENTIAL HYPERTENSION: Primary | ICD-10-CM

## 2022-04-28 DIAGNOSIS — E78.01 FAMILIAL HYPERCHOLESTEROLEMIA: Primary | Chronic | ICD-10-CM

## 2022-04-28 DIAGNOSIS — I10 ESSENTIAL HYPERTENSION: ICD-10-CM

## 2022-04-28 DIAGNOSIS — E78.01 FAMILIAL HYPERCHOLESTEROLEMIA: ICD-10-CM

## 2022-04-28 DIAGNOSIS — Z95.1 HX OF CABG: Chronic | ICD-10-CM

## 2022-04-28 DIAGNOSIS — I25.10 ATHEROSCLEROSIS OF NATIVE CORONARY ARTERY OF NATIVE HEART WITHOUT ANGINA PECTORIS: ICD-10-CM

## 2022-04-28 DIAGNOSIS — I45.10 RBBB: Chronic | ICD-10-CM

## 2022-04-28 DIAGNOSIS — R94.39 ABNORMAL CARDIOVASCULAR STRESS TEST: ICD-10-CM

## 2022-04-28 DIAGNOSIS — E78.2 MIXED HYPERLIPIDEMIA: ICD-10-CM

## 2022-04-28 DIAGNOSIS — Z86.74 HISTORY OF CARDIAC ARREST: ICD-10-CM

## 2022-04-28 DIAGNOSIS — E87.6 HYPOKALEMIA: ICD-10-CM

## 2022-04-28 DIAGNOSIS — Z95.1 HX OF CABG: Primary | ICD-10-CM

## 2022-04-28 PROCEDURE — 3077F PR MOST RECENT SYSTOLIC BLOOD PRESSURE >= 140 MM HG: ICD-10-PCS | Mod: CPTII,S$GLB,, | Performed by: INTERNAL MEDICINE

## 2022-04-28 PROCEDURE — 3008F BODY MASS INDEX DOCD: CPT | Mod: CPTII,S$GLB,, | Performed by: INTERNAL MEDICINE

## 2022-04-28 PROCEDURE — 4010F ACE/ARB THERAPY RXD/TAKEN: CPT | Mod: CPTII,S$GLB,, | Performed by: INTERNAL MEDICINE

## 2022-04-28 PROCEDURE — 4010F PR ACE/ARB THEARPY RXD/TAKEN: ICD-10-PCS | Mod: CPTII,S$GLB,, | Performed by: FAMILY MEDICINE

## 2022-04-28 PROCEDURE — 99999 PR PBB SHADOW E&M-EST. PATIENT-LVL III: CPT | Mod: PBBFAC,,, | Performed by: INTERNAL MEDICINE

## 2022-04-28 PROCEDURE — 99214 PR OFFICE/OUTPT VISIT, EST, LEVL IV, 30-39 MIN: ICD-10-PCS | Mod: S$GLB,,, | Performed by: INTERNAL MEDICINE

## 2022-04-28 PROCEDURE — 99999 PR PBB SHADOW E&M-EST. PATIENT-LVL IV: ICD-10-PCS | Mod: PBBFAC,,, | Performed by: FAMILY MEDICINE

## 2022-04-28 PROCEDURE — 99214 OFFICE O/P EST MOD 30 MIN: CPT | Mod: S$GLB,,, | Performed by: FAMILY MEDICINE

## 2022-04-28 PROCEDURE — 3008F BODY MASS INDEX DOCD: CPT | Mod: CPTII,S$GLB,, | Performed by: FAMILY MEDICINE

## 2022-04-28 PROCEDURE — 3079F PR MOST RECENT DIASTOLIC BLOOD PRESSURE 80-89 MM HG: ICD-10-PCS | Mod: CPTII,S$GLB,, | Performed by: INTERNAL MEDICINE

## 2022-04-28 PROCEDURE — 3008F PR BODY MASS INDEX (BMI) DOCUMENTED: ICD-10-PCS | Mod: CPTII,S$GLB,, | Performed by: FAMILY MEDICINE

## 2022-04-28 PROCEDURE — 3079F DIAST BP 80-89 MM HG: CPT | Mod: CPTII,S$GLB,, | Performed by: FAMILY MEDICINE

## 2022-04-28 PROCEDURE — 99214 PR OFFICE/OUTPT VISIT, EST, LEVL IV, 30-39 MIN: ICD-10-PCS | Mod: S$GLB,,, | Performed by: FAMILY MEDICINE

## 2022-04-28 PROCEDURE — 3008F PR BODY MASS INDEX (BMI) DOCUMENTED: ICD-10-PCS | Mod: CPTII,S$GLB,, | Performed by: INTERNAL MEDICINE

## 2022-04-28 PROCEDURE — 99999 PR PBB SHADOW E&M-EST. PATIENT-LVL IV: CPT | Mod: PBBFAC,,, | Performed by: FAMILY MEDICINE

## 2022-04-28 PROCEDURE — 1160F RVW MEDS BY RX/DR IN RCRD: CPT | Mod: CPTII,S$GLB,, | Performed by: FAMILY MEDICINE

## 2022-04-28 PROCEDURE — 4010F ACE/ARB THERAPY RXD/TAKEN: CPT | Mod: CPTII,S$GLB,, | Performed by: FAMILY MEDICINE

## 2022-04-28 PROCEDURE — 4010F PR ACE/ARB THEARPY RXD/TAKEN: ICD-10-PCS | Mod: CPTII,S$GLB,, | Performed by: INTERNAL MEDICINE

## 2022-04-28 PROCEDURE — 3077F SYST BP >= 140 MM HG: CPT | Mod: CPTII,S$GLB,, | Performed by: INTERNAL MEDICINE

## 2022-04-28 PROCEDURE — 1159F MED LIST DOCD IN RCRD: CPT | Mod: CPTII,S$GLB,, | Performed by: FAMILY MEDICINE

## 2022-04-28 PROCEDURE — 1160F PR REVIEW ALL MEDS BY PRESCRIBER/CLIN PHARMACIST DOCUMENTED: ICD-10-PCS | Mod: CPTII,S$GLB,, | Performed by: FAMILY MEDICINE

## 2022-04-28 PROCEDURE — 3077F SYST BP >= 140 MM HG: CPT | Mod: CPTII,S$GLB,, | Performed by: FAMILY MEDICINE

## 2022-04-28 PROCEDURE — 99214 OFFICE O/P EST MOD 30 MIN: CPT | Mod: PBBFAC,PO | Performed by: FAMILY MEDICINE

## 2022-04-28 PROCEDURE — 3079F DIAST BP 80-89 MM HG: CPT | Mod: CPTII,S$GLB,, | Performed by: INTERNAL MEDICINE

## 2022-04-28 PROCEDURE — 1159F PR MEDICATION LIST DOCUMENTED IN MEDICAL RECORD: ICD-10-PCS | Mod: CPTII,S$GLB,, | Performed by: FAMILY MEDICINE

## 2022-04-28 PROCEDURE — 99213 OFFICE O/P EST LOW 20 MIN: CPT | Mod: PBBFAC,27,PO | Performed by: INTERNAL MEDICINE

## 2022-04-28 PROCEDURE — 99214 OFFICE O/P EST MOD 30 MIN: CPT | Mod: S$GLB,,, | Performed by: INTERNAL MEDICINE

## 2022-04-28 PROCEDURE — 3077F PR MOST RECENT SYSTOLIC BLOOD PRESSURE >= 140 MM HG: ICD-10-PCS | Mod: CPTII,S$GLB,, | Performed by: FAMILY MEDICINE

## 2022-04-28 PROCEDURE — 3079F PR MOST RECENT DIASTOLIC BLOOD PRESSURE 80-89 MM HG: ICD-10-PCS | Mod: CPTII,S$GLB,, | Performed by: FAMILY MEDICINE

## 2022-04-28 PROCEDURE — 99999 PR PBB SHADOW E&M-EST. PATIENT-LVL III: ICD-10-PCS | Mod: PBBFAC,,, | Performed by: INTERNAL MEDICINE

## 2022-04-28 RX ORDER — METOPROLOL SUCCINATE 50 MG/1
50 TABLET, EXTENDED RELEASE ORAL EVERY MORNING
Qty: 90 TABLET | Refills: 3 | Status: SHIPPED | OUTPATIENT
Start: 2022-04-28 | End: 2022-06-08 | Stop reason: SDUPTHER

## 2022-04-28 RX ORDER — LOSARTAN POTASSIUM 25 MG/1
25 TABLET ORAL NIGHTLY
Qty: 90 TABLET | Refills: 3 | Status: SHIPPED | OUTPATIENT
Start: 2022-04-28 | End: 2022-06-14 | Stop reason: ALTCHOICE

## 2022-04-28 NOTE — Clinical Note
Please let patient and daughter know that due to anemia, we need to make sure we update her cancer screenings to make sure there's nothing hidden that is causing her anemia. Schedule MMG and GI appt to discuss GI work up/ scoping for anemia. Follow up labs as scheduled.

## 2022-04-28 NOTE — PROGRESS NOTES
Subjective:   @Patient ID:  Veronika Blackmon is a 62 y.o. female who presents for evaluation of HTN, abnormal EKG, HLP       HPI:   Here for follow up   She has been doing well  No chest pain, no issues since last visit  EM with no significant arrhythmias  There was some confusion about her medication. Losartan 50 mg was added by Dr. Carrillo, she felt very weak afterward. There was a confusion about if she was taking losartan 100 mg or not.   She stopped the Losartan and now just on Metoprolol which apparently she was taking it one time only not bid    No syncope or pre syncope  Repeat echo with preserved EF  She didn't start cardiac rehab yet due to insurance issues    PCSK9 inhibitors was not started yet. She didn't get it.               Historically:  Stress test was done that showed high risk findings with significant EKG changes very early in the stress and persistent in recovery. 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 During the cath she was noted to have frequent PVCs and metoprolol  dose was increased She was sent for CABG evaluation. She was seen by Dr. Mays and believe no good targets so she was declined. She wanted a 2nd opinion. While pending a 2nd opinion she had cardiac arrest  In 1/23/2022. It was believed to be arrhythmias related. She was arguing with her son. Initial concern about anoxic brain injury but she got extubated and recovered very well neurologically. She was transferred to [facility] for CABG which was done 1/31/2022 by Dr. Paz. She recovered very well and discharged 2/4/2022.  EF prior to CABG in [facility] was read as 55%    Admitted 1/23/2022 with cardiac arrest. It was believed to be arrhythmias related. She was arguing with her son. Initial concern about anoxic brain injury but she got extubated and recovered very well neurologically. She was transferred to [facility] for CABG which was done 1/31/2022 by Dr. Paz. She recovered very well and discharged 2/4/2022.       EKG done as below and  concerning lateral changes  No significant claudications     FH is significant for premature CAD, younger brother had MI in his 40s,  majority of her family with CAD, PAD, and DM.     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 Paz in [facility].  LIMA-LAD, SVG-D, SVG RI, SVG-OM. RCA  and was not bypassed.  ACEI held due HARSH.     She was discharged 2/2/2022  Had repeat Echo in [facility] 1/28/2022 EF was read as >55%, small pericardial effusion,       - Stress MPI 12/2/2021  . Findings concerning for reversible ischemia in the anterior wall.  There is an associated wall motion abnormality.  2. Possible separate area of reversible ischemia along the inferoseptal wall, though artifact at this location can occasionally give a similar appearance.  3. Left ventricular dysfunction.  Estimated ejection fraction 36% during stress and 45% during rest.  This report was flagged in Epic as abnormal    - [facility] 12/14/2021  · There was three vessel coronary artery disease.  · The ejection fraction was 50-55% by visual estimate.  · The pre-procedure left ventricular end diastolic pressure was 7.  · The Prox LAD lesion was 70% stenosed.  · The Mid LAD lesion was 80% stenosed.  · The Dist LAD lesion was 95% stenosed.  · The 1st Diag lesion was 95% stenosed.  · The 1st Mrg lesion was 90% stenosed.  · The Prox Cx to Mid Cx lesion was 80% stenosed.  · The Prox RCA to Mid RCA lesion was 100% stenosed.  · The estimated blood loss was none.     - Severe MVCAD   - CVT evaluation soon   - Increase Toprol to 25 mg bid  - ASA/Plavix/Statin         Echo 2/23/2022   · The left ventricle is normal in size with mild concentric hypertrophy and normal systolic function.  · There are segmental left ventricular wall motion abnormalities with inferobasilar hypokiesis.  · There is abnormal septal wall motion consistent with post-operative status.  · The estimated  ejection fraction is 55%.  · Grade I left ventricular diastolic dysfunction.  · Mild aortic regurgitation.  · Mild tricuspid regurgitation.  · Mild pulmonic regurgitation.  · Normal right ventricular size with normal right ventricular systolic function.  · There is no pulmonary hypertension.  · There is a moderate left pleural effusion.        - EKG  SR, RBBB, lateral TWI    Event monitor 3/9/2022  At baseline, in the absence of symptoms, the rhythm was sinus with heart rate 94 beats per minute.  No symptom was reported throughout the month.  There were multiple activations, all of which showed sinus rhythm and some of which showed single PVCs.  The heart rate 80-95 beats per minute.     Impression:  Sinus rhythm, with occasional single PVCs.  No reported symptom.    Patient Active Problem List    Diagnosis Date Noted    Hx of CABG 02/17/2022    Anemia 01/27/2022    Shock 01/27/2022    Acute hypoxemic respiratory failure     Facial weakness     Tingling of left arm and left side of face     Transient cerebral ischemia     History of cardiac arrest 01/24/2022    Hypokalemia 01/24/2022    Brain anoxic injury 01/24/2022    Sinus bradycardia 01/24/2022    Prediabetes 01/04/2022    Familial hypercholesterolemia 12/27/2021    Essential hypertension 12/27/2021    Mixed hyperlipidemia 12/27/2021    Atherosclerosis of native coronary artery of native heart 12/22/2021     Formatting of this note might be different from the original.  Added automatically from request for surgery [id number]      RBBB 12/02/2021                LAST HbA1c  Lab Results   Component Value Date    HGBA1C 6.3 (H) 01/28/2022       Lipid panel  Lab Results   Component Value Date    CHOL 191 04/01/2022    CHOL 188 02/23/2022    CHOL 327 (H) 11/30/2021     Lab Results   Component Value Date    HDL 67 04/01/2022    HDL 50 02/23/2022    HDL 86 (H) 11/30/2021     Lab Results   Component Value Date    LDLCALC 109.6 04/01/2022    LDLCALC 118.4  02/23/2022    LDLCALC 215.0 (H) 11/30/2021     Lab Results   Component Value Date    TRIG 72 04/01/2022    TRIG 98 02/23/2022    TRIG 130 11/30/2021     Lab Results   Component Value Date    CHOLHDL 35.1 04/01/2022    CHOLHDL 26.6 02/23/2022    CHOLHDL 26.3 11/30/2021            Review of Systems   Constitutional: Negative for chills and fever.   HENT: Negative for hearing loss and nosebleeds.    Eyes: Negative for blurred vision.   Cardiovascular: Positive for dyspnea on exertion. Negative for chest pain, leg swelling and palpitations.   Respiratory: Negative for hemoptysis and shortness of breath.    Hematologic/Lymphatic: Negative for bleeding problem.   Skin: Negative for itching.   Musculoskeletal: Negative for falls.   Gastrointestinal: Negative for abdominal pain and hematochezia.   Genitourinary: Negative for hematuria.   Neurological: Negative for dizziness and loss of balance.   Psychiatric/Behavioral: Negative for altered mental status and depression.       Objective:   Physical Exam  Constitutional:       Appearance: She is well-developed.   HENT:      Head: Normocephalic and atraumatic.   Eyes:      Conjunctiva/sclera: Conjunctivae normal.   Neck:      Vascular: No carotid bruit or JVD.   Cardiovascular:      Rate and Rhythm: Normal rate and regular rhythm.      Pulses:           Carotid pulses are 2+ on the right side and 2+ on the left side.       Radial pulses are 2+ on the right side and 2+ on the left side.        Dorsalis pedis pulses are 2+ on the right side and 0 on the left side.      Heart sounds: Normal heart sounds. No murmur heard.    No friction rub. No gallop.      Comments: monophasic lt DP   Pulmonary:      Effort: Pulmonary effort is normal. No respiratory distress.      Breath sounds: Normal breath sounds. No stridor. No wheezing.   Musculoskeletal:      Cervical back: Neck supple.   Skin:     General: Skin is warm and dry.   Neurological:      Mental Status: She is alert and oriented  to person, place, and time.   Psychiatric:         Behavior: Behavior normal.         Assessment:     1. Familial hypercholesterolemia    2. Abnormal cardiovascular stress test    3. Essential hypertension    4. Atherosclerosis of native coronary artery of native heart without angina pectoris    5. Mixed hyperlipidemia    6. Hx of CABG    7. RBBB    8. History of cardiac arrest        Plan:   - s/p CABG  - Continue Toprol.  Will change to Toprol XL 50 mg daily   - Will add low dose Losartan 25 mg daily and up titrate as tolerated  - Cardiac rehab  - LDL still not at gaol with high intense statin. PCSK 9 inhibitors  Sent. started  - Continue  plavix     6 months f/u       Evidence of PAD on exam LT side mainly, Will discuss about ROBIN in the future      I spent 5-10 minutes asking, assessing, assisting, arranging and advising heart healthy diet improvements. This included low-salt meals, portion control and health food alternatives. I also encourage 30 minutes of moderate exercise 3-4x a week.       Pertinent cardiac images and EKG reviewed independently.    Continue with current medical plan and lifestyle changes.  Return sooner for concerns or questions. If symptoms persist go to the ED  I have reviewed all pertinent data including patient's medical history in detail and updated the computerized patient record.     No orders of the defined types were placed in this encounter.      Follow up as scheduled.     She expressed verbal understanding and agreed with the plan    Patient's Medications   New Prescriptions    No medications on file   Previous Medications    CLOPIDOGREL (PLAVIX) 75 MG TABLET    Take 1 tablet (75 mg total) by mouth once daily.    EVOLOCUMAB (REPATHA SURECLICK) 140 MG/ML PNIJ    Inject 1 mL (140 mg total) into the skin every 14 (fourteen) days.    HYDROXYZINE HCL (ATARAX) 25 MG TABLET    Take 1 tablet (25 mg total) by mouth nightly as needed (insomnia).    ROSUVASTATIN (CRESTOR) 40 MG TAB    Take  1 tablet (40 mg total) by mouth every evening.   Modified Medications    Modified Medication Previous Medication    LOSARTAN (COZAAR) 25 MG TABLET losartan (COZAAR) 50 MG tablet       Take 1 tablet (25 mg total) by mouth every evening.    Take 1 tablet (50 mg total) by mouth once daily.    METOPROLOL SUCCINATE (TOPROL-XL) 50 MG 24 HR TABLET metoprolol succinate (TOPROL-XL) 25 MG 24 hr tablet       Take 1 tablet (50 mg total) by mouth every morning.    Take 1 tablet (25 mg total) by mouth 2 (two) times daily.   Discontinued Medications    No medications on file

## 2022-04-28 NOTE — TELEPHONE ENCOUNTER
Specialty Pharmacy - Initial Clinical Assessment    Specialty Medication Orders Linked to Encounter    Flowsheet Row Most Recent Value   Medication #1 evolocumab (REPATHA SURECLICK) 140 mg/mL PnIj (Order#[id number], Rx#[id number])        Patient Diagnosis   Z95.1 - Hx of CABG    Subjective    Veronika Blackmon is a 62 y.o. female, who is followed by the specialty pharmacy service for management and education.    Recent Encounters     Date Type Provider Description    04/28/2022 Specialty Pharmacy Matthew Gauthier PharmD Initial Clinical Assessment    04/18/2022 Specialty Pharmacy Carmen Espinosa PharmD Referral Authorization        Clinical call attempts since last clinical assessment   4/18/2022  7:00 PM - Specialty Pharmacy - Clinical Assessment by Carmen Espinosa PharmD  4/18/2022  7:00 PM - Specialty Pharmacy - Clinical Assessment by Carmen Espinosa PharmD  4/21/2022  3:14 PM - Specialty Pharmacy - Clinical Assessment by Carmen Espinosa PharmD     Current Outpatient Medications   Medication Sig    clopidogreL (PLAVIX) 75 mg tablet Take 1 tablet (75 mg total) by mouth once daily.    evolocumab (REPATHA SURECLICK) 140 mg/mL PnIj Inject 1 mL (140 mg total) into the skin every 14 (fourteen) days.    hydrOXYzine HCL (ATARAX) 25 MG tablet Take 1 tablet (25 mg total) by mouth nightly as needed (insomnia).    losartan (COZAAR) 25 MG tablet Take 1 tablet (25 mg total) by mouth every evening.    metoprolol succinate (TOPROL-XL) 50 MG 24 hr tablet Take 1 tablet (50 mg total) by mouth every morning.    rosuvastatin (CRESTOR) 40 MG Tab Take 1 tablet (40 mg total) by mouth every evening.   Last reviewed on 4/28/2022 10:52 AM by Matthew Gauthier PharmD    Review of patient's allergies indicates:  No Known AllergiesLast reviewed on  4/28/2022 10:29 AM by Jm Escobar    Drug Interactions    Drug interactions evaluated: yes  Clinically relevant drug interactions identified: no  Provided the patient with educational material  regarding drug interactions: not applicable           Assessment Questions - Documented Responses    Flowsheet Row Most Recent Value   Assessment    Medication Reconciliation completed for patient Yes   During the past 4 weeks, has patient missed any activities due to condition or medication? No   During the past 4 weeks, did patient have any of the following urgent care visits? None   Goals of Therapy Status Discussed (new start)   Status of the patients ability to self-administer: Is Able   All education points have been covered with patient? Yes, supplemental printed education provided   Welcome packet contents reviewed and discussed with patient? Yes   Assesment completed? Yes   Plan Therapy being initiated   Do you need to open a clinical intervention (i-vent)? No   Do you want to schedule first shipment? Yes   Medication #1 Assessment Info    Patient status New medication, New to [facility]   Is this medication appropriate for the patient? Yes   Is this medication effective? Not yet started        Refill Questions - Documented Responses    Flowsheet Row Most Recent Value   Patient Availability and HIPAA Verification    Does patient want to proceed with activity? Yes   HIPAA/medical authority confirmed? Yes   Relationship to patient of person spoken to? Self   Refill Screening Questions    When does the patient need to receive the medication? 04/29/22   Refill Delivery Questions    How will the patient receive the medication? Delivery Prudence   When does the patient need to receive the medication? 04/29/22   Shipping Address Home   Address in [facility] confirmed and updated if neccessary? Yes   Expected Copay ($) 0   Is the patient able to afford the medication copay? Yes   Payment Method zero copay   Days supply of Refill 28   Supplies needed? No supplies needed   Refill activity completed? Yes   Refill activity plan Refill scheduled   Shipment/Pickup Date: 04/29/22          Objective    She has no past medical  "history on file.    Tried/failed medications: Statins    BP Readings from Last 4 Encounters:   04/28/22 (!) 142/88   04/04/22 136/80   03/17/22 136/80   02/17/22 118/80     Ht Readings from Last 4 Encounters:   04/28/22 5' 4" (1.626 m)   04/04/22 5' 4" (1.626 m)   03/17/22 5' 4" (1.626 m)   02/23/22 5' 4" (1.626 m)     Wt Readings from Last 4 Encounters:   04/28/22 76.6 kg (168 lb 14 oz)   04/04/22 77.6 kg (171 lb 1.2 oz)   03/17/22 76.5 kg (168 lb 10.4 oz)   02/23/22 77.6 kg (171 lb)       The goals of prescribed drug therapy management include:  · Supporting patient to meet the prescriber's medical treatment objectives  · Improving or maintaining quality of life  · Maintaining optimal therapy adherence  · Minimizing and managing side effects      Goals of Therapy Status: Discussed (new start)    Assessment/Plan  Patient plans to start therapy on 04/29/22      Indication, dosage, appropriateness, effectiveness, safety and convenience of her specialty medication(s) were reviewed today.     Patient Education   Patient received education on the following:    Expectations and possible outcomes of therapy   Proper use, timely administration, and missed dose management   Duration of therapy   Side effects, including prevention, minimization, and management   Contraindications and safety precautions   New or changed medications, including prescribe and over the counter medications and supplements   Reviews recommended vaccinations, as appropriate   Storage, safe handling, and disposal      Tasks added this encounter   5/20/2022 - Refill Call (Auto Added)   Tasks due within next 3 months   No tasks due.     Matthew Gauthier, PharmD  [facility]  [address] [facility] [address]  Phone: [phone]  Fax: [phone]  "

## 2022-04-28 NOTE — Clinical Note
Please have patient come in for nurse BP check. Last visit BP out of goal. Please give number for digital medicine sign up for her daughter to assist her in signing up.

## 2022-04-28 NOTE — PROGRESS NOTES
Subjective:       Patient ID: Veronika Blackmon is a 62 y.o. female.    Chief Complaint: Hypertension (Follow up )    Patient Active Problem List   Diagnosis    RBBB    Atherosclerosis of native coronary artery of native heart    Familial hypercholesterolemia    Essential hypertension    Mixed hyperlipidemia    Prediabetes    History of cardiac arrest    Hypokalemia    Brain anoxic injury    Sinus bradycardia    Facial weakness    Tingling of left arm and left side of face    Transient cerebral ischemia    Acute hypoxemic respiratory failure    Anemia    Shock    Hx of CABG      HPI  63 yo female presents today for follow up of BP. Saw cardiology today.   Event monitor unremarkable. Asymptomatic.     Unclear about whether patient was taking Losartan 50mg or 100mg. She reports stopping Losartan and only taking Metoprolol (but not BID as prescribed). Today Losartan was added at 25mg po daily and Metoprolol XL given.     Reports will receive PCSK9 inhibitor tomorrow and inject in home.     Review of Systems   All other systems reviewed and are negative.         Results for orders placed or performed in visit on 04/01/22   Basic Metabolic Panel   Result Value Ref Range    Sodium 141 136 - 145 mmol/L    Potassium 3.4 (L) 3.5 - 5.1 mmol/L    Chloride 106 95 - 110 mmol/L    CO2 28 23 - 29 mmol/L    Glucose 101 70 - 110 mg/dL    BUN 15 6 - 20 mg/dL    Creatinine 1.0 0.5 - 1.4 mg/dL    Calcium 10.1 8.7 - 10.5 mg/dL    Anion Gap 7 (L) 8 - 16 mmol/L    eGFR if African American >60.0 >60 mL/min/1.73 m^2    eGFR if non African American >60.0 >60 mL/min/1.73 m^2   Lipid Panel   Result Value Ref Range    Cholesterol 191 120 - 199 mg/dL    Triglycerides 72 30 - 150 mg/dL    HDL 67 40 - 75 mg/dL    LDL Cholesterol 109.6 63.0 - 159.0 mg/dL    HDL/Cholesterol Ratio 35.1 20.0 - 50.0 %    Total Cholesterol/HDL Ratio 2.9 2.0 - 5.0    Non-HDL Cholesterol 124 mg/dL       Objective:     Vitals:    04/28/22 1326   BP: (!) 142/86    Pulse: 83        Physical Exam  Vitals and nursing note reviewed.   Constitutional:       General: She is not in acute distress.     Appearance: Normal appearance. She is not ill-appearing, toxic-appearing or diaphoretic.   HENT:      Head: Normocephalic and atraumatic.   Eyes:      General: No scleral icterus.     Conjunctiva/sclera: Conjunctivae normal.   Cardiovascular:      Rate and Rhythm: Normal rate.      Heart sounds: Normal heart sounds.   Pulmonary:      Effort: Pulmonary effort is normal. No respiratory distress.      Breath sounds: Normal breath sounds.   Abdominal:      General: Bowel sounds are normal.   Skin:     Coloration: Skin is not pale.   Neurological:      Mental Status: She is alert. Mental status is at baseline.   Psychiatric:         Attention and Perception: Attention and perception normal.         Mood and Affect: Mood and affect normal.         Speech: Speech normal.         Behavior: Behavior normal.         Cognition and Memory: Cognition and memory normal.         Judgment: Judgment normal.         Assessment:       1. Essential hypertension    2. Familial hypercholesterolemia    3. Hypokalemia    4. Anemia, unspecified type        Plan:         Essential hypertension  -     Hypertension Digital Medicine (HDMP) Enrollment Order  -     Hypertension Digital Medicine (HDMP): Assign Onboarding Questionnaires  - Home BP logs reviewed. 90% above goal with highest 160/170s/80s. 2 week nurse BP check.   - Dr. Escobar adjusted meds today. Reviewed instructions and how to take.     Familial hypercholesterolemia  -     Lipids Digital Medicine (LDMP) Enrollment Order  -     Lipids Digital Medicine (LDMP): Assign Onboarding Questionnaires    Hypokalemia  -     Basic Metabolic Panel; Future; Expected date: 04/28/2022    Anemia, unspecified type  -     Iron and TIBC; Future  -     Ferritin; Future  -     Reticulocytes; Future  -     CBC Auto Differential; Future; Expected date: 04/28/2022  - Patient  with H/H trending down in 1/2022 hospitalization and received 2units PRBC. No gross clinical bleeding. Will recheck.   - GI referral - needs GI work up    Patient's questions answered. Plan reviewed with patient at the end of visit. Relevant precautions to chief complaint and reasons to seek medical care or contact the office sooner reviewed with patient.     Follow up in about 3 months (around 7/28/2022) for anemia f/u (CBC, iron, TIBC, ferritin, BMP).

## 2022-05-02 ENCOUNTER — TELEPHONE (OUTPATIENT)
Dept: CARDIOLOGY | Facility: CLINIC | Age: 62
End: 2022-05-02
Payer: MEDICAID

## 2022-05-02 ENCOUNTER — SPECIALTY PHARMACY (OUTPATIENT)
Dept: PHARMACY | Facility: CLINIC | Age: 62
End: 2022-05-02
Payer: MEDICAID

## 2022-05-02 NOTE — TELEPHONE ENCOUNTER
Incoming call from patient to report the Repatha pen did not click or start when she attempted to inject for the first time today. Walked patient through proper administration. It was found that she was not pushing the yellow needle guard hard enough to allow for the pen to trigger. Stayed on the phone while patient injected successfully. No further questions or concerns.

## 2022-05-02 NOTE — TELEPHONE ENCOUNTER
----- Message from Grazyna Martinez sent at 5/2/2022  8:14 AM CDT -----  Regarding: Medication Inquiry  Type:  Needs Medical Advice    Who Called: pt    Would the patient rather a call back or a response via [facility]? Call    Best Call Back Number: [phone]    Additional Information: medication prescribed is not helping her symptoms ( evolocumab (REPATHA SURECLICK) 140 mg/mL PnIj )

## 2022-05-05 ENCOUNTER — PATIENT MESSAGE (OUTPATIENT)
Dept: CARDIOLOGY | Facility: CLINIC | Age: 62
End: 2022-05-05
Payer: MEDICAID

## 2022-05-11 ENCOUNTER — PATIENT OUTREACH (OUTPATIENT)
Dept: ADMINISTRATIVE | Facility: HOSPITAL | Age: 62
End: 2022-05-11
Payer: MEDICAID

## 2022-05-11 NOTE — PROGRESS NOTES
2022 Care Everywhere updates requested and reviewed.  Immunizations reconciled. Media reports reviewed.  Duplicate HM overrides and  orders removed.  Overdue HM topic chart audit and/or requested.  Overdue lab testing linked to upcoming lab appointments if applies.  Patient outreach regarding Health Maintenance- for Mammogram Patient states she is still sore from cardiac bypass surgery please call her back in august  DIS reviewed   Health Maintenance Due   Topic Date Due    Mammogram  Never done    Colorectal Cancer Screening  Never done    Shingles Vaccine (1 of 2) Never done    COVID-19 Vaccine (4 - Booster for Pfizer series) 2022

## 2022-05-16 ENCOUNTER — OFFICE VISIT (OUTPATIENT)
Dept: GASTROENTEROLOGY | Facility: CLINIC | Age: 62
End: 2022-05-16
Payer: MEDICAID

## 2022-05-16 ENCOUNTER — TELEPHONE (OUTPATIENT)
Dept: GASTROENTEROLOGY | Facility: CLINIC | Age: 62
End: 2022-05-16

## 2022-05-16 VITALS — BODY MASS INDEX: 28.86 KG/M2 | HEIGHT: 64 IN | WEIGHT: 169.06 LBS

## 2022-05-16 DIAGNOSIS — D64.9 ANEMIA, UNSPECIFIED TYPE: ICD-10-CM

## 2022-05-16 DIAGNOSIS — Z12.11 SCREENING FOR COLON CANCER: Primary | ICD-10-CM

## 2022-05-16 PROCEDURE — 99999 PR PBB SHADOW E&M-EST. PATIENT-LVL III: ICD-10-PCS | Mod: PBBFAC,,, | Performed by: NURSE PRACTITIONER

## 2022-05-16 PROCEDURE — 99205 OFFICE O/P NEW HI 60 MIN: CPT | Mod: S$PBB,,, | Performed by: NURSE PRACTITIONER

## 2022-05-16 PROCEDURE — 4010F PR ACE/ARB THEARPY RXD/TAKEN: ICD-10-PCS | Mod: CPTII,,, | Performed by: NURSE PRACTITIONER

## 2022-05-16 PROCEDURE — 1159F MED LIST DOCD IN RCRD: CPT | Mod: CPTII,,, | Performed by: NURSE PRACTITIONER

## 2022-05-16 PROCEDURE — 99999 PR PBB SHADOW E&M-EST. PATIENT-LVL III: CPT | Mod: PBBFAC,,, | Performed by: NURSE PRACTITIONER

## 2022-05-16 PROCEDURE — 99205 PR OFFICE/OUTPT VISIT, NEW, LEVL V, 60-74 MIN: ICD-10-PCS | Mod: S$PBB,,, | Performed by: NURSE PRACTITIONER

## 2022-05-16 PROCEDURE — 4010F ACE/ARB THERAPY RXD/TAKEN: CPT | Mod: CPTII,,, | Performed by: NURSE PRACTITIONER

## 2022-05-16 PROCEDURE — 99213 OFFICE O/P EST LOW 20 MIN: CPT | Mod: PBBFAC,PO | Performed by: NURSE PRACTITIONER

## 2022-05-16 PROCEDURE — 3008F PR BODY MASS INDEX (BMI) DOCUMENTED: ICD-10-PCS | Mod: CPTII,,, | Performed by: NURSE PRACTITIONER

## 2022-05-16 PROCEDURE — 1159F PR MEDICATION LIST DOCUMENTED IN MEDICAL RECORD: ICD-10-PCS | Mod: CPTII,,, | Performed by: NURSE PRACTITIONER

## 2022-05-16 PROCEDURE — 3008F BODY MASS INDEX DOCD: CPT | Mod: CPTII,,, | Performed by: NURSE PRACTITIONER

## 2022-05-16 RX ORDER — SODIUM, POTASSIUM,MAG SULFATES 17.5-3.13G
1 SOLUTION, RECONSTITUTED, ORAL ORAL DAILY
Qty: 1 KIT | Refills: 0 | Status: SHIPPED | OUTPATIENT
Start: 2022-05-16 | End: 2022-06-16

## 2022-05-16 NOTE — TELEPHONE ENCOUNTER
Patient is schedule to have an EGD and Colonoscopy on 07/14/2022 with Dr. Gillette. Patient needs cardiac clearance  to hold her Plavix five days prior her procedure.

## 2022-05-16 NOTE — PROGRESS NOTES
GASTROENTEROLOGY CLINIC NOTE    Chief Complaint: The encounter diagnosis was Anemia, unspecified type.  Referring provider/PCP: Sahara Carrillo MD    HPI:  Veronika Blackmon is a 62 y.o. female who is a new patient to me with a PMH that's significant for HTN, HLD, and h/o cardiac arrest. She is here today to establish care for anemia.  Anemia which is normocytic in nature was first noted following left heart catheterization and angiography.  Anemia persisted following procedures.  She denies fatigue, shortness of breath, unexplained weight loss, dizziness, cravings for ice, appetite changes, changes in bowel movements, melena, or hematochezia.  She is not taking any iron supplements.     Treatments Tried: None  NSAIDs: No  Anticoagulation or Antiplatelet: Plavix    Prior Upper Endoscopy: No  Prior Colonoscopy: No  Family h/o Colon Cancer: No  Family h/o Crohn's Disease or Ulcerative Colitis: No  Family h/o Celiac Sprue: No  Abdominal Surgeries: No    Review of Systems   Constitutional: Negative for weight loss.   HENT: Negative for sore throat.    Eyes: Negative for blurred vision.   Respiratory: Negative for cough.    Cardiovascular: Negative for chest pain.   Gastrointestinal: Negative for abdominal pain, blood in stool, constipation, diarrhea, heartburn, melena, nausea and vomiting.   Genitourinary: Negative for dysuria.   Musculoskeletal: Negative for myalgias.   Skin: Negative for rash.   Neurological: Negative for headaches.   Endo/Heme/Allergies: Negative for environmental allergies.   Psychiatric/Behavioral: Negative for suicidal ideas. The patient is not nervous/anxious.        Past Medical History: has no past medical history on file.    Past Surgical History: has a past surgical history that includes Left heart catheterization (Left, 12/14/2021) and Coronary angiography (N/A, 12/14/2021).    Family History:family history includes Heart attack (age of onset: 62) in her brother.    Allergies: Review of patient's  allergies indicates:  No Known Allergies    Social History: reports that she has never smoked. She has never used smokeless tobacco. She reports current alcohol use. She reports that she does not use drugs.    Home medications:   Current Outpatient Medications on File Prior to Visit   Medication Sig Dispense Refill    clopidogreL (PLAVIX) 75 mg tablet Take 1 tablet (75 mg total) by mouth once daily. 90 tablet 3    evolocumab (REPATHA SURECLICK) 140 mg/mL PnIj Inject 1 mL (140 mg total) into the skin every 14 (fourteen) days. 2 mL 6    hydrOXYzine HCL (ATARAX) 25 MG tablet Take 1 tablet (25 mg total) by mouth nightly as needed (insomnia). 60 tablet 1    losartan (COZAAR) 25 MG tablet Take 1 tablet (25 mg total) by mouth every evening. 90 tablet 3    metoprolol succinate (TOPROL-XL) 50 MG 24 hr tablet Take 1 tablet (50 mg total) by mouth every morning. 90 tablet 3    rosuvastatin (CRESTOR) 40 MG Tab Take 1 tablet (40 mg total) by mouth every evening. 90 tablet 3     No current facility-administered medications on file prior to visit.       Vital signs:  There were no vitals taken for this visit.    Physical Exam  Vitals reviewed.   Constitutional:       General: She is not in acute distress.     Appearance: Normal appearance. She is not ill-appearing.   HENT:      Head: Normocephalic.   Cardiovascular:      Rate and Rhythm: Normal rate and regular rhythm.      Heart sounds: Normal heart sounds. No murmur heard.  Pulmonary:      Effort: Pulmonary effort is normal. No respiratory distress.      Breath sounds: Normal breath sounds.   Chest:      Chest wall: No tenderness.   Abdominal:      General: Bowel sounds are normal. There is no distension.      Palpations: Abdomen is soft.      Tenderness: There is no abdominal tenderness. Negative signs include Santiago's sign.      Hernia: No hernia is present.   Skin:     General: Skin is warm.   Neurological:      Mental Status: She is alert and oriented to person, place,  and time.   Psychiatric:         Mood and Affect: Mood normal.         Behavior: Behavior normal.         Routine labs:  Lab Results   Component Value Date    WBC 15.39 (H) 01/27/2022    HGB 10.2 (L) 01/27/2022    HCT 23.7 (L) 01/27/2022    MCV 92 01/27/2022     01/27/2022     Lab Results   Component Value Date    INR 1.1 01/27/2022     Lab Results   Component Value Date    IRON 30 01/26/2022    FERRITIN 1,093 (H) 01/26/2022    TIBC 246 (L) 01/26/2022    FESATURATED 12 (L) 01/26/2022     Lab Results   Component Value Date     04/01/2022    K 3.4 (L) 04/01/2022     04/01/2022    CO2 28 04/01/2022    BUN 15 04/01/2022    CREATININE 1.0 04/01/2022     Lab Results   Component Value Date    ALBUMIN 2.9 (L) 01/27/2022    ALT 80 (H) 01/27/2022    AST 63 (H) 01/27/2022    ALKPHOS 101 01/27/2022    BILITOT 1.0 01/27/2022     No results found for: GLUCOSE  Lab Results   Component Value Date    TSH 1.154 11/30/2021     Lab Results   Component Value Date    CALCIUM 10.1 04/01/2022    PHOS 2.5 (L) 01/26/2022       Imaging:      I have reviewed prior labs, imaging, and notes.      Assessment:  1. Screening for colon cancer    2. Anemia, unspecified type        Plan:  Orders Placed This Encounter    sodium,potassium,mag sulfates (SUPREP BOWEL PREP KIT) 17.5-3.13-1.6 gram SolR    Case Request Endoscopy: COLONOSCOPY, EGD (ESOPHAGOGASTRODUODENOSCOPY)     EGD to further evaluate anemia.  Colonoscopy for colon cancer screening.  Suprep.  Start oral iron supplement with at least 65mg of elemental iron.   Clearance to hold Plavix.      Plan of care discussed with patient who is in agreement and verbalized understanding.     I have explained the planned procedures to the patient.The risks, benefits and alternatives of the procedure were also explained in detail. Patient verbalized understanding, all questions were answered. The patient agrees to proceed as planned    Follow Up:  As Needed Pending Above  Workup    Higher than average risk given patient is on anticoagulant and will need clearance to hold Plavix.      PRISCILLA Cruz,FNP-BC  [facility]

## 2022-05-16 NOTE — PATIENT INSTRUCTIONS
SUPREP Instructions    [facility] [address]  Clinic Office [phone]  Endoscopy Lab [phone]    You are scheduled for a Colonoscopy with Dr. Gillette  on 07/14/2022 at [facility] in [place].    Check in at the Hospital -1st floor, Information desk.   Call [phone] to reschedule.    An adult friend/family member must come with you to drive you home.  You cannot drive, take a taxi, Uber/Lyft or bus to leave the Endoscopy Center alone.  If you do not have someone to drive you home, your test will be cancelled.     Please follow the directions of your doctor if you take any pills that thin your blood. If you take these meds: Aggrenox, Brilinta, Effient, Eliquis, Lovenox, Plavix, Pletal, Pradaxa, Ticilid, Xarelto or Coumadin, let the doctor's office know.    DON'T: On the morning of the test do not take insulin or pills for diabetes.     DO: On the morning of the test, do take any pills for blood pressure, heart, anti-rejection and or seizures with a small sip of water. Bring any inhalers with you.    To have a good prep, you must follow these instructions - please do not use the directions from the pharmacy.    The doctor will send a prescription for the SUPREP.      The Day Before the test:    You can only drink CLEAR LIQUIDS the whole day before your test.  You can't eat any food for the whole day.    You CAN have:  Water, Coffee or decaf coffee (no milk or cream)  Tea  Soft drinks - regular and sugar free  Jello (green or yellow)  Apple Juice, white grape juice, white cranberry juice  Gatorade, Power Aid, Crystal Light, Mikhail Aid  Lemonade and Limeade  Bouillon, clear soup  Snowball, popsicles  YOU CAN'T DRINK ANYTHING RED, PURPLE ORANGE OR BLUE   YOU CAN'T DRINK ALCOHOL  ONLY DRINK WHAT IS ON THE LIST      At 5 pm the night before your test:    Pour the 1st bottle of SUPREP into the cup provided in the box. Add water to the line on the cup and mix well.  Drink the whole  cup and then drink 2 more full cups of water over 1 hour.  This can be easier to drink if it is cold. You can mix it 20 minutes ahead of time and place in the refrigerator before you drink it.  You must drink it within 30-45 minutes of mixing it.  Do NOT pour the drink over ice.  You can drink it with a straw.    The Day of the test - We will call you 2 days before your test to tell you what time to get there.    5 hours before you come to the hospital (this may be in the middle of the night)  Pour the 2nd bottle of SUPREP into the cup provided in the box. Add water to the line on the cup and mix well.  Drink the whole cup and then drink 2 more full cups of water over 1 hour.  It might be easier to drink if it is cold. You can mix it 20 minutes ahead of time and place in the refrigerator before you drink it.  You must drink it within 30-45 minutes of mixing it.  Do NOT pour the drink over ice.  You can drink it with a straw.    YOU CAN'T EAT OR DRINK ANYTHING ELSE ONCE YOU FINISH THE PREP    Leave all valuables and jewelry at home. You will be at the hospital for 2-4 hours.    Call the Endoscopy department at [phone] with any questions about your procedure.

## 2022-05-23 ENCOUNTER — TELEPHONE (OUTPATIENT)
Dept: GASTROENTEROLOGY | Facility: CLINIC | Age: 62
End: 2022-05-23
Payer: MEDICAID

## 2022-05-23 NOTE — TELEPHONE ENCOUNTER
Ok to hold plavix for 5 days prior and restart post.     Sincerely,  Jm Escobar MD.   Interventional Cardiologist  [facility] [place]

## 2022-05-23 NOTE — TELEPHONE ENCOUNTER
Spoke to patient to let her know that Dr. Edwardssef  Stated it was Ok to hold plavix for 5 days prior and restart post. Verbal understanding.

## 2022-05-24 ENCOUNTER — SPECIALTY PHARMACY (OUTPATIENT)
Dept: PHARMACY | Facility: CLINIC | Age: 62
End: 2022-05-24
Payer: MEDICAID

## 2022-05-24 NOTE — TELEPHONE ENCOUNTER
Specialty Pharmacy - Refill Coordination    Specialty Medication Orders Linked to Encounter    Flowsheet Row Most Recent Value   Medication #1 evolocumab (REPATHA SURECLICK) 140 mg/mL PnIj (Order#[id number], Rx#[id number])          Refill Questions - Documented Responses    Flowsheet Row Most Recent Value   Patient Availability and HIPAA Verification    Does patient want to proceed with activity? Yes   HIPAA/medical authority confirmed? Yes   Relationship to patient of person spoken to? Self   Refill Screening Questions    Would patient like to speak to a pharmacist? No   When does the patient need to receive the medication? 05/30/22   Refill Delivery Questions    How will the patient receive the medication? Delivery Prudence   When does the patient need to receive the medication? 05/30/22   Shipping Address Home   Address in [facility] confirmed and updated if neccessary? Yes   Expected Copay ($) 0   Is the patient able to afford the medication copay? Yes   Payment Method zero copay   Days supply of Refill 28   Supplies needed? No supplies needed   Refill activity completed? Yes   Refill activity plan Refill scheduled   Shipment/Pickup Date: 05/26/22          Current Outpatient Medications   Medication Sig    clopidogreL (PLAVIX) 75 mg tablet Take 1 tablet (75 mg total) by mouth once daily.    evolocumab (REPATHA SURECLICK) 140 mg/mL PnIj Inject 1 mL (140 mg total) into the skin every 14 (fourteen) days.    hydrOXYzine HCL (ATARAX) 25 MG tablet Take 1 tablet (25 mg total) by mouth nightly as needed (insomnia).    losartan (COZAAR) 25 MG tablet Take 1 tablet (25 mg total) by mouth every evening.    metoprolol succinate (TOPROL-XL) 50 MG 24 hr tablet Take 1 tablet (50 mg total) by mouth every morning.    rosuvastatin (CRESTOR) 40 MG Tab Take 1 tablet (40 mg total) by mouth every evening.    sodium,potassium,mag sulfates (SUPREP BOWEL PREP KIT) 17.5-3.13-1.6 gram SolR Take 177 mLs by mouth once daily.   Last  reviewed on 5/16/2022  8:13 AM by Angie Reyes-Ruiz, MA    Review of patient's allergies indicates:  No Known Allergies Last reviewed on  5/16/2022 8:13 AM by Angie Reyes-Ruiz      Tasks added this encounter   6/20/2022 - Refill Call (Auto Added)   Tasks due within next 3 months   No tasks due.     Livia Liz, Patient Care Assistant  [facility]  [address]  [facility] [address]  Phone: [phone]  Fax: [phone]

## 2022-06-06 ENCOUNTER — TELEPHONE (OUTPATIENT)
Dept: FAMILY MEDICINE | Facility: CLINIC | Age: 62
End: 2022-06-06
Payer: MEDICAID

## 2022-06-06 NOTE — TELEPHONE ENCOUNTER
Pt has been set up with a BP check and also given the number to digital medicine to assist with BP.

## 2022-06-06 NOTE — TELEPHONE ENCOUNTER
----- Message from Sahara Carrillo MD sent at 6/6/2022  7:23 AM CDT -----  Please have patient come in for nurse BP check. Last visit BP out of goal. Please give number for digital medicine sign up for her daughter to assist her in signing up.

## 2022-06-07 ENCOUNTER — CLINICAL SUPPORT (OUTPATIENT)
Dept: FAMILY MEDICINE | Facility: CLINIC | Age: 62
End: 2022-06-07
Payer: MEDICAID

## 2022-06-07 VITALS — SYSTOLIC BLOOD PRESSURE: 180 MMHG | DIASTOLIC BLOOD PRESSURE: 90 MMHG

## 2022-06-07 DIAGNOSIS — I10 HYPERTENSION, UNSPECIFIED TYPE: Primary | ICD-10-CM

## 2022-06-08 DIAGNOSIS — R94.39 ABNORMAL CARDIOVASCULAR STRESS TEST: ICD-10-CM

## 2022-06-08 RX ORDER — METOPROLOL SUCCINATE 50 MG/1
50 TABLET, EXTENDED RELEASE ORAL EVERY MORNING
Qty: 90 TABLET | Refills: 3 | Status: SHIPPED | OUTPATIENT
Start: 2022-06-08 | End: 2023-03-07 | Stop reason: SDUPTHER

## 2022-06-13 ENCOUNTER — TELEPHONE (OUTPATIENT)
Dept: CARDIOLOGY | Facility: CLINIC | Age: 62
End: 2022-06-13
Payer: MEDICAID

## 2022-06-13 ENCOUNTER — TELEPHONE (OUTPATIENT)
Dept: INTERNAL MEDICINE | Facility: CLINIC | Age: 62
End: 2022-06-13
Payer: MEDICAID

## 2022-06-13 ENCOUNTER — OFFICE VISIT (OUTPATIENT)
Dept: FAMILY MEDICINE | Facility: CLINIC | Age: 62
End: 2022-06-13
Payer: MEDICAID

## 2022-06-13 VITALS
OXYGEN SATURATION: 96 % | HEART RATE: 63 BPM | DIASTOLIC BLOOD PRESSURE: 90 MMHG | BODY MASS INDEX: 29.62 KG/M2 | SYSTOLIC BLOOD PRESSURE: 140 MMHG | TEMPERATURE: 98 F | HEIGHT: 64 IN | WEIGHT: 173.5 LBS

## 2022-06-13 DIAGNOSIS — J06.9 VIRAL URI WITH COUGH: Primary | ICD-10-CM

## 2022-06-13 LAB
CTP QC/QA: YES
SARS-COV-2 RDRP RESP QL NAA+PROBE: NEGATIVE

## 2022-06-13 PROCEDURE — 3080F PR MOST RECENT DIASTOLIC BLOOD PRESSURE >= 90 MM HG: ICD-10-PCS | Mod: CPTII,,, | Performed by: NURSE PRACTITIONER

## 2022-06-13 PROCEDURE — 3080F DIAST BP >= 90 MM HG: CPT | Mod: CPTII,,, | Performed by: NURSE PRACTITIONER

## 2022-06-13 PROCEDURE — 3077F PR MOST RECENT SYSTOLIC BLOOD PRESSURE >= 140 MM HG: ICD-10-PCS | Mod: CPTII,,, | Performed by: NURSE PRACTITIONER

## 2022-06-13 PROCEDURE — 4010F PR ACE/ARB THEARPY RXD/TAKEN: ICD-10-PCS | Mod: CPTII,,, | Performed by: NURSE PRACTITIONER

## 2022-06-13 PROCEDURE — 99213 OFFICE O/P EST LOW 20 MIN: CPT | Mod: S$PBB,,, | Performed by: NURSE PRACTITIONER

## 2022-06-13 PROCEDURE — 3077F SYST BP >= 140 MM HG: CPT | Mod: CPTII,,, | Performed by: NURSE PRACTITIONER

## 2022-06-13 PROCEDURE — 99213 PR OFFICE/OUTPT VISIT, EST, LEVL III, 20-29 MIN: ICD-10-PCS | Mod: S$PBB,,, | Performed by: NURSE PRACTITIONER

## 2022-06-13 PROCEDURE — 3008F BODY MASS INDEX DOCD: CPT | Mod: CPTII,,, | Performed by: NURSE PRACTITIONER

## 2022-06-13 PROCEDURE — 1159F PR MEDICATION LIST DOCUMENTED IN MEDICAL RECORD: ICD-10-PCS | Mod: CPTII,,, | Performed by: NURSE PRACTITIONER

## 2022-06-13 PROCEDURE — 1160F RVW MEDS BY RX/DR IN RCRD: CPT | Mod: CPTII,,, | Performed by: NURSE PRACTITIONER

## 2022-06-13 PROCEDURE — 1160F PR REVIEW ALL MEDS BY PRESCRIBER/CLIN PHARMACIST DOCUMENTED: ICD-10-PCS | Mod: CPTII,,, | Performed by: NURSE PRACTITIONER

## 2022-06-13 PROCEDURE — U0002 COVID-19 LAB TEST NON-CDC: HCPCS | Mod: PBBFAC,PO | Performed by: NURSE PRACTITIONER

## 2022-06-13 PROCEDURE — 3008F PR BODY MASS INDEX (BMI) DOCUMENTED: ICD-10-PCS | Mod: CPTII,,, | Performed by: NURSE PRACTITIONER

## 2022-06-13 PROCEDURE — 99214 OFFICE O/P EST MOD 30 MIN: CPT | Mod: PBBFAC,PO | Performed by: NURSE PRACTITIONER

## 2022-06-13 PROCEDURE — 99999 PR PBB SHADOW E&M-EST. PATIENT-LVL IV: ICD-10-PCS | Mod: PBBFAC,,, | Performed by: NURSE PRACTITIONER

## 2022-06-13 PROCEDURE — 99999 PR PBB SHADOW E&M-EST. PATIENT-LVL IV: CPT | Mod: PBBFAC,,, | Performed by: NURSE PRACTITIONER

## 2022-06-13 PROCEDURE — 4010F ACE/ARB THERAPY RXD/TAKEN: CPT | Mod: CPTII,,, | Performed by: NURSE PRACTITIONER

## 2022-06-13 PROCEDURE — 1159F MED LIST DOCD IN RCRD: CPT | Mod: CPTII,,, | Performed by: NURSE PRACTITIONER

## 2022-06-13 RX ORDER — BENZONATATE 100 MG/1
100 CAPSULE ORAL 3 TIMES DAILY PRN
Qty: 30 CAPSULE | Refills: 0 | Status: SHIPPED | OUTPATIENT
Start: 2022-06-13 | End: 2022-06-23

## 2022-06-13 RX ORDER — PROMETHAZINE HYDROCHLORIDE AND DEXTROMETHORPHAN HYDROBROMIDE 6.25; 15 MG/5ML; MG/5ML
5 SYRUP ORAL EVERY 4 HOURS PRN
Qty: 240 ML | Refills: 0 | Status: SHIPPED | OUTPATIENT
Start: 2022-06-13 | End: 2022-06-23

## 2022-06-13 NOTE — TELEPHONE ENCOUNTER
Spoke with patient she will be calling back to with information about life insurance she needs for paperwork to be filled out.

## 2022-06-13 NOTE — TELEPHONE ENCOUNTER
----- Message from Maddy Gonzalez sent at 6/13/2022 12:27 PM CDT -----  Contact: [phone]  Type:  Needs Medical Advice    Who Called: Pt   Reason for call; pt would like to speak with the nurse, pt states she called last week with no return call  Pharmacy name and phone #:  N/A  Would the patient rather a call back or a response via [facility]? Call back  Best Call Back Number: [phone]

## 2022-06-13 NOTE — TELEPHONE ENCOUNTER
----- Message from Martha Naranjo sent at 6/13/2022  8:07 AM CDT -----  Regarding: call back  Contact: [phone]  Type:  Same Day Appointment Request    Caller is requesting a same day appointment.  Caller declined first available appointment listed below.    Name of Caller: PT   When is the first available appointment? 7/1/22  Symptoms: Severe cough drip sinus infection   Best Call Back Number: [phone]  Additional Information:

## 2022-06-13 NOTE — PROGRESS NOTES
Subjective:       Patient ID: Veronika Blackmon is a 62 y.o. female.    Chief Complaint: Sinus Problem and Cough    This is a pleasant 61 yo female patient of Dr. Carrillo who is new to me. She presents today accompanied by her daughter with c/o URI symptoms. PMH includes    Patient Active Problem List:     RBBB     Atherosclerosis of native coronary artery of native heart     Familial hypercholesterolemia     Essential hypertension     Mixed hyperlipidemia     Prediabetes     History of cardiac arrest     Hypokalemia     Brain anoxic injury     Sinus bradycardia     Facial weakness     Tingling of left arm and left side of face     Transient cerebral ischemia     Anemia     Shock     Hx of CABG    BP elevated today, about the same with recheck. Denies chest pain, SOB, dyspnea, palpitations, visual changes, HA, dizziness, or N/V/D. Compliant with current medication regimen. Has been experiencing symptoms of productive cough, runny nose and postnasal drip x past 5 days that has gradually improved since onset. Denies fever, chills, body aches, ear pain, sore throat, wheezing, swollen glands, sinus pressure, decreased appetite, or N/V/D. Tolerating fluids and meals. No known sick contacts. Has tried taking Robitussin-PM and cough drops with mild relief. Also takes antihistamine regularly. Fully vaccinated against COVID-19. No other issues or complaints today.    Review of Systems   Constitutional: Negative for appetite change, chills, fatigue and fever.   HENT: Positive for postnasal drip and rhinorrhea. Negative for nasal congestion, ear discharge, ear pain, sinus pressure/congestion, sore throat and trouble swallowing.    Eyes: Negative for redness.   Respiratory: Positive for cough. Negative for chest tightness, shortness of breath and wheezing.    Cardiovascular: Negative for chest pain and palpitations.   Gastrointestinal: Negative for abdominal pain, diarrhea, nausea and vomiting.   Genitourinary: Negative for difficulty  urinating.   Musculoskeletal: Negative for gait problem and myalgias.   Neurological: Negative for dizziness, weakness, headaches, disturbances in coordination and coordination difficulties.   Hematological: Negative for adenopathy.         Objective:      Physical Exam  Vitals reviewed.   Constitutional:       General: She is awake. She is not in acute distress.     Appearance: Normal appearance. She is well-developed, well-groomed and overweight.   HENT:      Head: Normocephalic and atraumatic.      Right Ear: Tympanic membrane, ear canal and external ear normal.      Left Ear: Tympanic membrane, ear canal and external ear normal.      Nose: Rhinorrhea present. Rhinorrhea is clear.      Right Turbinates: Enlarged.      Left Turbinates: Enlarged.      Right Sinus: No maxillary sinus tenderness or frontal sinus tenderness.      Left Sinus: No maxillary sinus tenderness or frontal sinus tenderness.      Mouth/Throat:      Lips: Pink.      Pharynx: Uvula midline. No posterior oropharyngeal erythema.      Comments: Postnasal drip visualized  Eyes:      General:         Right eye: No discharge.         Left eye: No discharge.   Neck:      Vascular: No carotid bruit.   Cardiovascular:      Rate and Rhythm: Normal rate and regular rhythm.      Heart sounds: No murmur heard.  Pulmonary:      Effort: Pulmonary effort is normal. No respiratory distress.      Breath sounds: Normal breath sounds. No wheezing or rhonchi.   Abdominal:      General: There is no distension.   Musculoskeletal:      Right lower leg: No edema.      Left lower leg: No edema.   Lymphadenopathy:      Cervical: No cervical adenopathy.   Skin:     General: Skin is warm and dry.      Coloration: Skin is not pale.   Neurological:      Mental Status: She is alert and oriented to person, place, and time.      Coordination: Coordination normal.      Gait: Gait normal.   Psychiatric:         Attention and Perception: Attention normal.         Mood and Affect:  Mood and affect normal.         Speech: Speech normal.         Behavior: Behavior normal. Behavior is cooperative.         Thought Content: Thought content normal.         Assessment:       Problem List Items Addressed This Visit    None     Visit Diagnoses     Viral URI with cough    -  Primary    Relevant Medications    benzonatate (TESSALON) 100 MG capsule    promethazine-dextromethorphan (PROMETHAZINE-DM) 6.25-15 mg/5 mL Syrp    Other Relevant Orders    POCT COVID-19 Rapid Screening (Completed)          Plan:       Veronika was seen today for sinus problem and cough.    Diagnoses and all orders for this visit:    Viral URI with cough  -     POCT COVID-19 Rapid Screening  -     benzonatate (TESSALON) 100 MG capsule; Take 1 capsule (100 mg total) by mouth 3 (three) times daily as needed.  -     promethazine-dextromethorphan (PROMETHAZINE-DM) 6.25-15 mg/5 mL Syrp; Take 5 mLs by mouth every 4 (four) hours as needed (cough).        - COVID-19 testing done today: negative  - Likely viral URI requiring better symptomatic relief treatments  - May use Tessalon perles up to TID PRN cough, cough syrup at night as it may cause drowsiness  - May continue using antihistamine and cough drops  - Drink plenty fluids and eat as tolerated  - Hot fluids and humidifier may help  - Warning signs discussed  - RTC as needed if symptoms worsen or fail to improve          I spent a total of 20 minutes on the day of the visit.  This includes face to face time and non-face to face time preparing to see the patient (eg, review of tests), obtaining and/or reviewing separately obtained history, documenting clinical information in the electronic or other health record, independently interpreting results and communicating results to the patient/family/caregiver, or care coordinator.

## 2022-06-15 ENCOUNTER — TELEPHONE (OUTPATIENT)
Dept: FAMILY MEDICINE | Facility: CLINIC | Age: 62
End: 2022-06-15
Payer: MEDICAID

## 2022-06-15 NOTE — TELEPHONE ENCOUNTER
----- Message from Mayda Rendon sent at 6/15/2022 10:17 AM CDT -----  Contact: [phone]  Type:  Needs Medical Advice    Who Called: pt called   Symptoms (please be specific): Nose bleeding and rectal bleeding when wiping   How long has patient had these symptoms:  a few days  Would the patient rather a call back or a response via [facility]? Call back  Best Call Back Number: [phone]  Additional Information: Pt calling to discuss this with nurse. Please call pt to advice

## 2022-06-16 ENCOUNTER — LAB VISIT (OUTPATIENT)
Dept: LAB | Facility: HOSPITAL | Age: 62
End: 2022-06-16
Attending: FAMILY MEDICINE
Payer: MEDICAID

## 2022-06-16 ENCOUNTER — PATIENT OUTREACH (OUTPATIENT)
Dept: ADMINISTRATIVE | Facility: HOSPITAL | Age: 62
End: 2022-06-16
Payer: MEDICAID

## 2022-06-16 ENCOUNTER — OFFICE VISIT (OUTPATIENT)
Dept: FAMILY MEDICINE | Facility: CLINIC | Age: 62
End: 2022-06-16
Payer: MEDICAID

## 2022-06-16 VITALS
BODY MASS INDEX: 29.47 KG/M2 | DIASTOLIC BLOOD PRESSURE: 102 MMHG | SYSTOLIC BLOOD PRESSURE: 160 MMHG | HEART RATE: 67 BPM | WEIGHT: 172.63 LBS | HEIGHT: 64 IN | OXYGEN SATURATION: 98 %

## 2022-06-16 DIAGNOSIS — I10 ESSENTIAL HYPERTENSION: ICD-10-CM

## 2022-06-16 DIAGNOSIS — D64.9 ANEMIA, UNSPECIFIED TYPE: ICD-10-CM

## 2022-06-16 DIAGNOSIS — E87.6 HYPOKALEMIA: ICD-10-CM

## 2022-06-16 DIAGNOSIS — R04.0 BLEEDING FROM THE NOSE: Primary | ICD-10-CM

## 2022-06-16 LAB
ANION GAP SERPL CALC-SCNC: 10 MMOL/L (ref 8–16)
BASOPHILS # BLD AUTO: 0.06 K/UL (ref 0–0.2)
BASOPHILS NFR BLD: 0.8 % (ref 0–1.9)
BUN SERPL-MCNC: 15 MG/DL (ref 6–20)
CALCIUM SERPL-MCNC: 10.1 MG/DL (ref 8.7–10.5)
CHLORIDE SERPL-SCNC: 105 MMOL/L (ref 95–110)
CO2 SERPL-SCNC: 28 MMOL/L (ref 23–29)
CREAT SERPL-MCNC: 1.1 MG/DL (ref 0.5–1.4)
DIFFERENTIAL METHOD: ABNORMAL
EOSINOPHIL # BLD AUTO: 0.1 K/UL (ref 0–0.5)
EOSINOPHIL NFR BLD: 1.7 % (ref 0–8)
ERYTHROCYTE [DISTWIDTH] IN BLOOD BY AUTOMATED COUNT: 15.9 % (ref 11.5–14.5)
EST. GFR  (AFRICAN AMERICAN): >60 ML/MIN/1.73 M^2
EST. GFR  (NON AFRICAN AMERICAN): 54.7 ML/MIN/1.73 M^2
FERRITIN SERPL-MCNC: 156 NG/ML (ref 20–300)
GLUCOSE SERPL-MCNC: 81 MG/DL (ref 70–110)
HCT VFR BLD AUTO: 42.4 % (ref 37–48.5)
HGB BLD-MCNC: 13.4 G/DL (ref 12–16)
IMM GRANULOCYTES # BLD AUTO: 0.01 K/UL (ref 0–0.04)
IMM GRANULOCYTES NFR BLD AUTO: 0.1 % (ref 0–0.5)
IRON SERPL-MCNC: 58 UG/DL (ref 30–160)
LYMPHOCYTES # BLD AUTO: 4 K/UL (ref 1–4.8)
LYMPHOCYTES NFR BLD: 55.9 % (ref 18–48)
MCH RBC QN AUTO: 29.4 PG (ref 27–31)
MCHC RBC AUTO-ENTMCNC: 31.6 G/DL (ref 32–36)
MCV RBC AUTO: 93 FL (ref 82–98)
MONOCYTES # BLD AUTO: 0.6 K/UL (ref 0.3–1)
MONOCYTES NFR BLD: 8.5 % (ref 4–15)
NEUTROPHILS # BLD AUTO: 2.4 K/UL (ref 1.8–7.7)
NEUTROPHILS NFR BLD: 33 % (ref 38–73)
NRBC BLD-RTO: 0 /100 WBC
PLATELET # BLD AUTO: 306 K/UL (ref 150–450)
PMV BLD AUTO: 11.1 FL (ref 9.2–12.9)
POTASSIUM SERPL-SCNC: 3.6 MMOL/L (ref 3.5–5.1)
RBC # BLD AUTO: 4.56 M/UL (ref 4–5.4)
RETICS/RBC NFR AUTO: 1.1 % (ref 0.5–2.5)
SATURATED IRON: 15 % (ref 20–50)
SODIUM SERPL-SCNC: 143 MMOL/L (ref 136–145)
TOTAL IRON BINDING CAPACITY: 389 UG/DL (ref 250–450)
TRANSFERRIN SERPL-MCNC: 263 MG/DL (ref 200–375)
WBC # BLD AUTO: 7.14 K/UL (ref 3.9–12.7)

## 2022-06-16 PROCEDURE — 82728 ASSAY OF FERRITIN: CPT | Performed by: FAMILY MEDICINE

## 2022-06-16 PROCEDURE — 3008F BODY MASS INDEX DOCD: CPT | Mod: CPTII,,, | Performed by: FAMILY MEDICINE

## 2022-06-16 PROCEDURE — 80048 BASIC METABOLIC PNL TOTAL CA: CPT | Performed by: FAMILY MEDICINE

## 2022-06-16 PROCEDURE — 3080F PR MOST RECENT DIASTOLIC BLOOD PRESSURE >= 90 MM HG: ICD-10-PCS | Mod: CPTII,,, | Performed by: FAMILY MEDICINE

## 2022-06-16 PROCEDURE — 3077F PR MOST RECENT SYSTOLIC BLOOD PRESSURE >= 140 MM HG: ICD-10-PCS | Mod: CPTII,,, | Performed by: FAMILY MEDICINE

## 2022-06-16 PROCEDURE — 3080F DIAST BP >= 90 MM HG: CPT | Mod: CPTII,,, | Performed by: FAMILY MEDICINE

## 2022-06-16 PROCEDURE — 99999 PR PBB SHADOW E&M-EST. PATIENT-LVL III: ICD-10-PCS | Mod: PBBFAC,,, | Performed by: FAMILY MEDICINE

## 2022-06-16 PROCEDURE — 3008F PR BODY MASS INDEX (BMI) DOCUMENTED: ICD-10-PCS | Mod: CPTII,,, | Performed by: FAMILY MEDICINE

## 2022-06-16 PROCEDURE — 84466 ASSAY OF TRANSFERRIN: CPT | Performed by: FAMILY MEDICINE

## 2022-06-16 PROCEDURE — 85045 AUTOMATED RETICULOCYTE COUNT: CPT | Performed by: FAMILY MEDICINE

## 2022-06-16 PROCEDURE — 99213 OFFICE O/P EST LOW 20 MIN: CPT | Mod: PBBFAC,PO | Performed by: FAMILY MEDICINE

## 2022-06-16 PROCEDURE — 99999 PR PBB SHADOW E&M-EST. PATIENT-LVL III: CPT | Mod: PBBFAC,,, | Performed by: FAMILY MEDICINE

## 2022-06-16 PROCEDURE — 99212 PR OFFICE/OUTPT VISIT, EST, LEVL II, 10-19 MIN: ICD-10-PCS | Mod: S$PBB,,, | Performed by: FAMILY MEDICINE

## 2022-06-16 PROCEDURE — 4010F PR ACE/ARB THEARPY RXD/TAKEN: ICD-10-PCS | Mod: CPTII,,, | Performed by: FAMILY MEDICINE

## 2022-06-16 PROCEDURE — 3077F SYST BP >= 140 MM HG: CPT | Mod: CPTII,,, | Performed by: FAMILY MEDICINE

## 2022-06-16 PROCEDURE — 85025 COMPLETE CBC W/AUTO DIFF WBC: CPT | Performed by: FAMILY MEDICINE

## 2022-06-16 PROCEDURE — 99212 OFFICE O/P EST SF 10 MIN: CPT | Mod: S$PBB,,, | Performed by: FAMILY MEDICINE

## 2022-06-16 PROCEDURE — 4010F ACE/ARB THERAPY RXD/TAKEN: CPT | Mod: CPTII,,, | Performed by: FAMILY MEDICINE

## 2022-06-16 NOTE — PROGRESS NOTES
Subjective:       Patient ID: Veronika Blackmon is a 62 y.o. female.    Chief Complaint: Epistaxis (recta) and Rectal Bleeding    Patient Active Problem List   Diagnosis    RBBB    Atherosclerosis of native coronary artery of native heart    Familial hypercholesterolemia    Essential hypertension    Mixed hyperlipidemia    Prediabetes    History of cardiac arrest    Brain anoxic injury    Sinus bradycardia    Facial weakness    Transient cerebral ischemia    Shock    Hx of CABG    Stage 3a chronic kidney disease      HPI  61 yo female presents to discuss nose bleeding and blood when wiping. Reports multiple episodes over past weeks. Every 2-3 days. Resolves with pressure. Reports dryness in nose, no recent colds.   She is taking plavix.     Some elevated BP readings. No HA.    Review of Systems   All other systems reviewed and are negative.       Results for orders placed or performed in visit on 06/13/22   POCT COVID-19 Rapid Screening   Result Value Ref Range    POC Rapid COVID Negative Negative     Acceptable Yes      Objective:     Vitals:    06/16/22 1408   BP: (!) 160/102   Pulse: 67        Physical Exam  Vitals and nursing note reviewed.   Constitutional:       General: She is not in acute distress.     Appearance: Normal appearance. She is not ill-appearing, toxic-appearing or diaphoretic.   HENT:      Head: Normocephalic and atraumatic.   Eyes:      General: No scleral icterus.     Conjunctiva/sclera: Conjunctivae normal.   Cardiovascular:      Rate and Rhythm: Normal rate.      Heart sounds: Normal heart sounds.   Pulmonary:      Effort: Pulmonary effort is normal. No respiratory distress.      Breath sounds: Normal breath sounds.   Abdominal:      General: Bowel sounds are normal.   Skin:     Coloration: Skin is not pale.   Neurological:      Mental Status: She is alert. Mental status is at baseline.   Psychiatric:         Attention and Perception: Attention and perception normal.          Mood and Affect: Mood and affect normal.         Speech: Speech normal.         Behavior: Behavior normal.         Cognition and Memory: Cognition and memory normal.         Judgment: Judgment normal.         Assessment:       1. Bleeding from the nose    2. Essential hypertension        Plan:         Discussed taking meds consistently and prior to visit.   On visual exam, nothing to easily cauterize in anterior nasal area. Discussed if persistent, may discussed risks/benefits of stopping Plavix.     Discussed ENT referral for deeper exam, appears to be bleeding from same side and potentially same area. Patient deferred.   No allergy type symptoms discussed.     Patient opted for more conservative management with close follow up.   Vaseline applied twice daily into each nostril. Work on BP control. Continue plavix. Close follow up to see if  Bleeding continues.     Bleeding from the nose  -     Protime-INR; Future; Expected date: 06/16/2022    Essential hypertension      Patient's questions answered. Plan reviewed with patient at the end of visit. Relevant precautions to chief complaint and reasons to seek medical care or contact the office sooner reviewed with patient.     Follow up in about 2 weeks (around 6/30/2022) for Hypertension Follow-up (already scheduled).

## 2022-06-17 ENCOUNTER — PATIENT MESSAGE (OUTPATIENT)
Dept: ADMINISTRATIVE | Facility: HOSPITAL | Age: 62
End: 2022-06-17
Payer: MEDICAID

## 2022-06-17 ENCOUNTER — PATIENT OUTREACH (OUTPATIENT)
Dept: ADMINISTRATIVE | Facility: HOSPITAL | Age: 62
End: 2022-06-17
Payer: MEDICAID

## 2022-06-17 NOTE — PROGRESS NOTES
2022 Care Everywhere updates requested and reviewed.  Immunizations reconciled. Media reports reviewed.  Duplicate HM overrides and  orders removed.  Overdue HM topic chart audit and/or requested.  Overdue lab testing linked to upcoming lab appointments if applies.Portal outreached regarding Health maintenance   DIS reviewed     Health Maintenance Due   Topic Date Due    Mammogram  Never done    Shingles Vaccine (1 of 2) Never done    COVID-19 Vaccine (4 - Booster for Pfizer series) 2022

## 2022-06-20 ENCOUNTER — SPECIALTY PHARMACY (OUTPATIENT)
Dept: PHARMACY | Facility: CLINIC | Age: 62
End: 2022-06-20
Payer: MEDICAID

## 2022-06-20 NOTE — TELEPHONE ENCOUNTER
Specialty Pharmacy - Refill Coordination    Specialty Medication Orders Linked to Encounter    Flowsheet Row Most Recent Value   Medication #1 evolocumab (REPATHA SURECLICK) 140 mg/mL PnIj (Order#[id number], Rx#[id number])          Refill Questions - Documented Responses    Flowsheet Row Most Recent Value   Patient Availability and HIPAA Verification    Does patient want to proceed with activity? Yes   HIPAA/medical authority confirmed? Yes   Relationship to patient of person spoken to? Self   Refill Screening Questions    Would patient like to speak to a pharmacist? No   When does the patient need to receive the medication? 06/27/22   Refill Delivery Questions    How will the patient receive the medication? Delivery Prudence   When does the patient need to receive the medication? 06/27/22   Shipping Address Home   Address in [facility] confirmed and updated if neccessary? Yes   Expected Copay ($) 0   Payment Method zero copay   Days supply of Refill 28   Supplies needed? No supplies needed   Refill activity completed? Yes   Refill activity plan Refill scheduled   Shipment/Pickup Date: 06/23/22          Current Outpatient Medications   Medication Sig    benzonatate (TESSALON) 100 MG capsule Take 1 capsule (100 mg total) by mouth 3 (three) times daily as needed.    clopidogreL (PLAVIX) 75 mg tablet Take 1 tablet (75 mg total) by mouth once daily.    evolocumab (REPATHA SURECLICK) 140 mg/mL PnIj Inject 1 mL (140 mg total) into the skin every 14 (fourteen) days.    metoprolol succinate (TOPROL-XL) 50 MG 24 hr tablet Take 1 tablet (50 mg total) by mouth every morning.    promethazine-dextromethorphan (PROMETHAZINE-DM) 6.25-15 mg/5 mL Syrp Take 5 mLs by mouth every 4 (four) hours as needed (cough).    rosuvastatin (CRESTOR) 40 MG Tab Take 1 tablet (40 mg total) by mouth every evening.    valsartan-hydrochlorothiazide (DIOVAN-HCT) 160-12.5 mg per tablet Take 1 tablet by mouth once daily.   Last reviewed on  6/16/2022  2:47 PM by Sahara Carrillo MD    Review of patient's allergies indicates:  No Known Allergies Last reviewed on  6/16/2022 2:47 PM by Sahara Carrillo      Tasks added this encounter   7/18/2022 - Refill Call (Auto Added)   Tasks due within next 3 months   No tasks due.     Amy Arteaga, PharmD  [facility]  [address] [place] [address]  Phone: [phone]  Fax: [phone]

## 2022-06-28 ENCOUNTER — PATIENT OUTREACH (OUTPATIENT)
Dept: ADMINISTRATIVE | Facility: HOSPITAL | Age: 62
End: 2022-06-28
Payer: MEDICAID

## 2022-06-28 NOTE — PROGRESS NOTES
2022 Care Everywhere updates requested and reviewed.  Immunizations reconciled. Media reports reviewed.  Duplicate HM overrides and  orders removed.  Overdue HM topic chart audit and/or requested.  Overdue lab testing linked to upcoming lab appointments if applies.  Patient outreach regarding Health Maintenance- Patient states since cardiac surgery - chest remains sore requesting postponement until    DIS reviewed       Health Maintenance Due   Topic Date Due    Mammogram  Never done    Shingles Vaccine (1 of 2) Never done    COVID-19 Vaccine (4 - Booster for Pfizer series) 2022

## 2022-07-01 ENCOUNTER — OFFICE VISIT (OUTPATIENT)
Dept: FAMILY MEDICINE | Facility: CLINIC | Age: 62
End: 2022-07-01
Payer: MEDICAID

## 2022-07-01 VITALS
HEIGHT: 64 IN | HEART RATE: 79 BPM | BODY MASS INDEX: 29.77 KG/M2 | WEIGHT: 174.38 LBS | OXYGEN SATURATION: 100 % | SYSTOLIC BLOOD PRESSURE: 126 MMHG | DIASTOLIC BLOOD PRESSURE: 82 MMHG

## 2022-07-01 DIAGNOSIS — I10 ESSENTIAL HYPERTENSION: Primary | ICD-10-CM

## 2022-07-01 DIAGNOSIS — I25.10 ATHEROSCLEROSIS OF NATIVE CORONARY ARTERY OF NATIVE HEART WITHOUT ANGINA PECTORIS: ICD-10-CM

## 2022-07-01 DIAGNOSIS — R04.0 BLEEDING FROM THE NOSE: ICD-10-CM

## 2022-07-01 DIAGNOSIS — Z12.31 ENCOUNTER FOR SCREENING MAMMOGRAM FOR MALIGNANT NEOPLASM OF BREAST: ICD-10-CM

## 2022-07-01 DIAGNOSIS — Z95.1 HX OF CABG: Chronic | ICD-10-CM

## 2022-07-01 DIAGNOSIS — N18.31 STAGE 3A CHRONIC KIDNEY DISEASE: ICD-10-CM

## 2022-07-01 PROBLEM — E87.6 HYPOKALEMIA: Status: RESOLVED | Noted: 2022-01-24 | Resolved: 2022-07-01

## 2022-07-01 PROBLEM — D64.9 ANEMIA: Status: RESOLVED | Noted: 2022-01-27 | Resolved: 2022-07-01

## 2022-07-01 PROCEDURE — 99213 OFFICE O/P EST LOW 20 MIN: CPT | Mod: S$PBB,,, | Performed by: FAMILY MEDICINE

## 2022-07-01 PROCEDURE — 3074F SYST BP LT 130 MM HG: CPT | Mod: CPTII,,, | Performed by: FAMILY MEDICINE

## 2022-07-01 PROCEDURE — 3079F DIAST BP 80-89 MM HG: CPT | Mod: CPTII,,, | Performed by: FAMILY MEDICINE

## 2022-07-01 PROCEDURE — 3008F PR BODY MASS INDEX (BMI) DOCUMENTED: ICD-10-PCS | Mod: CPTII,,, | Performed by: FAMILY MEDICINE

## 2022-07-01 PROCEDURE — 1160F RVW MEDS BY RX/DR IN RCRD: CPT | Mod: CPTII,,, | Performed by: FAMILY MEDICINE

## 2022-07-01 PROCEDURE — 1159F MED LIST DOCD IN RCRD: CPT | Mod: CPTII,,, | Performed by: FAMILY MEDICINE

## 2022-07-01 PROCEDURE — 4010F PR ACE/ARB THEARPY RXD/TAKEN: ICD-10-PCS | Mod: CPTII,,, | Performed by: FAMILY MEDICINE

## 2022-07-01 PROCEDURE — 3074F PR MOST RECENT SYSTOLIC BLOOD PRESSURE < 130 MM HG: ICD-10-PCS | Mod: CPTII,,, | Performed by: FAMILY MEDICINE

## 2022-07-01 PROCEDURE — 99213 PR OFFICE/OUTPT VISIT, EST, LEVL III, 20-29 MIN: ICD-10-PCS | Mod: S$PBB,,, | Performed by: FAMILY MEDICINE

## 2022-07-01 PROCEDURE — 99999 PR PBB SHADOW E&M-EST. PATIENT-LVL III: ICD-10-PCS | Mod: PBBFAC,,, | Performed by: FAMILY MEDICINE

## 2022-07-01 PROCEDURE — 99213 OFFICE O/P EST LOW 20 MIN: CPT | Mod: PBBFAC,PO | Performed by: FAMILY MEDICINE

## 2022-07-01 PROCEDURE — 1160F PR REVIEW ALL MEDS BY PRESCRIBER/CLIN PHARMACIST DOCUMENTED: ICD-10-PCS | Mod: CPTII,,, | Performed by: FAMILY MEDICINE

## 2022-07-01 PROCEDURE — 1159F PR MEDICATION LIST DOCUMENTED IN MEDICAL RECORD: ICD-10-PCS | Mod: CPTII,,, | Performed by: FAMILY MEDICINE

## 2022-07-01 PROCEDURE — 4010F ACE/ARB THERAPY RXD/TAKEN: CPT | Mod: CPTII,,, | Performed by: FAMILY MEDICINE

## 2022-07-01 PROCEDURE — 99999 PR PBB SHADOW E&M-EST. PATIENT-LVL III: CPT | Mod: PBBFAC,,, | Performed by: FAMILY MEDICINE

## 2022-07-01 PROCEDURE — 3008F BODY MASS INDEX DOCD: CPT | Mod: CPTII,,, | Performed by: FAMILY MEDICINE

## 2022-07-01 PROCEDURE — 3079F PR MOST RECENT DIASTOLIC BLOOD PRESSURE 80-89 MM HG: ICD-10-PCS | Mod: CPTII,,, | Performed by: FAMILY MEDICINE

## 2022-07-01 RX ORDER — VALSARTAN AND HYDROCHLOROTHIAZIDE 320; 25 MG/1; MG/1
1 TABLET, FILM COATED ORAL DAILY
Qty: 90 TABLET | Refills: 3 | Status: SHIPPED | OUTPATIENT
Start: 2022-07-01 | End: 2022-10-04 | Stop reason: ALTCHOICE

## 2022-07-01 NOTE — PROGRESS NOTES
Subjective:       Patient ID: Veronika Blackmon is a 62 y.o. female.    Chief Complaint: Follow-up    Patient Active Problem List   Diagnosis    RBBB    Atherosclerosis of native coronary artery of native heart    Familial hypercholesterolemia    Essential hypertension    Mixed hyperlipidemia    Prediabetes    History of cardiac arrest    Brain anoxic injury    Sinus bradycardia    Facial weakness    Transient cerebral ischemia    Shock    Hx of CABG      HPI  63 yo female with history of CABG, CAD, HTN, preDM.   Presents today for follow up of HTN and nose bleeding on Plavix. Has since resolved and no ongoing issues while continuing with Plavix.   Taking BP meds:  Metoprolol succinate 50mg daily  Valsartan-HCTZ 160-12.5mg - 2 tabs po daily  Still taking Losartan 25 mg - Instructed to stop.     Otherwise taking crestor and plavix.    Functionally doing very well. Reports able to all prior home tasks and walks regularly without chest pain or dizziness. Building exercise capacity.     Review of Systems   All other systems reviewed and are negative.       Results for orders placed or performed in visit on 06/16/22   Protime-INR   Result Value Ref Range    Prothrombin Time 10.3 9.0 - 12.5 sec    INR 1.0 0.8 - 1.2     Objective:     Vitals:    07/01/22 0847   BP: 126/82   Pulse: 79        Physical Exam  Vitals and nursing note reviewed.   Constitutional:       General: She is not in acute distress.     Appearance: Normal appearance. She is not ill-appearing, toxic-appearing or diaphoretic.   HENT:      Head: Normocephalic and atraumatic.   Eyes:      General: No scleral icterus.     Conjunctiva/sclera: Conjunctivae normal.   Cardiovascular:      Rate and Rhythm: Normal rate.      Heart sounds: Normal heart sounds.   Pulmonary:      Effort: Pulmonary effort is normal. No respiratory distress.      Breath sounds: Normal breath sounds.   Abdominal:      General: Bowel sounds are normal.   Skin:     Coloration: Skin  is not pale.   Neurological:      Mental Status: She is alert. Mental status is at baseline.   Psychiatric:         Attention and Perception: Attention and perception normal.         Mood and Affect: Mood and affect normal.         Speech: Speech normal.         Behavior: Behavior normal.         Cognition and Memory: Cognition and memory normal.         Judgment: Judgment normal.         Assessment:       1. Essential hypertension    2. Bleeding from the nose    3. Hx of CABG    4. Atherosclerosis of native coronary artery of native heart without angina pectoris    5. Encounter for screening mammogram for malignant neoplasm of breast          Plan:         Essential hypertension  -     valsartan-hydrochlorothiazide (DIOVAN-HCT) 320-25 mg per tablet; Take 1 tablet by mouth once daily.  Dispense: 90 tablet; Refill: 3  - Reviewed meds. Controlled. Continue monitoring without Losartan.     Bleeding from the nose  - Resolved    Hx of CABG  Atherosclerosis of native coronary artery of native heart without angina pectoris  - Medical risk optimization.   Follow up with cardiology q 6 months    Encounter for screening mammogram for malignant neoplasm of breast  - Schedule    Patient's questions answered. Plan reviewed with patient at the end of visit. Relevant precautions to chief complaint and reasons to seek medical care or contact the office sooner reviewed with patient.     Follow up in about 3 months (around 10/1/2022) for Hypertension Follow-up.

## 2022-07-05 ENCOUNTER — TELEPHONE (OUTPATIENT)
Dept: FAMILY MEDICINE | Facility: CLINIC | Age: 62
End: 2022-07-05
Payer: MEDICAID

## 2022-07-05 NOTE — TELEPHONE ENCOUNTER
----- Message from Amy Corcoran sent at 7/5/2022  7:32 AM CDT -----  Contact: [phone]  Who Called: PT  Regarding: what date is she suppose to stop taking blood thinner   Would the patient rather a call back or a response via [facility]? Call back  Best Call Back Number: [phone]  Additional Information: n/a

## 2022-07-05 NOTE — TELEPHONE ENCOUNTER
Pt was contacted and informed to hold her blood thinner medication two days before her scheduled procedure, per Dr. Carrillo. Pt verbalized understanding.

## 2022-07-07 ENCOUNTER — IMMUNIZATION (OUTPATIENT)
Dept: INTERNAL MEDICINE | Facility: CLINIC | Age: 62
End: 2022-07-07
Payer: MEDICAID

## 2022-07-07 DIAGNOSIS — Z23 NEED FOR VACCINATION: Primary | ICD-10-CM

## 2022-07-07 PROCEDURE — 91305 COVID-19, MRNA, LNP-S, PF, 30 MCG/0.3 ML DOSE VACCINE (PFIZER): CPT | Mod: PBBFAC,PO

## 2022-07-12 ENCOUNTER — TELEPHONE (OUTPATIENT)
Dept: ENDOSCOPY | Facility: HOSPITAL | Age: 62
End: 2022-07-12
Payer: MEDICAID

## 2022-07-12 ENCOUNTER — TELEPHONE (OUTPATIENT)
Dept: GASTROENTEROLOGY | Facility: CLINIC | Age: 62
End: 2022-07-12
Payer: MEDICAID

## 2022-07-12 NOTE — TELEPHONE ENCOUNTER
EGD and Colonoscopy on Thursday, 7/14/22 cancelled; patient did not stop Plavix. Last dose was 7/12/22 Given number to call and reschedule. Message sent to Dr. Gillette and staff.

## 2022-07-12 NOTE — TELEPHONE ENCOUNTER
Patient had to reschedule because she did not follow instructions to hold Plavix x 5 days. Has Prep, Instructions resent. Vaccinated

## 2022-07-12 NOTE — TELEPHONE ENCOUNTER
Left voice and text messages instructing patient to call dept @ [phone] or [phone] between 8am-3pm.    Arrival time to be given @ 0915  EGD/Colon (Suprep)  Covid - boosted  (Message sent via [facility] portal)

## 2022-07-12 NOTE — TELEPHONE ENCOUNTER
----- Message from Elsi Wheat RN sent at 7/12/2022  2:11 PM CDT -----  Regarding: EGD and Colonoscopy on Thursday, 7/14/22  EGD and Colonoscopy on Thursday, 7/14/22 cancelled; patient did not stop Plavix. Last dose was 7/12/22 Given number to call and reschedule.

## 2022-07-19 ENCOUNTER — SPECIALTY PHARMACY (OUTPATIENT)
Dept: PHARMACY | Facility: CLINIC | Age: 62
End: 2022-07-19
Payer: MEDICAID

## 2022-07-19 NOTE — TELEPHONE ENCOUNTER
Spoke with patient regarding repatha refill.patient stated she injected on 7/18 and will be due on 8/1.asked pt if we can call next week and she agreed.

## 2022-07-22 ENCOUNTER — TELEPHONE (OUTPATIENT)
Dept: FAMILY MEDICINE | Facility: CLINIC | Age: 62
End: 2022-07-22
Payer: MEDICAID

## 2022-07-22 NOTE — TELEPHONE ENCOUNTER
----- Message from Love Montero sent at 7/22/2022  6:05 AM CDT -----  Regarding: same day appt  Contact: Pt  Type:  Same Day Appointment Request    Caller is requesting a same day appointment.    Name of Caller: Pt  When is the first available appointment?  Symptoms: Swollen Lip  Best Call Back Number: [phone]  Additional Information:  n/a

## 2022-07-22 NOTE — TELEPHONE ENCOUNTER
Returned patients call, who states she went to her dentist and states she was prescribed amoxicillin for 10 days. Patient was told the reason of her lip being swollen its because she needs a root canal.

## 2022-07-25 NOTE — TELEPHONE ENCOUNTER
Specialty Pharmacy - Refill Coordination    Specialty Medication Orders Linked to Encounter    Flowsheet Row Most Recent Value   Medication #1 evolocumab (REPATHA SURECLICK) 140 mg/mL PnIj (Order#[id number], Rx#[id number])          Refill Questions - Documented Responses    Flowsheet Row Most Recent Value   Patient Availability and HIPAA Verification    Does patient want to proceed with activity? Yes   HIPAA/medical authority confirmed? Yes   Relationship to patient of person spoken to? Self   Refill Screening Questions    Would patient like to speak to a pharmacist? No   When does the patient need to receive the medication? 08/01/22   Refill Delivery Questions    How will the patient receive the medication? Delivery Prudence   When does the patient need to receive the medication? 08/01/22   Shipping Address Home   Address in [facility] confirmed and updated if neccessary? Yes   Expected Copay ($) 0   Is the patient able to afford the medication copay? Yes   Payment Method zero copay   Days supply of Refill 28   Refill activity completed? Yes   Refill activity plan Refill scheduled   Shipment/Pickup Date: 07/28/22          Current Outpatient Medications   Medication Sig    clopidogreL (PLAVIX) 75 mg tablet Take 1 tablet (75 mg total) by mouth once daily.    evolocumab (REPATHA SURECLICK) 140 mg/mL PnIj Inject 1 mL (140 mg total) into the skin every 14 (fourteen) days.    metoprolol succinate (TOPROL-XL) 50 MG 24 hr tablet Take 1 tablet (50 mg total) by mouth every morning.    rosuvastatin (CRESTOR) 40 MG Tab Take 1 tablet (40 mg total) by mouth every evening.    valsartan-hydrochlorothiazide (DIOVAN-HCT) 320-25 mg per tablet Take 1 tablet by mouth once daily.   Last reviewed on 7/1/2022  9:05 AM by Sahara Carrillo MD    Review of patient's allergies indicates:  No Known Allergies Last reviewed on  7/1/2022 9:05 AM by Sahara Carrillo      Tasks added this encounter   8/22/2022 - Refill Call (Auto Added)   Tasks due  within next 3 months   No tasks due.     Anupama Montalvo, PharmD  [facility]  [address]  [facility] [address]  Phone: [phone]  Fax: [phone]

## 2022-07-27 ENCOUNTER — HOSPITAL ENCOUNTER (OUTPATIENT)
Dept: RADIOLOGY | Facility: HOSPITAL | Age: 62
Discharge: HOME | End: 2022-07-27
Attending: FAMILY MEDICINE
Payer: MEDICAID

## 2022-07-27 PROCEDURE — 77067 SCR MAMMO BI INCL CAD: CPT | Mod: TC

## 2022-07-27 PROCEDURE — 77063 MAMMO DIGITAL SCREENING BILAT WITH TOMO: ICD-10-PCS | Mod: 26,,, | Performed by: RADIOLOGY

## 2022-07-27 PROCEDURE — 77067 MAMMO DIGITAL SCREENING BILAT WITH TOMO: ICD-10-PCS | Mod: 26,,, | Performed by: RADIOLOGY

## 2022-07-27 PROCEDURE — 77067 SCR MAMMO BI INCL CAD: CPT | Mod: 26,,, | Performed by: RADIOLOGY

## 2022-07-27 PROCEDURE — 77063 BREAST TOMOSYNTHESIS BI: CPT | Mod: TC

## 2022-07-27 PROCEDURE — 77063 BREAST TOMOSYNTHESIS BI: CPT | Mod: 26,,, | Performed by: RADIOLOGY

## 2022-08-02 ENCOUNTER — TELEPHONE (OUTPATIENT)
Dept: FAMILY MEDICINE | Facility: CLINIC | Age: 62
End: 2022-08-02
Payer: MEDICAID

## 2022-08-02 NOTE — TELEPHONE ENCOUNTER
Patient states she has a colonoscopy on 09/07 and she is on blood thinner medication. Patient wants to know how many days prior to her procedure she should stop taking the blood thinner. Please advise

## 2022-08-02 NOTE — TELEPHONE ENCOUNTER
Called patient to explain she needs to stop Plavix 5 days before procedure. Restart the plavix the night of the procedure and take 2 that night. After that take one daily like usual. Patient understating

## 2022-08-02 NOTE — TELEPHONE ENCOUNTER
----- Message from Amy Corcoran sent at 8/2/2022  1:07 PM CDT -----  Contact: [phone]  Who Called: PT  Regarding: issue with  blood thinner   Would the patient rather a call back or a response via [facility]? Call back  Best Call Back Number: [phone]  Additional Information: n/a

## 2022-08-14 ENCOUNTER — NURSE TRIAGE (OUTPATIENT)
Dept: ADMINISTRATIVE | Facility: CLINIC | Age: 62
End: 2022-08-14
Payer: MEDICAID

## 2022-08-14 NOTE — TELEPHONE ENCOUNTER
"  Reason for Disposition   Patient sounds very sick or weak to the triager    Additional Information   Negative: Shock suspected (e.g., cold/pale/clammy skin, too weak to stand, low BP, rapid pulse)   Negative: [1] Similar pain previously AND [2] it was from "heart attack"   Negative: [1] Similar pain previously AND [2] it was from "angina" AND [3] not relieved by nitroglycerin   Negative: Sounds like a life-threatening emergency to the triager   Negative: Chest pain   Negative: Toothache followed tooth injury   Negative: Toothache or mouth pain after tooth extraction   Negative: Canker sore (i.e., aphthous ulcer)   Negative: Tongue is very swollen and tender   Negative: [1] Face is swollen AND [2] fever    Answer Assessment - Initial Assessment Questions  1. LOCATION: "Which tooth is hurting?"  (e.g., right-side/left-side, upper/lower, front/back)      Tooth pain at top started yest 8/13  2. ONSET: "When did the toothache start?"  (e.g., hours, days)       8/13  3. SEVERITY: "How bad is the toothache?"  (Scale 1-10; mild, moderate or severe)    - MILD (1-3): doesn't interfere with chewing     - MODERATE (4-7): interferes with chewing, interferes with normal activities, awakens from sleep      - SEVERE (8-10): unable to eat, unable to do any normal activities, excruciating pain        8  4. SWELLING: "Is there any visible swelling of your face?"     No   5. OTHER SYMPTOMS: "Do you have any other symptoms?" (e.g., fever)     afeb    Protocols used:  TOOTHACHE-A-  pt called re get something for toothache. pain started yest 8/13.afeb. no CP, no SOB.  Pt rates pain 8. rec ED as pt feeling sick and in bed. Pt agrees. Pt notified MD will not write abx or pain meds over the phone. Call back with questions.   "

## 2022-08-19 ENCOUNTER — LAB VISIT (OUTPATIENT)
Dept: LAB | Facility: HOSPITAL | Age: 62
End: 2022-08-19
Attending: FAMILY MEDICINE
Payer: MEDICAID

## 2022-08-19 DIAGNOSIS — I10 ESSENTIAL HYPERTENSION: ICD-10-CM

## 2022-08-19 LAB
ANION GAP SERPL CALC-SCNC: 13 MMOL/L (ref 8–16)
BUN SERPL-MCNC: 14 MG/DL (ref 6–20)
CALCIUM SERPL-MCNC: 9.8 MG/DL (ref 8.7–10.5)
CHLORIDE SERPL-SCNC: 106 MMOL/L (ref 95–110)
CO2 SERPL-SCNC: 21 MMOL/L (ref 23–29)
CREAT SERPL-MCNC: 1.3 MG/DL (ref 0.5–1.4)
EST. GFR  (NO RACE VARIABLE): 47 ML/MIN/1.73 M^2
GLUCOSE SERPL-MCNC: 105 MG/DL (ref 70–110)
POTASSIUM SERPL-SCNC: 3.9 MMOL/L (ref 3.5–5.1)
SODIUM SERPL-SCNC: 140 MMOL/L (ref 136–145)

## 2022-08-19 PROCEDURE — 80048 BASIC METABOLIC PNL TOTAL CA: CPT | Performed by: FAMILY MEDICINE

## 2022-08-19 PROCEDURE — 36415 COLL VENOUS BLD VENIPUNCTURE: CPT | Performed by: FAMILY MEDICINE

## 2022-08-22 ENCOUNTER — SPECIALTY PHARMACY (OUTPATIENT)
Dept: PHARMACY | Facility: CLINIC | Age: 62
End: 2022-08-22
Payer: MEDICAID

## 2022-08-22 NOTE — TELEPHONE ENCOUNTER
Specialty Pharmacy - Refill Coordination    Specialty Medication Orders Linked to Encounter    Flowsheet Row Most Recent Value   Medication #1 evolocumab (REPATHA SURECLICK) 140 mg/mL PnIj (Order#[id number], Rx#[id number])          Refill Questions - Documented Responses    Flowsheet Row Most Recent Value   Patient Availability and HIPAA Verification    Does patient want to proceed with activity? Yes   HIPAA/medical authority confirmed? Yes   Relationship to patient of person spoken to? Self   Refill Screening Questions    Would patient like to speak to a pharmacist? No   When does the patient need to receive the medication? 09/01/22   Refill Delivery Questions    How will the patient receive the medication? Delivery Prudence   When does the patient need to receive the medication? 09/01/22   Shipping Address Home   Address in [facility] confirmed and updated if neccessary? Yes   Expected Copay ($) 0   Is the patient able to afford the medication copay? Yes   Payment Method zero copay   Days supply of Refill 28   Supplies needed? No supplies needed   Refill activity completed? Yes   Refill activity plan Refill scheduled   Shipment/Pickup Date: 08/25/22          Current Outpatient Medications   Medication Sig    clopidogreL (PLAVIX) 75 mg tablet Take 1 tablet (75 mg total) by mouth once daily.    evolocumab (REPATHA SURECLICK) 140 mg/mL PnIj Inject 1 mL (140 mg total) into the skin every 14 (fourteen) days.    metoprolol succinate (TOPROL-XL) 50 MG 24 hr tablet Take 1 tablet (50 mg total) by mouth every morning.    rosuvastatin (CRESTOR) 40 MG Tab Take 1 tablet (40 mg total) by mouth every evening.    valsartan-hydrochlorothiazide (DIOVAN-HCT) 320-25 mg per tablet Take 1 tablet by mouth once daily.   Last reviewed on 7/1/2022  9:05 AM by Sahara Carrillo MD    Review of patient's allergies indicates:  No Known Allergies Last reviewed on  7/27/2022 9:55 AM by Kiran Kendrick      Tasks added this encounter   9/22/2022  - Refill Call (Auto Added)   Tasks due within next 3 months   No tasks due.     Amy Arteaga, PharmD  [facility]  [address]  [facility] [address]  Phone: [phone]  Fax: [phone]

## 2022-08-26 ENCOUNTER — SPECIALTY PHARMACY (OUTPATIENT)
Dept: PHARMACY | Facility: CLINIC | Age: 62
End: 2022-08-26
Payer: MEDICAID

## 2022-08-26 NOTE — TELEPHONE ENCOUNTER
Incoming call from patient reporting muscle pain with Repatha injection and inquired if she can apply a heating pad to her arm. Verified that patient is not self-injecting into her arm and a caregiver is administering it. Advised that the tops of the thigh and lower abdomen are alternative sites of injection if self-injecting. Patient states that she prefers to inject in her arm. Advised that if injecting in the arm, caregiver must inject in the back of the upper arm in the fatty tissue layer of the skin and to make sure to avoid the muscle. Also advised that heating pad may help alleviate muscle pain. Patient verbalized understanding and had no further questions.

## 2022-08-26 NOTE — TELEPHONE ENCOUNTER
Incoming call from pt. Shipment received. Per pt last dose of repatha given 8/15, confirmed next dose due 8/29

## 2022-08-29 ENCOUNTER — SPECIALTY PHARMACY (OUTPATIENT)
Dept: PHARMACY | Facility: CLINIC | Age: 62
End: 2022-08-29
Payer: MEDICAID

## 2022-08-29 NOTE — TELEPHONE ENCOUNTER
Incoming call from patient regarding Repatha injection. Patient stated caregiver attempted to administer Repatha in the back of upper arm and the medication was not released. Went over administration with patient again. Patient has another pen on hand. Advised patient to contact the  for a replacement and to inject the second pen if  is sending a new replacement for Repatha. Patient verbalized understanding, will follow up.

## 2022-09-02 ENCOUNTER — TELEPHONE (OUTPATIENT)
Dept: INTERNAL MEDICINE | Facility: CLINIC | Age: 62
End: 2022-09-02
Payer: MEDICAID

## 2022-09-02 ENCOUNTER — TELEPHONE (OUTPATIENT)
Dept: ENDOSCOPY | Facility: HOSPITAL | Age: 62
End: 2022-09-02
Payer: MEDICAID

## 2022-09-02 NOTE — TELEPHONE ENCOUNTER
----- Message from Dash Mclean sent at 9/1/2022  5:22 PM CDT -----  Contact: pt  Type: Requesting to speak with nurse        Who Called: PT  Regarding: would like a call back. She would like to know when to stop taking her blood thinner prior to surgery coming up on the 7th   Would the patient rather a call back or a response via [facility]? Call back  Best Call Back Number: [phone]  Additional Information:

## 2022-09-02 NOTE — TELEPHONE ENCOUNTER
Spoke with patient about arrival time @ 0845.   Covid test = Boosted    Prep instructions reviewed: the day before the procedure, follow a clear liquid diet all day, then start the first 1/2 of prep at 5pm and take 2nd 1/2 of prep @ 0330.  Pt must be completely NPO when prep completed @ 0530.              Medications: Do not take Insulin or oral diabetic medications the day of the procedure.  Take as prescribed: heart, seizure and blood pressure medication in the morning with a sip of water (less than an ounce).  Take any breathing medications and bring inhalers to hospital with you Leave all valuables and jewelry at home.     Wear comfortable clothes to procedure to change into hospital gown You cannot drive for 24 hours after your procedure because you will receive sedation for your procedure to make you comfortable.  A ride must be provided at discharge.

## 2022-09-06 ENCOUNTER — TELEPHONE (OUTPATIENT)
Dept: GASTROENTEROLOGY | Facility: CLINIC | Age: 62
End: 2022-09-06
Payer: MEDICAID

## 2022-09-06 NOTE — TELEPHONE ENCOUNTER
Spoke to patient she stated that she made a mistake and took her blood thinner instead of stopping it 5 days prior reschedule patient to have her procedure on 10/12/2022 with Dr. Ramírez verbal understanding.

## 2022-09-09 NOTE — TELEPHONE ENCOUNTER
Outgoing call to patient to follow up on replacement for Repatha. Patient stated she was able to get pen to inject on 8/29 and has 1 remaining for the next injection. She did not have to call for replacement. Informed patient [facility] will reach out to her on 9/19 for a refill call. Patient had no further questions.

## 2022-09-12 ENCOUNTER — TELEPHONE (OUTPATIENT)
Dept: CARDIOLOGY | Facility: CLINIC | Age: 62
End: 2022-09-12
Payer: MEDICAID

## 2022-09-12 NOTE — TELEPHONE ENCOUNTER
----- Message from Laura oHran sent at 9/12/2022  8:07 AM CDT -----  Type:  Needs Medical Advice    Who Called:  pt  Symptoms (please be specific):  would like to get a call back to schedule follow up appt with  In [place] location      Would the patient rather a call back or a response via [facility]?  Please call  Best Call Back Number:  [phone]   Additional Information:  pt said this is her 3rd attempt to get Scheduled for visit

## 2022-09-14 ENCOUNTER — TELEPHONE (OUTPATIENT)
Dept: CARDIOLOGY | Facility: CLINIC | Age: 62
End: 2022-09-14
Payer: MEDICAID

## 2022-09-14 ENCOUNTER — TELEPHONE (OUTPATIENT)
Dept: INTERNAL MEDICINE | Facility: CLINIC | Age: 62
End: 2022-09-14
Payer: MEDICAID

## 2022-09-14 NOTE — TELEPHONE ENCOUNTER
----- Message from Gen Zhao sent at 9/14/2022  4:16 PM CDT -----    Type:  Patient Requesting Referral    Who Called:Pt  Does the patient already have the specialty appointment scheduled?:yes  If yes, what is the date of that appointment?: 11/10/2022  Referral to What Specialty:cardiology   Reason for Referral: est care , follow up  Does the patient want the referral with a specific physician?:Dr Araceli Beltran    [facility] the specialist [facility] or [facility]  Patient Requesting a Response?:yes  Would the patient rather a call back or a response via [facility]? call  Best Call Back Number:[phone]  Additional Information:

## 2022-09-14 NOTE — TELEPHONE ENCOUNTER
Pt requested a 6mon.f/u appt w Dr. Escobar however no available appts in [place] next available at [facility] for November 1st     Pt declined appt and will reach out to [facility] for an appt sooner if possible           Verbalized understanding  JA

## 2022-09-14 NOTE — TELEPHONE ENCOUNTER
----- Message from Martha Naranjo sent at 9/14/2022  8:50 AM CDT -----  Regarding: call back  Contact: [phone]  Type:  Sooner Apoointment Request    Caller is requesting a sooner appointment.  Caller declined first available appointment listed below.  Caller will not accept being placed on the waitlist and is requesting a message be sent to doctor.  Name of Caller: PT   When is the first available appointment? Scheduled not open patient has Medicaid   Symptoms: 6 months follow up   Would the patient rather a call back or a response via [facility]? Call back   Best Call Back Number: [phone]  Additional Information:

## 2022-09-14 NOTE — TELEPHONE ENCOUNTER
Patient an referral for a new cardiologist due to one she is seeing now no longer accepts her insurance.

## 2022-09-19 ENCOUNTER — TELEPHONE (OUTPATIENT)
Dept: PHARMACY | Facility: CLINIC | Age: 62
End: 2022-09-19
Payer: MEDICAID

## 2022-09-19 NOTE — TELEPHONE ENCOUNTER
Patient took last injection today, 9/19. Next injection due 10/3. Pending refill call out accordingly.

## 2022-09-26 ENCOUNTER — SPECIALTY PHARMACY (OUTPATIENT)
Dept: PHARMACY | Facility: CLINIC | Age: 62
End: 2022-09-26
Payer: MEDICAID

## 2022-09-26 NOTE — TELEPHONE ENCOUNTER
Specialty Pharmacy - Refill Coordination    Specialty Medication Orders Linked to Encounter      Flowsheet Row Most Recent Value   Medication #1 evolocumab (REPATHA SURECLICK) 140 mg/mL PnIj (Order#[id number], Rx#[id number])            Refill Questions - Documented Responses      Flowsheet Row Most Recent Value   Patient Availability and HIPAA Verification    Does patient want to proceed with activity? Yes   HIPAA/medical authority confirmed? Yes   Relationship to patient of person spoken to? Self   Refill Screening Questions    Would patient like to speak to a pharmacist? No   When does the patient need to receive the medication? 10/03/22   Refill Delivery Questions    How will the patient receive the medication? Delivery Prudence   When does the patient need to receive the medication? 10/03/22   Shipping Address Home   Address in [facility] confirmed and updated if neccessary? Yes   Expected Copay ($) 0   Is the patient able to afford the medication copay? Yes   Payment Method zero copay   Days supply of Refill 28   Supplies needed? No supplies needed   Refill activity completed? Yes   Refill activity plan Refill scheduled   Shipment/Pickup Date: 09/27/22            Current Outpatient Medications   Medication Sig    clopidogreL (PLAVIX) 75 mg tablet Take 1 tablet (75 mg total) by mouth once daily.    evolocumab (REPATHA SURECLICK) 140 mg/mL PnIj Inject 1 mL (140 mg total) into the skin every 14 (fourteen) days.    metoprolol succinate (TOPROL-XL) 50 MG 24 hr tablet Take 1 tablet (50 mg total) by mouth every morning.    rosuvastatin (CRESTOR) 40 MG Tab Take 1 tablet (40 mg total) by mouth every evening.    valsartan-hydrochlorothiazide (DIOVAN-HCT) 320-25 mg per tablet Take 1 tablet by mouth once daily.   Last reviewed on 7/1/2022  9:05 AM by Sahara Carrillo MD    Review of patient's allergies indicates:  No Known Allergies Last reviewed on  7/27/2022 9:55 AM by Kiran Kendrick      Tasks added this encounter   No  tasks added.   Tasks due within next 3 months   9/19/2022 - Refill Call (Auto Added)     [facility]  [address]  [facility] [address]  Phone: [phone]  Fax: [phone]

## 2022-10-04 ENCOUNTER — OFFICE VISIT (OUTPATIENT)
Dept: FAMILY MEDICINE | Facility: HOSPITAL | Age: 62
End: 2022-10-04
Payer: MEDICAID

## 2022-10-04 VITALS
WEIGHT: 170 LBS | DIASTOLIC BLOOD PRESSURE: 64 MMHG | HEART RATE: 42 BPM | SYSTOLIC BLOOD PRESSURE: 93 MMHG | HEIGHT: 64 IN | BODY MASS INDEX: 29.02 KG/M2

## 2022-10-04 DIAGNOSIS — I10 ESSENTIAL HYPERTENSION: Primary | ICD-10-CM

## 2022-10-04 DIAGNOSIS — L91.0 KELOID SCAR: ICD-10-CM

## 2022-10-04 PROCEDURE — 99213 OFFICE O/P EST LOW 20 MIN: CPT | Performed by: STUDENT IN AN ORGANIZED HEALTH CARE EDUCATION/TRAINING PROGRAM

## 2022-10-04 RX ORDER — VALSARTAN AND HYDROCHLOROTHIAZIDE 320; 12.5 MG/1; MG/1
1 TABLET, FILM COATED ORAL DAILY
Qty: 30 TABLET | Refills: 11 | Status: SHIPPED | OUTPATIENT
Start: 2022-10-04 | End: 2022-10-31

## 2022-10-04 NOTE — PROGRESS NOTES
"  History & Physical  [facility]      SUBJECTIVE:     History of Present Illness:  Patient is a 62 y.o. female with PMH cardiac arrest with anoxic brain injury s/p CABG January 2022 on plavix (established with cardiology), Pre-DM, HTN, HLD, CKD 3 presenting for follow-up for HTN. Patient is currently seeing Dr. Carrillo for primary care, is here today as Dr. Carrillo is on maternity leave. Plans to continue her care with Dr. Carrillo.     HTN: BP at home /60-80's. Will have intermittent readings that are higher than this after going for a walk but most are low if not intermittently lower than this. Has some lightheadedness but reports it is typically related to not having eaten yet. No syncope, LE edema, SOB or chest pain.     Scar: Pt reports her sternotomy scar is very itchy. Cortisone cream has not helped. Her abdominal incisions are also somewhat itchy and bothersome and don't appear to have healed appropriately.       Review of patient's allergies indicates:  No Known Allergies    Past Medical History:   Diagnosis Date    Acute hypoxemic respiratory failure     Anemia 1/27/2022     Past Surgical History:   Procedure Laterality Date    CORONARY ANGIOGRAPHY N/A 12/14/2021    Procedure: ANGIOGRAM, CORONARY ARTERY;  Surgeon: Jm Escobar MD;  Location: [facility] CATH LAB/EP;  Service: Cardiology;  Laterality: N/A;    LEFT HEART CATHETERIZATION Left 12/14/2021    Procedure: Left heart cath;  Surgeon: Jm Escobar MD;  Location: [facility] CATH LAB/EP;  Service: Cardiology;  Laterality: Left;     Family History   Problem Relation Age of Onset    Heart attack Brother 62     Social History     Tobacco Use    Smoking status: Never    Smokeless tobacco: Never   Substance Use Topics    Alcohol use: Yes    Drug use: No        OBJECTIVE:     Vital Signs (Most Recent)  Vitals:    10/04/22 0948   Weight: 77.1 kg (169 lb 15.6 oz)   Height: 5' 4" (1.626 m)     BMI: 29.18    Gen: No apparent distress, well nourished and " developed, appears stated age  CV: RRR, S1 and S2 present, no LE edema  Resp: CTAB, normal respiratory effort  Skin: Keloid over abdominal and sternotomy scar. All wounds have healed appropriately.       ASSESSMENT/PLAN:   62 y.o.female presents to clinic for follow-up of HTN    1. Essential hypertension  Will change combo pill to reduce dose of HCTZ as BP too low at this time. Recommend continued home monitoring. Pt verbalized understanding and was in agreement with the plan.    - valsartan-hydrochlorothiazide (DIOVAN-HCT) 320-12.5 mg per tablet; Take 1 tablet by mouth once daily.  Dispense: 30 tablet; Refill: 11    2. Keloid scar  Discussed that it appears all of her surgical scars have developed keloids. Recommend patient continue to moisturize. Can inject with corticosteroid to attempt to reduce size of keloids. Offered atarax for itching, patient declined today. Would like to continue OTC creams for now.     Follow-up: 1-2 months with PCP     A total of 35 minutes was spent on patient care during this encounter which included chart review, examining the patient, formulating a treatment plan and documentation.     Medical decision making straight forward and not complex during this visit.     Romie Levy DO  [facility], PGY-3

## 2022-10-10 ENCOUNTER — TELEPHONE (OUTPATIENT)
Dept: FAMILY MEDICINE | Facility: CLINIC | Age: 62
End: 2022-10-10
Payer: MEDICAID

## 2022-10-10 ENCOUNTER — TELEPHONE (OUTPATIENT)
Dept: ENDOSCOPY | Facility: HOSPITAL | Age: 62
End: 2022-10-10
Payer: MEDICAID

## 2022-10-10 NOTE — TELEPHONE ENCOUNTER
----- Message from Gen Zhao sent at 10/10/2022  3:17 PM CDT -----  Type:  Needs Medical Advice    Who Called:  pt  Symptoms (please be specific): Pt Needs to cancel request to schedule Appt

## 2022-10-10 NOTE — TELEPHONE ENCOUNTER
----- Message from Gen Zhao sent at 10/10/2022  2:57 PM CDT -----  Type:  Needs Medical Advice    Who Called:  Pt  Would the patient rather a call back or a response via [facility]? call  Best Call Back Number:[phone]   Additional Information:  pt of Sahara Carrillo and needs to be seen for a 3 month f/u for HTN and would like to be seen please call to schedule

## 2022-10-10 NOTE — TELEPHONE ENCOUNTER
Spoke with patient about arrival time @ 1100.   Covid test = boosted    Prep instructions reviewed: the day before the procedure, follow a clear liquid diet all day, then start the first 1/2 of prep at 5pm and take 2nd 1/2 of prep @ 0600.  Pt must be completely NPO when prep completed @ 0800.              Medications: Do not take Insulin or oral diabetic medications the day of the procedure.  Take as prescribed: heart, seizure and blood pressure medication in the morning with a sip of water (less than an ounce).  Take any breathing medications and bring inhalers to hospital with you Leave all valuables and jewelry at home.     Wear comfortable clothes to procedure to change into hospital gown You cannot drive for 24 hours after your procedure because you will receive sedation for your procedure to make you comfortable.  A ride must be provided at discharge.

## 2022-10-11 ENCOUNTER — TELEPHONE (OUTPATIENT)
Dept: ENDOSCOPY | Facility: HOSPITAL | Age: 62
End: 2022-10-11
Payer: MEDICAID

## 2022-10-12 ENCOUNTER — ANESTHESIA EVENT (OUTPATIENT)
Dept: ENDOSCOPY | Facility: HOSPITAL | Age: 62
End: 2022-10-12
Payer: MEDICAID

## 2022-10-12 ENCOUNTER — HOSPITAL ENCOUNTER (OUTPATIENT)
Facility: HOSPITAL | Age: 62
Discharge: HOME | End: 2022-10-12
Attending: INTERNAL MEDICINE | Admitting: INTERNAL MEDICINE
Payer: MEDICAID

## 2022-10-12 ENCOUNTER — ANESTHESIA (OUTPATIENT)
Dept: ENDOSCOPY | Facility: HOSPITAL | Age: 62
End: 2022-10-12
Payer: MEDICAID

## 2022-10-12 VITALS
RESPIRATION RATE: 14 BRPM | HEIGHT: 64 IN | DIASTOLIC BLOOD PRESSURE: 80 MMHG | WEIGHT: 169 LBS | BODY MASS INDEX: 28.85 KG/M2 | TEMPERATURE: 97 F | OXYGEN SATURATION: 100 % | HEART RATE: 64 BPM | SYSTOLIC BLOOD PRESSURE: 110 MMHG

## 2022-10-12 DIAGNOSIS — Z12.11 COLON CANCER SCREENING: ICD-10-CM

## 2022-10-12 PROCEDURE — 88342 IMHCHEM/IMCYTCHM 1ST ANTB: CPT | Performed by: PATHOLOGY

## 2022-10-12 PROCEDURE — 88305 TISSUE EXAM BY PATHOLOGIST: CPT | Mod: 26,,, | Performed by: PATHOLOGY

## 2022-10-12 PROCEDURE — 27201089 HC SNARE, DISP (ANY): Performed by: INTERNAL MEDICINE

## 2022-10-12 PROCEDURE — 88312 SPECIAL STAINS GROUP 1: CPT | Mod: 26,,, | Performed by: PATHOLOGY

## 2022-10-12 PROCEDURE — 88342 CHG IMMUNOCYTOCHEMISTRY: ICD-10-PCS | Mod: 26,,, | Performed by: PATHOLOGY

## 2022-10-12 PROCEDURE — 43239 EGD BIOPSY SINGLE/MULTIPLE: CPT | Mod: 51,,, | Performed by: INTERNAL MEDICINE

## 2022-10-12 PROCEDURE — 25000003 PHARM REV CODE 250: Performed by: INTERNAL MEDICINE

## 2022-10-12 PROCEDURE — 43239 EGD BIOPSY SINGLE/MULTIPLE: CPT | Performed by: INTERNAL MEDICINE

## 2022-10-12 PROCEDURE — 43239 PR EGD, FLEX, W/BIOPSY, SGL/MULTI: ICD-10-PCS | Mod: 51,,, | Performed by: INTERNAL MEDICINE

## 2022-10-12 PROCEDURE — 88312 PR  SPECIAL STAINS,GROUP I: ICD-10-PCS | Mod: 26,,, | Performed by: PATHOLOGY

## 2022-10-12 PROCEDURE — 88305 TISSUE EXAM BY PATHOLOGIST: CPT | Mod: 59 | Performed by: PATHOLOGY

## 2022-10-12 PROCEDURE — 45380 COLONOSCOPY AND BIOPSY: CPT | Mod: ,,, | Performed by: INTERNAL MEDICINE

## 2022-10-12 PROCEDURE — 00731 ANES UPR GI NDSC PX NOS: CPT | Performed by: INTERNAL MEDICINE

## 2022-10-12 PROCEDURE — 37000009 HC ANESTHESIA EA ADD 15 MINS: Performed by: INTERNAL MEDICINE

## 2022-10-12 PROCEDURE — 45380 PR COLONOSCOPY,BIOPSY: ICD-10-PCS | Mod: ,,, | Performed by: INTERNAL MEDICINE

## 2022-10-12 PROCEDURE — 27201012 HC FORCEPS, HOT/COLD, DISP: Performed by: INTERNAL MEDICINE

## 2022-10-12 PROCEDURE — 88305 TISSUE EXAM BY PATHOLOGIST: ICD-10-PCS | Mod: 26,,, | Performed by: PATHOLOGY

## 2022-10-12 PROCEDURE — 88312 SPECIAL STAINS GROUP 1: CPT | Performed by: PATHOLOGY

## 2022-10-12 PROCEDURE — 37000008 HC ANESTHESIA 1ST 15 MINUTES: Performed by: INTERNAL MEDICINE

## 2022-10-12 PROCEDURE — 45380 COLONOSCOPY AND BIOPSY: CPT | Performed by: INTERNAL MEDICINE

## 2022-10-12 PROCEDURE — 25000003 PHARM REV CODE 250: Performed by: NURSE ANESTHETIST, CERTIFIED REGISTERED

## 2022-10-12 PROCEDURE — 63600175 PHARM REV CODE 636 W HCPCS: Performed by: NURSE ANESTHETIST, CERTIFIED REGISTERED

## 2022-10-12 PROCEDURE — 88342 IMHCHEM/IMCYTCHM 1ST ANTB: CPT | Mod: 26,,, | Performed by: PATHOLOGY

## 2022-10-12 RX ORDER — SODIUM CHLORIDE 9 MG/ML
INJECTION, SOLUTION INTRAVENOUS CONTINUOUS
Status: DISCONTINUED | OUTPATIENT
Start: 2022-10-12 | End: 2022-10-12 | Stop reason: HOSPADM

## 2022-10-12 RX ORDER — PROPOFOL 10 MG/ML
VIAL (ML) INTRAVENOUS CONTINUOUS PRN
Status: DISCONTINUED | OUTPATIENT
Start: 2022-10-12 | End: 2022-10-12

## 2022-10-12 RX ORDER — LIDOCAINE HCL/PF 100 MG/5ML
SYRINGE (ML) INTRAVENOUS
Status: DISCONTINUED | OUTPATIENT
Start: 2022-10-12 | End: 2022-10-12

## 2022-10-12 RX ORDER — PROPOFOL 10 MG/ML
VIAL (ML) INTRAVENOUS
Status: DISCONTINUED | OUTPATIENT
Start: 2022-10-12 | End: 2022-10-12

## 2022-10-12 RX ADMIN — PROPOFOL 200 MCG/KG/MIN: 10 INJECTION, EMULSION INTRAVENOUS at 12:10

## 2022-10-12 RX ADMIN — SODIUM CHLORIDE: 0.9 INJECTION, SOLUTION INTRAVENOUS at 11:10

## 2022-10-12 RX ADMIN — PROPOFOL 70 MG: 10 INJECTION, EMULSION INTRAVENOUS at 12:10

## 2022-10-12 RX ADMIN — PROPOFOL 30 MG: 10 INJECTION, EMULSION INTRAVENOUS at 12:10

## 2022-10-12 RX ADMIN — LIDOCAINE HYDROCHLORIDE 75 MG: 20 INJECTION, SOLUTION INTRAVENOUS at 12:10

## 2022-10-12 NOTE — TRANSFER OF CARE
"Anesthesia Transfer of Care Note    Patient: Veronika Blackmon    Procedure(s) Performed: Procedure(s) (LRB):  COLONOSCOPY Suprep (N/A)  EGD (ESOPHAGOGASTRODUODENOSCOPY) (N/A)    Patient location: GI    Anesthesia Type: MAC    Transport from OR: Transported from OR on room air with adequate spontaneous ventilation    Post pain: adequate analgesia    Post assessment: no apparent anesthetic complications and tolerated procedure well    Post vital signs: stable    Level of consciousness: responds to stimulation and sedated    Nausea/Vomiting: no nausea/vomiting    Complications: none    Transfer of care protocol was followed      Last vitals:   Visit Vitals  /82   Pulse 96   Temp 36.6 °C (97.9 °F)   Resp 18   Ht 5' 4" (1.626 m)   Wt 76.7 kg (169 lb)   LMP 03/07/2018   SpO2 100%   Breastfeeding No   BMI 29.01 kg/m²     "

## 2022-10-12 NOTE — H&P
Short Stay Endoscopy History and Physical    PCP - Sahara Carrillo MD    Procedure - Colonoscopy  ASA - II  Mallampati - per anesthesia  History of Anesthesia problems - no  Family history Anesthesia problems - no     HPI:  This is a 62 y.o. female here for evaluation of : Colon cancer screening    Family history of colon cancer - no  History of polyps - na  Change in bowel habits - no  Rectal bleeding - no      ROS:  Constitutional: No fevers, chills, No weight loss  ENT: No allergies  CV: No chest pain  Pulm: No shortness of breath  GI: see HPI  Derm: No rash    Medical History:  has a past medical history of Acute hypoxemic respiratory failure, Anemia (01/27/2022), and Anticoagulant long-term use.    Surgical History:  has a past surgical history that includes Left heart catheterization (Left, 12/14/2021); Coronary angiography (N/A, 12/14/2021); and Cardiac surgery (01/31/2022).    Family History: family history includes Heart attack (age of onset: 62) in her brother.. Otherwise no colon cancer, inflammatory bowel disease, or GI malignancies.    Social History:  reports that she has never smoked. She has never used smokeless tobacco. She reports current alcohol use. She reports that she does not use drugs.    Review of patient's allergies indicates:  No Known Allergies    Medications:   Medications Prior to Admission   Medication Sig Dispense Refill Last Dose    metoprolol succinate (TOPROL-XL) 50 MG 24 hr tablet Take 1 tablet (50 mg total) by mouth every morning. 90 tablet 3 10/12/2022    rosuvastatin (CRESTOR) 40 MG Tab Take 1 tablet (40 mg total) by mouth every evening. 90 tablet 3 10/11/2022    valsartan-hydrochlorothiazide (DIOVAN-HCT) 320-12.5 mg per tablet Take 1 tablet by mouth once daily. 30 tablet 11 10/12/2022    clopidogreL (PLAVIX) 75 mg tablet Take 1 tablet (75 mg total) by mouth once daily. 90 tablet 3 10/7/2022    evolocumab (REPATHA SURECLICK) 140 mg/mL PnIj Inject 1 mL (140 mg total) into the skin  every 14 (fourteen) days. 2 mL 6 10/6/2022         Objective Findings:    Vital Signs: see nursing notes  Physical Exam:  General Appearance: Well appearing in no acute distress  Eyes:    No scleral icterus  ENT: Neck supple  Lungs: CTA anteriorly  Heart:  S1, S2 normal, no murmurs heard  Abdomen: Soft, non tender, non distended with positive bowel sounds. No hepatosplenomegaly, ascites, or mass  Extremities: no edema  Skin: No rash      Labs:  Lab Results   Component Value Date    WBC 7.14 06/16/2022    HGB 13.4 06/16/2022    HCT 42.4 06/16/2022     06/16/2022    CHOL 191 04/01/2022    TRIG 72 04/01/2022    HDL 67 04/01/2022    ALT 80 (H) 01/27/2022    AST 63 (H) 01/27/2022     08/19/2022    K 3.9 08/19/2022     08/19/2022    CREATININE 1.3 08/19/2022    BUN 14 08/19/2022    CO2 21 (L) 08/19/2022    TSH 1.154 11/30/2021    INR 1.0 06/16/2022    HGBA1C 6.3 (H) 01/28/2022       I have explained the risks and benefits of endoscopy procedures to the patient including but not limited to bleeding, perforation, infection, and death.    Kostas Ramírez MD

## 2022-10-12 NOTE — ANESTHESIA POSTPROCEDURE EVALUATION
Anesthesia Post Evaluation    Patient: Veronika Blackmon    Procedure(s) Performed: Procedure(s) (LRB):  COLONOSCOPY Suprep (N/A)  EGD (ESOPHAGOGASTRODUODENOSCOPY) (N/A)    Final Anesthesia Type: MAC      Patient location during evaluation: GI PACU  Patient participation: Yes- Able to Participate  Level of consciousness: awake and alert  Post-procedure vital signs: reviewed and stable  Pain management: adequate  Airway patency: patent    PONV status at discharge: No PONV  Anesthetic complications: no      Cardiovascular status: hemodynamically stable  Respiratory status: unassisted, spontaneous ventilation and room air  Hydration status: euvolemic  Follow-up not needed.          Vitals Value Taken Time   BP 96/63 10/12/22 1325   Temp 36.3 °C (97.3 °F) 10/12/22 1325   Pulse 63 10/12/22 1325   Resp 13 10/12/22 1325   SpO2 100 % 10/12/22 1325         No case tracking events are documented in the log.      Pain/Kasandra Score: Kasandra Score: 8 (10/12/2022  1:25 PM)

## 2022-10-12 NOTE — PROVATION PATIENT INSTRUCTIONS
Discharge Summary/Instructions after an Endoscopic Procedure  Patient Name: Veronika Blackmon  Patient MRN: [id number]  Patient YOB: 1960 Wednesday, October 12, 2022  Kostas Ramírez MD  Dear patient,  As a result of recent federal legislation (The Federal Cures Act), you may   receive lab or pathology results from your procedure in your [facility]   account before your physician is able to contact you. Your physician or   their representative will relay the results to you with their   recommendations at their soonest availability.  Thank you,  Your health is very important to us during the Covid Crisis. Following your   procedure today, you will receive a daily text for 2 weeks asking about   signs or symptoms of Covid 19.  Please respond to this text when you   receive it so we can follow up and keep you as safe as possible.   RESTRICTIONS:  During your procedure today, you received medications for sedation.  These   medications may affect your judgment, balance and coordination.  Therefore,   for 24 hours, you have the following restrictions:   - DO NOT drive a car, operate machinery, make legal/financial decisions,   sign important papers or drink alcohol.    ACTIVITY:  Today: no heavy lifting, straining or running due to procedural   sedation/anesthesia.  The following day: return to full activity including work.  DIET:  Eat and drink normally unless instructed otherwise.     TREATMENT FOR COMMON SIDE EFFECTS:  - Mild abdominal pain, nausea, belching, bloating or excessive gas:  rest,   eat lightly and use a heating pad.  - Sore Throat: treat with throat lozenges and/or gargle with warm salt   water.  - Because air was used during the procedure, expelling large amounts of air   from your rectum or belching is normal.  - If a bowel prep was taken, you may not have a bowel movement for 1-3 days.    This is normal.  SYMPTOMS TO WATCH FOR AND REPORT TO YOUR PHYSICIAN:  1. Abdominal pain or bloating,  other than gas cramps.  2. Chest pain.  3. Back pain.  4. Signs of infection such as: chills or fever occurring within 24 hours   after the procedure.  5. Rectal bleeding, which would show as bright red, maroon, or black stools.   (A tablespoon of blood from the rectum is not serious, especially if   hemorrhoids are present.)  6. Vomiting.  7. Weakness or dizziness.  GO DIRECTLY TO THE NEAREST EMERGENCY ROOM IF YOU HAVE ANY OF THE FOLLOWING:      Difficulty breathing              Chills and/or fever over 101 F   Persistent vomiting and/or vomiting blood   Severe abdominal pain   Severe chest pain   Black, tarry stools   Bleeding- more than one tablespoon   Any other symptom or condition that you feel may need urgent attention  Your doctor recommends these additional instructions:  If any biopsies were taken, your doctors clinic will contact you in 1 to 2   weeks with any results.  - Discharge patient to home.   - Resume previous diet.   - Continue present medications.   - Await pathology results.   - Repeat colonoscopy PRN for surveillance based on pathology results.   - Patient has a contact number available for emergencies.  The signs and   symptoms of potential delayed complications were discussed with the   patient.  Return to normal activities tomorrow.  Written discharge   instructions were provided to the patient.  For questions, problems or results please call your physician - Kostas Ramírez MD.  EMERGENCY PHONE NUMBER: [phone],  LAB RESULTS: [phone]  IF A COMPLICATION OR EMERGENCY SITUATION ARISES AND YOU ARE UNABLE TO REACH   YOUR PHYSICIAN - GO DIRECTLY TO THE EMERGENCY ROOM.  Kostas Ramírez MD  10/12/2022 1:56:13 PM  This report has been verified and signed electronically.  Dear patient,  As a result of recent federal legislation (The Federal Cures Act), you may   receive lab or pathology results from your procedure in your [facility]   account before your physician is  able to contact you. Your physician or   their representative will relay the results to you with their   recommendations at their soonest availability.  Thank you,  PROVATION

## 2022-10-12 NOTE — PROVATION PATIENT INSTRUCTIONS
Discharge Summary/Instructions after an Endoscopic Procedure  Patient Name: Veronika Blackmon  Patient MRN: [id number]  Patient YOB: 1960 Wednesday, October 12, 2022  Kostas Ramírez MD  Dear patient,  As a result of recent federal legislation (The Federal Cures Act), you may   receive lab or pathology results from your procedure in your [facility]   account before your physician is able to contact you. Your physician or   their representative will relay the results to you with their   recommendations at their soonest availability.  Thank you,  Your health is very important to us during the Covid Crisis. Following your   procedure today, you will receive a daily text for 2 weeks asking about   signs or symptoms of Covid 19.  Please respond to this text when you   receive it so we can follow up and keep you as safe as possible.   RESTRICTIONS:  During your procedure today, you received medications for sedation.  These   medications may affect your judgment, balance and coordination.  Therefore,   for 24 hours, you have the following restrictions:   - DO NOT drive a car, operate machinery, make legal/financial decisions,   sign important papers or drink alcohol.    ACTIVITY:  Today: no heavy lifting, straining or running due to procedural   sedation/anesthesia.  The following day: return to full activity including work.  DIET:  Eat and drink normally unless instructed otherwise.     TREATMENT FOR COMMON SIDE EFFECTS:  - Mild abdominal pain, nausea, belching, bloating or excessive gas:  rest,   eat lightly and use a heating pad.  - Sore Throat: treat with throat lozenges and/or gargle with warm salt   water.  - Because air was used during the procedure, expelling large amounts of air   from your rectum or belching is normal.  - If a bowel prep was taken, you may not have a bowel movement for 1-3 days.    This is normal.  SYMPTOMS TO WATCH FOR AND REPORT TO YOUR PHYSICIAN:  1. Abdominal pain or bloating,  other than gas cramps.  2. Chest pain.  3. Back pain.  4. Signs of infection such as: chills or fever occurring within 24 hours   after the procedure.  5. Rectal bleeding, which would show as bright red, maroon, or black stools.   (A tablespoon of blood from the rectum is not serious, especially if   hemorrhoids are present.)  6. Vomiting.  7. Weakness or dizziness.  GO DIRECTLY TO THE NEAREST EMERGENCY ROOM IF YOU HAVE ANY OF THE FOLLOWING:      Difficulty breathing              Chills and/or fever over 101 F   Persistent vomiting and/or vomiting blood   Severe abdominal pain   Severe chest pain   Black, tarry stools   Bleeding- more than one tablespoon   Any other symptom or condition that you feel may need urgent attention  Your doctor recommends these additional instructions:  If any biopsies were taken, your doctors clinic will contact you in 1 to 2   weeks with any results.  - Discharge patient to home.   - Resume previous diet.   - Continue present medications.   - Await pathology results.   - Perform a colonoscopy as previously scheduled.  For questions, problems or results please call your physician - Kostas Ramírez MD.  EMERGENCY PHONE NUMBER: [phone],  LAB RESULTS: [phone]  IF A COMPLICATION OR EMERGENCY SITUATION ARISES AND YOU ARE UNABLE TO REACH   YOUR PHYSICIAN - GO DIRECTLY TO THE EMERGENCY ROOM.  Kostas Ramírez MD  10/12/2022 1:02:47 PM  This report has been verified and signed electronically.  Dear patient,  As a result of recent federal legislation (The Federal Cures Act), you may   receive lab or pathology results from your procedure in your [facility]   account before your physician is able to contact you. Your physician or   their representative will relay the results to you with their   recommendations at their soonest availability.  Thank you,  PROVATION

## 2022-10-12 NOTE — ANESTHESIA POSTPROCEDURE EVALUATION
Anesthesia Post Evaluation    Patient: Veronika Blackmon    Procedure(s) Performed: Procedure(s) (LRB):  COLONOSCOPY Suprep (N/A)  EGD (ESOPHAGOGASTRODUODENOSCOPY) (N/A)    Final Anesthesia Type: MAC      Patient location during evaluation: GI PACU  Patient participation: Yes- Able to Participate  Level of consciousness: awake and alert  Post-procedure vital signs: reviewed and stable  Pain management: adequate  Airway patency: patent    PONV status at discharge: No PONV  Anesthetic complications: no      Cardiovascular status: blood pressure returned to baseline  Respiratory status: unassisted  Hydration status: euvolemic            Vitals Value Taken Time   BP 96/63 10/12/22 1325   Temp 36.3 °C (97.3 °F) 10/12/22 1325   Pulse 63 10/12/22 1325   Resp 13 10/12/22 1325   SpO2 100 % 10/12/22 1325         No case tracking events are documented in the log.      Pain/Kasandra Score: Kasandra Score: 8 (10/12/2022  1:25 PM)

## 2022-10-12 NOTE — ANESTHESIA PREPROCEDURE EVALUATION
10/12/2022     Veronika Blackmon is a 62 y.o., female here for C-Scope    Patient had 4 vessel CABG in 01/2022. Doing well since then    Past Medical History:   Diagnosis Date    Acute hypoxemic respiratory failure     Anemia 01/27/2022    Anticoagulant long-term use      Past Surgical History:   Procedure Laterality Date    CARDIAC SURGERY  01/31/2022    [id number]    CORONARY ANGIOGRAPHY N/A 12/14/2021    Procedure: ANGIOGRAM, CORONARY ARTERY;  Surgeon: Jm Escobar MD;  Location: [facility] CATH LAB/EP;  Service: Cardiology;  Laterality: N/A;    LEFT HEART CATHETERIZATION Left 12/14/2021    Procedure: Left heart cath;  Surgeon: Jm Ecsobar MD;  Location: [facility] CATH LAB/EP;  Service: Cardiology;  Laterality: Left;           Pre-op Assessment    I have reviewed the Patient Summary Reports.     I have reviewed the Nursing Notes. I have reviewed the NPO Status.      Review of Systems  Anesthesia Hx:  History of prior surgery of interest to airway management or planning:  Denies Personal Hx of Anesthesia complications.   Cardiovascular:   Hypertension CAD  CABG/stent Dysrhythmias     Renal/:   Chronic Renal Disease        Physical Exam  General: Well nourished    Airway:  Mallampati: II   Mouth Opening: Normal        Anesthesia Plan  Type of Anesthesia, risks & benefits discussed:    Anesthesia Type: MAC  Informed Consent: Informed consent signed with the Patient and all parties understand the risks and agree with anesthesia plan.  All questions answered.   ASA Score: 3    Ready For Surgery From Anesthesia Perspective.     .

## 2022-10-13 ENCOUNTER — TELEPHONE (OUTPATIENT)
Dept: GASTROENTEROLOGY | Facility: CLINIC | Age: 62
End: 2022-10-13
Payer: MEDICAID

## 2022-10-13 ENCOUNTER — TELEPHONE (OUTPATIENT)
Dept: ENDOSCOPY | Facility: HOSPITAL | Age: 62
End: 2022-10-13
Payer: MEDICAID

## 2022-10-13 DIAGNOSIS — K20.80 LOS ANGELES GRADE C ESOPHAGITIS: Primary | ICD-10-CM

## 2022-10-13 RX ORDER — PANTOPRAZOLE SODIUM 40 MG/1
40 TABLET, DELAYED RELEASE ORAL 2 TIMES DAILY
Qty: 60 TABLET | Refills: 2 | Status: SHIPPED | OUTPATIENT
Start: 2022-10-13 | End: 2023-03-07 | Stop reason: SDUPTHER

## 2022-10-13 NOTE — TELEPHONE ENCOUNTER
Grade C esophagitis noted on EGD. Will start Protonix 40mg BID. Consider decreasing to daily in about 12 weeks.

## 2022-10-14 ENCOUNTER — TELEPHONE (OUTPATIENT)
Dept: GASTROENTEROLOGY | Facility: CLINIC | Age: 62
End: 2022-10-14
Payer: MEDICAID

## 2022-10-20 LAB
FINAL PATHOLOGIC DIAGNOSIS: NORMAL
GROSS: NORMAL
Lab: NORMAL
MICROSCOPIC EXAM: NORMAL

## 2022-10-25 ENCOUNTER — SPECIALTY PHARMACY (OUTPATIENT)
Dept: PHARMACY | Facility: CLINIC | Age: 62
End: 2022-10-25
Payer: MEDICAID

## 2022-10-26 NOTE — TELEPHONE ENCOUNTER
Second set of prior authorization questions completed and awaiting payer response. Will continue to follow up.

## 2022-10-26 NOTE — TELEPHONE ENCOUNTER
-PA approved from 10/26/2022 to 10/26/2023  Case ID: [id number]      Copay $0 BI/ FA not required.    Refill call is currently pended to 10/31 due to patients next dose being 11/7.

## 2022-10-30 RX ORDER — CLARITHROMYCIN 500 MG/1
500 TABLET, FILM COATED ORAL EVERY 12 HOURS
Qty: 28 TABLET | Refills: 0 | Status: SHIPPED | OUTPATIENT
Start: 2022-10-30 | End: 2022-11-13

## 2022-10-30 RX ORDER — AMOXICILLIN 500 MG/1
1000 TABLET, FILM COATED ORAL EVERY 12 HOURS
Qty: 56 TABLET | Refills: 0 | Status: SHIPPED | OUTPATIENT
Start: 2022-10-30 | End: 2022-11-13

## 2022-10-31 NOTE — PROGRESS NOTES
Biopsies taken during recent EGD positive for H pylori.  Will prescribe a course of antibiotics as well as antacids, all of which to be taken twice a day for full to weeks.  Following completion of regimen a stool test will need to be performed to confirm eradication.    Colon polyp benign

## 2022-10-31 NOTE — TELEPHONE ENCOUNTER
Specialty Pharmacy - Refill Coordination  Specialty Pharmacy - Medication/Referral Authorization    Specialty Medication Orders Linked to Encounter      Flowsheet Row Most Recent Value   Medication #1 evolocumab (REPATHA SURECLICK) 140 mg/mL PnIj (Order#[id number], Rx#[id number])            Refill Questions - Documented Responses      Flowsheet Row Most Recent Value   Patient Availability and HIPAA Verification    Does patient want to proceed with activity? Yes   HIPAA/medical authority confirmed? Yes   Relationship to patient of person spoken to? Self   Refill Screening Questions    Would patient like to speak to a pharmacist? No   When does the patient need to receive the medication? 11/07/22   Refill Delivery Questions    How will the patient receive the medication? MEDRx   When does the patient need to receive the medication? 11/07/22   Shipping Address Home   Address in [facility] confirmed and updated if neccessary? Yes   Expected Copay ($) 0   Is the patient able to afford the medication copay? Yes   Payment Method zero copay   Days supply of Refill 28   Refill activity completed? Yes   Refill activity plan Refill scheduled   Shipment/Pickup Date: 11/03/22            Current Outpatient Medications   Medication Sig    evolocumab (REPATHA SURECLICK) 140 mg/mL PnIj Inject 1 mL (140 mg total) into the skin every 14 (fourteen) days.    amoxicillin (AMOXIL) 500 MG Tab Take 2 tablets (1,000 mg total) by mouth every 12 (twelve) hours. for 14 days    clarithromycin (BIAXIN) 500 MG tablet Take 1 tablet (500 mg total) by mouth every 12 (twelve) hours. for 14 days    clopidogreL (PLAVIX) 75 mg tablet Take 1 tablet (75 mg total) by mouth once daily.    metoprolol succinate (TOPROL-XL) 50 MG 24 hr tablet Take 1 tablet (50 mg total) by mouth every morning.    pantoprazole (PROTONIX) 40 MG tablet Take 1 tablet (40 mg total) by mouth 2 (two) times daily.    rosuvastatin (CRESTOR) 40 MG Tab Take 1 tablet (40 mg total)  by mouth every evening.    valsartan-hydrochlorothiazide (DIOVAN-HCT) 320-12.5 mg per tablet Take 1 tablet by mouth once daily.   Last reviewed on 10/12/2022 12:02 PM by Vonad Macedo CRNA    Review of patient's allergies indicates:  No Known Allergies Last reviewed on  10/30/2022 9:05 PM by Kostas Ramírez      Tasks added this encounter   11/28/2022 - Refill Call (Auto Added)   Tasks due within next 3 months   No tasks due.     Meghann Rouse, PharmD  [facility]  [address] [address]  Phone: [phone]  Fax: [phone]

## 2022-11-01 ENCOUNTER — TELEPHONE (OUTPATIENT)
Dept: INTERNAL MEDICINE | Facility: CLINIC | Age: 62
End: 2022-11-01
Payer: MEDICAID

## 2022-11-01 NOTE — TELEPHONE ENCOUNTER
----- Message from Jaky Molina sent at 11/1/2022  9:01 AM CDT -----  Regarding: same day  Contact: [phone]  Type:  Same Day Appointment Request    Caller is requesting a same day appointment.  Caller declined first available appointment listed below.    Name of Caller: self   When is the first available appointment?   Symptoms: low BP for 2 months   Best Call Back Number: [phone]   Additional Information:

## 2022-11-01 NOTE — TELEPHONE ENCOUNTER
Patient states that she has been having low blood pressures and her medication was changed by the digital medicine pharmacist. She states that she has been running 90/83. Patient scheduled to be seen.

## 2022-11-02 ENCOUNTER — TELEPHONE (OUTPATIENT)
Dept: GASTROENTEROLOGY | Facility: CLINIC | Age: 62
End: 2022-11-02
Payer: MEDICAID

## 2022-11-08 ENCOUNTER — TELEPHONE (OUTPATIENT)
Dept: GASTROENTEROLOGY | Facility: CLINIC | Age: 62
End: 2022-11-08
Payer: MEDICAID

## 2022-11-08 NOTE — TELEPHONE ENCOUNTER
----- Message from Kostas Ramírez MD sent at 10/30/2022  9:07 PM CDT -----  Biopsies taken during recent EGD positive for H pylori.  Will prescribe a course of antibiotics as well as antacids, all of which to be taken twice a day for full to weeks.  Following completion of regimen a stool test will need to be performed to co  nfirm eradication.    Colon polyp benign

## 2022-11-08 NOTE — TELEPHONE ENCOUNTER
Spoke to pt and her daughter. Informed about taking antibiotics and taking stool test after taking medication for weeks. Pt and daughter verbalized understanding.

## 2022-11-10 ENCOUNTER — OFFICE VISIT (OUTPATIENT)
Dept: CARDIOLOGY | Facility: CLINIC | Age: 62
End: 2022-11-10
Payer: MEDICAID

## 2022-11-10 VITALS — HEIGHT: 64 IN | WEIGHT: 169 LBS | OXYGEN SATURATION: 98 % | BODY MASS INDEX: 28.85 KG/M2

## 2022-11-10 DIAGNOSIS — Z95.1 HX OF CABG: ICD-10-CM

## 2022-11-10 DIAGNOSIS — Z86.74 HISTORY OF CARDIAC ARREST: ICD-10-CM

## 2022-11-10 DIAGNOSIS — I10 ESSENTIAL HYPERTENSION: Primary | ICD-10-CM

## 2022-11-10 PROCEDURE — 4010F ACE/ARB THERAPY RXD/TAKEN: CPT | Mod: CPTII,,, | Performed by: INTERNAL MEDICINE

## 2022-11-10 PROCEDURE — 99999 PR PBB SHADOW E&M-EST. PATIENT-LVL III: ICD-10-PCS | Mod: PBBFAC,,, | Performed by: INTERNAL MEDICINE

## 2022-11-10 PROCEDURE — 99213 OFFICE O/P EST LOW 20 MIN: CPT | Mod: PBBFAC,PN | Performed by: INTERNAL MEDICINE

## 2022-11-10 PROCEDURE — 1159F MED LIST DOCD IN RCRD: CPT | Mod: CPTII,,, | Performed by: INTERNAL MEDICINE

## 2022-11-10 PROCEDURE — 3008F BODY MASS INDEX DOCD: CPT | Mod: CPTII,,, | Performed by: INTERNAL MEDICINE

## 2022-11-10 PROCEDURE — 3008F PR BODY MASS INDEX (BMI) DOCUMENTED: ICD-10-PCS | Mod: CPTII,,, | Performed by: INTERNAL MEDICINE

## 2022-11-10 PROCEDURE — 1160F RVW MEDS BY RX/DR IN RCRD: CPT | Mod: CPTII,,, | Performed by: INTERNAL MEDICINE

## 2022-11-10 PROCEDURE — 1159F PR MEDICATION LIST DOCUMENTED IN MEDICAL RECORD: ICD-10-PCS | Mod: CPTII,,, | Performed by: INTERNAL MEDICINE

## 2022-11-10 PROCEDURE — 4010F PR ACE/ARB THEARPY RXD/TAKEN: ICD-10-PCS | Mod: CPTII,,, | Performed by: INTERNAL MEDICINE

## 2022-11-10 PROCEDURE — 99999 PR PBB SHADOW E&M-EST. PATIENT-LVL III: CPT | Mod: PBBFAC,,, | Performed by: INTERNAL MEDICINE

## 2022-11-10 PROCEDURE — 99204 OFFICE O/P NEW MOD 45 MIN: CPT | Mod: S$PBB,,, | Performed by: INTERNAL MEDICINE

## 2022-11-10 PROCEDURE — 99204 PR OFFICE/OUTPT VISIT, NEW, LEVL IV, 45-59 MIN: ICD-10-PCS | Mod: S$PBB,,, | Performed by: INTERNAL MEDICINE

## 2022-11-10 PROCEDURE — 1160F PR REVIEW ALL MEDS BY PRESCRIBER/CLIN PHARMACIST DOCUMENTED: ICD-10-PCS | Mod: CPTII,,, | Performed by: INTERNAL MEDICINE

## 2022-11-10 RX ORDER — POTASSIUM CHLORIDE 600 MG/1
8 CAPSULE, EXTENDED RELEASE ORAL 2 TIMES DAILY
Qty: 90 CAPSULE | Refills: 1 | Status: SHIPPED | OUTPATIENT
Start: 2022-11-10 | End: 2024-03-07

## 2022-11-10 NOTE — PROGRESS NOTES
[facility] [address]     SUBJECTIVE:     History of Present Illness:  Patient is a 62 y.o. female presents with CAD follows with . .     Primary Diagnosis:   Hypertension  DM: none  Nonsmoker  Family history of early CAD:  brother with MI when young  Heart disease: CABG 1/31/22 - LIMA to LAD, SVG to D, SVG -OM; Right 100%  Asthma   ROS  No chest pains  No shortness of breath; no PND or orthopnea  No syncope  No palpitations  Activity: no restrictions; plays basketball without issues with no symptoms   Review of patient's allergies indicates:  No Known Allergies    Past Medical History:   Diagnosis Date    Acute hypoxemic respiratory failure     Anemia 01/27/2022    Anticoagulant long-term use        Past Surgical History:   Procedure Laterality Date    CARDIAC SURGERY  01/31/2022    [id number]    COLONOSCOPY N/A 10/12/2022    Procedure: COLONOSCOPY Suprep;  Surgeon: Kostas Ramírez MD;  Location: [facility];  Service: Endoscopy;  Laterality: N/A;    CORONARY ANGIOGRAPHY N/A 12/14/2021    Procedure: ANGIOGRAM, CORONARY ARTERY;  Surgeon: Jm Escobar MD;  Location: [facility] CATH LAB/EP;  Service: Cardiology;  Laterality: N/A;    ESOPHAGOGASTRODUODENOSCOPY N/A 10/12/2022    Procedure: EGD (ESOPHAGOGASTRODUODENOSCOPY);  Surgeon: Kostas Ramírez MD;  Location: [facility];  Service: Endoscopy;  Laterality: N/A;    LEFT HEART CATHETERIZATION Left 12/14/2021    Procedure: Left heart cath;  Surgeon: Jm Escobar MD;  Location: [facility] CATH LAB/EP;  Service: Cardiology;  Laterality: Left;       Family History   Problem Relation Age of Onset    Heart attack Brother 62       Social History     Tobacco Use    Smoking status: Never    Smokeless tobacco: Never   Substance Use Topics    Alcohol use: Yes    Drug use: No        Past Hospitalization:     Home meds:  Current Outpatient Medications on File Prior to Visit   Medication Sig Dispense Refill    amoxicillin (AMOXIL) 500 MG Tab Take 2 tablets (1,000 mg  "total) by mouth every 12 (twelve) hours. for 14 days 56 tablet 0    clarithromycin (BIAXIN) 500 MG tablet Take 1 tablet (500 mg total) by mouth every 12 (twelve) hours. for 14 days 28 tablet 0    clopidogreL (PLAVIX) 75 mg tablet Take 1 tablet (75 mg total) by mouth once daily. 90 tablet 3    evolocumab (REPATHA SURECLICK) 140 mg/mL PnIj Inject 1 mL (140 mg total) into the skin every 14 (fourteen) days. 2 mL 6    metoprolol succinate (TOPROL-XL) 50 MG 24 hr tablet Take 1 tablet (50 mg total) by mouth every morning. 90 tablet 3    pantoprazole (PROTONIX) 40 MG tablet Take 1 tablet (40 mg total) by mouth 2 (two) times daily. 60 tablet 2    rosuvastatin (CRESTOR) 40 MG Tab Take 1 tablet (40 mg total) by mouth every evening. 90 tablet 3    valsartan-hydrochlorothiazide (DIOVAN-HCT) 320-12.5 mg per tablet Take 0.5 tablets by mouth once daily. 15 tablet 11     No current facility-administered medications on file prior to visit.       Cardiac meds:  Plavix 75 mg  Valsartan/HCTZ 320/12.5 1/2 tablet for one month  Crestor 40 mg  Metoprolol succinate 50 mg  Repatha 140 mg every 2 weeks since April         OBJECTIVE:     Vital Signs (Most Recent)  Vitals:    11/10/22 1453   SpO2: 98%   Weight: 76.7 kg (169 lb)   Height: 5' 4" (1.626 m)         Physical Exam:    Neck: normal carotids, no bruits; normal JVP  Lungs :clear  Heart: RR, normal S1,S2, no murmurs, no gallops; extra systoles   Abd: no masses; no bruits;   Exts: normal DP and PT pulses bilaterally, no edema noted           LABS    CMP  Recent Labs   Lab Result Units 22  1011   Potassium mmol/L 3.9       LIPID  No results for input(s): HDL, CHOL, TRIG, LDLCALC, CHOLHDL, NONHDL, TOTALCHOLEST in the last 2160 hours.    GFR 47; Lipids 422:    Diagnostic Results:    EK/22; sinus tachycardia; diffuse ST/T changes     Echo: : EF 55%; mild AI, mild PI, mild TR; diasatolic dysfunction     Chart review:        ASSESSMENT/PLAN:     CAD: stable without " symptoms, s/p bypass surgery  Needs lipids on the repatha  3. PVC's noted - could be from lower K than usual  Plan: continue the same medications  K 8 meq daily  Followup with Dr.Yousef Araceli Beltran MD

## 2022-11-11 ENCOUNTER — TELEPHONE (OUTPATIENT)
Dept: GASTROENTEROLOGY | Facility: CLINIC | Age: 62
End: 2022-11-11
Payer: MEDICAID

## 2022-11-21 ENCOUNTER — OFFICE VISIT (OUTPATIENT)
Dept: INTERNAL MEDICINE | Facility: CLINIC | Age: 62
End: 2022-11-21
Payer: MEDICAID

## 2022-11-21 VITALS
OXYGEN SATURATION: 100 % | BODY MASS INDEX: 29.06 KG/M2 | WEIGHT: 170.25 LBS | SYSTOLIC BLOOD PRESSURE: 110 MMHG | HEIGHT: 64 IN | HEART RATE: 92 BPM | DIASTOLIC BLOOD PRESSURE: 82 MMHG

## 2022-11-21 DIAGNOSIS — I10 ESSENTIAL HYPERTENSION: Primary | ICD-10-CM

## 2022-11-21 DIAGNOSIS — Z23 NEED FOR VACCINATION: ICD-10-CM

## 2022-11-21 PROCEDURE — 90686 IIV4 VACC NO PRSV 0.5 ML IM: CPT | Mod: PBBFAC,PO

## 2022-11-21 PROCEDURE — 3074F PR MOST RECENT SYSTOLIC BLOOD PRESSURE < 130 MM HG: ICD-10-PCS | Mod: CPTII,,, | Performed by: INTERNAL MEDICINE

## 2022-11-21 PROCEDURE — 99213 OFFICE O/P EST LOW 20 MIN: CPT | Mod: PBBFAC,PO | Performed by: INTERNAL MEDICINE

## 2022-11-21 PROCEDURE — 3008F BODY MASS INDEX DOCD: CPT | Mod: CPTII,,, | Performed by: INTERNAL MEDICINE

## 2022-11-21 PROCEDURE — 3079F PR MOST RECENT DIASTOLIC BLOOD PRESSURE 80-89 MM HG: ICD-10-PCS | Mod: CPTII,,, | Performed by: INTERNAL MEDICINE

## 2022-11-21 PROCEDURE — 3074F SYST BP LT 130 MM HG: CPT | Mod: CPTII,,, | Performed by: INTERNAL MEDICINE

## 2022-11-21 PROCEDURE — 99999 PR PBB SHADOW E&M-EST. PATIENT-LVL III: CPT | Mod: PBBFAC,,, | Performed by: INTERNAL MEDICINE

## 2022-11-21 PROCEDURE — 3008F PR BODY MASS INDEX (BMI) DOCUMENTED: ICD-10-PCS | Mod: CPTII,,, | Performed by: INTERNAL MEDICINE

## 2022-11-21 PROCEDURE — 99214 PR OFFICE/OUTPT VISIT, EST, LEVL IV, 30-39 MIN: ICD-10-PCS | Mod: S$PBB,,, | Performed by: INTERNAL MEDICINE

## 2022-11-21 PROCEDURE — 99214 OFFICE O/P EST MOD 30 MIN: CPT | Mod: S$PBB,,, | Performed by: INTERNAL MEDICINE

## 2022-11-21 PROCEDURE — 99999 PR PBB SHADOW E&M-EST. PATIENT-LVL III: ICD-10-PCS | Mod: PBBFAC,,, | Performed by: INTERNAL MEDICINE

## 2022-11-21 PROCEDURE — 4010F ACE/ARB THERAPY RXD/TAKEN: CPT | Mod: CPTII,,, | Performed by: INTERNAL MEDICINE

## 2022-11-21 PROCEDURE — 4010F PR ACE/ARB THEARPY RXD/TAKEN: ICD-10-PCS | Mod: CPTII,,, | Performed by: INTERNAL MEDICINE

## 2022-11-21 PROCEDURE — 3079F DIAST BP 80-89 MM HG: CPT | Mod: CPTII,,, | Performed by: INTERNAL MEDICINE

## 2022-11-21 NOTE — PROGRESS NOTES
Assessment:       1. Essential hypertension    2. Need for vaccination          Plan:         Veronika was seen today for follow-up.    Diagnoses and all orders for this visit:    Essential hypertension  Chronic  Controlled  Patient is at goal today   I have reviewed lifestyle modification to achieve/maintain goals  We will continue the current medication regimen as listed below  Patient will follow up in 6 months     -     Basic Metabolic Panel; Standing    Need for vaccination  -- I reviewed the patient vaccine history and provided the risk benefits and side effects of the vaccine.   -- I provided the patient a VIS sheet on the vaccine.     -     Influenza - Quadrivalent (PF)        Subjective:       Patient ID: Veronika Blackmon is a 62 y.o. female.    Chief Complaint: Follow-up      Hypertension  This is a chronic problem. The current episode started more than 1 year ago. The problem has been waxing and waning since onset. The problem is controlled. There are no known risk factors for coronary artery disease. Past treatments include beta blockers and angiotensin blockers. The current treatment provides significant improvement. There are no compliance problems.        Lab Results   Component Value Date     08/19/2022    K 3.9 08/19/2022     08/19/2022    CO2 21 (L) 08/19/2022    BUN 14 08/19/2022    CREATININE 1.3 08/19/2022    CALCIUM 9.8 08/19/2022    ANIONGAP 13 08/19/2022    ESTGFRAFRICA 52 (A) 07/27/2022    EGFRNONAA 45 (A) 07/27/2022          Last 5 Patient Entered Readings                Current 30 Day Average: 108/74  Recent Readings 11/21/2022 11/21/2022 11/20/2022 11/20/2022 11/19/2022   SBP (mmHg) 128 114 139 131 118   DBP (mmHg) 70 90 93 92 86   Pulse 71 71 65 66 62                   Review of Systems   All other systems reviewed and are negative.          Health Maintenance Due   Topic Date Due    Pneumococcal Vaccines (Age 0-64) (1 - PCV) Never done    Shingles Vaccine (1 of 2) Never done     "COVID-19 Vaccine (5 - Booster for Pfizer series) 09/01/2022         Objective:     /82 (BP Location: Right arm, Patient Position: Sitting, BP Method: Large (Manual))   Pulse 92   Ht 5' 4" (1.626 m)   Wt 77.2 kg (170 lb 3.8 oz)   LMP 03/07/2018   SpO2 100%   BMI 29.22 kg/m²     Vitals 11/21/2022 11/10/2022 10/12/2022 10/4/2022 7/1/2022   Height 64 64 64 64 64   Weight (lbs) 170.24 169 169 169.97 174.38   BMI (kg/m2) 29.2 29 29 29.2 29.9     Tobacco Use: Low Risk     Smoking Tobacco Use: Never    Smokeless Tobacco Use: Never    Passive Exposure: Not on file            Physical Exam  Vitals and nursing note reviewed.   Constitutional:       General: She is not in acute distress.     Appearance: She is well-developed. She is not diaphoretic.   HENT:      Head: Normocephalic.      Nose: Nose normal.   Eyes:      General:         Right eye: No discharge.         Left eye: No discharge.      Conjunctiva/sclera: Conjunctivae normal.      Pupils: Pupils are equal, round, and reactive to light.   Cardiovascular:      Rate and Rhythm: Normal rate and regular rhythm.      Heart sounds: Normal heart sounds. No murmur heard.    No friction rub. No gallop.   Pulmonary:      Effort: Pulmonary effort is normal. No respiratory distress.   Abdominal:      General: Bowel sounds are normal. There is no distension.      Palpations: Abdomen is soft.   Musculoskeletal:         General: No deformity. Normal range of motion.      Cervical back: Normal range of motion.   Skin:     General: Skin is warm.   Neurological:      Mental Status: She is alert and oriented to person, place, and time.      Cranial Nerves: No cranial nerve deficit.           Future Appointments   Date Time Provider Department Center   12/21/2022  9:30 AM Jina Celestin NP [facility]   2/17/2023 12:45 PM LAB, [facility]   2/27/2023  1:20 PM Sahara Carrillo MD [facility]         Medication List with Changes/Refills   Current " Medications    CLOPIDOGREL (PLAVIX) 75 MG TABLET    Take 1 tablet (75 mg total) by mouth once daily.    EVOLOCUMAB (REPATHA SURECLICK) 140 MG/ML PNIJ    Inject 1 mL (140 mg total) into the skin every 14 (fourteen) days.    METOPROLOL SUCCINATE (TOPROL-XL) 50 MG 24 HR TABLET    Take 1 tablet (50 mg total) by mouth every morning.    PANTOPRAZOLE (PROTONIX) 40 MG TABLET    Take 1 tablet (40 mg total) by mouth 2 (two) times daily.    POTASSIUM CHLORIDE (MICRO-K) 8 MEQ CPSR    Take 1 capsule (8 mEq total) by mouth 2 (two) times daily.    ROSUVASTATIN (CRESTOR) 40 MG TAB    Take 1 tablet (40 mg total) by mouth every evening.    VALSARTAN (DIOVAN) 160 MG TABLET    Take 1 tablet (160 mg total) by mouth once daily.         Disclaimer:  This note has been generated using voice-recognition software. There may be grammatical or spelling errors that have been missed during proof-reading

## 2022-11-28 ENCOUNTER — SPECIALTY PHARMACY (OUTPATIENT)
Dept: PHARMACY | Facility: CLINIC | Age: 62
End: 2022-11-28
Payer: MEDICAID

## 2022-11-28 DIAGNOSIS — Z95.1 HX OF CABG: ICD-10-CM

## 2022-11-28 DIAGNOSIS — E78.01 FAMILIAL HYPERCHOLESTEROLEMIA: ICD-10-CM

## 2022-11-28 RX ORDER — EVOLOCUMAB 140 MG/ML
140 INJECTION, SOLUTION SUBCUTANEOUS
Qty: 2 ML | Refills: 6 | Status: CANCELLED | OUTPATIENT
Start: 2022-11-28 | End: 2023-06-12

## 2022-11-28 NOTE — TELEPHONE ENCOUNTER
Spoke w pt regarding Repatha refill. Refill request sent to [facility]. Pt is due 12/5. Will follow up once refills received.

## 2022-12-01 DIAGNOSIS — Z95.1 HX OF CABG: ICD-10-CM

## 2022-12-01 DIAGNOSIS — E78.01 FAMILIAL HYPERCHOLESTEROLEMIA: ICD-10-CM

## 2022-12-01 RX ORDER — EVOLOCUMAB 140 MG/ML
140 INJECTION, SOLUTION SUBCUTANEOUS
Qty: 2 ML | Refills: 6 | Status: SHIPPED | OUTPATIENT
Start: 2022-12-01 | End: 2023-06-02 | Stop reason: SDUPTHER

## 2022-12-02 NOTE — TELEPHONE ENCOUNTER
Specialty Pharmacy - Refill Coordination    Specialty Medication Orders Linked to Encounter      Flowsheet Row Most Recent Value   Medication #1 evolocumab (REPATHA SURECLICK) 140 mg/mL PnIj (Order#[id number], Rx#[id number])            Refill Questions - Documented Responses      Flowsheet Row Most Recent Value   Patient Availability and HIPAA Verification    Does patient want to proceed with activity? Yes   HIPAA/medical authority confirmed? Yes   Relationship to patient of person spoken to? Self   Refill Screening Questions    Would patient like to speak to a pharmacist? No   When does the patient need to receive the medication? 12/05/22   Refill Delivery Questions    How will the patient receive the medication? MEDRx   When does the patient need to receive the medication? 12/05/22   Shipping Address Home   Address in [facility] confirmed and updated if neccessary? Yes   Expected Copay ($) 0   Is the patient able to afford the medication copay? Yes   Payment Method zero copay   Days supply of Refill 28   Supplies needed? No supplies needed   Refill activity completed? Yes   Refill activity plan Refill scheduled   Shipment/Pickup Date: 12/02/22            Current Outpatient Medications   Medication Sig    clopidogreL (PLAVIX) 75 mg tablet Take 1 tablet (75 mg total) by mouth once daily.    evolocumab (REPATHA SURECLICK) 140 mg/mL PnIj Inject 1 mL (140 mg total) into the skin every 14 (fourteen) days.    metoprolol succinate (TOPROL-XL) 50 MG 24 hr tablet Take 1 tablet (50 mg total) by mouth every morning.    pantoprazole (PROTONIX) 40 MG tablet Take 1 tablet (40 mg total) by mouth 2 (two) times daily.    potassium chloride (MICRO-K) 8 mEq CpSR Take 1 capsule (8 mEq total) by mouth 2 (two) times daily.    rosuvastatin (CRESTOR) 40 MG Tab Take 1 tablet (40 mg total) by mouth every evening.    valsartan (DIOVAN) 160 MG tablet Take 1 tablet (160 mg total) by mouth once daily.   Last reviewed on 11/10/2022  3:20  PM by Araceli Beltran MD    Review of patient's allergies indicates:  No Known Allergies Last reviewed on  11/21/2022 11:55 AM by Sebastian Xavier      Tasks added this encounter   12/26/2022 - Refill Call (Auto Added)   Tasks due within next 3 months   No tasks due.     Edilma Ni [facility] - [facility] Pharm[facility]  [address] [place] [address]  Phone: [phone]  Fax: [phone]

## 2022-12-06 ENCOUNTER — TELEPHONE (OUTPATIENT)
Dept: GASTROENTEROLOGY | Facility: CLINIC | Age: 62
End: 2022-12-06
Payer: MEDICAID

## 2022-12-06 NOTE — TELEPHONE ENCOUNTER
Spoke to pt. Pt stated she is finished taking her medication for h plyori. She said she didn't do any stool test yet. Informed that she can get a stool kit at any [facility] lab. Informed how to do the stool test and that she needs to bring it back to the lab once stool is collected. Also reminded the pt that she has follow up appointment with Jina GRAF 12/21 9:30. Pt verbalized understanding.

## 2022-12-06 NOTE — TELEPHONE ENCOUNTER
----- Message from Blue Harrison sent at 12/6/2022  7:57 AM CST -----  Contact: pt  .Type:  Needs Medical Advice    Who Called: pt    Would the patient rather a call back or a response via [facility]? Call back  Best Call Back Number: [phone]  Additional Information: pt.Is calling regarding a procedure she had done and she had medication to take then had to schedule an appt. With the provider.  She does not remember what provider.  I thinking its the appt. She has on 12/21/2022 with your dept.   But please give her a call back to make sure she has the right apt.

## 2022-12-14 ENCOUNTER — TELEPHONE (OUTPATIENT)
Dept: GASTROENTEROLOGY | Facility: CLINIC | Age: 62
End: 2022-12-14
Payer: MEDICAID

## 2022-12-14 ENCOUNTER — LAB VISIT (OUTPATIENT)
Dept: LAB | Facility: HOSPITAL | Age: 62
End: 2022-12-14
Attending: NURSE PRACTITIONER
Payer: MEDICAID

## 2022-12-14 DIAGNOSIS — A04.8 H. PYLORI INFECTION: ICD-10-CM

## 2022-12-14 DIAGNOSIS — A04.8 H. PYLORI INFECTION: Primary | ICD-10-CM

## 2022-12-14 PROCEDURE — 87338 HPYLORI STOOL AG IA: CPT

## 2022-12-20 NOTE — PROGRESS NOTES
GASTROENTEROLOGY CLINIC NOTE    Chief Complaint: The encounter diagnosis was H. pylori infection.  Referring provider/PCP: Sahara Carrillo MD    Interval:  Pt here today for f/u from procedures in October with Dr. Ramírez. Biopsies from EGD were positive for H. Pylori- she was treated with triple therapy, completed on 11/23/22. She denies pyrosis, indigestion, or abdominal pain. She has no complaints. Stool study for H.Pylori eradication is still pending.     Initial visit:  Veronika Blackmon is a 62 y.o. female who is a new patient to me with a PMH that's significant for HTN, HLD, and h/o cardiac arrest. She is here today to establish care for anemia.  Anemia which is normocytic in nature was first noted following left heart catheterization and angiography.  Anemia persisted following procedures.  She denies fatigue, shortness of breath, unexplained weight loss, dizziness, cravings for ice, appetite changes, changes in bowel movements, melena, or hematochezia.  She is not taking any iron supplements.     History of H.pylori: Yes  H.pylori Treatment: Triple therapy with Protonix, clarithromycin, and amoxicillin completed 11/23/2022  Treatments Tried: None  NSAIDs: No  Anticoagulation or Antiplatelet: Plavix    Prior Upper Endoscopy: 10/12/2022 with Dr. Ramírez  Impression:     - LA Grade C esophagitis with no bleeding.                          - Esophageal ulcer with no stigmata of recent                          bleeding. Biopsied.                          - Erythematous mucosa in the antrum. Biopsied.                          - Normal examined duodenum.     Recommendation:        - Discharge patient to home.                          - Resume previous diet.                          - Continue present medications.                          - Await pathology results.                          - Perform a colonoscopy as previously scheduled.     Pathology:  1. Stomach (biopsy):   Moderate chronic antral and oxyntic  gastritis with mild activity   Helicobacter organisms present, abundant     2. Gastroesophageal junction, ulcer (biopsy):   Active esophagitis with fibrinopurulent exudate associated with polarizable   material   Consistent with biopsy of an ulcer   No fungus (GMS stain)   No viral inclusions (CMV immunostain negative)     Prior Colonoscopy: 10/12/2022 with Dr. Ramírez  Impression:            - One 2 mm polyp at the hepatic flexure, removed                          with a cold biopsy forceps. Resected and retrieved.                          - Altered vascular, congested and ulcerated mucosa                          in the rectum. Biopsied.     Recommendation:        - Discharge patient to home.                          - Resume previous diet.                          - Continue present medications.                          - Await pathology results.                          - Repeat colonoscopy PRN for surveillance based on                          pathology results.     Pathology:   3. Colon, hepatic flexure polyp (polypectomy):   Colonic mucosa with hyperplastic surface changes   Multiple deeper levels examined     4. Rectum (biopsy):   Rectal mucosa with active inflammation and ischemic type injury   No dysplasia, no malignancy                         Family h/o Colon Cancer: No  Family h/o Crohn's Disease or Ulcerative Colitis: No  Family h/o Celiac Sprue: No  Abdominal Surgeries: No    Review of Systems   Constitutional:  Negative for weight loss.   HENT:  Negative for sore throat.    Eyes:  Negative for blurred vision.   Respiratory:  Negative for cough.    Cardiovascular:  Negative for chest pain.   Gastrointestinal:  Negative for abdominal pain, blood in stool, constipation, diarrhea, heartburn, melena, nausea and vomiting.   Genitourinary:  Negative for dysuria.   Musculoskeletal:  Negative for myalgias.   Skin:  Negative for rash.   Neurological:  Negative for headaches.   Endo/Heme/Allergies:  Negative  "for environmental allergies.   Psychiatric/Behavioral:  Negative for suicidal ideas. The patient is not nervous/anxious.      Past Medical History: has a past medical history of Acute hypoxemic respiratory failure, Anemia, and Anticoagulant long-term use.    Past Surgical History: has a past surgical history that includes Left heart catheterization (Left, 12/14/2021); Coronary angiography (N/A, 12/14/2021); Cardiac surgery (01/31/2022); Colonoscopy (N/A, 10/12/2022); and Esophagogastroduodenoscopy (N/A, 10/12/2022).    Family History:family history includes Heart attack (age of onset: 62) in her brother.    Allergies: Review of patient's allergies indicates:  No Known Allergies    Social History: reports that she has never smoked. She has never used smokeless tobacco. She reports current alcohol use. She reports that she does not use drugs.    Home medications:   Current Outpatient Medications on File Prior to Visit   Medication Sig Dispense Refill    clopidogreL (PLAVIX) 75 mg tablet Take 1 tablet (75 mg total) by mouth once daily. 90 tablet 3    evolocumab (REPATHA SURECLICK) 140 mg/mL PnIj Inject 1 mL (140 mg total) into the skin every 14 (fourteen) days. 2 mL 6    metoprolol succinate (TOPROL-XL) 50 MG 24 hr tablet Take 1 tablet (50 mg total) by mouth every morning. 90 tablet 3    pantoprazole (PROTONIX) 40 MG tablet Take 1 tablet (40 mg total) by mouth 2 (two) times daily. 60 tablet 2    potassium chloride (MICRO-K) 8 mEq CpSR Take 1 capsule (8 mEq total) by mouth 2 (two) times daily. 90 capsule 1    rosuvastatin (CRESTOR) 40 MG Tab Take 1 tablet (40 mg total) by mouth every evening. 90 tablet 3    valsartan (DIOVAN) 160 MG tablet Take 1 tablet (160 mg total) by mouth once daily. 90 tablet 1     No current facility-administered medications on file prior to visit.       Vital signs:  Ht 5' 4" (1.626 m)   Wt 77.6 kg (171 lb 1.2 oz)   LMP 03/07/2018   BMI 29.37 kg/m²     Physical Exam  Vitals reviewed. "   Constitutional:       General: She is not in acute distress.     Appearance: Normal appearance. She is not ill-appearing.   HENT:      Head: Normocephalic.   Cardiovascular:      Rate and Rhythm: Normal rate and regular rhythm.      Heart sounds: Normal heart sounds. No murmur heard.  Pulmonary:      Effort: Pulmonary effort is normal. No respiratory distress.      Breath sounds: Normal breath sounds.   Chest:      Chest wall: No tenderness.   Abdominal:      General: Bowel sounds are normal. There is no distension.      Palpations: Abdomen is soft.      Tenderness: There is no abdominal tenderness. Negative signs include Santiago's sign.      Hernia: No hernia is present.   Skin:     General: Skin is warm.   Neurological:      Mental Status: She is alert and oriented to person, place, and time.   Psychiatric:         Mood and Affect: Mood normal.         Behavior: Behavior normal.       Routine labs:  Lab Results   Component Value Date    WBC 7.14 06/16/2022    HGB 13.4 06/16/2022    HCT 42.4 06/16/2022    MCV 93 06/16/2022     06/16/2022     Lab Results   Component Value Date    INR 1.0 06/16/2022     Lab Results   Component Value Date    IRON 58 06/16/2022    FERRITIN 156 06/16/2022    TIBC 389 06/16/2022    FESATURATED 15 (L) 06/16/2022     Lab Results   Component Value Date     08/19/2022    K 3.9 08/19/2022     08/19/2022    CO2 21 (L) 08/19/2022    BUN 14 08/19/2022    CREATININE 1.3 08/19/2022     Lab Results   Component Value Date    ALBUMIN 2.9 (L) 01/27/2022    ALT 80 (H) 01/27/2022    AST 63 (H) 01/27/2022    ALKPHOS 101 01/27/2022    BILITOT 1.0 01/27/2022     No results found for: GLUCOSE  Lab Results   Component Value Date    TSH 1.154 11/30/2021     Lab Results   Component Value Date    CALCIUM 9.8 08/19/2022    PHOS 2.5 (L) 01/26/2022       Imaging:  I have reviewed prior labs, imaging, and notes.    Assessment:  1. H. pylori infection      Plan:  Orders Placed This Encounter     CBC Auto Differential     Decrease protonix to once daily  CBC to evaluate anemia    H.Pylori stool still pending    Plan of care discussed with patient who is in agreement and verbalized understanding.     Follow Up:  As Needed Pending Above Workup      PRISCILLA Cruz,FNP-BC  [facility]         Statement Selected

## 2022-12-21 ENCOUNTER — LAB VISIT (OUTPATIENT)
Dept: LAB | Facility: HOSPITAL | Age: 62
End: 2022-12-21
Attending: FAMILY MEDICINE
Payer: MEDICAID

## 2022-12-21 ENCOUNTER — OFFICE VISIT (OUTPATIENT)
Dept: GASTROENTEROLOGY | Facility: CLINIC | Age: 62
End: 2022-12-21
Payer: MEDICAID

## 2022-12-21 VITALS — BODY MASS INDEX: 29.2 KG/M2 | WEIGHT: 171.06 LBS | HEIGHT: 64 IN

## 2022-12-21 DIAGNOSIS — A04.8 H. PYLORI INFECTION: Primary | ICD-10-CM

## 2022-12-21 DIAGNOSIS — A04.8 H. PYLORI INFECTION: ICD-10-CM

## 2022-12-21 LAB
BASOPHILS # BLD AUTO: 0.05 K/UL (ref 0–0.2)
BASOPHILS NFR BLD: 1 % (ref 0–1.9)
DIFFERENTIAL METHOD: ABNORMAL
EOSINOPHIL # BLD AUTO: 0.1 K/UL (ref 0–0.5)
EOSINOPHIL NFR BLD: 1.6 % (ref 0–8)
ERYTHROCYTE [DISTWIDTH] IN BLOOD BY AUTOMATED COUNT: 15.7 % (ref 11.5–14.5)
H PYLORI AG STL QL IA: NOT DETECTED
HCT VFR BLD AUTO: 36.1 % (ref 37–48.5)
HGB BLD-MCNC: 11.7 G/DL (ref 12–16)
IMM GRANULOCYTES # BLD AUTO: 0.01 K/UL (ref 0–0.04)
IMM GRANULOCYTES NFR BLD AUTO: 0.2 % (ref 0–0.5)
LYMPHOCYTES # BLD AUTO: 2.5 K/UL (ref 1–4.8)
LYMPHOCYTES NFR BLD: 50.3 % (ref 18–48)
MCH RBC QN AUTO: 29.7 PG (ref 27–31)
MCHC RBC AUTO-ENTMCNC: 32.4 G/DL (ref 32–36)
MCV RBC AUTO: 92 FL (ref 82–98)
MONOCYTES # BLD AUTO: 0.4 K/UL (ref 0.3–1)
MONOCYTES NFR BLD: 8.1 % (ref 4–15)
NEUTROPHILS # BLD AUTO: 1.9 K/UL (ref 1.8–7.7)
NEUTROPHILS NFR BLD: 38.8 % (ref 38–73)
NRBC BLD-RTO: 0 /100 WBC
PLATELET # BLD AUTO: 247 K/UL (ref 150–450)
PMV BLD AUTO: 11.5 FL (ref 9.2–12.9)
RBC # BLD AUTO: 3.94 M/UL (ref 4–5.4)
SPECIMEN SOURCE: 1
WBC # BLD AUTO: 4.93 K/UL (ref 3.9–12.7)

## 2022-12-21 PROCEDURE — 99999 PR PBB SHADOW E&M-EST. PATIENT-LVL III: ICD-10-PCS | Mod: PBBFAC,,, | Performed by: NURSE PRACTITIONER

## 2022-12-21 PROCEDURE — 1159F PR MEDICATION LIST DOCUMENTED IN MEDICAL RECORD: ICD-10-PCS | Mod: CPTII,,, | Performed by: NURSE PRACTITIONER

## 2022-12-21 PROCEDURE — 4010F ACE/ARB THERAPY RXD/TAKEN: CPT | Mod: CPTII,,, | Performed by: NURSE PRACTITIONER

## 2022-12-21 PROCEDURE — 3008F BODY MASS INDEX DOCD: CPT | Mod: CPTII,,, | Performed by: NURSE PRACTITIONER

## 2022-12-21 PROCEDURE — 1159F MED LIST DOCD IN RCRD: CPT | Mod: CPTII,,, | Performed by: NURSE PRACTITIONER

## 2022-12-21 PROCEDURE — 85025 COMPLETE CBC W/AUTO DIFF WBC: CPT | Performed by: NURSE PRACTITIONER

## 2022-12-21 PROCEDURE — 99214 OFFICE O/P EST MOD 30 MIN: CPT | Mod: S$PBB,,, | Performed by: NURSE PRACTITIONER

## 2022-12-21 PROCEDURE — 99999 PR PBB SHADOW E&M-EST. PATIENT-LVL III: CPT | Mod: PBBFAC,,, | Performed by: NURSE PRACTITIONER

## 2022-12-21 PROCEDURE — 99213 OFFICE O/P EST LOW 20 MIN: CPT | Mod: PBBFAC,PO | Performed by: NURSE PRACTITIONER

## 2022-12-21 PROCEDURE — 99214 PR OFFICE/OUTPT VISIT, EST, LEVL IV, 30-39 MIN: ICD-10-PCS | Mod: S$PBB,,, | Performed by: NURSE PRACTITIONER

## 2022-12-21 PROCEDURE — 3008F PR BODY MASS INDEX (BMI) DOCUMENTED: ICD-10-PCS | Mod: CPTII,,, | Performed by: NURSE PRACTITIONER

## 2022-12-21 PROCEDURE — 36415 COLL VENOUS BLD VENIPUNCTURE: CPT | Performed by: NURSE PRACTITIONER

## 2022-12-21 PROCEDURE — 4010F PR ACE/ARB THEARPY RXD/TAKEN: ICD-10-PCS | Mod: CPTII,,, | Performed by: NURSE PRACTITIONER

## 2022-12-27 ENCOUNTER — SPECIALTY PHARMACY (OUTPATIENT)
Dept: PHARMACY | Facility: CLINIC | Age: 62
End: 2022-12-27
Payer: MEDICAID

## 2022-12-27 NOTE — TELEPHONE ENCOUNTER
Outgoing call regarding repatha refill; per pt, she's due to inject on 1/9; informed her that [facility] will follow up on 1/3 to schedule delivery

## 2023-01-04 NOTE — TELEPHONE ENCOUNTER
Specialty Pharmacy - Refill Coordination    Specialty Medication Orders Linked to Encounter      Flowsheet Row Most Recent Value   Medication #1 evolocumab (REPATHA SURECLICK) 140 mg/mL PnIj (Order#[id number], Rx#[id number])            Refill Questions - Documented Responses      Flowsheet Row Most Recent Value   Patient Availability and HIPAA Verification    Does patient want to proceed with activity? Yes   HIPAA/medical authority confirmed? Yes   Relationship to patient of person spoken to? Self   Refill Screening Questions    Would patient like to speak to a pharmacist? No   When does the patient need to receive the medication? 01/09/23   Refill Delivery Questions    How will the patient receive the medication? MEDRx   When does the patient need to receive the medication? 01/09/23   Shipping Address Home   Address in [facility] confirmed and updated if neccessary? Yes   Expected Copay ($) 0   Is the patient able to afford the medication copay? Yes   Payment Method zero copay   Days supply of Refill 28   Supplies needed? No supplies needed   Refill activity completed? Yes   Refill activity plan Refill scheduled   Shipment/Pickup Date: 01/05/23            Current Outpatient Medications   Medication Sig    clopidogreL (PLAVIX) 75 mg tablet Take 1 tablet (75 mg total) by mouth once daily.    evolocumab (REPATHA SURECLICK) 140 mg/mL PnIj Inject 1 mL (140 mg total) into the skin every 14 (fourteen) days.    metoprolol succinate (TOPROL-XL) 50 MG 24 hr tablet Take 1 tablet (50 mg total) by mouth every morning.    pantoprazole (PROTONIX) 40 MG tablet Take 1 tablet (40 mg total) by mouth 2 (two) times daily.    potassium chloride (MICRO-K) 8 mEq CpSR Take 1 capsule (8 mEq total) by mouth 2 (two) times daily.    rosuvastatin (CRESTOR) 40 MG Tab TAKE ONE TABLET BY MOUTH EVERY EVENING    valsartan (DIOVAN) 160 MG tablet Take 1 tablet (160 mg total) by mouth once daily.   Last reviewed on 12/21/2022  9:19 AM by Frank  RANDY Stephen    Review of patient's allergies indicates:  No Known Allergies Last reviewed on  12/21/2022 9:18 AM by Frank Stephen      Tasks added this encounter   1/28/2023 - Refill Call (Auto Added)   Tasks due within next 3 months   No tasks due.     Jammie Cabral, PharmD  [facility]  [address] [place] [id number]  Phone: [phone]  Fax: [phone]

## 2023-01-10 ENCOUNTER — TELEPHONE (OUTPATIENT)
Dept: GASTROENTEROLOGY | Facility: CLINIC | Age: 63
End: 2023-01-10
Payer: MEDICAID

## 2023-01-10 NOTE — TELEPHONE ENCOUNTER
Spoke to pt and informed H.pylori is negative confirming eradication. Continue pantoprazole daily and follow up as needed. Verbalized understanding

## 2023-01-10 NOTE — TELEPHONE ENCOUNTER
----- Message from Jina Celestin NP sent at 1/10/2023 12:22 PM CST -----  Please let patient know H.pylori is negative confirming eradication. Continue pantoprazole daily and follow up as needed.   ----- Message -----  From: Meghann Dominguez NP  Sent: 1/6/2023   8:12 AM CST  To: Jina Celestin NP    Hey, not sure if you received this result since I put the order in. Just seeing it now.   ----- Message -----  From: Eduardo, [facility]  Sent: 12/14/2022   8:54 AM CST  To: Meghann Dominguez NP

## 2023-01-12 ENCOUNTER — TELEPHONE (OUTPATIENT)
Dept: FAMILY MEDICINE | Facility: CLINIC | Age: 63
End: 2023-01-12
Payer: MEDICAID

## 2023-01-12 NOTE — TELEPHONE ENCOUNTER
----- Message from Dario Ledesma sent at 1/12/2023 10:26 AM CST -----  Type:  Pharmacy Calling to Clarify an RX      [facility]  Prescription Name valsartan (DIOVAN) 160 MG tablet  What do they need to clarify?:which mg should pt be on 320mg or 160mg  Insurance won't approve both  Best Call Back Number: [phone]  Additional Information:

## 2023-01-12 NOTE — TELEPHONE ENCOUNTER
Phone call from [facility] stating pt is telling them that she is on diovan 160mg and 320mg, her meds only have 160mg, please advise

## 2023-01-30 ENCOUNTER — PATIENT MESSAGE (OUTPATIENT)
Dept: PHARMACY | Facility: CLINIC | Age: 63
End: 2023-01-30
Payer: MEDICAID

## 2023-01-30 ENCOUNTER — OFFICE VISIT (OUTPATIENT)
Dept: CARDIOLOGY | Facility: CLINIC | Age: 63
End: 2023-01-30
Payer: MEDICAID

## 2023-01-30 VITALS
SYSTOLIC BLOOD PRESSURE: 166 MMHG | OXYGEN SATURATION: 97 % | HEART RATE: 56 BPM | HEIGHT: 64 IN | DIASTOLIC BLOOD PRESSURE: 86 MMHG | WEIGHT: 170.88 LBS | BODY MASS INDEX: 29.17 KG/M2

## 2023-01-30 DIAGNOSIS — I73.9 PAD (PERIPHERAL ARTERY DISEASE): ICD-10-CM

## 2023-01-30 DIAGNOSIS — I45.10 RBBB: Chronic | ICD-10-CM

## 2023-01-30 DIAGNOSIS — Z95.1 HX OF CABG: Primary | Chronic | ICD-10-CM

## 2023-01-30 DIAGNOSIS — I25.10 ATHEROSCLEROSIS OF NATIVE CORONARY ARTERY OF NATIVE HEART WITHOUT ANGINA PECTORIS: ICD-10-CM

## 2023-01-30 DIAGNOSIS — I10 ESSENTIAL HYPERTENSION: ICD-10-CM

## 2023-01-30 DIAGNOSIS — E78.2 MIXED HYPERLIPIDEMIA: ICD-10-CM

## 2023-01-30 DIAGNOSIS — E78.01 FAMILIAL HYPERCHOLESTEROLEMIA: Chronic | ICD-10-CM

## 2023-01-30 DIAGNOSIS — Z86.74 HISTORY OF CARDIAC ARREST: ICD-10-CM

## 2023-01-30 PROCEDURE — 3079F DIAST BP 80-89 MM HG: CPT | Mod: CPTII,,, | Performed by: INTERNAL MEDICINE

## 2023-01-30 PROCEDURE — 3077F SYST BP >= 140 MM HG: CPT | Mod: CPTII,,, | Performed by: INTERNAL MEDICINE

## 2023-01-30 PROCEDURE — 99999 PR PBB SHADOW E&M-EST. PATIENT-LVL III: ICD-10-PCS | Mod: PBBFAC,,, | Performed by: INTERNAL MEDICINE

## 2023-01-30 PROCEDURE — 3079F PR MOST RECENT DIASTOLIC BLOOD PRESSURE 80-89 MM HG: ICD-10-PCS | Mod: CPTII,,, | Performed by: INTERNAL MEDICINE

## 2023-01-30 PROCEDURE — 3077F PR MOST RECENT SYSTOLIC BLOOD PRESSURE >= 140 MM HG: ICD-10-PCS | Mod: CPTII,,, | Performed by: INTERNAL MEDICINE

## 2023-01-30 PROCEDURE — 4010F ACE/ARB THERAPY RXD/TAKEN: CPT | Mod: CPTII,,, | Performed by: INTERNAL MEDICINE

## 2023-01-30 PROCEDURE — 4010F PR ACE/ARB THEARPY RXD/TAKEN: ICD-10-PCS | Mod: CPTII,,, | Performed by: INTERNAL MEDICINE

## 2023-01-30 PROCEDURE — 99214 OFFICE O/P EST MOD 30 MIN: CPT | Mod: S$PBB,,, | Performed by: INTERNAL MEDICINE

## 2023-01-30 PROCEDURE — 1159F MED LIST DOCD IN RCRD: CPT | Mod: CPTII,,, | Performed by: INTERNAL MEDICINE

## 2023-01-30 PROCEDURE — 3008F PR BODY MASS INDEX (BMI) DOCUMENTED: ICD-10-PCS | Mod: CPTII,,, | Performed by: INTERNAL MEDICINE

## 2023-01-30 PROCEDURE — 1160F PR REVIEW ALL MEDS BY PRESCRIBER/CLIN PHARMACIST DOCUMENTED: ICD-10-PCS | Mod: CPTII,,, | Performed by: INTERNAL MEDICINE

## 2023-01-30 PROCEDURE — 3008F BODY MASS INDEX DOCD: CPT | Mod: CPTII,,, | Performed by: INTERNAL MEDICINE

## 2023-01-30 PROCEDURE — 1159F PR MEDICATION LIST DOCUMENTED IN MEDICAL RECORD: ICD-10-PCS | Mod: CPTII,,, | Performed by: INTERNAL MEDICINE

## 2023-01-30 PROCEDURE — 99999 PR PBB SHADOW E&M-EST. PATIENT-LVL III: CPT | Mod: PBBFAC,,, | Performed by: INTERNAL MEDICINE

## 2023-01-30 PROCEDURE — 99213 OFFICE O/P EST LOW 20 MIN: CPT | Mod: PBBFAC,PO | Performed by: INTERNAL MEDICINE

## 2023-01-30 PROCEDURE — 99214 PR OFFICE/OUTPT VISIT, EST, LEVL IV, 30-39 MIN: ICD-10-PCS | Mod: S$PBB,,, | Performed by: INTERNAL MEDICINE

## 2023-01-30 PROCEDURE — 1160F RVW MEDS BY RX/DR IN RCRD: CPT | Mod: CPTII,,, | Performed by: INTERNAL MEDICINE

## 2023-01-30 NOTE — PROGRESS NOTES
Subjective:   @Patient ID:  Veronika Blackmon is a 62 y.o. female who presents for evaluation of HTN, abnormal EKG, HLP       HPI:   Here for follow up   She has been doing well  She has been doing well since last visit   She tries to stay active.   No chest pain with activities   She is enrolled in digital HTN clinic. BP is well per her home recordings. Today is elevated some.   No syncope or pre syncope  Tolerating PCSK9 inhibitors               Historically:  Stress test was done that showed high risk findings with significant EKG changes very early in the stress and persistent in recovery. 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 During the cath she was noted to have frequent PVCs and metoprolol  dose was increased She was sent for CABG evaluation. She was seen by Dr. Mays and believe no good targets so she was declined. She wanted a 2nd opinion. While pending a 2nd opinion she had cardiac arrest  In 1/23/2022. It was believed to be arrhythmias related. She was arguing with her son. Initial concern about anoxic brain injury but she got extubated and recovered very well neurologically. She was transferred to [facility] for CABG which was done 1/31/2022 by Dr. Paz. She recovered very well and discharged 2/4/2022.  EF prior to CABG in [facility] was read as 55%    Admitted 1/23/2022 with cardiac arrest. It was believed to be arrhythmias related. She was arguing with her son. Initial concern about anoxic brain injury but she got extubated and recovered very well neurologically. She was transferred to [facility] for CABG which was done 1/31/2022 by Dr. Paz. She recovered very well and discharged 2/4/2022.       EKG done as below and concerning lateral changes  No significant claudications     FH is significant for premature CAD, younger brother had MI in his 40s,  majority of her family with CAD, PAD, and DM.     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 Paz in [facility].  LIMA-LAD, SVG-D, SVG RI, SVG-OM. RCA  and was not bypassed.  ACEI held due HARSH.     She was discharged 2/2/2022  Had repeat Echo in [facility] 1/28/2022 EF was read as >55%, small pericardial effusion,       - Stress MPI 12/2/2021  . Findings concerning for reversible ischemia in the anterior wall.  There is an associated wall motion abnormality.  2. Possible separate area of reversible ischemia along the inferoseptal wall, though artifact at this location can occasionally give a similar appearance.  3. Left ventricular dysfunction.  Estimated ejection fraction 36% during stress and 45% during rest.  This report was flagged in Epic as abnormal    - [facility] 12/14/2021  There was three vessel coronary artery disease.  The ejection fraction was 50-55% by visual estimate.  The pre-procedure left ventricular end diastolic pressure was 7.  The Prox LAD lesion was 70% stenosed.  The Mid LAD lesion was 80% stenosed.  The Dist LAD lesion was 95% stenosed.  The 1st Diag lesion was 95% stenosed.  The 1st Mrg lesion was 90% stenosed.  The Prox Cx to Mid Cx lesion was 80% stenosed.  The Prox RCA to Mid RCA lesion was 100% stenosed.  The estimated blood loss was none.     - Severe MVCAD   - CVT evaluation soon   - Increase Toprol to 25 mg bid  - ASA/Plavix/Statin         Echo 2/23/2022   The left ventricle is normal in size with mild concentric hypertrophy and normal systolic function.  There are segmental left ventricular wall motion abnormalities with inferobasilar hypokiesis.  There is abnormal septal wall motion consistent with post-operative status.  The estimated ejection fraction is 55%.  Grade I left ventricular diastolic dysfunction.  Mild aortic regurgitation.  Mild tricuspid regurgitation.  Mild pulmonic regurgitation.  Normal right ventricular size with normal right ventricular systolic function.  There is no pulmonary hypertension.  There is a moderate left pleural effusion.        -  EKG  SR, RBBB, lateral TWI    Event monitor 3/9/2022  At baseline, in the absence of symptoms, the rhythm was sinus with heart rate 94 beats per minute.  No symptom was reported throughout the month.  There were multiple activations, all of which showed sinus rhythm and some of which showed single PVCs.  The heart rate 80-95 beats per minute.     Impression:  Sinus rhythm, with occasional single PVCs.  No reported symptom.    Patient Active Problem List    Diagnosis Date Noted    Stage 3a chronic kidney disease 07/01/2022    Hx of CABG 02/17/2022    Shock 01/27/2022    Facial weakness     Transient cerebral ischemia     History of cardiac arrest 01/24/2022    Brain anoxic injury 01/24/2022    Sinus bradycardia 01/24/2022    Prediabetes 01/04/2022    Familial hypercholesterolemia 12/27/2021    Essential hypertension 12/27/2021    Mixed hyperlipidemia 12/27/2021    Atherosclerosis of native coronary artery of native heart 12/22/2021     Formatting of this note might be different from the original.  Added automatically from request for surgery [id number]      RBBB 12/02/2021                LAST HbA1c  Lab Results   Component Value Date    HGBA1C 6.3 (H) 01/28/2022       Lipid panel  Lab Results   Component Value Date    CHOL 191 04/01/2022    CHOL 188 02/23/2022    CHOL 327 (H) 11/30/2021     Lab Results   Component Value Date    HDL 67 04/01/2022    HDL 50 02/23/2022    HDL 86 (H) 11/30/2021     Lab Results   Component Value Date    LDLCALC 109.6 04/01/2022    LDLCALC 118.4 02/23/2022    LDLCALC 215.0 (H) 11/30/2021     Lab Results   Component Value Date    TRIG 72 04/01/2022    TRIG 98 02/23/2022    TRIG 130 11/30/2021     Lab Results   Component Value Date    CHOLHDL 35.1 04/01/2022    CHOLHDL 26.6 02/23/2022    CHOLHDL 26.3 11/30/2021            Review of Systems   Constitutional: Negative for chills and fever.   HENT:  Negative for hearing loss and nosebleeds.    Eyes:  Negative for blurred vision.   Cardiovascular:   Positive for dyspnea on exertion. Negative for chest pain, leg swelling and palpitations.   Respiratory:  Negative for hemoptysis and shortness of breath.    Hematologic/Lymphatic: Negative for bleeding problem.   Skin:  Negative for itching.   Musculoskeletal:  Negative for falls.   Gastrointestinal:  Negative for abdominal pain and hematochezia.   Genitourinary:  Negative for hematuria.   Neurological:  Negative for dizziness and loss of balance.   Psychiatric/Behavioral:  Negative for altered mental status and depression.      Objective:   Physical Exam  Constitutional:       Appearance: She is well-developed.   HENT:      Head: Normocephalic and atraumatic.   Eyes:      Conjunctiva/sclera: Conjunctivae normal.   Neck:      Vascular: No carotid bruit or JVD.   Cardiovascular:      Rate and Rhythm: Normal rate and regular rhythm.      Pulses:           Carotid pulses are 2+ on the right side and 2+ on the left side.       Radial pulses are 2+ on the right side and 2+ on the left side.        Dorsalis pedis pulses are 2+ on the right side and 0 on the left side.      Heart sounds: Normal heart sounds. No murmur heard.    No friction rub. No gallop.      Comments: monophasic lt DP   Pulmonary:      Effort: Pulmonary effort is normal. No respiratory distress.      Breath sounds: Normal breath sounds. No stridor. No wheezing.   Musculoskeletal:      Cervical back: Neck supple.   Skin:     General: Skin is warm and dry.   Neurological:      Mental Status: She is alert and oriented to person, place, and time.   Psychiatric:         Behavior: Behavior normal.       Assessment:     1. Hx of CABG    2. Atherosclerosis of native coronary artery of native heart without angina pectoris    3. Essential hypertension    4. Mixed hyperlipidemia    5. History of cardiac arrest    6. Familial hypercholesterolemia    7. RBBB    8. PAD (peripheral artery disease)        Plan:   - s/p CABG  - Continue Toprol.  50 mg daily   - Continue  Valsartan   -  high intense statin/PCSK 9 inhibitors. Repeat lipid panel   - Continue  plavix   - Evidence of PAD on exam LT side mainly,  Check ROBIN         I spent 5-10 minutes asking, assessing, assisting, arranging and advising heart healthy diet improvements. This included low-salt meals, portion control and health food alternatives. I also encourage 30 minutes of moderate exercise 3-4x a week.       Pertinent cardiac images and EKG reviewed independently.    Continue with current medical plan and lifestyle changes.  Return sooner for concerns or questions. If symptoms persist go to the ED  I have reviewed all pertinent data including patient's medical history in detail and updated the computerized patient record.     Orders Placed This Encounter   Procedures    Lipid Panel     fasting     Standing Status:   Future     Standing Expiration Date:   1/30/2024    Ankle Brachial Indices (ROBIN)     Standing Status:   Future     Standing Expiration Date:   1/30/2024     Order Specific Question:   Exam Type:     Answer:   Exercise with toe tracings     Order Specific Question:   Release to patient     Answer:   Immediate       Follow up as scheduled.     She expressed verbal understanding and agreed with the plan    Patient's Medications   New Prescriptions    No medications on file   Previous Medications    CLOPIDOGREL (PLAVIX) 75 MG TABLET    Take 1 tablet (75 mg total) by mouth once daily.    EVOLOCUMAB (REPATHA SURECLICK) 140 MG/ML PNIJ    Inject 1 mL (140 mg total) into the skin every 14 (fourteen) days.    METOPROLOL SUCCINATE (TOPROL-XL) 50 MG 24 HR TABLET    Take 1 tablet (50 mg total) by mouth every morning.    PANTOPRAZOLE (PROTONIX) 40 MG TABLET    Take 1 tablet (40 mg total) by mouth 2 (two) times daily.    POTASSIUM CHLORIDE (MICRO-K) 8 MEQ CPSR    Take 1 capsule (8 mEq total) by mouth 2 (two) times daily.    ROSUVASTATIN (CRESTOR) 40 MG TAB    TAKE ONE TABLET BY MOUTH EVERY EVENING    VALSARTAN (DIOVAN) 160  MG TABLET    Take 1 tablet (160 mg total) by mouth once daily.   Modified Medications    No medications on file   Discontinued Medications    No medications on file

## 2023-02-02 ENCOUNTER — HOSPITAL ENCOUNTER (OUTPATIENT)
Dept: CARDIOLOGY | Facility: HOSPITAL | Age: 63
Discharge: HOME | End: 2023-02-02
Attending: INTERNAL MEDICINE
Payer: MEDICAID

## 2023-02-02 ENCOUNTER — SPECIALTY PHARMACY (OUTPATIENT)
Dept: PHARMACY | Facility: CLINIC | Age: 63
End: 2023-02-02
Payer: MEDICAID

## 2023-02-02 DIAGNOSIS — I73.9 PAD (PERIPHERAL ARTERY DISEASE): ICD-10-CM

## 2023-02-02 PROCEDURE — 93924 LWR XTR VASC STDY BILAT: CPT

## 2023-02-02 PROCEDURE — 93924 ANKLE BRACHIAL INDICES (ABI): ICD-10-PCS | Mod: 26,,, | Performed by: INTERNAL MEDICINE

## 2023-02-02 PROCEDURE — 93924 LWR XTR VASC STDY BILAT: CPT | Mod: 26,,, | Performed by: INTERNAL MEDICINE

## 2023-02-02 NOTE — TELEPHONE ENCOUNTER
Specialty Pharmacy - Refill Coordination    Specialty Medication Orders Linked to Encounter      Flowsheet Row Most Recent Value   Medication #1 evolocumab (REPATHA SURECLICK) 140 mg/mL PnIj (Order#[id number], Rx#[id number])            Refill Questions - Documented Responses      Flowsheet Row Most Recent Value   Patient Availability and HIPAA Verification    Does patient want to proceed with activity? Yes   HIPAA/medical authority confirmed? Yes   Relationship to patient of person spoken to? Self   Refill Screening Questions    Would patient like to speak to a pharmacist? No   When does the patient need to receive the medication? 02/06/23   Refill Delivery Questions    How will the patient receive the medication? MEDRx   When does the patient need to receive the medication? 02/06/23   Shipping Address Home   Address in [facility] confirmed and updated if neccessary? Yes   Expected Copay ($) 0   Is the patient able to afford the medication copay? Yes   Payment Method zero copay   Days supply of Refill 28   Supplies needed? No supplies needed   Refill activity completed? Yes   Refill activity plan Refill scheduled   Shipment/Pickup Date: 02/03/23            Current Outpatient Medications   Medication Sig    clopidogreL (PLAVIX) 75 mg tablet Take 1 tablet (75 mg total) by mouth once daily.    evolocumab (REPATHA SURECLICK) 140 mg/mL PnIj Inject 1 mL (140 mg total) into the skin every 14 (fourteen) days.    metoprolol succinate (TOPROL-XL) 50 MG 24 hr tablet Take 1 tablet (50 mg total) by mouth every morning.    pantoprazole (PROTONIX) 40 MG tablet Take 1 tablet (40 mg total) by mouth 2 (two) times daily.    potassium chloride (MICRO-K) 8 mEq CpSR Take 1 capsule (8 mEq total) by mouth 2 (two) times daily.    rosuvastatin (CRESTOR) 40 MG Tab TAKE ONE TABLET BY MOUTH EVERY EVENING    valsartan (DIOVAN) 160 MG tablet Take 1 tablet (160 mg total) by mouth once daily.   Last reviewed on 1/30/2023 10:18 AM by Jm  SUNIL Ecsobar MD    Review of patient's allergies indicates:  No Known Allergies Last reviewed on  1/30/2023 11:45 AM by Jm Escobar      Tasks added this encounter   2/27/2023 - Refill Call (Auto Added)   Tasks due within next 3 months   No tasks due.     Reema Ni [facility] - Specialty Pharmacy  [address] [place] [id number]  Phone: [phone]  Fax: [phone]

## 2023-02-03 LAB
ABI POST MINUTES1: 2 MIN
ABI POST MINUTES2: 3 MIN
IMMEDIATE ARM BP: 216 MMHG
IMMEDIATE LEFT ABI: 0.88
IMMEDIATE LEFT TIBIAL: 191 MMHG
IMMEDIATE RIGHT ABI: 0.73
IMMEDIATE RIGHT TIBIAL: 158 MMHG
LEFT ABI: 1.04
LEFT ARM BP: 164 MMHG
LEFT DORSALIS PEDIS: 192 MMHG
LEFT POSTERIOR TIBIAL: 153 MMHG
LEFT TBI: 0.42
LEFT TOE PRESSURE: 77 MMHG
POST1 ARM BP: 186 MMHG
POST1 LEFT ABI: 0.92
POST1 LEFT TIBIAL: 171 MMHG
POST1 RIGHT ABI: 0.89
POST1 RIGHT TIBIAL: 165 MMHG
POST2 ARM BP: 163 MMHG
POST2 LEFT ABI: 0.96
POST2 LEFT TIBIAL: 156 MMHG
POST2 RIGHT ABI: 0.98
POST2 RIGHT TIBIAL: 160 MMHG
RIGHT ABI: 0.97
RIGHT ARM BP: 185 MMHG
RIGHT DORSALIS PEDIS: 179 MMHG
RIGHT POSTERIOR TIBIAL: 153 MMHG
RIGHT TBI: 0.43
RIGHT TOE PRESSURE: 80 MMHG
TREADMILL GRADE: 10 %
TREADMILL SPEED: 1.5 MPH
TREADMILL TIME: 5 MIN

## 2023-02-03 NOTE — PROGRESS NOTES
The lower extremity circulation test showed mild drop. Suggest mild PAD. Will treat with medications for now. No interventions    Sincerely,  Jm Escobar MD.   Interventional Cardiologist  [facility], [place]

## 2023-02-06 ENCOUNTER — PATIENT MESSAGE (OUTPATIENT)
Dept: CARDIOLOGY | Facility: CLINIC | Age: 63
End: 2023-02-06
Payer: MEDICAID

## 2023-02-06 ENCOUNTER — LAB VISIT (OUTPATIENT)
Dept: LAB | Facility: HOSPITAL | Age: 63
End: 2023-02-06
Attending: FAMILY MEDICINE
Payer: MEDICAID

## 2023-02-06 DIAGNOSIS — E78.2 MIXED HYPERLIPIDEMIA: ICD-10-CM

## 2023-02-06 LAB
CHOLEST SERPL-MCNC: 138 MG/DL (ref 120–199)
CHOLEST/HDLC SERPL: 1.9 {RATIO} (ref 2–5)
HDLC SERPL-MCNC: 72 MG/DL (ref 40–75)
HDLC SERPL: 52.2 % (ref 20–50)
LDLC SERPL CALC-MCNC: 56.4 MG/DL (ref 63–159)
NONHDLC SERPL-MCNC: 66 MG/DL
TRIGL SERPL-MCNC: 48 MG/DL (ref 30–150)

## 2023-02-06 PROCEDURE — 80061 LIPID PANEL: CPT | Performed by: INTERNAL MEDICINE

## 2023-02-06 PROCEDURE — 36415 COLL VENOUS BLD VENIPUNCTURE: CPT | Performed by: INTERNAL MEDICINE

## 2023-02-17 ENCOUNTER — LAB VISIT (OUTPATIENT)
Dept: LAB | Facility: HOSPITAL | Age: 63
End: 2023-02-17
Attending: INTERNAL MEDICINE
Payer: MEDICAID

## 2023-02-17 DIAGNOSIS — I10 ESSENTIAL HYPERTENSION: ICD-10-CM

## 2023-02-17 LAB
ANION GAP SERPL CALC-SCNC: 14 MMOL/L (ref 8–16)
BUN SERPL-MCNC: 14 MG/DL (ref 6–20)
CALCIUM SERPL-MCNC: 9.9 MG/DL (ref 8.7–10.5)
CHLORIDE SERPL-SCNC: 108 MMOL/L (ref 95–110)
CO2 SERPL-SCNC: 24 MMOL/L (ref 23–29)
CREAT SERPL-MCNC: 1.3 MG/DL (ref 0.5–1.4)
EST. GFR  (NO RACE VARIABLE): 47.1 ML/MIN/1.73 M^2
GLUCOSE SERPL-MCNC: 108 MG/DL (ref 70–110)
POTASSIUM SERPL-SCNC: 3.9 MMOL/L (ref 3.5–5.1)
SODIUM SERPL-SCNC: 146 MMOL/L (ref 136–145)

## 2023-02-17 PROCEDURE — 36415 COLL VENOUS BLD VENIPUNCTURE: CPT | Mod: PO | Performed by: INTERNAL MEDICINE

## 2023-02-17 PROCEDURE — 80048 BASIC METABOLIC PNL TOTAL CA: CPT | Performed by: INTERNAL MEDICINE

## 2023-02-22 ENCOUNTER — SPECIALTY PHARMACY (OUTPATIENT)
Dept: PHARMACY | Facility: CLINIC | Age: 63
End: 2023-02-22
Payer: MEDICAID

## 2023-02-22 NOTE — TELEPHONE ENCOUNTER
Incoming call from patient looking to refill Repatha injection. States just took on 2/20 so next fill needed by 3/6. Informed pt that Repatha refill call appropriately pended for 2/27 and [facility] will call then when closer to actual injection date. Pt verbalized understanding.

## 2023-02-28 NOTE — TELEPHONE ENCOUNTER
Specialty Pharmacy - Refill Coordination    Specialty Medication Orders Linked to Encounter      Flowsheet Row Most Recent Value   Medication #1 evolocumab (REPATHA SURECLICK) 140 mg/mL PnIj (Order#[id number], Rx#[id number])            Refill Questions - Documented Responses      Flowsheet Row Most Recent Value   Patient Availability and HIPAA Verification    Does patient want to proceed with activity? Yes   HIPAA/medical authority confirmed? Yes   Relationship to patient of person spoken to? Self   Refill Screening Questions    Would patient like to speak to a pharmacist? No   When does the patient need to receive the medication? 03/06/23   Refill Delivery Questions    How will the patient receive the medication? MEDRx   When does the patient need to receive the medication? 03/06/23   Shipping Address Home   Address in [facility] confirmed and updated if neccessary? Yes   Expected Copay ($) 0   Is the patient able to afford the medication copay? Yes   Payment Method zero copay   Days supply of Refill 28   Supplies needed? No supplies needed   Refill activity completed? Yes   Refill activity plan Refill scheduled   Shipment/Pickup Date: 03/01/23            Current Outpatient Medications   Medication Sig    clopidogreL (PLAVIX) 75 mg tablet Take 1 tablet (75 mg total) by mouth once daily.    evolocumab (REPATHA SURECLICK) 140 mg/mL PnIj Inject 1 mL (140 mg total) into the skin every 14 (fourteen) days.    metoprolol succinate (TOPROL-XL) 50 MG 24 hr tablet Take 1 tablet (50 mg total) by mouth every morning.    pantoprazole (PROTONIX) 40 MG tablet Take 1 tablet (40 mg total) by mouth 2 (two) times daily.    potassium chloride (MICRO-K) 8 mEq CpSR Take 1 capsule (8 mEq total) by mouth 2 (two) times daily.    rosuvastatin (CRESTOR) 40 MG Tab TAKE ONE TABLET BY MOUTH EVERY EVENING    valsartan (DIOVAN) 160 MG tablet Take 1 tablet (160 mg total) by mouth once daily.   Last reviewed on 1/30/2023 10:18 AM by Jm  SUNIL Escobar MD    Review of patient's allergies indicates:  No Known Allergies Last reviewed on  1/30/2023 11:45 AM by Jm Escobar      Tasks added this encounter   3/27/2023 - Refill Call (Auto Added)   Tasks due within next 3 months   No tasks due.     Grazyna Ni [facility] - Specialty Pharmacy  [address] [place] [address]  Phone: [phone]  Fax: [phone]

## 2023-03-07 ENCOUNTER — OFFICE VISIT (OUTPATIENT)
Dept: FAMILY MEDICINE | Facility: CLINIC | Age: 63
End: 2023-03-07
Payer: MEDICAID

## 2023-03-07 VITALS
BODY MASS INDEX: 29.62 KG/M2 | DIASTOLIC BLOOD PRESSURE: 70 MMHG | SYSTOLIC BLOOD PRESSURE: 139 MMHG | OXYGEN SATURATION: 100 % | HEART RATE: 68 BPM | WEIGHT: 173.5 LBS | HEIGHT: 64 IN

## 2023-03-07 DIAGNOSIS — I25.810 CORONARY ARTERY DISEASE INVOLVING CORONARY BYPASS GRAFT OF NATIVE HEART WITHOUT ANGINA PECTORIS: ICD-10-CM

## 2023-03-07 DIAGNOSIS — K20.80 LOS ANGELES GRADE C ESOPHAGITIS: ICD-10-CM

## 2023-03-07 DIAGNOSIS — E78.2 MIXED HYPERLIPIDEMIA: ICD-10-CM

## 2023-03-07 DIAGNOSIS — Z95.1 HX OF CABG: Chronic | ICD-10-CM

## 2023-03-07 DIAGNOSIS — L90.5 PAIN IN SURGICAL SCAR: ICD-10-CM

## 2023-03-07 DIAGNOSIS — I73.9 PAD (PERIPHERAL ARTERY DISEASE): ICD-10-CM

## 2023-03-07 DIAGNOSIS — R73.03 PREDIABETES: ICD-10-CM

## 2023-03-07 DIAGNOSIS — N18.31 STAGE 3A CHRONIC KIDNEY DISEASE: ICD-10-CM

## 2023-03-07 DIAGNOSIS — R52 PAIN IN SURGICAL SCAR: ICD-10-CM

## 2023-03-07 DIAGNOSIS — R94.39 ABNORMAL CARDIOVASCULAR STRESS TEST: ICD-10-CM

## 2023-03-07 DIAGNOSIS — I10 ESSENTIAL HYPERTENSION: Primary | ICD-10-CM

## 2023-03-07 PROBLEM — R57.9 SHOCK: Status: RESOLVED | Noted: 2022-01-27 | Resolved: 2023-03-07

## 2023-03-07 PROBLEM — R00.1 SINUS BRADYCARDIA: Status: RESOLVED | Noted: 2022-01-24 | Resolved: 2023-03-07

## 2023-03-07 PROCEDURE — 4010F ACE/ARB THERAPY RXD/TAKEN: CPT | Mod: CPTII,,, | Performed by: FAMILY MEDICINE

## 2023-03-07 PROCEDURE — 3008F PR BODY MASS INDEX (BMI) DOCUMENTED: ICD-10-PCS | Mod: CPTII,,, | Performed by: FAMILY MEDICINE

## 2023-03-07 PROCEDURE — 99999 PR PBB SHADOW E&M-EST. PATIENT-LVL IV: ICD-10-PCS | Mod: PBBFAC,,, | Performed by: FAMILY MEDICINE

## 2023-03-07 PROCEDURE — 99214 PR OFFICE/OUTPT VISIT, EST, LEVL IV, 30-39 MIN: ICD-10-PCS | Mod: S$PBB,,, | Performed by: FAMILY MEDICINE

## 2023-03-07 PROCEDURE — 3008F BODY MASS INDEX DOCD: CPT | Mod: CPTII,,, | Performed by: FAMILY MEDICINE

## 2023-03-07 PROCEDURE — 3078F DIAST BP <80 MM HG: CPT | Mod: CPTII,,, | Performed by: FAMILY MEDICINE

## 2023-03-07 PROCEDURE — 3075F PR MOST RECENT SYSTOLIC BLOOD PRESS GE 130-139MM HG: ICD-10-PCS | Mod: CPTII,,, | Performed by: FAMILY MEDICINE

## 2023-03-07 PROCEDURE — 3078F PR MOST RECENT DIASTOLIC BLOOD PRESSURE < 80 MM HG: ICD-10-PCS | Mod: CPTII,,, | Performed by: FAMILY MEDICINE

## 2023-03-07 PROCEDURE — 99214 OFFICE O/P EST MOD 30 MIN: CPT | Mod: S$PBB,,, | Performed by: FAMILY MEDICINE

## 2023-03-07 PROCEDURE — 99214 OFFICE O/P EST MOD 30 MIN: CPT | Mod: PBBFAC,PO | Performed by: FAMILY MEDICINE

## 2023-03-07 PROCEDURE — 99999 PR PBB SHADOW E&M-EST. PATIENT-LVL IV: CPT | Mod: PBBFAC,,, | Performed by: FAMILY MEDICINE

## 2023-03-07 PROCEDURE — 3075F SYST BP GE 130 - 139MM HG: CPT | Mod: CPTII,,, | Performed by: FAMILY MEDICINE

## 2023-03-07 PROCEDURE — 4010F PR ACE/ARB THEARPY RXD/TAKEN: ICD-10-PCS | Mod: CPTII,,, | Performed by: FAMILY MEDICINE

## 2023-03-07 RX ORDER — VALSARTAN 320 MG/1
320 TABLET ORAL DAILY
Qty: 90 TABLET | Refills: 0 | Status: SHIPPED | OUTPATIENT
Start: 2023-03-07 | End: 2023-04-17 | Stop reason: SDUPTHER

## 2023-03-07 RX ORDER — VALSARTAN 160 MG/1
160 TABLET ORAL DAILY
Qty: 90 TABLET | Refills: 3 | Status: SHIPPED | OUTPATIENT
Start: 2023-03-07 | End: 2023-03-07 | Stop reason: SDUPTHER

## 2023-03-07 RX ORDER — CLOPIDOGREL BISULFATE 75 MG/1
75 TABLET ORAL DAILY
Qty: 90 TABLET | Refills: 3 | Status: SHIPPED | OUTPATIENT
Start: 2023-03-07 | End: 2023-03-22

## 2023-03-07 RX ORDER — ROSUVASTATIN CALCIUM 40 MG/1
40 TABLET, COATED ORAL NIGHTLY
Qty: 90 TABLET | Refills: 3 | Status: SHIPPED | OUTPATIENT
Start: 2023-03-07 | End: 2024-02-20 | Stop reason: SDUPTHER

## 2023-03-07 RX ORDER — PANTOPRAZOLE SODIUM 40 MG/1
40 TABLET, DELAYED RELEASE ORAL 2 TIMES DAILY
Qty: 180 TABLET | Refills: 3 | Status: SHIPPED | OUTPATIENT
Start: 2023-03-07 | End: 2023-03-15 | Stop reason: SDUPTHER

## 2023-03-07 RX ORDER — METOPROLOL SUCCINATE 50 MG/1
50 TABLET, EXTENDED RELEASE ORAL EVERY MORNING
Qty: 90 TABLET | Refills: 3 | Status: SHIPPED | OUTPATIENT
Start: 2023-03-07 | End: 2023-06-19 | Stop reason: SDUPTHER

## 2023-03-07 RX ORDER — TRIAMCINOLONE ACETONIDE 1 MG/G
CREAM TOPICAL 2 TIMES DAILY
Qty: 45 G | Refills: 1 | Status: SHIPPED | OUTPATIENT
Start: 2023-03-07

## 2023-03-07 NOTE — PROGRESS NOTES
Subjective:       Patient ID: Veronika Blackmon is a 62 y.o. female.    Chief Complaint: Follow-up    Patient Active Problem List   Diagnosis    RBBB    Atherosclerosis of native coronary artery of native heart    Familial hypercholesterolemia    Essential hypertension    Mixed hyperlipidemia    Prediabetes    History of cardiac arrest    Brain anoxic injury    Facial weakness    History of TIA (transient ischemic attack)    Hx of CABG    Stage 3a chronic kidney disease    Coronary artery disease involving coronary bypass graft of native heart without angina pectoris      HPI  61 yo female presents today to follow up her chronic medical problems.     Review of Systems   All other systems reviewed and are negative.       Results for orders placed or performed in visit on 02/17/23   Basic Metabolic Panel   Result Value Ref Range    Sodium 146 (H) 136 - 145 mmol/L    Potassium 3.9 3.5 - 5.1 mmol/L    Chloride 108 95 - 110 mmol/L    CO2 24 23 - 29 mmol/L    Glucose 108 70 - 110 mg/dL    BUN 14 6 - 20 mg/dL    Creatinine 1.3 0.5 - 1.4 mg/dL    Calcium 9.9 8.7 - 10.5 mg/dL    Anion Gap 14 8 - 16 mmol/L    eGFR 47.1 (A) >60 mL/min/1.73 m^2     Objective:     Vitals:    03/07/23 0933   BP: 139/70   Pulse: 68        Physical Exam  Vitals reviewed.         Assessment:       1. Essential hypertension    2. Prediabetes    3. Stage 3a chronic kidney disease    4. Hx of CABG    5. Mixed hyperlipidemia    6. PAD (peripheral artery disease)    7. Abnormal cardiovascular stress test    8. Coronary artery disease involving coronary bypass graft of native heart without angina pectoris    9. [place] grade C esophagitis    10. Pain in surgical scar          Plan:         Essential hypertension  -     Discontinue: valsartan (DIOVAN) 160 MG tablet; Take 1 tablet (160 mg total) by mouth once daily.  Dispense: 90 tablet; Refill: 3  -     valsartan (DIOVAN) 320 MG tablet; Take 1 tablet (320 mg total) by mouth once daily.  Dispense: 90 tablet;  Refill: 0  - Uncontrolled BP, increase to valsartan 160 mg.   Review of BP log  on digital hypertension program, 03/24/2023 - better controlled with dose increase.    Prediabetes  - Chronic and stable. Recheck A1C. Weight loss encouraged    Stage 3a chronic kidney disease  -     Ambulatory referral/consult to Nephrology; Future; Expected date: 03/14/2023  -     BASIC METABOLIC PANEL; Future; Expected date: 03/07/2023  -     CBC Auto Differential; Future; Expected date: 03/07/2023  - GFR stable    Hx of CABG  Mixed hyperlipidemia  -     rosuvastatin (CRESTOR) 40 MG Tab; Take 1 tablet (40 mg total) by mouth every evening.  Dispense: 90 tablet; Refill: 3  - Continue statin.  LDL < 100, at goal    PAD (peripheral artery disease)  - Symptomatically controlled. Treat HTN  Regular exercise.     CAD  Abnormal cardiovascular stress test  -     clopidogreL (PLAVIX) 75 mg tablet; Take 1 tablet (75 mg total) by mouth once daily.  Dispense: 90 tablet; Refill: 3  -     metoprolol succinate (TOPROL-XL) 50 MG 24 hr tablet; Take 1 tablet (50 mg total) by mouth every morning.  Dispense: 90 tablet; Refill: 3  - Continue statin  - Follows with cardiology    [place] grade C esophagitis    -     pantoprazole (PROTONIX) 40 MG tablet; Take 1 tablet (40 mg total) by mouth 2 (two) times daily.  Dispense: 180 tablet; Refill: 3    Pain in surgical scar  -     triamcinolone acetonide 0.1% (KENALOG) 0.1 % cream; Apply topically 2 (two) times daily.  Dispense: 45 g; Refill: 1  - Local itching      Patient's questions answered. Plan reviewed with patient at the end of visit. Relevant precautions to chief complaint and reasons to seek medical care or contact the office sooner reviewed with patient.     Follow up in about 6 months (around 9/7/2023) for Hypertension Follow-up (BMP, CBC, A1C).

## 2023-03-15 RX ORDER — PANTOPRAZOLE SODIUM 40 MG/1
40 TABLET, DELAYED RELEASE ORAL 2 TIMES DAILY
Qty: 180 TABLET | Refills: 3 | Status: SHIPPED | OUTPATIENT
Start: 2023-03-15 | End: 2024-03-07

## 2023-03-20 ENCOUNTER — TELEPHONE (OUTPATIENT)
Dept: FAMILY MEDICINE | Facility: CLINIC | Age: 63
End: 2023-03-20
Payer: MEDICAID

## 2023-03-20 ENCOUNTER — E-VISIT (OUTPATIENT)
Dept: FAMILY MEDICINE | Facility: CLINIC | Age: 63
End: 2023-03-20
Payer: MEDICAID

## 2023-03-20 DIAGNOSIS — J06.9 VIRAL URI WITH COUGH: Primary | ICD-10-CM

## 2023-03-20 PROCEDURE — 99421 OL DIG E/M SVC 5-10 MIN: CPT | Mod: ,,, | Performed by: FAMILY MEDICINE

## 2023-03-20 PROCEDURE — 99421 PR E&M, ONLINE DIGIT, EST, < 7 DAYS, 5-10 MINS: ICD-10-PCS | Mod: ,,, | Performed by: FAMILY MEDICINE

## 2023-03-20 RX ORDER — BENZONATATE 200 MG/1
200 CAPSULE ORAL 3 TIMES DAILY PRN
Qty: 30 CAPSULE | Refills: 0 | Status: SHIPPED | OUTPATIENT
Start: 2023-03-20 | End: 2023-03-30

## 2023-03-20 NOTE — TELEPHONE ENCOUNTER
----- Message from Ariadna Gonzalez sent at 3/20/2023  1:02 PM CDT -----  Type:  Needs Medical Advice    Who Called: Pt   Symptoms (please be specific): sore throat    How long has patient had these symptoms:  since yesterdasy   Pharmacy name and phone #:    Would the patient rather a call back or a response via [facility]? Call back   Best Call Back Number: [phone]  Additional Information: pt says her gland is swollen on the right side... wanted to know what can be done

## 2023-03-20 NOTE — PROGRESS NOTES
Patient ID: Veronika Blackmon is a 62 y.o. female.    Chief Complaint: No chief complaint on file.    The patient initiated a request through [employer] on 3/20/2023 for evaluation and management with a chief complaint of No chief complaint on file.     I evaluated the questionnaire submission on 03/20/2023      [facility] Peq Evisit Upper Respitatory/Cough Questionnaire    3/20/2023  5:32 PM CDT - Filed by Patient   Do you agree to participate in an E-Visit? Yes   If you have any of the following symptoms, please present to your local ER or call 911:  I acknowledge   What is the main issue that you would like for your doctor to address today? Sore throat   Are you able to take your vital signs? Yes   Systolic Blood Pressure: 180   Diastolic Blood Pressure: 97   Weight: 173   Height: 64   Pulse:    Temp:    Resp:    Pulse Ox:    Are you currently pregnant, could you be pregnant, or are you breast feeding? None of the above   What symptoms do you currently have?  Cough   Have you had a fever? No   When did your symptoms first appear? 3/19/2023   In the last two weeks, have you been in close contact with someone who has COVID-19 or the Flu? No   In the last two weeks, have you worked or volunteered in a healthcare facility or as a ? Healthcare facilities include a hospital, medical or dental clinic, long-term care facility, or nursing home No   Do you live in a long-term care facility, nursing home, group home, or homeless shelter? No   List what you have done or taken to help your symptoms. Vapor halls   How severe are your symptoms? Mild   Have you taken an at home Covid test? No   Have you taken a Flu test? No   Have you been fully vaccinated for COVID? (2 Pfizer, 2 Moderna or 1 [employer] vaccine injections) Yes   Have you received a booster? Yes   Have you recieved a Flu shot? Yes   When did you recieve your Flu shot? 1/10/2023   Do you have transportation to get tested for COVID if it is indicated and  ordered for you at an [facility] location? Yes   Provide any information you feel is important to your history not asked above Glands feel swollen when i press on my throat   Please attach any relevant images or files          Recent Labs Obtained:  No visits with results within 7 Day(s) from this visit.   Latest known visit with results is:   Lab Visit on 02/17/2023   Component Date Value Ref Range Status    Sodium 02/17/2023 146 (H)  136 - 145 mmol/L Final    Potassium 02/17/2023 3.9  3.5 - 5.1 mmol/L Final    Chloride 02/17/2023 108  95 - 110 mmol/L Final    CO2 02/17/2023 24  23 - 29 mmol/L Final    Glucose 02/17/2023 108  70 - 110 mg/dL Final    BUN 02/17/2023 14  6 - 20 mg/dL Final    Creatinine 02/17/2023 1.3  0.5 - 1.4 mg/dL Final    Calcium 02/17/2023 9.9  8.7 - 10.5 mg/dL Final    Anion Gap 02/17/2023 14  8 - 16 mmol/L Final    eGFR 02/17/2023 47.1 (A)  >60 mL/min/1.73 m^2 Final       Encounter Diagnosis   Name Primary?    Viral URI with cough Yes        No orders of the defined types were placed in this encounter.     Medications Ordered This Encounter   Medications    benzonatate (TESSALON) 200 MG capsule     Sig: Take 1 capsule (200 mg total) by mouth 3 (three) times daily as needed for Cough.     Dispense:  30 capsule     Refill:  0        Follow up if symptoms worsen or fail to improve.      E-Visit Time Tracking:    Day 1 Time (in minutes): 10     Total Time (in minutes): 10

## 2023-03-24 PROBLEM — I25.810 CORONARY ARTERY DISEASE INVOLVING CORONARY BYPASS GRAFT OF NATIVE HEART WITHOUT ANGINA PECTORIS: Status: ACTIVE | Noted: 2023-03-24

## 2023-03-27 ENCOUNTER — SPECIALTY PHARMACY (OUTPATIENT)
Dept: PHARMACY | Facility: CLINIC | Age: 63
End: 2023-03-27
Payer: MEDICAID

## 2023-03-27 NOTE — TELEPHONE ENCOUNTER
Specialty Pharmacy - Refill Coordination    Specialty Medication Orders Linked to Encounter      Flowsheet Row Most Recent Value   Medication #1 evolocumab (REPATHA SURECLICK) 140 mg/mL PnIj (Order#[id number], Rx#[id number])            Refill Questions - Documented Responses      Flowsheet Row Most Recent Value   Patient Availability and HIPAA Verification    Does patient want to proceed with activity? Yes   HIPAA/medical authority confirmed? Yes   Relationship to patient of person spoken to? Self   Refill Screening Questions    Would patient like to speak to a pharmacist? No   When does the patient need to receive the medication? 04/03/23   Refill Delivery Questions    How will the patient receive the medication? MEDRx   When does the patient need to receive the medication? 04/03/23   Shipping Address Home   Address in [facility] confirmed and updated if neccessary? Yes   Expected Copay ($) 0   Is the patient able to afford the medication copay? Yes   Payment Method zero copay   Days supply of Refill 28   Supplies needed? No supplies needed   Refill activity completed? Yes   Refill activity plan Refill scheduled   Shipment/Pickup Date: 03/30/23            Current Outpatient Medications   Medication Sig    amLODIPine (NORVASC) 5 MG tablet Take 1 tablet (5 mg total) by mouth once daily.    benzonatate (TESSALON) 200 MG capsule Take 1 capsule (200 mg total) by mouth 3 (three) times daily as needed for Cough.    clopidogreL (PLAVIX) 75 mg tablet TAKE ONE TABLET BY MOUTH ONCE DAILY.    evolocumab (REPATHA SURECLICK) 140 mg/mL PnIj Inject 1 mL (140 mg total) into the skin every 14 (fourteen) days.    metoprolol succinate (TOPROL-XL) 50 MG 24 hr tablet Take 1 tablet (50 mg total) by mouth every morning.    pantoprazole (PROTONIX) 40 MG tablet Take 1 tablet (40 mg total) by mouth 2 (two) times daily.    potassium chloride (MICRO-K) 8 mEq CpSR Take 1 capsule (8 mEq total) by mouth 2 (two) times daily.    rosuvastatin  (CRESTOR) 40 MG Tab Take 1 tablet (40 mg total) by mouth every evening.    triamcinolone acetonide 0.1% (KENALOG) 0.1 % cream Apply topically 2 (two) times daily.    valsartan (DIOVAN) 320 MG tablet Take 1 tablet (320 mg total) by mouth once daily.   Last reviewed on 3/20/2023  5:45 PM by Sahara Carrillo MD    Review of patient's allergies indicates:  No Known Allergies Last reviewed on  3/20/2023 5:45 PM by Sahara Carrillo      Tasks added this encounter   4/24/2023 - Refill Call (Auto Added)   Tasks due within next 3 months   No tasks due.     Samantha Ayala, PharmD  [facility] - Specialty Pharmacy  [address] [place] [id number]  Phone: [phone]  Fax: [phone]

## 2023-04-27 ENCOUNTER — SPECIALTY PHARMACY (OUTPATIENT)
Dept: PHARMACY | Facility: CLINIC | Age: 63
End: 2023-04-27
Payer: MEDICAID

## 2023-04-27 ENCOUNTER — PATIENT MESSAGE (OUTPATIENT)
Dept: PHARMACY | Facility: CLINIC | Age: 63
End: 2023-04-27
Payer: MEDICAID

## 2023-04-27 RX ORDER — DAPAGLIFLOZIN 10 MG/1
10 TABLET, FILM COATED ORAL
COMMUNITY
Start: 2023-04-20 | End: 2024-03-07

## 2023-04-27 NOTE — TELEPHONE ENCOUNTER
Specialty Pharmacy - Refill Coordination    Specialty Medication Orders Linked to Encounter      Flowsheet Row Most Recent Value   Medication #1 evolocumab (REPATHA SURECLICK) 140 mg/mL PnIj (Order#[id number], Rx#[id number])            Refill Questions - Documented Responses      Flowsheet Row Most Recent Value   Patient Availability and HIPAA Verification    Does patient want to proceed with activity? Yes   HIPAA/medical authority confirmed? Yes   Relationship to patient of person spoken to? Self   Refill Screening Questions    Would patient like to speak to a pharmacist? No   When does the patient need to receive the medication? 05/08/23   Refill Delivery Questions    How will the patient receive the medication? MEDRx   When does the patient need to receive the medication? 05/08/23   Shipping Address Home   Address in [facility] confirmed and updated if neccessary? Yes   Expected Copay ($) 0   Is the patient able to afford the medication copay? Yes   Payment Method zero copay   Days supply of Refill 28   Supplies needed? No supplies needed   Refill activity completed? Yes   Refill activity plan Refill scheduled   Shipment/Pickup Date: 05/04/23            Current Outpatient Medications   Medication Sig    amLODIPine (NORVASC) 5 MG tablet Take 1 tablet (5 mg total) by mouth once daily.    clopidogreL (PLAVIX) 75 mg tablet TAKE ONE TABLET BY MOUTH ONCE DAILY.    evolocumab (REPATHA SURECLICK) 140 mg/mL PnIj Inject 1 mL (140 mg total) into the skin every 14 (fourteen) days.    FARXIGA 10 mg tablet Take 10 mg by mouth.    metoprolol succinate (TOPROL-XL) 50 MG 24 hr tablet Take 1 tablet (50 mg total) by mouth every morning.    pantoprazole (PROTONIX) 40 MG tablet Take 1 tablet (40 mg total) by mouth 2 (two) times daily.    potassium chloride (KLOR-CON) 8 MEQ TbSR TAKE ONE TABLET BY MOUTH TWICE DAILY    potassium chloride (MICRO-K) 8 mEq CpSR Take 1 capsule (8 mEq total) by mouth 2 (two) times daily.     rosuvastatin (CRESTOR) 40 MG Tab Take 1 tablet (40 mg total) by mouth every evening.    triamcinolone acetonide 0.1% (KENALOG) 0.1 % cream Apply topically 2 (two) times daily.    valsartan (DIOVAN) 320 MG tablet Take 1 tablet (320 mg total) by mouth once daily.   Last reviewed on 3/20/2023  5:45 PM by Sahara Carrillo MD    Review of patient's allergies indicates:  No Known Allergies Last reviewed on  4/17/2023 3:38 PM by Donna Perez      Tasks added this encounter   No tasks added.   Tasks due within next 3 months   4/29/2023 - Refill Coordination Outreach (1 time occurrence)     Ray, PharmD  [facility] - [facility]ty [facility]acy  [address] [place] [address]  Phone: [phone]  Fax: [phone]

## 2023-05-01 ENCOUNTER — PATIENT MESSAGE (OUTPATIENT)
Dept: FAMILY MEDICINE | Facility: CLINIC | Age: 63
End: 2023-05-01

## 2023-05-01 ENCOUNTER — E-VISIT (OUTPATIENT)
Dept: FAMILY MEDICINE | Facility: CLINIC | Age: 63
End: 2023-05-01
Payer: MEDICAID

## 2023-05-01 DIAGNOSIS — B37.31 VAGINAL CANDIDIASIS: Primary | ICD-10-CM

## 2023-05-01 PROCEDURE — 99421 OL DIG E/M SVC 5-10 MIN: CPT | Mod: ,,, | Performed by: FAMILY MEDICINE

## 2023-05-01 PROCEDURE — 99421 PR E&M, ONLINE DIGIT, EST, < 7 DAYS, 5-10 MINS: ICD-10-PCS | Mod: ,,, | Performed by: FAMILY MEDICINE

## 2023-05-01 RX ORDER — FLUCONAZOLE 150 MG/1
150 TABLET ORAL WEEKLY
Qty: 12 TABLET | Refills: 0 | Status: SHIPPED | OUTPATIENT
Start: 2023-05-01 | End: 2023-09-07

## 2023-05-01 NOTE — PROGRESS NOTES
Patient ID: Veronika Blackmon is a 63 y.o. female.    Chief Complaint: No chief complaint on file.    The patient initiated a request through [employer] on 5/1/2023 for evaluation and management with a chief complaint of No chief complaint on file.     I evaluated the questionnaire submission on 05/01/2023    [facility] Peq Evisit Vaginal Discharge    5/1/2023  8:44 AM CDT - Filed by Patient   Do you agree to participate in an E-Visit? Yes   If you have any of the following symptoms,  please do not complete an E-Visit,  schedule an appointment with your provider: I acknowledge   What is the main issue that you would like for your doctor to address today? Vaginal itch due to new medicine   Are you able to take your vital signs? Yes   Systolic Blood Pressure: 124   Diastolic Blood Pressure: 76   Weight: 180   Height: 64   Pulse: 74   Temp:    Resp:    Pulse Ox:    Are you currently pregnant, could you be pregnant, or are you breast feeding? None of the above   Which of the following are you experiencing? Vaginal Itching    Are you having pain while passing urine? No, I have no pain while urinating.   Which of the following applies to your vaginal discharge? I have no discharge.    Which of the following are you experiencing? None of the above   Do you have any sores on your genitals? No    Have you taken antibiotics recently? I have not been on any antibiotics    Do you use any of the following? None of the above   Which of the following applies to your menstrual period? I do not have menstrual periods   Have you had similar symptoms in the past? No, I have never had these symptoms.   Have you had a fever? No   During the last 2 months, have you had sexual contact with a specific person for the first time? No   Provide any information you feel is important to your history not asked above    Please attach any relevant images or files          Recent Labs Obtained:  No visits with results within 7 Day(s) from this visit.   Latest  known visit with results is:   Lab Visit on 02/17/2023   Component Date Value Ref Range Status    Sodium 02/17/2023 146 (H)  136 - 145 mmol/L Final    Potassium 02/17/2023 3.9  3.5 - 5.1 mmol/L Final    Chloride 02/17/2023 108  95 - 110 mmol/L Final    CO2 02/17/2023 24  23 - 29 mmol/L Final    Glucose 02/17/2023 108  70 - 110 mg/dL Final    BUN 02/17/2023 14  6 - 20 mg/dL Final    Creatinine 02/17/2023 1.3  0.5 - 1.4 mg/dL Final    Calcium 02/17/2023 9.9  8.7 - 10.5 mg/dL Final    Anion Gap 02/17/2023 14  8 - 16 mmol/L Final    eGFR 02/17/2023 47.1 (A)  >60 mL/min/1.73 m^2 Final       Encounter Diagnosis   Name Primary?    Vaginal candidiasis Yes        No orders of the defined types were placed in this encounter.     Medications Ordered This Encounter   Medications    fluconazole (DIFLUCAN) 150 MG Tab     Sig: Take 1 tablet (150 mg total) by mouth once a week.     Dispense:  12 tablet     Refill:  0        Follow up if symptoms worsen or fail to improve.      E-Visit Time Tracking:    Day 1 Time (in minutes): 10     Total Time (in minutes): 10

## 2023-05-22 ENCOUNTER — PATIENT MESSAGE (OUTPATIENT)
Dept: FAMILY MEDICINE | Facility: CLINIC | Age: 63
End: 2023-05-22
Payer: MEDICAID

## 2023-05-25 ENCOUNTER — PATIENT MESSAGE (OUTPATIENT)
Dept: PHARMACY | Facility: CLINIC | Age: 63
End: 2023-05-25
Payer: MEDICAID

## 2023-05-25 ENCOUNTER — SPECIALTY PHARMACY (OUTPATIENT)
Dept: PHARMACY | Facility: CLINIC | Age: 63
End: 2023-05-25
Payer: MEDICAID

## 2023-05-25 NOTE — TELEPHONE ENCOUNTER
Incoming call from pt regarding Repatha refill. Pt stated she is last injected on 5/22/23 and stated we can call her back on 6/1/23. She will be due for her next injection on 6/5/23.

## 2023-05-30 NOTE — TELEPHONE ENCOUNTER
Specialty Pharmacy - Refill Coordination    Specialty Medication Orders Linked to Encounter      Flowsheet Row Most Recent Value   Medication #1 evolocumab (REPATHA SURECLICK) 140 mg/mL PnIj (Order#[id number], Rx#[id number])            Refill Questions - Documented Responses      Flowsheet Row Most Recent Value   Patient Availability and HIPAA Verification    Does patient want to proceed with activity? Yes   HIPAA/medical authority confirmed? Yes   Relationship to patient of person spoken to? Self   Refill Screening Questions    Would patient like to speak to a pharmacist? No   When does the patient need to receive the medication? 06/05/23   Refill Delivery Questions    How will the patient receive the medication? MEDRx   When does the patient need to receive the medication? 06/05/23   Shipping Address Home   Address in [facility] confirmed and updated if neccessary? Yes   Expected Copay ($) 0   Is the patient able to afford the medication copay? Yes   Payment Method zero copay   Days supply of Refill 28   Supplies needed? No supplies needed   Refill activity completed? Yes   Refill activity plan Refill scheduled   Shipment/Pickup Date: 06/01/23            Current Outpatient Medications   Medication Sig    amLODIPine (NORVASC) 5 MG tablet Take 1 tablet (5 mg total) by mouth once daily.    clopidogreL (PLAVIX) 75 mg tablet TAKE ONE TABLET BY MOUTH ONCE DAILY.    evolocumab (REPATHA SURECLICK) 140 mg/mL PnIj Inject 1 mL (140 mg total) into the skin every 14 (fourteen) days.    FARXIGA 10 mg tablet Take 10 mg by mouth.    fluconazole (DIFLUCAN) 150 MG Tab Take 1 tablet (150 mg total) by mouth once a week.    metoprolol succinate (TOPROL-XL) 50 MG 24 hr tablet Take 1 tablet (50 mg total) by mouth every morning.    pantoprazole (PROTONIX) 40 MG tablet Take 1 tablet (40 mg total) by mouth 2 (two) times daily.    potassium chloride (KLOR-CON) 8 MEQ TbSR TAKE ONE TABLET BY MOUTH TWICE DAILY    potassium chloride  (MICRO-K) 8 mEq CpSR Take 1 capsule (8 mEq total) by mouth 2 (two) times daily.    rosuvastatin (CRESTOR) 40 MG Tab Take 1 tablet (40 mg total) by mouth every evening.    triamcinolone acetonide 0.1% (KENALOG) 0.1 % cream Apply topically 2 (two) times daily.    valsartan (DIOVAN) 320 MG tablet Take 1 tablet (320 mg total) by mouth once daily.   Last reviewed on 5/1/2023 10:31 AM by Sahara Carrillo MD    Review of patient's allergies indicates:  No Known Allergies Last reviewed on  5/1/2023 10:31 AM by Sahara Carrillo      Tasks added this encounter   No tasks added.   Tasks due within next 3 months   6/1/2023 - Refill Coordination Outreach (1 time occurrence)     Kostas Knight, PharmD  [facility]  [address] [place] [id number]  Phone: [phone]  Fax: [phone]

## 2023-06-02 ENCOUNTER — TELEPHONE (OUTPATIENT)
Dept: CARDIOLOGY | Facility: CLINIC | Age: 63
End: 2023-06-02
Payer: MEDICAID

## 2023-06-02 DIAGNOSIS — E78.01 FAMILIAL HYPERCHOLESTEROLEMIA: ICD-10-CM

## 2023-06-02 DIAGNOSIS — Z95.1 HX OF CABG: ICD-10-CM

## 2023-06-02 DIAGNOSIS — I10 ESSENTIAL HYPERTENSION: ICD-10-CM

## 2023-06-02 RX ORDER — VALSARTAN 320 MG/1
320 TABLET ORAL DAILY
Qty: 90 TABLET | Refills: 1 | Status: SHIPPED | OUTPATIENT
Start: 2023-06-02 | End: 2023-11-10

## 2023-06-02 RX ORDER — AMLODIPINE BESYLATE 5 MG/1
5 TABLET ORAL DAILY
Qty: 90 TABLET | Refills: 1 | Status: SHIPPED | OUTPATIENT
Start: 2023-06-02 | End: 2023-09-07

## 2023-06-02 RX ORDER — EVOLOCUMAB 140 MG/ML
140 INJECTION, SOLUTION SUBCUTANEOUS
Qty: 2 ML | Refills: 6 | Status: SHIPPED | OUTPATIENT
Start: 2023-06-02 | End: 2023-07-05 | Stop reason: SDUPTHER

## 2023-06-02 NOTE — TELEPHONE ENCOUNTER
Spoke to patient and informed her that her medication will be sent to the [facility] to cover the cost.    maribel

## 2023-06-05 DIAGNOSIS — I10 ESSENTIAL HYPERTENSION: ICD-10-CM

## 2023-06-05 NOTE — TELEPHONE ENCOUNTER
Care Due:                  Date            Visit Type   Department     Provider  --------------------------------------------------------------------------------                                EP -                              PRIMARY      [facility] FAMILY  Last Visit: 03-      CARE ([facility])   MEDICINE       Sahara T  Carrillo                              EP -                              PRIMARY      [facility]  Next Visit: 09-      CARE ([facility])   MEDICINE       Sahara T  Carrillo                                                            Last  Test          Frequency    Reason                     Performed    Due Date  --------------------------------------------------------------------------------    CMP.........  12 months..  rosuvastatin.............  01- 01-    [facility] Embedded Care Due Messages. Reference number: [id number].   6/05/2023 11:44:33 AM CDT

## 2023-06-06 RX ORDER — VALSARTAN 320 MG/1
320 TABLET ORAL DAILY
Qty: 90 TABLET | Refills: 1 | OUTPATIENT
Start: 2023-06-06 | End: 2024-06-05

## 2023-06-06 NOTE — TELEPHONE ENCOUNTER
Refill Decision Note   Veronika Blackmon  is requesting a refill authorization.  Brief Assessment and Rationale for Refill:  Quick Discontinue     Medication Therapy Plan:  Receipt confirmed by pharmacy (6/2/2023  3:27 PM CDT)    Medication Reconciliation Completed: No   Comments:     Provider Staff:     Action is required for this patient.   Please see care gap opportunities below in Care Due Message.     Thanks!  [facility]     Appointments      Date Provider   Last Visit   3/7/2023 Sahara Carrillo MD   Next Visit   9/7/2023 Sahara Carrillo MD     Note composed:4:21 AM 06/06/2023           Note composed:4:21 AM 06/06/2023

## 2023-06-19 ENCOUNTER — OFFICE VISIT (OUTPATIENT)
Dept: CARDIOLOGY | Facility: CLINIC | Age: 63
End: 2023-06-19
Payer: MEDICAID

## 2023-06-19 VITALS
WEIGHT: 171.31 LBS | HEART RATE: 69 BPM | HEIGHT: 64 IN | BODY MASS INDEX: 29.24 KG/M2 | OXYGEN SATURATION: 96 % | DIASTOLIC BLOOD PRESSURE: 81 MMHG | SYSTOLIC BLOOD PRESSURE: 118 MMHG

## 2023-06-19 DIAGNOSIS — I25.810 CORONARY ARTERY DISEASE INVOLVING CORONARY BYPASS GRAFT OF NATIVE HEART WITHOUT ANGINA PECTORIS: ICD-10-CM

## 2023-06-19 DIAGNOSIS — I10 ESSENTIAL HYPERTENSION: ICD-10-CM

## 2023-06-19 DIAGNOSIS — I25.10 ATHEROSCLEROSIS OF NATIVE CORONARY ARTERY OF NATIVE HEART WITHOUT ANGINA PECTORIS: ICD-10-CM

## 2023-06-19 DIAGNOSIS — N18.31 STAGE 3A CHRONIC KIDNEY DISEASE: ICD-10-CM

## 2023-06-19 DIAGNOSIS — R94.39 ABNORMAL CARDIOVASCULAR STRESS TEST: ICD-10-CM

## 2023-06-19 DIAGNOSIS — E78.2 MIXED HYPERLIPIDEMIA: ICD-10-CM

## 2023-06-19 DIAGNOSIS — E78.01 FAMILIAL HYPERCHOLESTEROLEMIA: Chronic | ICD-10-CM

## 2023-06-19 DIAGNOSIS — I49.9 IRREGULAR HEART RHYTHM: ICD-10-CM

## 2023-06-19 DIAGNOSIS — I45.10 RBBB: Primary | Chronic | ICD-10-CM

## 2023-06-19 DIAGNOSIS — Z86.74 HISTORY OF CARDIAC ARREST: ICD-10-CM

## 2023-06-19 DIAGNOSIS — Z95.1 HX OF CABG: Chronic | ICD-10-CM

## 2023-06-19 PROCEDURE — 3079F PR MOST RECENT DIASTOLIC BLOOD PRESSURE 80-89 MM HG: ICD-10-PCS | Mod: CPTII,,, | Performed by: INTERNAL MEDICINE

## 2023-06-19 PROCEDURE — 3074F SYST BP LT 130 MM HG: CPT | Mod: CPTII,,, | Performed by: INTERNAL MEDICINE

## 2023-06-19 PROCEDURE — 99214 OFFICE O/P EST MOD 30 MIN: CPT | Mod: S$PBB,,, | Performed by: INTERNAL MEDICINE

## 2023-06-19 PROCEDURE — 1159F PR MEDICATION LIST DOCUMENTED IN MEDICAL RECORD: ICD-10-PCS | Mod: CPTII,,, | Performed by: INTERNAL MEDICINE

## 2023-06-19 PROCEDURE — 3008F BODY MASS INDEX DOCD: CPT | Mod: CPTII,,, | Performed by: INTERNAL MEDICINE

## 2023-06-19 PROCEDURE — 4010F PR ACE/ARB THEARPY RXD/TAKEN: ICD-10-PCS | Mod: CPTII,,, | Performed by: INTERNAL MEDICINE

## 2023-06-19 PROCEDURE — 3008F PR BODY MASS INDEX (BMI) DOCUMENTED: ICD-10-PCS | Mod: CPTII,,, | Performed by: INTERNAL MEDICINE

## 2023-06-19 PROCEDURE — 99214 PR OFFICE/OUTPT VISIT, EST, LEVL IV, 30-39 MIN: ICD-10-PCS | Mod: S$PBB,,, | Performed by: INTERNAL MEDICINE

## 2023-06-19 PROCEDURE — 4010F ACE/ARB THERAPY RXD/TAKEN: CPT | Mod: CPTII,,, | Performed by: INTERNAL MEDICINE

## 2023-06-19 PROCEDURE — 99999 PR PBB SHADOW E&M-EST. PATIENT-LVL III: CPT | Mod: PBBFAC,,, | Performed by: INTERNAL MEDICINE

## 2023-06-19 PROCEDURE — 3074F PR MOST RECENT SYSTOLIC BLOOD PRESSURE < 130 MM HG: ICD-10-PCS | Mod: CPTII,,, | Performed by: INTERNAL MEDICINE

## 2023-06-19 PROCEDURE — 1159F MED LIST DOCD IN RCRD: CPT | Mod: CPTII,,, | Performed by: INTERNAL MEDICINE

## 2023-06-19 PROCEDURE — 99999 PR PBB SHADOW E&M-EST. PATIENT-LVL III: ICD-10-PCS | Mod: PBBFAC,,, | Performed by: INTERNAL MEDICINE

## 2023-06-19 PROCEDURE — 99213 OFFICE O/P EST LOW 20 MIN: CPT | Mod: PBBFAC,PO | Performed by: INTERNAL MEDICINE

## 2023-06-19 PROCEDURE — 3079F DIAST BP 80-89 MM HG: CPT | Mod: CPTII,,, | Performed by: INTERNAL MEDICINE

## 2023-06-19 RX ORDER — METOPROLOL SUCCINATE 50 MG/1
50 TABLET, EXTENDED RELEASE ORAL EVERY MORNING
Qty: 90 TABLET | Refills: 3 | Status: SHIPPED | OUTPATIENT
Start: 2023-06-19 | End: 2023-12-06 | Stop reason: SDUPTHER

## 2023-06-19 RX ORDER — METOPROLOL SUCCINATE 50 MG/1
50 TABLET, EXTENDED RELEASE ORAL EVERY MORNING
Qty: 90 TABLET | Refills: 3 | Status: SHIPPED | OUTPATIENT
Start: 2023-06-19 | End: 2023-06-19 | Stop reason: SDUPTHER

## 2023-06-19 NOTE — PROGRESS NOTES
Subjective:   @Patient ID:  Veronika Blackmon is a 63 y.o. female who presents for evaluation of HTN, abnormal EKG, HLP       HPI:   6/19/2023:  F/U. ROBIN done with mild to moderate PAD. Overall she is doing well. No chest pain. Occasional tightness with breathing. Occasional BRBPR. Had EGD and colonoscopy in 10/2022.         1/30/2023: Here for follow up. She has been doing well. She tries to stay active.   No chest pain with activities. She is enrolled in digital HTN clinic. BP is well per her home recordings. Today is elevated some. No syncope or pre syncope. Tolerating PCSK9 inhibitors               Historically:  Stress test was done that showed high risk findings with significant EKG changes very early in the stress and persistent in recovery. 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 During the cath she was noted to have frequent PVCs and metoprolol  dose was increased She was sent for CABG evaluation. She was seen by Dr. Mays and believe no good targets so she was declined. She wanted a 2nd opinion. While pending a 2nd opinion she had cardiac arrest  In 1/23/2022. It was believed to be arrhythmias related. She was arguing with her son. Initial concern about anoxic brain injury but she got extubated and recovered very well neurologically. She was transferred to [facility] for CABG which was done 1/31/2022 by Dr. Paz. She recovered very well and discharged 2/4/2022.  EF prior to CABG in [facility] was read as 55%    Admitted 1/23/2022 with cardiac arrest. It was believed to be arrhythmias related. She was arguing with her son. Initial concern about anoxic brain injury but she got extubated and recovered very well neurologically. She was transferred to [facility] for CABG which was done 1/31/2022 by Dr. Paz. She recovered very well and discharged 2/4/2022.       EKG done as below and concerning lateral changes  No significant claudications     FH is significant for premature CAD, younger brother had MI in his 40s,  majority  of her family with CAD, PAD, and DM.     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 Paz in [facility].  LIMA-LAD, SVG-D, SVG RI, SVG-OM. RCA  and was not bypassed.  ACEI held due HARSH.     She was discharged 2/2/2022  Had repeat Echo in [facility] 1/28/2022 EF was read as >55%, small pericardial effusion,     ROBIN 2/2023   Rt ROBIN 0.97 ---> 0.73 consistent with mild PAD  Lt ROBIN 1.04--->0.88 consistent with mild PAD  Rt TBI 0.43  Lt TBI 0.42       - Stress MPI 12/2/2021  . Findings concerning for reversible ischemia in the anterior wall.  There is an associated wall motion abnormality.  2. Possible separate area of reversible ischemia along the inferoseptal wall, though artifact at this location can occasionally give a similar appearance.  3. Left ventricular dysfunction.  Estimated ejection fraction 36% during stress and 45% during rest.  This report was flagged in Epic as abnormal    - [facility] 12/14/2021  There was three vessel coronary artery disease.  The ejection fraction was 50-55% by visual estimate.  The pre-procedure left ventricular end diastolic pressure was 7.  The Prox LAD lesion was 70% stenosed.  The Mid LAD lesion was 80% stenosed.  The Dist LAD lesion was 95% stenosed.  The 1st Diag lesion was 95% stenosed.  The 1st Mrg lesion was 90% stenosed.  The Prox Cx to Mid Cx lesion was 80% stenosed.  The Prox RCA to Mid RCA lesion was 100% stenosed.  The estimated blood loss was none.     - Severe MVCAD   - CVT evaluation soon   - Increase Toprol to 25 mg bid  - ASA/Plavix/Statin         Echo 2/23/2022   The left ventricle is normal in size with mild concentric hypertrophy and normal systolic function.  There are segmental left ventricular wall motion abnormalities with inferobasilar hypokiesis.  There is abnormal septal wall motion consistent with post-operative status.  The estimated ejection fraction is 55%.  Grade I left  ventricular diastolic dysfunction.  Mild aortic regurgitation.  Mild tricuspid regurgitation.  Mild pulmonic regurgitation.  Normal right ventricular size with normal right ventricular systolic function.  There is no pulmonary hypertension.  There is a moderate left pleural effusion.        - EKG  SR, RBBB, lateral TWI    Event monitor 3/9/2022  At baseline, in the absence of symptoms, the rhythm was sinus with heart rate 94 beats per minute.  No symptom was reported throughout the month.  There were multiple activations, all of which showed sinus rhythm and some of which showed single PVCs.  The heart rate 80-95 beats per minute.     Impression:  Sinus rhythm, with occasional single PVCs.  No reported symptom.    Patient Active Problem List    Diagnosis Date Noted    Coronary artery disease involving coronary bypass graft of native heart without angina pectoris 03/24/2023    Stage 3a chronic kidney disease 07/01/2022    Hx of CABG 02/17/2022    Facial weakness     History of TIA (transient ischemic attack)     History of cardiac arrest 01/24/2022    Brain anoxic injury 01/24/2022    Prediabetes 01/04/2022    Familial hypercholesterolemia 12/27/2021    Essential hypertension 12/27/2021    Mixed hyperlipidemia 12/27/2021    Atherosclerosis of native coronary artery of native heart 12/22/2021     Formatting of this note might be different from the original.  Added automatically from request for surgery [id number]      RBBB 12/02/2021                LAST HbA1c  Lab Results   Component Value Date    HGBA1C 6.3 (H) 01/28/2022       Lipid panel  Lab Results   Component Value Date    CHOL 138 02/06/2023    CHOL 191 04/01/2022    CHOL 188 02/23/2022     Lab Results   Component Value Date    HDL 72 02/06/2023    HDL 67 04/01/2022    HDL 50 02/23/2022     Lab Results   Component Value Date    LDLCALC 56.4 (L) 02/06/2023    LDLCALC 109.6 04/01/2022    LDLCALC 118.4 02/23/2022     Lab Results   Component Value Date    TRIG 48  02/06/2023    TRIG 72 04/01/2022    TRIG 98 02/23/2022     Lab Results   Component Value Date    CHOLHDL 52.2 (H) 02/06/2023    CHOLHDL 35.1 04/01/2022    CHOLHDL 26.6 02/23/2022            Review of Systems   Constitutional: Negative for chills and fever.   HENT:  Negative for hearing loss and nosebleeds.    Eyes:  Negative for blurred vision.   Cardiovascular:  Positive for dyspnea on exertion. Negative for chest pain, leg swelling and palpitations.   Respiratory:  Negative for hemoptysis and shortness of breath.    Hematologic/Lymphatic: Negative for bleeding problem.   Skin:  Negative for itching.   Musculoskeletal:  Negative for falls.   Gastrointestinal:  Negative for abdominal pain and hematochezia.   Genitourinary:  Negative for hematuria.   Neurological:  Negative for dizziness and loss of balance.   Psychiatric/Behavioral:  Negative for altered mental status and depression.      Objective:   Physical Exam  Constitutional:       Appearance: She is well-developed.   HENT:      Head: Normocephalic and atraumatic.   Eyes:      Conjunctiva/sclera: Conjunctivae normal.   Neck:      Vascular: No carotid bruit or JVD.   Cardiovascular:      Rate and Rhythm: Normal rate. Rhythm irregular.      Pulses:           Carotid pulses are 2+ on the right side and 2+ on the left side.       Radial pulses are 2+ on the right side and 2+ on the left side.        Dorsalis pedis pulses are 2+ on the right side and 0 on the left side.      Heart sounds: Normal heart sounds. No murmur heard.    No friction rub. No gallop.      Comments: monophasic lt DP   Pulmonary:      Effort: Pulmonary effort is normal. No respiratory distress.      Breath sounds: Normal breath sounds. No stridor. No wheezing.   Musculoskeletal:      Cervical back: Neck supple.   Skin:     General: Skin is warm and dry.   Neurological:      Mental Status: She is alert and oriented to person, place, and time.   Psychiatric:         Behavior: Behavior normal.        Assessment:     1. RBBB    2. Familial hypercholesterolemia    3. Hx of CABG    4. Atherosclerosis of native coronary artery of native heart without angina pectoris    5. Essential hypertension    6. Mixed hyperlipidemia    7. History of cardiac arrest    8. Coronary artery disease involving coronary bypass graft of native heart without angina pectoris    9. Stage 3a chronic kidney disease        Plan:   - s/p CABG  - Continue Toprol.  50 mg daily   - Irregular rhythm. Will check Holter for PVCs. If elevated burden will increase Toprol and cut amlodipine dose.   - Continue Valsartan   - high intense statin/PCSK 9 inhibitors. LDL at goal   - Continue  plavix   - F/U with GI team   - Medical TTT for PAD. No significant claudications          I spent 5-10 minutes asking, assessing, assisting, arranging and advising heart healthy diet improvements. This included low-salt meals, portion control and health food alternatives. I also encourage 30 minutes of moderate exercise 3-4x a week.       Pertinent cardiac images and EKG reviewed independently.    Continue with current medical plan and lifestyle changes.  Return sooner for concerns or questions. If symptoms persist go to the ED  I have reviewed all pertinent data including patient's medical history in detail and updated the computerized patient record.     No orders of the defined types were placed in this encounter.      Follow up as scheduled.     She expressed verbal understanding and agreed with the plan    Patient's Medications   New Prescriptions    No medications on file   Previous Medications    AMLODIPINE (NORVASC) 5 MG TABLET    Take 1 tablet (5 mg total) by mouth once daily.    CLOPIDOGREL (PLAVIX) 75 MG TABLET    TAKE ONE TABLET BY MOUTH ONCE DAILY.    EVOLOCUMAB (REPATHA SURECLICK) 140 MG/ML PNIJ    Inject 1 mL (140 mg total) into the skin every 14 (fourteen) days.    FARXIGA 10 MG TABLET    Take 10 mg by mouth.    FLUCONAZOLE (DIFLUCAN) 150 MG TAB     Take 1 tablet (150 mg total) by mouth once a week.    METOPROLOL SUCCINATE (TOPROL-XL) 50 MG 24 HR TABLET    Take 1 tablet (50 mg total) by mouth every morning.    PANTOPRAZOLE (PROTONIX) 40 MG TABLET    Take 1 tablet (40 mg total) by mouth 2 (two) times daily.    POTASSIUM CHLORIDE (KLOR-CON) 8 MEQ TBSR    TAKE ONE TABLET BY MOUTH TWICE DAILY    POTASSIUM CHLORIDE (MICRO-K) 8 MEQ CPSR    Take 1 capsule (8 mEq total) by mouth 2 (two) times daily.    ROSUVASTATIN (CRESTOR) 40 MG TAB    Take 1 tablet (40 mg total) by mouth every evening.    TRIAMCINOLONE ACETONIDE 0.1% (KENALOG) 0.1 % CREAM    Apply topically 2 (two) times daily.    VALSARTAN (DIOVAN) 320 MG TABLET    Take 1 tablet (320 mg total) by mouth once daily.   Modified Medications    No medications on file   Discontinued Medications    No medications on file

## 2023-06-22 ENCOUNTER — SPECIALTY PHARMACY (OUTPATIENT)
Dept: PHARMACY | Facility: CLINIC | Age: 63
End: 2023-06-22
Payer: MEDICAID

## 2023-06-22 NOTE — TELEPHONE ENCOUNTER
Outgoing call to pt regarding Repatha. Pt is due to inject on 6/26. Pt missed 1 dose of medication because she forgot. Will follow up on 7/3 to schedule next refill.

## 2023-06-29 ENCOUNTER — HOSPITAL ENCOUNTER (OUTPATIENT)
Dept: CARDIOLOGY | Facility: HOSPITAL | Age: 63
Discharge: HOME | End: 2023-06-29
Attending: INTERNAL MEDICINE
Payer: MEDICAID

## 2023-06-29 DIAGNOSIS — I49.9 IRREGULAR HEART RHYTHM: ICD-10-CM

## 2023-06-29 PROCEDURE — 93227 XTRNL ECG REC<48 HR R&I: CPT | Mod: ,,, | Performed by: INTERNAL MEDICINE

## 2023-06-29 PROCEDURE — 93226 XTRNL ECG REC<48 HR SCAN A/R: CPT

## 2023-06-29 PROCEDURE — 93227 HOLTER MONITOR - 24 HOUR (CUPID ONLY): ICD-10-PCS | Mod: ,,, | Performed by: INTERNAL MEDICINE

## 2023-07-03 DIAGNOSIS — Z95.1 HX OF CABG: ICD-10-CM

## 2023-07-03 DIAGNOSIS — E78.01 FAMILIAL HYPERCHOLESTEROLEMIA: ICD-10-CM

## 2023-07-03 RX ORDER — EVOLOCUMAB 140 MG/ML
140 INJECTION, SOLUTION SUBCUTANEOUS
Qty: 2 ML | Refills: 6 | OUTPATIENT
Start: 2023-07-03 | End: 2024-01-15

## 2023-07-05 DIAGNOSIS — E78.01 FAMILIAL HYPERCHOLESTEROLEMIA: ICD-10-CM

## 2023-07-05 DIAGNOSIS — Z95.1 HX OF CABG: ICD-10-CM

## 2023-07-05 LAB
OHS CV EVENT MONITOR DAY: 0
OHS CV HOLTER LENGTH DECIMAL HOURS: 23.98
OHS CV HOLTER LENGTH HOURS: 23
OHS CV HOLTER LENGTH MINUTES: 59
OHS CV HOLTER SINUS AVERAGE HR: 66
OHS CV HOLTER SINUS MAX HR: 98
OHS CV HOLTER SINUS MIN HR: 42

## 2023-07-05 RX ORDER — EVOLOCUMAB 140 MG/ML
140 INJECTION, SOLUTION SUBCUTANEOUS
Qty: 2 ML | Refills: 6 | Status: ACTIVE | OUTPATIENT
Start: 2023-07-05 | End: 2023-11-13

## 2023-07-05 RX ORDER — EVOLOCUMAB 140 MG/ML
140 INJECTION, SOLUTION SUBCUTANEOUS
Qty: 2 ML | Refills: 6 | OUTPATIENT
Start: 2023-07-05 | End: 2024-01-17

## 2023-07-05 NOTE — TELEPHONE ENCOUNTER
Incoming call from pt regarding refill for repatha. Informed pt we are still waiting on refill authorization from the provider and as soon as we get the authorization, we will call her back.

## 2023-07-07 ENCOUNTER — TELEPHONE (OUTPATIENT)
Dept: CARDIOLOGY | Facility: CLINIC | Age: 63
End: 2023-07-07
Payer: MEDICAID

## 2023-07-07 NOTE — TELEPHONE ENCOUNTER
"----- Message from Marjorie Esteban sent at 7/7/2023  9:06 AM CDT -----  Regarding: call back  "Type:  Patient Call Back    Who Called:PT    What is the reqeust in detail:Pt following up on referral for her to be seen by Gi for her ABD. Please advise    Can the clinic reply by [facility]?no    Best Call Back Number:[phone]      Additional Information:            "

## 2023-07-07 NOTE — TELEPHONE ENCOUNTER
Specialty Pharmacy - Refill Coordination    Specialty Medication Orders Linked to Encounter      Flowsheet Row Most Recent Value   Medication #1 evolocumab (REPATHA SURECLICK) 140 mg/mL PnIj (Order#[id number], Rx#[id number])            Refill Questions - Documented Responses      Flowsheet Row Most Recent Value   Patient Availability and HIPAA Verification    Does patient want to proceed with activity? Yes   HIPAA/medical authority confirmed? Yes   Relationship to patient of person spoken to? Self   Refill Screening Questions    Would patient like to speak to a pharmacist? No   When does the patient need to receive the medication? 07/10/23   Refill Delivery Questions    How will the patient receive the medication? MEDRx   When does the patient need to receive the medication? 07/10/23   Shipping Address Home   Address in [facility] confirmed and updated if neccessary? Yes   Expected Copay ($) 3   Is the patient able to afford the medication copay? Yes   Payment Method CC on file   Days supply of Refill 28   Supplies needed? No supplies needed   Refill activity completed? Yes   Refill activity plan Refill scheduled   Shipment/Pickup Date: 07/07/23            Current Outpatient Medications   Medication Sig    amLODIPine (NORVASC) 5 MG tablet Take 1 tablet (5 mg total) by mouth once daily.    clopidogreL (PLAVIX) 75 mg tablet TAKE ONE TABLET BY MOUTH ONCE DAILY.    evolocumab (REPATHA SURECLICK) 140 mg/mL PnIj Inject 1 mL (140 mg total) into the skin every 14 (fourteen) days.    FARXIGA 10 mg tablet Take 10 mg by mouth.    fluconazole (DIFLUCAN) 150 MG Tab Take 1 tablet (150 mg total) by mouth once a week.    metoprolol succinate (TOPROL-XL) 50 MG 24 hr tablet Take 1 tablet (50 mg total) by mouth every morning.    pantoprazole (PROTONIX) 40 MG tablet Take 1 tablet (40 mg total) by mouth 2 (two) times daily.    potassium chloride (KLOR-CON) 8 MEQ TbSR TAKE ONE TABLET BY MOUTH TWICE DAILY    potassium chloride  (MICRO-K) 8 mEq CpSR Take 1 capsule (8 mEq total) by mouth 2 (two) times daily.    rosuvastatin (CRESTOR) 40 MG Tab Take 1 tablet (40 mg total) by mouth every evening.    triamcinolone acetonide 0.1% (KENALOG) 0.1 % cream Apply topically 2 (two) times daily.    valsartan (DIOVAN) 320 MG tablet Take 1 tablet (320 mg total) by mouth once daily.   Last reviewed on 6/19/2023 11:19 AM by Angelica Frost MA    Review of patient's allergies indicates:  No Known Allergies Last reviewed on  6/19/2023 11:19 AM by Angelica Frost      Tasks added this encounter   No tasks added.   Tasks due within next 3 months   7/7/2023 - Refill Coordination Outreach (1 time occurrence)     Daily Bill, PharmD  [facility]  [address]  [facility] [address]  Phone: [phone]  Fax: [phone]

## 2023-07-24 ENCOUNTER — TELEPHONE (OUTPATIENT)
Dept: FAMILY MEDICINE | Facility: CLINIC | Age: 63
End: 2023-07-24
Payer: MEDICAID

## 2023-07-24 DIAGNOSIS — Z12.31 SCREENING MAMMOGRAM, ENCOUNTER FOR: Primary | ICD-10-CM

## 2023-07-28 ENCOUNTER — SPECIALTY PHARMACY (OUTPATIENT)
Dept: PHARMACY | Facility: CLINIC | Age: 63
End: 2023-07-28
Payer: MEDICAID

## 2023-07-28 NOTE — TELEPHONE ENCOUNTER
Outgoing call regarding repatha refill; per pt, she's due to inject on 8/7; informed her that it's too soon until 8/1, and [facility] will follow up on that date to schedule delivery

## 2023-08-01 NOTE — TELEPHONE ENCOUNTER
Specialty Pharmacy - Refill Coordination    Specialty Medication Orders Linked to Encounter      Flowsheet Row Most Recent Value   Medication #1 evolocumab (REPATHA SURECLICK) 140 mg/mL PnIj (Order#[id number], Rx#[id number])            Refill Questions - Documented Responses      Flowsheet Row Most Recent Value   Patient Availability and HIPAA Verification    Does patient want to proceed with activity? Yes   HIPAA/medical authority confirmed? Yes   Relationship to patient of person spoken to? Self   Refill Screening Questions    Would patient like to speak to a pharmacist? No   When does the patient need to receive the medication? 08/07/23   Refill Delivery Questions    How will the patient receive the medication? MEDRx   When does the patient need to receive the medication? 08/07/23   Shipping Address Home   Address in [facility] confirmed and updated if neccessary? Yes   Expected Copay ($) 3   Is the patient able to afford the medication copay? Yes   Payment Method new CC added to file   Days supply of Refill 28   Supplies needed? No supplies needed   Refill activity completed? Yes   Refill activity plan Refill scheduled   Shipment/Pickup Date: 08/03/23            Current Outpatient Medications   Medication Sig    amLODIPine (NORVASC) 5 MG tablet Take 1 tablet (5 mg total) by mouth once daily.    clopidogreL (PLAVIX) 75 mg tablet TAKE ONE TABLET BY MOUTH ONCE DAILY.    evolocumab (REPATHA SURECLICK) 140 mg/mL PnIj Inject 1 mL (140 mg total) into the skin every 14 (fourteen) days.    FARXIGA 10 mg tablet Take 10 mg by mouth.    fluconazole (DIFLUCAN) 150 MG Tab Take 1 tablet (150 mg total) by mouth once a week.    metoprolol succinate (TOPROL-XL) 50 MG 24 hr tablet Take 1 tablet (50 mg total) by mouth every morning.    pantoprazole (PROTONIX) 40 MG tablet Take 1 tablet (40 mg total) by mouth 2 (two) times daily.    potassium chloride (KLOR-CON) 8 MEQ TbSR TAKE ONE TABLET BY MOUTH TWICE DAILY    potassium  chloride (MICRO-K) 8 mEq CpSR Take 1 capsule (8 mEq total) by mouth 2 (two) times daily.    rosuvastatin (CRESTOR) 40 MG Tab Take 1 tablet (40 mg total) by mouth every evening.    triamcinolone acetonide 0.1% (KENALOG) 0.1 % cream Apply topically 2 (two) times daily.    valsartan (DIOVAN) 320 MG tablet Take 1 tablet (320 mg total) by mouth once daily.   Last reviewed on 6/19/2023 11:19 AM by Angelica Frost MA    Review of patient's allergies indicates:  No Known Allergies Last reviewed on  6/19/2023 11:19 AM by Angelica Frost      Tasks added this encounter   No tasks added.   Tasks due within next 3 months   8/1/2023 - Refill Coordination Outreach (1 time occurrence)     Helen Baca, PharmD  [facility]  [address] [place] [id number]  Phone: [phone]  Fax: [phone]

## 2023-08-03 ENCOUNTER — HOSPITAL ENCOUNTER (OUTPATIENT)
Dept: RADIOLOGY | Facility: HOSPITAL | Age: 63
Discharge: HOME | End: 2023-08-03
Attending: FAMILY MEDICINE
Payer: MEDICAID

## 2023-08-03 DIAGNOSIS — Z12.31 SCREENING MAMMOGRAM, ENCOUNTER FOR: ICD-10-CM

## 2023-08-03 PROCEDURE — 77067 SCR MAMMO BI INCL CAD: CPT | Mod: TC

## 2023-08-03 PROCEDURE — 77067 SCR MAMMO BI INCL CAD: CPT | Mod: 26,,, | Performed by: RADIOLOGY

## 2023-08-03 PROCEDURE — 77063 MAMMO DIGITAL SCREENING BILAT WITH TOMO: ICD-10-PCS | Mod: 26,,, | Performed by: RADIOLOGY

## 2023-08-03 PROCEDURE — 77063 BREAST TOMOSYNTHESIS BI: CPT | Mod: 26,,, | Performed by: RADIOLOGY

## 2023-08-03 PROCEDURE — 77067 MAMMO DIGITAL SCREENING BILAT WITH TOMO: ICD-10-PCS | Mod: 26,,, | Performed by: RADIOLOGY

## 2023-09-05 ENCOUNTER — LAB VISIT (OUTPATIENT)
Dept: LAB | Facility: HOSPITAL | Age: 63
End: 2023-09-05
Attending: FAMILY MEDICINE
Payer: MEDICAID

## 2023-09-05 DIAGNOSIS — R73.03 PREDIABETES: ICD-10-CM

## 2023-09-05 DIAGNOSIS — N18.31 STAGE 3A CHRONIC KIDNEY DISEASE: ICD-10-CM

## 2023-09-05 LAB
ANION GAP SERPL CALC-SCNC: 8 MMOL/L (ref 8–16)
BASOPHILS # BLD AUTO: 0.07 K/UL (ref 0–0.2)
BASOPHILS NFR BLD: 1.2 % (ref 0–1.9)
BUN SERPL-MCNC: 14 MG/DL (ref 8–23)
CALCIUM SERPL-MCNC: 9.8 MG/DL (ref 8.7–10.5)
CHLORIDE SERPL-SCNC: 109 MMOL/L (ref 95–110)
CO2 SERPL-SCNC: 26 MMOL/L (ref 23–29)
CREAT SERPL-MCNC: 1.4 MG/DL (ref 0.5–1.4)
DIFFERENTIAL METHOD: ABNORMAL
EOSINOPHIL # BLD AUTO: 0.1 K/UL (ref 0–0.5)
EOSINOPHIL NFR BLD: 1.7 % (ref 0–8)
ERYTHROCYTE [DISTWIDTH] IN BLOOD BY AUTOMATED COUNT: 15.9 % (ref 11.5–14.5)
EST. GFR  (NO RACE VARIABLE): 42.8 ML/MIN/1.73 M^2
ESTIMATED AVG GLUCOSE: 128 MG/DL (ref 68–131)
GLUCOSE SERPL-MCNC: 94 MG/DL (ref 70–110)
HBA1C MFR BLD: 6.1 % (ref 4–5.6)
HCT VFR BLD AUTO: 39.7 % (ref 37–48.5)
HGB BLD-MCNC: 12.3 G/DL (ref 12–16)
IMM GRANULOCYTES # BLD AUTO: 0.01 K/UL (ref 0–0.04)
IMM GRANULOCYTES NFR BLD AUTO: 0.2 % (ref 0–0.5)
LYMPHOCYTES # BLD AUTO: 3.6 K/UL (ref 1–4.8)
LYMPHOCYTES NFR BLD: 62 % (ref 18–48)
MCH RBC QN AUTO: 29.1 PG (ref 27–31)
MCHC RBC AUTO-ENTMCNC: 31 G/DL (ref 32–36)
MCV RBC AUTO: 94 FL (ref 82–98)
MONOCYTES # BLD AUTO: 0.5 K/UL (ref 0.3–1)
MONOCYTES NFR BLD: 8.2 % (ref 4–15)
NEUTROPHILS # BLD AUTO: 1.6 K/UL (ref 1.8–7.7)
NEUTROPHILS NFR BLD: 26.7 % (ref 38–73)
NRBC BLD-RTO: 0 /100 WBC
PLATELET # BLD AUTO: 249 K/UL (ref 150–450)
PMV BLD AUTO: 11.3 FL (ref 9.2–12.9)
POTASSIUM SERPL-SCNC: 4.1 MMOL/L (ref 3.5–5.1)
RBC # BLD AUTO: 4.22 M/UL (ref 4–5.4)
SODIUM SERPL-SCNC: 143 MMOL/L (ref 136–145)
WBC # BLD AUTO: 5.87 K/UL (ref 3.9–12.7)

## 2023-09-05 PROCEDURE — 80048 BASIC METABOLIC PNL TOTAL CA: CPT | Performed by: FAMILY MEDICINE

## 2023-09-05 PROCEDURE — 83036 HEMOGLOBIN GLYCOSYLATED A1C: CPT | Performed by: FAMILY MEDICINE

## 2023-09-05 PROCEDURE — 85025 COMPLETE CBC W/AUTO DIFF WBC: CPT | Performed by: FAMILY MEDICINE

## 2023-09-05 PROCEDURE — 36415 COLL VENOUS BLD VENIPUNCTURE: CPT | Mod: PO | Performed by: FAMILY MEDICINE

## 2023-09-07 ENCOUNTER — OFFICE VISIT (OUTPATIENT)
Dept: FAMILY MEDICINE | Facility: CLINIC | Age: 63
End: 2023-09-07
Payer: MEDICAID

## 2023-09-07 VITALS
SYSTOLIC BLOOD PRESSURE: 102 MMHG | HEIGHT: 64 IN | BODY MASS INDEX: 28.79 KG/M2 | HEART RATE: 68 BPM | OXYGEN SATURATION: 99 % | DIASTOLIC BLOOD PRESSURE: 60 MMHG | WEIGHT: 168.63 LBS

## 2023-09-07 DIAGNOSIS — Z95.1 HX OF CABG: Chronic | ICD-10-CM

## 2023-09-07 DIAGNOSIS — Z86.73 HISTORY OF TIA (TRANSIENT ISCHEMIC ATTACK): ICD-10-CM

## 2023-09-07 DIAGNOSIS — K62.5 RECTAL BLEEDING: ICD-10-CM

## 2023-09-07 DIAGNOSIS — I10 ESSENTIAL HYPERTENSION: Primary | ICD-10-CM

## 2023-09-07 DIAGNOSIS — I45.10 RBBB: Chronic | ICD-10-CM

## 2023-09-07 DIAGNOSIS — N18.31 STAGE 3A CHRONIC KIDNEY DISEASE: ICD-10-CM

## 2023-09-07 DIAGNOSIS — E78.01 FAMILIAL HYPERCHOLESTEROLEMIA: Chronic | ICD-10-CM

## 2023-09-07 DIAGNOSIS — L90.5 PAIN IN SURGICAL SCAR: ICD-10-CM

## 2023-09-07 DIAGNOSIS — B37.31 VAGINAL CANDIDIASIS: ICD-10-CM

## 2023-09-07 DIAGNOSIS — E66.3 OVERWEIGHT WITH BODY MASS INDEX (BMI) OF 28 TO 28.9 IN ADULT: ICD-10-CM

## 2023-09-07 DIAGNOSIS — G62.9 NEUROPATHY: ICD-10-CM

## 2023-09-07 DIAGNOSIS — R52 PAIN IN SURGICAL SCAR: ICD-10-CM

## 2023-09-07 DIAGNOSIS — E78.2 MIXED HYPERLIPIDEMIA: ICD-10-CM

## 2023-09-07 DIAGNOSIS — R73.03 PREDIABETES: ICD-10-CM

## 2023-09-07 DIAGNOSIS — I25.810 CORONARY ARTERY DISEASE INVOLVING CORONARY BYPASS GRAFT OF NATIVE HEART WITHOUT ANGINA PECTORIS: ICD-10-CM

## 2023-09-07 PROCEDURE — 3008F PR BODY MASS INDEX (BMI) DOCUMENTED: ICD-10-PCS | Mod: CPTII,,, | Performed by: FAMILY MEDICINE

## 2023-09-07 PROCEDURE — 99214 OFFICE O/P EST MOD 30 MIN: CPT | Mod: PBBFAC,PO | Performed by: FAMILY MEDICINE

## 2023-09-07 PROCEDURE — 3044F HG A1C LEVEL LT 7.0%: CPT | Mod: CPTII,,, | Performed by: FAMILY MEDICINE

## 2023-09-07 PROCEDURE — 99999 PR PBB SHADOW E&M-EST. PATIENT-LVL IV: CPT | Mod: PBBFAC,,, | Performed by: FAMILY MEDICINE

## 2023-09-07 PROCEDURE — 99214 OFFICE O/P EST MOD 30 MIN: CPT | Mod: S$PBB,,, | Performed by: FAMILY MEDICINE

## 2023-09-07 PROCEDURE — 99999 PR PBB SHADOW E&M-EST. PATIENT-LVL IV: ICD-10-PCS | Mod: PBBFAC,,, | Performed by: FAMILY MEDICINE

## 2023-09-07 PROCEDURE — 3074F SYST BP LT 130 MM HG: CPT | Mod: CPTII,,, | Performed by: FAMILY MEDICINE

## 2023-09-07 PROCEDURE — 1160F PR REVIEW ALL MEDS BY PRESCRIBER/CLIN PHARMACIST DOCUMENTED: ICD-10-PCS | Mod: CPTII,,, | Performed by: FAMILY MEDICINE

## 2023-09-07 PROCEDURE — 3074F PR MOST RECENT SYSTOLIC BLOOD PRESSURE < 130 MM HG: ICD-10-PCS | Mod: CPTII,,, | Performed by: FAMILY MEDICINE

## 2023-09-07 PROCEDURE — 3078F DIAST BP <80 MM HG: CPT | Mod: CPTII,,, | Performed by: FAMILY MEDICINE

## 2023-09-07 PROCEDURE — 1159F PR MEDICATION LIST DOCUMENTED IN MEDICAL RECORD: ICD-10-PCS | Mod: CPTII,,, | Performed by: FAMILY MEDICINE

## 2023-09-07 PROCEDURE — 1159F MED LIST DOCD IN RCRD: CPT | Mod: CPTII,,, | Performed by: FAMILY MEDICINE

## 2023-09-07 PROCEDURE — 3078F PR MOST RECENT DIASTOLIC BLOOD PRESSURE < 80 MM HG: ICD-10-PCS | Mod: CPTII,,, | Performed by: FAMILY MEDICINE

## 2023-09-07 PROCEDURE — 4010F PR ACE/ARB THEARPY RXD/TAKEN: ICD-10-PCS | Mod: CPTII,,, | Performed by: FAMILY MEDICINE

## 2023-09-07 PROCEDURE — 99214 PR OFFICE/OUTPT VISIT, EST, LEVL IV, 30-39 MIN: ICD-10-PCS | Mod: S$PBB,,, | Performed by: FAMILY MEDICINE

## 2023-09-07 PROCEDURE — 1160F RVW MEDS BY RX/DR IN RCRD: CPT | Mod: CPTII,,, | Performed by: FAMILY MEDICINE

## 2023-09-07 PROCEDURE — 4010F ACE/ARB THERAPY RXD/TAKEN: CPT | Mod: CPTII,,, | Performed by: FAMILY MEDICINE

## 2023-09-07 PROCEDURE — 3044F PR MOST RECENT HEMOGLOBIN A1C LEVEL <7.0%: ICD-10-PCS | Mod: CPTII,,, | Performed by: FAMILY MEDICINE

## 2023-09-07 PROCEDURE — 3008F BODY MASS INDEX DOCD: CPT | Mod: CPTII,,, | Performed by: FAMILY MEDICINE

## 2023-09-07 RX ORDER — TOLNAFTATE 1 %
AEROSOL, POWDER (GRAM) TOPICAL
Qty: 21 G | Refills: 0 | Status: SHIPPED | OUTPATIENT
Start: 2023-09-07 | End: 2024-03-07

## 2023-09-07 RX ORDER — FLUCONAZOLE 150 MG/1
150 TABLET ORAL WEEKLY
Qty: 12 TABLET | Refills: 0 | Status: SHIPPED | OUTPATIENT
Start: 2023-09-07 | End: 2024-03-07

## 2023-09-07 NOTE — PATIENT INSTRUCTIONS
Stop amlodipine. Keep checking blood pressures.     Labs reviewed - no anemia. Prediabetes is improved/better. Kidney function still chronic kidney disease stage 3 but stable.

## 2023-09-07 NOTE — PROGRESS NOTES
Subjective:       Patient ID: Veronika Blackmon is a 63 y.o. female.    Chief Complaint: Hypertension    Patient Active Problem List   Diagnosis    RBBB    Atherosclerosis of native coronary artery of native heart    Familial hypercholesterolemia    Essential hypertension    Mixed hyperlipidemia    Prediabetes    History of cardiac arrest    Brain anoxic injury    Facial weakness    History of TIA (transient ischemic attack)    Hx of CABG    Stage 3a chronic kidney disease    Coronary artery disease involving coronary bypass graft of native heart without angina pectoris      Hypertension    62 yo female presents today for ongoing follow up of HTN with extensive heart history as listed above.   Reports has been consistent with her medications.   Sees outside nephrology and taking Farxiga 10mg that has been causing her recurrent yeast infections. Some mild relief with OTC Monistat. She would like topical treatment for the burning. Previously prescribed Diflucan weekly x 3 months. I believe she took it until it was out and then noted symptoms again.     Ongoing itching and bothersome possible neuropathy mediated sensations regarding her chest scar from CABG. Bothers her regularly, topical steroid cream without relief. She wants to limit her meds and does not want to try gabapentin.    Review of Systems   All other systems reviewed and are negative.       Objective:     Vitals:    09/07/23 1318   BP: 102/60   Pulse: 68     Physical Exam  Vitals and nursing note reviewed.   Constitutional:       General: She is not in acute distress.     Appearance: Normal appearance. She is not ill-appearing, toxic-appearing or diaphoretic.   HENT:      Head: Normocephalic and atraumatic.   Eyes:      General: No scleral icterus.     Conjunctiva/sclera: Conjunctivae normal.   Cardiovascular:      Rate and Rhythm: Normal rate.   Pulmonary:      Effort: Pulmonary effort is normal. No respiratory distress.   Skin:     Coloration: Skin is not  pale.   Neurological:      Mental Status: She is alert. Mental status is at baseline.   Psychiatric:         Attention and Perception: Attention and perception normal.         Mood and Affect: Mood and affect normal.         Speech: Speech normal.         Behavior: Behavior normal.         Cognition and Memory: Cognition and memory normal.         Judgment: Judgment normal.       Assessment:       1. Essential hypertension    2. Coronary artery disease involving coronary bypass graft of native heart without angina pectoris    3. History of TIA (transient ischemic attack)    4. Mixed hyperlipidemia    5. Hx of CABG    6. Familial hypercholesterolemia    7. Stage 3a chronic kidney disease    8. Prediabetes    9. Vaginal candidiasis    10. Rectal bleeding    11. RBBB    12. Overweight with body mass index (BMI) of 28 to 28.9 in adult    13. Pain in surgical scar    14. Neuropathy          Plan:   Recent relevant labs results reviewed with patient.       1. Essential hypertension  -     Comprehensive Metabolic Panel; Future; Expected date: 09/07/2023  -     CBC Auto Differential; Future; Expected date: 09/07/2023  - BP well controlled on Valsartan. BP runs low but asymptomatic, doing well.     2. Coronary artery disease involving coronary bypass graft of native heart without angina pectoris  3. History of TIA (transient ischemic attack)  4. Mixed hyperlipidemia  -     Lipid Panel; Future; Expected date: 09/07/2023  5. Hx of CABG  6. Familial hypercholesterolemia  - Continue medical risk optimization with meds. Continue statin, plavix, regular cardiology follow up. Regular exercise and healthy diet.     7. Stage 3a chronic kidney disease  - Chronic, stable. As per Nephrology. Discussed with patient to speak to Nephrology about ongoing side effects. Can restart chronic diflucan, but overall she may not tolerate Farxiga even though it is helpful for her comorbidities.     8. Prediabetes  -     Hemoglobin A1C; Future;  Expected date: 09/07/2023  - Improved, possibly from Farxiga. Continue healthy lifestyle.    9. Vaginal candidiasis  -     fluconazole (DIFLUCAN) 150 MG Tab; Take 1 tablet (150 mg total) by mouth once a week.  Dispense: 12 tablet; Refill: 0  -     clotrimazole (CLOTRIMAZOLE 3 DAY) 2 %; Insert nightly PV x 3 nights  Dispense: 21 g; Refill: 0  As above, side effect of Farxiga likely    10. Rectal bleeding  11. RBBB  - Worked up in past. Unremarkable. Self limited. Stool has been soft. Deemed related to Plavix    12. Overweight with body mass index (BMI) of 28 to 28.9 in adult  - Continue healthy lifestyle modifications.     13. Pain in surgical scar  14. Neuropathy  Vitamin levels checked and unremarkable in past. Likely post surgical residual effect. Neuropathic pain med if patient amendable in future.    Patient's questions answered. Plan reviewed with patient at the end of visit. Relevant precautions to chief complaint and reasons to seek further medical care or to contact the office sooner reviewed with patient.     Follow up in about 6 months (around 3/7/2024) for Hypertension Follow-up (prelabs).

## 2023-10-10 ENCOUNTER — NURSE TRIAGE (OUTPATIENT)
Dept: ADMINISTRATIVE | Facility: CLINIC | Age: 63
End: 2023-10-10
Payer: MEDICAID

## 2023-10-10 NOTE — TELEPHONE ENCOUNTER
Pt calling wanting to see if she can take robitussin dm she has had a cough x 4 days worse at night.  Per protocol instructed pt on home care.   Reason for Disposition   Cough    Additional Information   Negative: SEVERE difficulty breathing (e.g., struggling for each breath, speaks in single words)   Negative: Bluish (or gray) lips or face now   Negative: [1] Rapid onset of cough AND [2] has hives   Negative: Coughing started suddenly after medicine, an allergic food or bee sting   Negative: [1] Difficulty breathing AND [2] exposure to flames, smoke, or fumes   Negative: [1] Stridor AND [2] difficulty breathing   Negative: Sounds like a life-threatening emergency to the triager   Negative: [1] MODERATE difficulty breathing (e.g., speaks in phrases, SOB even at rest, pulse 100-120) AND [2] still present when not coughing   Negative: Chest pain  (Exception: MILD central chest pain, present only when coughing.)   Negative: Patient sounds very sick or weak to the triager   Negative: [1] MILD difficulty breathing (e.g., minimal/no SOB at rest, SOB with walking, pulse <100) AND [2] still present when not coughing   Negative: [1] Coughed up blood AND [2] > 1 tablespoon (15 ml)   (Exception: Blood-tinged sputum.)   Negative: Fever > 103 F (39.4 C)   Negative: [1] Fever > 101 F (38.3 C) AND [2] age > 60 years   Negative: [1] Fever > 100.0 F (37.8 C) AND [2] bedridden (e.g., CVA, chronic illness, recovering from surgery)   Negative: [1] Fever > 100.0 F (37.8 C) AND [2] diabetes mellitus or weak immune system (e.g., HIV positive, cancer chemo, splenectomy, organ transplant, chronic steroids)   Negative: Wheezing is present   Negative: [1] Ankle swelling AND [2] swelling is increasing   Negative: SEVERE coughing spells (e.g., whooping sound after coughing, vomiting after coughing)   Negative: [1] Continuous (nonstop) coughing interferes with work or school AND [2] no improvement using cough treatment per Care Advice    Negative: Fever present > 3 days (72 hours)   Negative: [1] Fever returns after gone for over 24 hours AND [2] symptoms worse or not improved   Negative: [1] Using nasal washes and pain medicine > 24 hours AND [2] sinus pain (around cheekbone or eye) persists   Negative: Earache is present   Negative: Cough has been present for > 3 weeks   Negative: [1] Nasal discharge AND [2] present > 10 days   Negative: [1] Coughed up blood-tinged sputum AND [2] more than once   Negative: [1] Patient also has allergy symptoms (e.g., itchy eyes, clear nasal discharge, postnasal drip) AND [2] they are acting up   Negative: Taking an ACE Inhibitor medicine (e.g., benazepril / LOTENSIN, captopril / CAPOTEN, enalapril / VASOTEC, lisinopril / ZESTRIL)   Negative: Exposure to TB (Tuberculosis)    Protocols used: Cough - Acute Non-Productive-A-AH

## 2023-10-25 RX ORDER — POTASSIUM CHLORIDE 600 MG/1
TABLET, FILM COATED, EXTENDED RELEASE ORAL
Qty: 90 TABLET | Refills: 0 | Status: SHIPPED | OUTPATIENT
Start: 2023-10-25 | End: 2024-01-26

## 2023-11-07 DIAGNOSIS — E78.01 FAMILIAL HYPERCHOLESTEROLEMIA: Primary | Chronic | ICD-10-CM

## 2023-11-07 RX ORDER — ALIROCUMAB 75 MG/ML
75 INJECTION, SOLUTION SUBCUTANEOUS
COMMUNITY
End: 2023-11-07 | Stop reason: SDUPTHER

## 2023-11-07 RX ORDER — ALIROCUMAB 75 MG/ML
75 INJECTION, SOLUTION SUBCUTANEOUS
Qty: 2 EACH | Refills: 6 | Status: ACTIVE | OUTPATIENT
Start: 2023-11-07

## 2023-11-09 DIAGNOSIS — I10 ESSENTIAL HYPERTENSION: ICD-10-CM

## 2023-11-10 RX ORDER — VALSARTAN 320 MG/1
320 TABLET ORAL
Qty: 30 TABLET | Refills: 5 | Status: SHIPPED | OUTPATIENT
Start: 2023-11-10 | End: 2024-01-02

## 2023-12-04 ENCOUNTER — TELEPHONE (OUTPATIENT)
Dept: OBSTETRICS AND GYNECOLOGY | Facility: CLINIC | Age: 63
End: 2023-12-04
Payer: MEDICAID

## 2023-12-04 NOTE — TELEPHONE ENCOUNTER
Spoke with pt to schedule next available jan 12, also added pt to wait list incase of cancellations.

## 2023-12-04 NOTE — TELEPHONE ENCOUNTER
----- Message from Joann Topete sent at 12/4/2023  9:10 AM CST -----  Type:  Sooner Apoointment Request    Caller is requesting a sooner appointment.  Caller declined first available appointment listed below.  Caller will not accept being placed on the waitlist and is requesting a message be sent to doctor.  Name of Caller:pt  When is the first available appointment?03/04  Symptoms:wants to get in earlier for her annual   Would the patient rather a call back or a response via [facility]? call  Best Call Back Number:[phone]  Additional Information:

## 2023-12-06 ENCOUNTER — OFFICE VISIT (OUTPATIENT)
Dept: CARDIOLOGY | Facility: CLINIC | Age: 63
End: 2023-12-06
Payer: MEDICAID

## 2023-12-06 VITALS
WEIGHT: 171.94 LBS | OXYGEN SATURATION: 97 % | SYSTOLIC BLOOD PRESSURE: 141 MMHG | BODY MASS INDEX: 29.35 KG/M2 | DIASTOLIC BLOOD PRESSURE: 89 MMHG | HEART RATE: 65 BPM | HEIGHT: 64 IN

## 2023-12-06 DIAGNOSIS — I25.810 CORONARY ARTERY DISEASE INVOLVING CORONARY BYPASS GRAFT OF NATIVE HEART WITHOUT ANGINA PECTORIS: ICD-10-CM

## 2023-12-06 DIAGNOSIS — E78.01 FAMILIAL HYPERCHOLESTEROLEMIA: Chronic | ICD-10-CM

## 2023-12-06 DIAGNOSIS — E78.2 MIXED HYPERLIPIDEMIA: ICD-10-CM

## 2023-12-06 DIAGNOSIS — I25.118 ATHEROSCLEROSIS OF NATIVE CORONARY ARTERY OF NATIVE HEART WITH STABLE ANGINA PECTORIS: ICD-10-CM

## 2023-12-06 DIAGNOSIS — I10 ESSENTIAL HYPERTENSION: ICD-10-CM

## 2023-12-06 DIAGNOSIS — I45.10 RBBB: Chronic | ICD-10-CM

## 2023-12-06 DIAGNOSIS — I49.3 FREQUENT PVCS: ICD-10-CM

## 2023-12-06 DIAGNOSIS — Z95.1 HX OF CABG: Primary | Chronic | ICD-10-CM

## 2023-12-06 DIAGNOSIS — R94.39 ABNORMAL CARDIOVASCULAR STRESS TEST: ICD-10-CM

## 2023-12-06 DIAGNOSIS — Z86.74 HISTORY OF CARDIAC ARREST: ICD-10-CM

## 2023-12-06 DIAGNOSIS — N18.31 STAGE 3A CHRONIC KIDNEY DISEASE: ICD-10-CM

## 2023-12-06 PROCEDURE — 3008F PR BODY MASS INDEX (BMI) DOCUMENTED: ICD-10-PCS | Mod: CPTII,,, | Performed by: INTERNAL MEDICINE

## 2023-12-06 PROCEDURE — 99214 OFFICE O/P EST MOD 30 MIN: CPT | Mod: PBBFAC,PN | Performed by: INTERNAL MEDICINE

## 2023-12-06 PROCEDURE — 93005 ELECTROCARDIOGRAM TRACING: CPT | Mod: PBBFAC,PN | Performed by: STUDENT IN AN ORGANIZED HEALTH CARE EDUCATION/TRAINING PROGRAM

## 2023-12-06 PROCEDURE — 3077F PR MOST RECENT SYSTOLIC BLOOD PRESSURE >= 140 MM HG: ICD-10-PCS | Mod: CPTII,,, | Performed by: INTERNAL MEDICINE

## 2023-12-06 PROCEDURE — 3077F SYST BP >= 140 MM HG: CPT | Mod: CPTII,,, | Performed by: INTERNAL MEDICINE

## 2023-12-06 PROCEDURE — 3044F PR MOST RECENT HEMOGLOBIN A1C LEVEL <7.0%: ICD-10-PCS | Mod: CPTII,,, | Performed by: INTERNAL MEDICINE

## 2023-12-06 PROCEDURE — 99215 OFFICE O/P EST HI 40 MIN: CPT | Mod: S$PBB,,, | Performed by: INTERNAL MEDICINE

## 2023-12-06 PROCEDURE — 3008F BODY MASS INDEX DOCD: CPT | Mod: CPTII,,, | Performed by: INTERNAL MEDICINE

## 2023-12-06 PROCEDURE — 1159F MED LIST DOCD IN RCRD: CPT | Mod: CPTII,,, | Performed by: INTERNAL MEDICINE

## 2023-12-06 PROCEDURE — 4010F PR ACE/ARB THEARPY RXD/TAKEN: ICD-10-PCS | Mod: CPTII,,, | Performed by: INTERNAL MEDICINE

## 2023-12-06 PROCEDURE — 3079F DIAST BP 80-89 MM HG: CPT | Mod: CPTII,,, | Performed by: INTERNAL MEDICINE

## 2023-12-06 PROCEDURE — 4010F ACE/ARB THERAPY RXD/TAKEN: CPT | Mod: CPTII,,, | Performed by: INTERNAL MEDICINE

## 2023-12-06 PROCEDURE — 3044F HG A1C LEVEL LT 7.0%: CPT | Mod: CPTII,,, | Performed by: INTERNAL MEDICINE

## 2023-12-06 PROCEDURE — 3079F PR MOST RECENT DIASTOLIC BLOOD PRESSURE 80-89 MM HG: ICD-10-PCS | Mod: CPTII,,, | Performed by: INTERNAL MEDICINE

## 2023-12-06 PROCEDURE — 1159F PR MEDICATION LIST DOCUMENTED IN MEDICAL RECORD: ICD-10-PCS | Mod: CPTII,,, | Performed by: INTERNAL MEDICINE

## 2023-12-06 PROCEDURE — 99215 PR OFFICE/OUTPT VISIT, EST, LEVL V, 40-54 MIN: ICD-10-PCS | Mod: S$PBB,,, | Performed by: INTERNAL MEDICINE

## 2023-12-06 PROCEDURE — 99999 PR PBB SHADOW E&M-EST. PATIENT-LVL IV: CPT | Mod: PBBFAC,,, | Performed by: INTERNAL MEDICINE

## 2023-12-06 PROCEDURE — 99999 PR PBB SHADOW E&M-EST. PATIENT-LVL IV: ICD-10-PCS | Mod: PBBFAC,,, | Performed by: INTERNAL MEDICINE

## 2023-12-06 RX ORDER — METOPROLOL SUCCINATE 50 MG/1
TABLET, EXTENDED RELEASE ORAL
Qty: 135 TABLET | Refills: 3 | Status: SHIPPED | OUTPATIENT
Start: 2023-12-06 | End: 2024-12-05

## 2023-12-06 NOTE — PROGRESS NOTES
Subjective:   @Patient ID:  Veronika Blackmon is a 63 y.o. female who presents for evaluation of HTN, abnormal EKG, HLP       HPI:   12/2023: F/U. She feels well. Occasional palpitations.  No angina. Last time Holter showed frequent PVCs and Toprol dose increased.  Still with frequent Pvcs by EKG today. 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 ROBIN done with mild to moderate PAD. Overall she is doing well. No chest pain. Occasional tightness with breathing. Occasional BRBPR. Had EGD and colonoscopy in 10/2022.         1/30/2023: Here for follow up. She has been doing well. She tries to stay active.   No chest pain with activities. She is enrolled in digital HTN clinic. BP is well per her home recordings. Today is elevated some. No syncope or pre syncope. Tolerating PCSK9 inhibitors               Historically:  Stress test was done that showed high risk findings with significant EKG changes very early in the stress and persistent in recovery. 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 During the cath she was noted to have frequent PVCs and metoprolol  dose was increased She was sent for CABG evaluation. She was seen by Dr. Mays and believe no good targets so she was declined. She wanted a 2nd opinion. While pending a 2nd opinion she had cardiac arrest  In 1/23/2022. It was believed to be arrhythmias related. She was arguing with her son. Initial concern about anoxic brain injury but she got extubated and recovered very well neurologically. She was transferred to [facility] for CABG which was done 1/31/2022 by Dr. Paz. She recovered very well and discharged 2/4/2022.  EF prior to CABG in [facility] was read as 55%    Admitted 1/23/2022 with cardiac arrest. It was believed to be arrhythmias related. She was arguing with her son. Initial concern about anoxic brain injury but she got extubated and recovered very well neurologically. She was transferred to [facility] for CABG which  was done 1/31/2022 by Dr. Paz. She recovered very well and discharged 2/4/2022.       EKG done as below and concerning lateral changes  No significant claudications     FH is significant for premature CAD, younger brother had MI in his 40s,  majority of her family with CAD, PAD, and DM.     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 Paz in [facility].  LIMA-LAD, SVG-D, SVG RI, SVG-OM. RCA  and was not bypassed.  ACEI held due HARSH.     She was discharged 2/2/2022  Had repeat Echo in [facility] 1/28/2022 EF was read as >55%, small pericardial effusion,     ROBIN 2/2023   Rt ROBIN 0.97 ---> 0.73 consistent with mild PAD  Lt ROBIN 1.04--->0.88 consistent with mild PAD  Rt TBI 0.43  Lt TBI 0.42       - Stress MPI 12/2/2021  . Findings concerning for reversible ischemia in the anterior wall.  There is an associated wall motion abnormality.  2. Possible separate area of reversible ischemia along the inferoseptal wall, though artifact at this location can occasionally give a similar appearance.  3. Left ventricular dysfunction.  Estimated ejection fraction 36% during stress and 45% during rest.  This report was flagged in Epic as abnormal    - [facility] 12/14/2021  There was three vessel coronary artery disease.  The ejection fraction was 50-55% by visual estimate.  The pre-procedure left ventricular end diastolic pressure was 7.  The Prox LAD lesion was 70% stenosed.  The Mid LAD lesion was 80% stenosed.  The Dist LAD lesion was 95% stenosed.  The 1st Diag lesion was 95% stenosed.  The 1st Mrg lesion was 90% stenosed.  The Prox Cx to Mid Cx lesion was 80% stenosed.  The Prox RCA to Mid RCA lesion was 100% stenosed.  The estimated blood loss was none.     - Severe MVCAD   - CVT evaluation soon   - Increase Toprol to 25 mg bid  - ASA/Plavix/Statin         Echo 2/23/2022   The left ventricle is normal in size with mild concentric hypertrophy and  normal systolic function.  There are segmental left ventricular wall motion abnormalities with inferobasilar hypokiesis.  There is abnormal septal wall motion consistent with post-operative status.  The estimated ejection fraction is 55%.  Grade I left ventricular diastolic dysfunction.  Mild aortic regurgitation.  Mild tricuspid regurgitation.  Mild pulmonic regurgitation.  Normal right ventricular size with normal right ventricular systolic function.  There is no pulmonary hypertension.  There is a moderate left pleural effusion.        - EKG  SR, RBBB, lateral TWI    Event monitor 3/9/2022  At baseline, in the absence of symptoms, the rhythm was sinus with heart rate 94 beats per minute.  No symptom was reported throughout the month.  There were multiple activations, all of which showed sinus rhythm and some of which showed single PVCs.  The heart rate 80-95 beats per minute.     Impression:  Sinus rhythm, with occasional single PVCs.  No reported symptom.    Patient Active Problem List    Diagnosis Date Noted    Coronary artery disease involving coronary bypass graft of native heart without angina pectoris 03/24/2023    Stage 3a chronic kidney disease 07/01/2022    Hx of CABG 02/17/2022    Facial weakness     History of TIA (transient ischemic attack)     History of cardiac arrest 01/24/2022    Brain anoxic injury 01/24/2022    Prediabetes 01/04/2022    Familial hypercholesterolemia 12/27/2021    Essential hypertension 12/27/2021    Mixed hyperlipidemia 12/27/2021    Atherosclerosis of native coronary artery of native heart 12/22/2021     Formatting of this note might be different from the original.  Added automatically from request for surgery [id number]      RBBB 12/02/2021                LAST HbA1c  Lab Results   Component Value Date    HGBA1C 6.1 (H) 09/05/2023       Lipid panel  Lab Results   Component Value Date    CHOL 138 02/06/2023    CHOL 191 04/01/2022    CHOL 188 02/23/2022     Lab Results   Component  Value Date    HDL 72 02/06/2023    HDL 67 04/01/2022    HDL 50 02/23/2022     Lab Results   Component Value Date    LDLCALC 56.4 (L) 02/06/2023    LDLCALC 109.6 04/01/2022    LDLCALC 118.4 02/23/2022     Lab Results   Component Value Date    TRIG 48 02/06/2023    TRIG 72 04/01/2022    TRIG 98 02/23/2022     Lab Results   Component Value Date    CHOLHDL 52.2 (H) 02/06/2023    CHOLHDL 35.1 04/01/2022    CHOLHDL 26.6 02/23/2022            Review of Systems   Constitutional: Negative for chills and fever.   HENT:  Negative for hearing loss and nosebleeds.    Eyes:  Negative for blurred vision.   Cardiovascular:  Positive for dyspnea on exertion. Negative for chest pain, leg swelling and palpitations.   Respiratory:  Negative for hemoptysis and shortness of breath.    Hematologic/Lymphatic: Negative for bleeding problem.   Skin:  Negative for itching.   Musculoskeletal:  Negative for falls.   Gastrointestinal:  Negative for abdominal pain and hematochezia.   Genitourinary:  Negative for hematuria.   Neurological:  Negative for dizziness and loss of balance.   Psychiatric/Behavioral:  Negative for altered mental status and depression.        Objective:   Physical Exam  Constitutional:       Appearance: She is well-developed.   HENT:      Head: Normocephalic and atraumatic.   Eyes:      Conjunctiva/sclera: Conjunctivae normal.   Neck:      Vascular: No carotid bruit or JVD.   Cardiovascular:      Rate and Rhythm: Normal rate. Rhythm irregular.      Pulses:           Carotid pulses are 2+ on the right side and 2+ on the left side.       Radial pulses are 2+ on the right side and 2+ on the left side.        Dorsalis pedis pulses are 2+ on the right side and 0 on the left side.      Heart sounds: Normal heart sounds. No murmur heard.     No friction rub. No gallop.      Comments: monophasic lt DP   Pulmonary:      Effort: Pulmonary effort is normal. No respiratory distress.      Breath sounds: Normal breath sounds. No  stridor. No wheezing.   Musculoskeletal:      Cervical back: Neck supple.   Skin:     General: Skin is warm and dry.   Neurological:      Mental Status: She is alert and oriented to person, place, and time.   Psychiatric:         Behavior: Behavior normal.         Assessment:     1. Hx of CABG    2. History of cardiac arrest    3. Familial hypercholesterolemia    4. Essential hypertension    5. Coronary artery disease involving coronary bypass graft of native heart without angina pectoris    6. Mixed hyperlipidemia    7. RBBB    8. Atherosclerosis of native coronary artery of native heart with stable angina pectoris    9. Stage 3a chronic kidney disease        Plan:   - s/p CABG  - Increase Toprol to 50 mg in am and 25 mg pm. Titrate as tolerated   - Repeat Holter and Echo   - Will check PET stress given hx significant history to rule out ischemia induced PVCs.   - EP referral   - Continue Valsartan   - high intense statin/PCSK 9 inhibitors. LDL at goal   - Continue  plavix   - Medical TTT for PAD. No significant claudications          I spent 5-10 minutes asking, assessing, assisting, arranging and advising heart healthy diet improvements. This included low-salt meals, portion control and health food alternatives. I also encourage 30 minutes of moderate exercise 3-4x a week.     F/u  in 3 months or sooner if significant abnormalities in the above ordered tests       Pertinent cardiac images and EKG reviewed independently.    Continue with current medical plan and lifestyle changes.  Return sooner for concerns or questions. If symptoms persist go to the ED  I have reviewed all pertinent data including patient's medical history in detail and updated the computerized patient record.     No orders of the defined types were placed in this encounter.      Follow up as scheduled.     She expressed verbal understanding and agreed with the plan    Patient's Medications   New Prescriptions    No medications on file    Previous Medications    ALIROCUMAB (PRALUENT PEN) 75 MG/ML PNIJ    Inject 1 mL (75 mg total) into the skin every 14 (fourteen) days.    CLOPIDOGREL (PLAVIX) 75 MG TABLET    TAKE ONE TABLET BY MOUTH ONCE DAILY.    CLOTRIMAZOLE (CLOTRIMAZOLE 3 DAY) 2 %    Insert nightly PV x 3 nights    FARXIGA 10 MG TABLET    Take 10 mg by mouth.    FLUCONAZOLE (DIFLUCAN) 150 MG TAB    Take 1 tablet (150 mg total) by mouth once a week.    METOPROLOL SUCCINATE (TOPROL-XL) 50 MG 24 HR TABLET    Take 1 tablet (50 mg total) by mouth every morning.    PANTOPRAZOLE (PROTONIX) 40 MG TABLET    Take 1 tablet (40 mg total) by mouth 2 (two) times daily.    POTASSIUM CHLORIDE (KLOR-CON) 8 MEQ TBSR    Take one tablet by mouth twice daily    POTASSIUM CHLORIDE (MICRO-K) 8 MEQ CPSR    Take 1 capsule (8 mEq total) by mouth 2 (two) times daily.    ROSUVASTATIN (CRESTOR) 40 MG TAB    Take 1 tablet (40 mg total) by mouth every evening.    TRIAMCINOLONE ACETONIDE 0.1% (KENALOG) 0.1 % CREAM    Apply topically 2 (two) times daily.    VALSARTAN (DIOVAN) 320 MG TABLET    TAKE ONE TABLET BY MOUTH ONCE DAILY.   Modified Medications    No medications on file   Discontinued Medications    No medications on file

## 2023-12-08 ENCOUNTER — HOSPITAL ENCOUNTER (OUTPATIENT)
Dept: CARDIOLOGY | Facility: HOSPITAL | Age: 63
Discharge: HOME | End: 2023-12-08
Attending: INTERNAL MEDICINE
Payer: MEDICAID

## 2023-12-08 VITALS — HEIGHT: 64 IN | BODY MASS INDEX: 29.19 KG/M2 | WEIGHT: 171 LBS

## 2023-12-08 DIAGNOSIS — I49.3 FREQUENT PVCS: ICD-10-CM

## 2023-12-08 LAB
ASCENDING AORTA: 3.46 CM
AV INDEX (PROSTH): 0.54
AV MEAN GRADIENT: 4 MMHG
AV PEAK GRADIENT: 7 MMHG
AV VALVE AREA BY VELOCITY RATIO: 1.79 CM²
AV VALVE AREA: 1.83 CM²
AV VELOCITY RATIO: 0.53
BSA FOR ECHO PROCEDURE: 1.87 M2
CV ECHO LV RWT: 0.48 CM
DOP CALC AO PEAK VEL: 1.29 M/S
DOP CALC AO VTI: 29.5 CM
DOP CALC LVOT AREA: 3.4 CM2
DOP CALC LVOT DIAMETER: 2.08 CM
DOP CALC LVOT PEAK VEL: 0.68 M/S
DOP CALC LVOT STROKE VOLUME: 54 CM3
DOP CALC MV VTI: 19 CM
DOP CALCLVOT PEAK VEL VTI: 15.9 CM
E WAVE DECELERATION TIME: 248.71 MSEC
E/A RATIO: 1.17
E/E' RATIO: 7.56 M/S
ECHO LV POSTERIOR WALL: 1.1 CM (ref 0.6–1.1)
FRACTIONAL SHORTENING: 37 % (ref 28–44)
INTERVENTRICULAR SEPTUM: 1.1 CM (ref 0.6–1.1)
IVC DIAMETER: 1.39 CM
LA MAJOR: 5.59 CM
LA MINOR: 5.38 CM
LA WIDTH: 4.1 CM
LEFT ATRIUM SIZE: 4.55 CM
LEFT ATRIUM VOLUME INDEX MOD: 28.6 ML/M2
LEFT ATRIUM VOLUME INDEX: 47.5 ML/M2
LEFT ATRIUM VOLUME MOD: 52.27 CM3
LEFT ATRIUM VOLUME: 86.94 CM3
LEFT INTERNAL DIMENSION IN SYSTOLE: 2.87 CM (ref 2.1–4)
LEFT VENTRICLE DIASTOLIC VOLUME INDEX: 51.56 ML/M2
LEFT VENTRICLE DIASTOLIC VOLUME: 94.36 ML
LEFT VENTRICLE MASS INDEX: 97 G/M2
LEFT VENTRICLE SYSTOLIC VOLUME INDEX: 17.1 ML/M2
LEFT VENTRICLE SYSTOLIC VOLUME: 31.34 ML
LEFT VENTRICULAR INTERNAL DIMENSION IN DIASTOLE: 4.54 CM (ref 3.5–6)
LEFT VENTRICULAR MASS: 177.49 G
LV LATERAL E/E' RATIO: 6.8 M/S
LV SEPTAL E/E' RATIO: 8.5 M/S
LVOT MG: 1.07 MMHG
LVOT MV: 0.5 CM/S
MV A" WAVE DURATION": 122.74 MSEC
MV PEAK A VEL: 0.58 M/S
MV PEAK E VEL: 0.68 M/S
MV PEAK GRADIENT: 2 MMHG
MV VALVE AREA BY CONTINUITY EQUATION: 2.84 CM2
OHS LV EJECTION FRACTION SIMPSONS BIPLANE MOD: 54 %
PISA TR MAX VEL: 2.32 M/S
PULM VEIN S/D RATIO: 0.53
PV MV: 0.63 M/S
PV PEAK D VEL: 0.51 M/S
PV PEAK GRADIENT: 3 MMHG
PV PEAK S VEL: 0.27 M/S
PV PEAK VELOCITY: 0.86 M/S
RA MAJOR: 5.35 CM
RA PRESSURE ESTIMATED: 3 MMHG
RA WIDTH: 3.54 CM
RIGHT VENTRICULAR END-DIASTOLIC DIMENSION: 2.82 CM
RV TB RVSP: 5 MMHG
RV TISSUE DOPPLER FREE WALL SYSTOLIC VELOCITY 1 (APICAL 4 CHAMBER VIEW): 5.59 CM/S
SINUS: 2.72 CM
STJ: 2.19 CM
TDI LATERAL: 0.1 M/S
TDI SEPTAL: 0.08 M/S
TDI: 0.09 M/S
TR MAX PG: 22 MMHG
TRICUSPID ANNULAR PLANE SYSTOLIC EXCURSION: 1.44 CM
TV REST PULMONARY ARTERY PRESSURE: 25 MMHG
Z-SCORE OF LEFT VENTRICULAR DIMENSION IN END DIASTOLE: -1.11
Z-SCORE OF LEFT VENTRICULAR DIMENSION IN END SYSTOLE: -0.69

## 2023-12-08 PROCEDURE — 93306 ECHO (CUPID ONLY): ICD-10-PCS | Mod: 26,,, | Performed by: INTERNAL MEDICINE

## 2023-12-08 PROCEDURE — 93306 TTE W/DOPPLER COMPLETE: CPT

## 2023-12-08 PROCEDURE — 93306 TTE W/DOPPLER COMPLETE: CPT | Mod: 26,,, | Performed by: INTERNAL MEDICINE

## 2023-12-13 ENCOUNTER — TELEPHONE (OUTPATIENT)
Dept: CARDIOLOGY | Facility: HOSPITAL | Age: 63
End: 2023-12-13
Payer: MEDICAID

## 2023-12-13 ENCOUNTER — PATIENT MESSAGE (OUTPATIENT)
Dept: CARDIOLOGY | Facility: CLINIC | Age: 63
End: 2023-12-13
Payer: MEDICAID

## 2023-12-13 NOTE — PROGRESS NOTES
I have reviewed the echocardiogram.  Heart pumping function is normal.  There is leakages of 1 of the heart valves that we will continue medications for now and observe.  We will await the stress test and Holter results    Sincerely,  Jm Escboar MD.   Interventional Cardiologist  [facility], [place]

## 2023-12-20 ENCOUNTER — TELEPHONE (OUTPATIENT)
Dept: CARDIOLOGY | Facility: CLINIC | Age: 63
End: 2023-12-20
Payer: MEDICAID

## 2023-12-20 DIAGNOSIS — I49.3 FREQUENT PVCS: Primary | ICD-10-CM

## 2023-12-20 NOTE — TELEPHONE ENCOUNTER
----- Message from Araceli Schneider sent at 12/19/2023  3:28 PM CST -----  Regarding: EKG ORDER  Please place and link EKG order to the EKG or Doctor appointment scheduled on 12/6/23 thanks. Araceli [id number]

## 2023-12-22 ENCOUNTER — HOSPITAL ENCOUNTER (OUTPATIENT)
Dept: CARDIOLOGY | Facility: HOSPITAL | Age: 63
Discharge: HOME | End: 2023-12-22
Attending: INTERNAL MEDICINE
Payer: MEDICAID

## 2023-12-22 DIAGNOSIS — I49.3 FREQUENT PVCS: ICD-10-CM

## 2023-12-22 DIAGNOSIS — Z95.1 HX OF CABG: Chronic | ICD-10-CM

## 2023-12-22 PROCEDURE — 93227 HOLTER MONITOR - 48 HOUR (CUPID ONLY): ICD-10-PCS | Mod: ,,, | Performed by: INTERNAL MEDICINE

## 2023-12-22 PROCEDURE — 93225 XTRNL ECG REC<48 HRS REC: CPT

## 2023-12-22 PROCEDURE — 93227 XTRNL ECG REC<48 HR R&I: CPT | Mod: ,,, | Performed by: INTERNAL MEDICINE

## 2023-12-28 LAB
OHS CV EVENT MONITOR DAY: 0
OHS CV HOLTER LENGTH DECIMAL HOURS: 48
OHS CV HOLTER LENGTH HOURS: 48
OHS CV HOLTER LENGTH MINUTES: 0
OHS CV HOLTER SINUS AVERAGE HR: 65
OHS CV HOLTER SINUS MAX HR: 98
OHS CV HOLTER SINUS MIN HR: 43

## 2023-12-29 DIAGNOSIS — I10 ESSENTIAL HYPERTENSION: ICD-10-CM

## 2024-01-02 RX ORDER — VALSARTAN 320 MG/1
320 TABLET ORAL
Qty: 90 TABLET | Refills: 1 | Status: SHIPPED | OUTPATIENT
Start: 2024-01-02

## 2024-01-12 ENCOUNTER — OFFICE VISIT (OUTPATIENT)
Dept: OBSTETRICS AND GYNECOLOGY | Facility: CLINIC | Age: 64
End: 2024-01-12
Payer: MEDICAID

## 2024-01-12 VITALS — SYSTOLIC BLOOD PRESSURE: 159 MMHG | WEIGHT: 174.19 LBS | BODY MASS INDEX: 29.9 KG/M2 | DIASTOLIC BLOOD PRESSURE: 94 MMHG

## 2024-01-12 DIAGNOSIS — Z01.419 WELL WOMAN EXAM: Primary | ICD-10-CM

## 2024-01-12 PROCEDURE — 3008F BODY MASS INDEX DOCD: CPT | Mod: CPTII,,,

## 2024-01-12 PROCEDURE — 88175 CYTOPATH C/V AUTO FLUID REDO: CPT | Performed by: PATHOLOGY

## 2024-01-12 PROCEDURE — 1160F RVW MEDS BY RX/DR IN RCRD: CPT | Mod: CPTII,,,

## 2024-01-12 PROCEDURE — 99213 OFFICE O/P EST LOW 20 MIN: CPT | Mod: PBBFAC,PO

## 2024-01-12 PROCEDURE — 87624 HPV HI-RISK TYP POOLED RSLT: CPT

## 2024-01-12 PROCEDURE — 88141 CYTOPATH C/V INTERPRET: CPT | Mod: ,,, | Performed by: PATHOLOGY

## 2024-01-12 PROCEDURE — 3077F SYST BP >= 140 MM HG: CPT | Mod: CPTII,,,

## 2024-01-12 PROCEDURE — 99999 PR PBB SHADOW E&M-EST. PATIENT-LVL III: CPT | Mod: PBBFAC,,,

## 2024-01-12 PROCEDURE — 4010F ACE/ARB THERAPY RXD/TAKEN: CPT | Mod: CPTII,,,

## 2024-01-12 PROCEDURE — 3080F DIAST BP >= 90 MM HG: CPT | Mod: CPTII,,,

## 2024-01-12 PROCEDURE — 1159F MED LIST DOCD IN RCRD: CPT | Mod: CPTII,,,

## 2024-01-12 PROCEDURE — 99396 PREV VISIT EST AGE 40-64: CPT | Mod: S$PBB,,,

## 2024-01-12 NOTE — PROGRESS NOTES
CC: Well woman exam    Veronika Blackmon is a 63 y.o. female  presents for a well woman exam.  She has no issues, problems, or complaints. Denies vaginal bleeding    PAP: 2021 NILM, HPV+ (type indeterminate)  Mammogram: 2023  Colonoscopy: 10/2022    BP today is elevated. Pt states she took her medication in the morning. She uses an deonte and measures her blood pressure daily. Denies headache, blurry vision, light headedness.    Past Medical History:   Diagnosis Date    Acute hypoxemic respiratory failure     Anemia 2022    Anticoagulant long-term use        Past Surgical History:   Procedure Laterality Date    CARDIAC SURGERY  2022    [id number]    COLONOSCOPY N/A 10/12/2022    Procedure: COLONOSCOPY Suprep;  Surgeon: Kostas Ramírez MD;  Location: [facility] ENDO;  Service: Endoscopy;  Laterality: N/A;    CORONARY ANGIOGRAPHY N/A 2021    Procedure: ANGIOGRAM, CORONARY ARTERY;  Surgeon: Jm Escobar MD;  Location: [facility] CATH LAB/EP;  Service: Cardiology;  Laterality: N/A;    ESOPHAGOGASTRODUODENOSCOPY N/A 10/12/2022    Procedure: EGD (ESOPHAGOGASTRODUODENOSCOPY);  Surgeon: Kostas Ramírez MD;  Location: [facility];  Service: Endoscopy;  Laterality: N/A;    LEFT HEART CATHETERIZATION Left 2021    Procedure: Left heart cath;  Surgeon: Jm Escobar MD;  Location: [facility] CATH LAB/EP;  Service: Cardiology;  Laterality: Left;       OB History    Para Term  AB Living   3 3 3     3   SAB IAB Ectopic Multiple Live Births                  # Outcome Date GA Lbr Uziel/2nd Weight Sex Delivery Anes PTL Lv   3 Term            2 Term            1 Term                Family History   Problem Relation Age of Onset    Heart attack Brother 62       Social History     Tobacco Use    Smoking status: Never    Smokeless tobacco: Never   Substance Use Topics    Alcohol use: Yes     Comment: red wine one in while    Drug use: No       BP (!) 159/94   Wt 79 kg (174 lb 2.6 oz)   LMP  03/07/2018   BMI 29.90 kg/m²     ROS:  GENERAL: Denies weight gain or weight loss. Feeling well overall.   SKIN: Denies rash or lesions.   HEAD: Denies head injury or headache.   ABDOMEN: No abdominal pain, constipation, diarrhea, nausea, vomiting or rectal bleeding.   URINARY: No frequency, dysuria, hematuria, or burning on urination.  REPRODUCTIVE: See HPI.   BREASTS: The patient performs breast self-examination and denies pain, lumps, or nipple discharge.   HEMATOLOGIC: No easy bruisability or excessive bleeding.  PSYCHIATRIC: Denies depression, anxiety or mood swings.    Physical Exam:    APPEARANCE: Well nourished, well developed, in no acute distress.  AFFECT: WNL, alert and oriented x 3  SKIN: No acne or hirsutism  CHEST: Good respiratory effect  ABDOMEN: Soft.  No tenderness or masses. No hernias.  BREASTS: Symmetrical, no skin changes or visible lesions.  No palpable masses, nipple discharge bilaterally.  PELVIC: Normal external genitalia without lesions.  Normal hair distribution.  Adequate perineal body, normal urethral meatus.  Vagina atrophic without lesions or discharge.  Cervix pink, without lesions, discharge or tenderness.  No significant cystocele or rectocele.  Bimanual exam shows uterus to be normal size, regular, mobile and nontender.  Adnexa without masses or tenderness.    EXTREMITIES: No edema.    ASSESSMENT AND PLAN  1. Well woman exam  HPV High Risk Genotypes, PCR    Liquid-Based Pap Smear, Screening          Patient was counseled today on A.C.S. Pap guidelines and recommendations for yearly pelvic exams, mammograms and monthly self breast exams; to see her PCP for other health maintenance.     Advised to repeat her blood pressure tonight after taking her medication. If home measurements remain elevated, follow up with PCP.     Follow up in 1yr for annual exam or prn.    Patient confirms understanding of encounter and all medical questions answered.

## 2024-01-18 ENCOUNTER — TELEPHONE (OUTPATIENT)
Dept: CARDIOLOGY | Facility: CLINIC | Age: 64
End: 2024-01-18
Payer: MEDICAID

## 2024-01-18 NOTE — TELEPHONE ENCOUNTER
Spoke with patient.  Patient's metoprolol was increased at her last visit.  She was told by her pharmacy it was too soon to refill the Rx.  Advised patient that a new Rx for the increased dose was sent to the pharmacy.   Patient will call the pharmacy.    ----- Message from Daniela Stein sent at 1/18/2024 11:50 AM CST -----  Type:  Patient Returning Call    Who Called: pt   Who Left Message for Patient: pt   Does the patient know what this is regarding?: pt need a call from the nurse   Would the patient rather a call back or a response via [facility]?  Call   Best Call Back Number:[phone]  Additional Information:  call back

## 2024-01-19 LAB
FINAL PATHOLOGIC DIAGNOSIS: NORMAL
Lab: NORMAL

## 2024-01-22 LAB
HPV HR 12 DNA SPEC QL NAA+PROBE: NEGATIVE
HPV16 AG SPEC QL: NEGATIVE
HPV18 DNA SPEC QL NAA+PROBE: NEGATIVE

## 2024-01-26 RX ORDER — POTASSIUM CHLORIDE 600 MG/1
8 TABLET, FILM COATED, EXTENDED RELEASE ORAL 2 TIMES DAILY
Qty: 90 TABLET | Refills: 0 | Status: SHIPPED | OUTPATIENT
Start: 2024-01-26 | End: 2024-03-07

## 2024-01-26 RX ORDER — POTASSIUM CHLORIDE 600 MG/1
8 TABLET, FILM COATED, EXTENDED RELEASE ORAL 2 TIMES DAILY
Qty: 30 TABLET | Refills: 2 | Status: SHIPPED | OUTPATIENT
Start: 2024-01-26 | End: 2024-03-07

## 2024-02-11 ENCOUNTER — E-VISIT (OUTPATIENT)
Dept: FAMILY MEDICINE | Facility: CLINIC | Age: 64
End: 2024-02-11
Payer: MEDICAID

## 2024-02-11 DIAGNOSIS — J06.9 VIRAL URI WITH COUGH: Primary | ICD-10-CM

## 2024-02-11 PROCEDURE — 99422 OL DIG E/M SVC 11-20 MIN: CPT | Mod: ,,, | Performed by: FAMILY MEDICINE

## 2024-02-12 RX ORDER — BENZONATATE 200 MG/1
200 CAPSULE ORAL 3 TIMES DAILY PRN
Qty: 30 CAPSULE | Refills: 0 | Status: SHIPPED | OUTPATIENT
Start: 2024-02-12 | End: 2024-02-22

## 2024-02-12 RX ORDER — PROMETHAZINE HYDROCHLORIDE AND DEXTROMETHORPHAN HYDROBROMIDE 6.25; 15 MG/5ML; MG/5ML
5 SYRUP ORAL EVERY 6 HOURS PRN
Qty: 210 ML | Refills: 0 | Status: SHIPPED | OUTPATIENT
Start: 2024-02-12 | End: 2024-02-22

## 2024-02-12 NOTE — PROGRESS NOTES
Patient ID: Veronika Blackmon is a 63 y.o. female.    Chief Complaint: URI (Entered automatically based on patient selection in Patient Portal.)    The patient initiated a request through [employer] on 2/11/2024 for evaluation and management with a chief complaint of URI (Entered automatically based on patient selection in Patient Portal.)     I evaluated the questionnaire submission on 02/12/2024      Ohs Peq Evisit Upper Respitatory/Cough Questionnaire    2/11/2024 10:29 AM CST - Filed by Patient   Do you agree to participate in an E-Visit? Yes   If you have any of the following symptoms, please present to your local ER or call 911:  I acknowledge   What is the main issue that you would like for your doctor to address today? Servre cough   Are you able to take your vital signs? No   What symptoms do you currently have?  Cough   Describe your cough: Dry   Have you ever smoked? I have never smoked   Have you had a fever? No   When did your symptoms first appear? 2/10/2024   In the last two weeks, have you been in close contact with someone who has COVID-19 or the Flu? No   In the last two weeks, have you worked or volunteered in a healthcare facility or as a ? Healthcare facilities include a hospital, medical or dental clinic, long-term care facility, or nursing home No   Do you live in a long-term care facility, nursing home, group home, or homeless shelter? No   List what you have done or taken to help your symptoms. Nothing   How severe are your symptoms? Moderate   Have your symptoms improved since they first appeared? No change   Have you taken an at home Covid test? No   Have you taken a Flu test? No   Have you been fully vaccinated for COVID? (2 Pfizer, 2 Moderna or 1 Cam & Cam vaccine injections) Yes   Have you received a booster? Yes   Have you recieved a Flu shot? Yes   When did you recieve your Flu shot? 11/1/2023   Do you have transportation to get tested for COVID if it is indicated and  ordered for you at an [facility] location? Yes   Provide any information you feel is important to your history not asked above    Please attach any relevant images or files          Encounter Diagnosis   Name Primary?    Viral URI with cough Yes      No orders of the defined types were placed in this encounter.     Medications Ordered This Encounter   Medications    benzonatate (TESSALON) 200 MG capsule     Sig: Take 1 capsule (200 mg total) by mouth 3 (three) times daily as needed for Cough.     Dispense:  30 capsule     Refill:  0    promethazine-dextromethorphan (PROMETHAZINE-DM) 6.25-15 mg/5 mL Syrp     Sig: Take 5 mLs by mouth every 6 (six) hours as needed (cough).     Dispense:  210 mL     Refill:  0        Follow up if symptoms worsen or fail to improve.    E-Visit Time Tracking:    Day 1 Time (in minutes): 12    Total Time (in minutes): 12

## 2024-02-20 DIAGNOSIS — E78.2 MIXED HYPERLIPIDEMIA: ICD-10-CM

## 2024-02-21 RX ORDER — ROSUVASTATIN CALCIUM 40 MG/1
40 TABLET, COATED ORAL NIGHTLY
Qty: 90 TABLET | Refills: 0 | Status: SHIPPED | OUTPATIENT
Start: 2024-02-21 | End: 2024-03-07 | Stop reason: SDUPTHER

## 2024-02-21 NOTE — TELEPHONE ENCOUNTER
Care Due:                  Date            Visit Type   Department     Provider  --------------------------------------------------------------------------------                                EP -                              [facility] FAMILY  Last Visit: 09-      CARE ([facility])   MEDICINE       Sahara T  Carrillo                              EP -                              PRIMARY      [facility]  Next Visit: 03-      CARE ([facility])   MEDICINE       Sahara Carrillo                                                            Last  Test          Frequency    Reason                     Performed    Due Date  --------------------------------------------------------------------------------    CMP.........  12 months..  rosuvastatin.............  Not Found    Overdue    Lipid Panel.  12 months..  rosuvastatin.............  02- 02-    [facility] Embedded Care Due Messages. Reference number: [id number].   2/20/2024 8:19:58 PM CST

## 2024-02-21 NOTE — TELEPHONE ENCOUNTER
Refill Routing Note   Medication(s) are not appropriate for processing by [facility] for the following reason(s):        Required labs outdated    [facility] action(s):  Defer        Medication Therapy Plan: FLOS 3/5/2024      Appointments  past 12m or future 3m with PCP    Date Provider   Last Visit   2/11/2024 Sahara Carrillo MD   Next Visit   3/7/2024 Sahara Carrillo MD   ED visits in past 90 days: 0        Note composed:11:13 AM 02/21/2024

## 2024-02-25 DIAGNOSIS — R94.39 ABNORMAL CARDIOVASCULAR STRESS TEST: ICD-10-CM

## 2024-02-26 RX ORDER — CLOPIDOGREL BISULFATE 75 MG/1
75 TABLET ORAL DAILY
Qty: 30 TABLET | Refills: 12 | Status: SHIPPED | OUTPATIENT
Start: 2024-02-26

## 2024-03-05 ENCOUNTER — LAB VISIT (OUTPATIENT)
Dept: LAB | Facility: HOSPITAL | Age: 64
End: 2024-03-05
Attending: FAMILY MEDICINE
Payer: MEDICAID

## 2024-03-05 DIAGNOSIS — E78.2 MIXED HYPERLIPIDEMIA: ICD-10-CM

## 2024-03-05 DIAGNOSIS — R73.03 PREDIABETES: ICD-10-CM

## 2024-03-05 DIAGNOSIS — I10 ESSENTIAL HYPERTENSION: ICD-10-CM

## 2024-03-05 LAB
ALBUMIN SERPL BCP-MCNC: 3.6 G/DL (ref 3.5–5.2)
ALP SERPL-CCNC: 77 U/L (ref 55–135)
ALT SERPL W/O P-5'-P-CCNC: 25 U/L (ref 10–44)
ANION GAP SERPL CALC-SCNC: 9 MMOL/L (ref 8–16)
AST SERPL-CCNC: 31 U/L (ref 10–40)
BASOPHILS # BLD AUTO: 0.05 K/UL (ref 0–0.2)
BASOPHILS NFR BLD: 0.9 % (ref 0–1.9)
BILIRUB SERPL-MCNC: 0.4 MG/DL (ref 0.1–1)
BUN SERPL-MCNC: 19 MG/DL (ref 8–23)
CALCIUM SERPL-MCNC: 9.9 MG/DL (ref 8.7–10.5)
CHLORIDE SERPL-SCNC: 108 MMOL/L (ref 95–110)
CHOLEST SERPL-MCNC: 157 MG/DL (ref 120–199)
CHOLEST/HDLC SERPL: 2.1 {RATIO} (ref 2–5)
CO2 SERPL-SCNC: 26 MMOL/L (ref 23–29)
CREAT SERPL-MCNC: 1.2 MG/DL (ref 0.5–1.4)
DIFFERENTIAL METHOD BLD: ABNORMAL
EOSINOPHIL # BLD AUTO: 0.1 K/UL (ref 0–0.5)
EOSINOPHIL NFR BLD: 1.5 % (ref 0–8)
ERYTHROCYTE [DISTWIDTH] IN BLOOD BY AUTOMATED COUNT: 15.6 % (ref 11.5–14.5)
EST. GFR  (NO RACE VARIABLE): 51.5 ML/MIN/1.73 M^2
ESTIMATED AVG GLUCOSE: 137 MG/DL (ref 68–131)
GLUCOSE SERPL-MCNC: 103 MG/DL (ref 70–110)
HBA1C MFR BLD: 6.4 % (ref 4–5.6)
HCT VFR BLD AUTO: 41.9 % (ref 37–48.5)
HDLC SERPL-MCNC: 76 MG/DL (ref 40–75)
HDLC SERPL: 48.4 % (ref 20–50)
HGB BLD-MCNC: 13 G/DL (ref 12–16)
IMM GRANULOCYTES # BLD AUTO: 0.01 K/UL (ref 0–0.04)
IMM GRANULOCYTES NFR BLD AUTO: 0.2 % (ref 0–0.5)
LDLC SERPL CALC-MCNC: 70.8 MG/DL (ref 63–159)
LYMPHOCYTES # BLD AUTO: 3.2 K/UL (ref 1–4.8)
LYMPHOCYTES NFR BLD: 58 % (ref 18–48)
MCH RBC QN AUTO: 29.4 PG (ref 27–31)
MCHC RBC AUTO-ENTMCNC: 31 G/DL (ref 32–36)
MCV RBC AUTO: 95 FL (ref 82–98)
MONOCYTES # BLD AUTO: 0.5 K/UL (ref 0.3–1)
MONOCYTES NFR BLD: 9.4 % (ref 4–15)
NEUTROPHILS # BLD AUTO: 1.6 K/UL (ref 1.8–7.7)
NEUTROPHILS NFR BLD: 30 % (ref 38–73)
NONHDLC SERPL-MCNC: 81 MG/DL
NRBC BLD-RTO: 0 /100 WBC
PLATELET # BLD AUTO: 295 K/UL (ref 150–450)
PMV BLD AUTO: 11 FL (ref 9.2–12.9)
POTASSIUM SERPL-SCNC: 4.3 MMOL/L (ref 3.5–5.1)
PROT SERPL-MCNC: 6.8 G/DL (ref 6–8.4)
RBC # BLD AUTO: 4.42 M/UL (ref 4–5.4)
SODIUM SERPL-SCNC: 143 MMOL/L (ref 136–145)
TRIGL SERPL-MCNC: 51 MG/DL (ref 30–150)
WBC # BLD AUTO: 5.45 K/UL (ref 3.9–12.7)

## 2024-03-05 PROCEDURE — 80061 LIPID PANEL: CPT | Performed by: FAMILY MEDICINE

## 2024-03-05 PROCEDURE — 83036 HEMOGLOBIN GLYCOSYLATED A1C: CPT | Performed by: FAMILY MEDICINE

## 2024-03-05 PROCEDURE — 80053 COMPREHEN METABOLIC PANEL: CPT | Performed by: FAMILY MEDICINE

## 2024-03-05 PROCEDURE — 85025 COMPLETE CBC W/AUTO DIFF WBC: CPT | Performed by: FAMILY MEDICINE

## 2024-03-05 PROCEDURE — 36415 COLL VENOUS BLD VENIPUNCTURE: CPT | Mod: PO | Performed by: FAMILY MEDICINE

## 2024-03-07 ENCOUNTER — OFFICE VISIT (OUTPATIENT)
Dept: FAMILY MEDICINE | Facility: CLINIC | Age: 64
End: 2024-03-07
Payer: MEDICAID

## 2024-03-07 VITALS
HEIGHT: 64 IN | OXYGEN SATURATION: 98 % | BODY MASS INDEX: 30.63 KG/M2 | HEART RATE: 79 BPM | DIASTOLIC BLOOD PRESSURE: 70 MMHG | SYSTOLIC BLOOD PRESSURE: 114 MMHG | WEIGHT: 179.44 LBS

## 2024-03-07 DIAGNOSIS — N18.31 STAGE 3A CHRONIC KIDNEY DISEASE: ICD-10-CM

## 2024-03-07 DIAGNOSIS — R73.03 PREDIABETES: ICD-10-CM

## 2024-03-07 DIAGNOSIS — E78.2 MIXED HYPERLIPIDEMIA: ICD-10-CM

## 2024-03-07 DIAGNOSIS — Z00.01 ENCOUNTER FOR GENERAL ADULT MEDICAL EXAMINATION WITH ABNORMAL FINDINGS: Primary | ICD-10-CM

## 2024-03-07 DIAGNOSIS — E78.01 FAMILIAL HYPERCHOLESTEROLEMIA: Chronic | ICD-10-CM

## 2024-03-07 DIAGNOSIS — Z95.1 HX OF CABG: Chronic | ICD-10-CM

## 2024-03-07 DIAGNOSIS — Z28.21 INFLUENZA VACCINATION DECLINED: ICD-10-CM

## 2024-03-07 DIAGNOSIS — I10 ESSENTIAL HYPERTENSION: ICD-10-CM

## 2024-03-07 DIAGNOSIS — Z86.73 HISTORY OF TIA (TRANSIENT ISCHEMIC ATTACK): ICD-10-CM

## 2024-03-07 DIAGNOSIS — I25.810 CORONARY ARTERY DISEASE INVOLVING CORONARY BYPASS GRAFT OF NATIVE HEART WITHOUT ANGINA PECTORIS: ICD-10-CM

## 2024-03-07 PROCEDURE — 99999 PR PBB SHADOW E&M-EST. PATIENT-LVL III: CPT | Mod: PBBFAC,,, | Performed by: FAMILY MEDICINE

## 2024-03-07 PROCEDURE — 1160F RVW MEDS BY RX/DR IN RCRD: CPT | Mod: CPTII,,, | Performed by: FAMILY MEDICINE

## 2024-03-07 PROCEDURE — 99213 OFFICE O/P EST LOW 20 MIN: CPT | Mod: PBBFAC,PO | Performed by: FAMILY MEDICINE

## 2024-03-07 PROCEDURE — 4010F ACE/ARB THERAPY RXD/TAKEN: CPT | Mod: CPTII,,, | Performed by: FAMILY MEDICINE

## 2024-03-07 PROCEDURE — 1159F MED LIST DOCD IN RCRD: CPT | Mod: CPTII,,, | Performed by: FAMILY MEDICINE

## 2024-03-07 PROCEDURE — 3074F SYST BP LT 130 MM HG: CPT | Mod: CPTII,,, | Performed by: FAMILY MEDICINE

## 2024-03-07 PROCEDURE — 99396 PREV VISIT EST AGE 40-64: CPT | Mod: S$PBB,,, | Performed by: FAMILY MEDICINE

## 2024-03-07 PROCEDURE — 3078F DIAST BP <80 MM HG: CPT | Mod: CPTII,,, | Performed by: FAMILY MEDICINE

## 2024-03-07 PROCEDURE — 3044F HG A1C LEVEL LT 7.0%: CPT | Mod: CPTII,,, | Performed by: FAMILY MEDICINE

## 2024-03-07 PROCEDURE — 3008F BODY MASS INDEX DOCD: CPT | Mod: CPTII,,, | Performed by: FAMILY MEDICINE

## 2024-03-07 RX ORDER — ROSUVASTATIN CALCIUM 40 MG/1
40 TABLET, COATED ORAL NIGHTLY
Qty: 90 TABLET | Refills: 3 | Status: SHIPPED | OUTPATIENT
Start: 2024-03-07

## 2024-03-07 RX ORDER — VALSARTAN 320 MG/1
320 TABLET ORAL DAILY
Qty: 90 TABLET | Refills: 3 | Status: SHIPPED | OUTPATIENT
Start: 2024-03-07

## 2024-03-07 NOTE — PROGRESS NOTES
"Subjective:         Patient ID: Veronika Blackmon is a 63 y.o. female.    Chief Complaint: Follow-up    Patient Active Problem List   Diagnosis    RBBB    Atherosclerosis of native coronary artery of native heart    Familial hypercholesterolemia    Essential hypertension    Mixed hyperlipidemia    Prediabetes    History of cardiac arrest    Brain anoxic injury    Facial weakness    History of TIA (transient ischemic attack)    Hx of CABG    Stage 3a chronic kidney disease    Coronary artery disease involving coronary bypass graft of native heart without angina pectoris      KITTY Banda is a 63 y.o. female who presents today for follow up of ongoing chronic medical problems in HTN.   Active and moves throughout the day. Reports that her she's always ate small portions in general and not big eater.     Chronic neuropathy and itching in area of healed surgical scar on chest s/p CABG.  No dyspnea, LE edema or symptoms with exertion.   No regular dedicated exercise, but on her feel and active most of the day.     Review of Systems   All other systems reviewed and are negative.       Objective:     Vitals:    03/07/24 1321   BP: 114/70   BP Location: Right arm   Patient Position: Sitting   BP Method: Medium (Manual)   Pulse: 79   SpO2: 98%   Weight: 81.4 kg (179 lb 7.3 oz)   Height: 5' 4" (1.626 m)         Physical Exam  Vitals and nursing note reviewed.   Constitutional:       General: She is not in acute distress.     Appearance: Normal appearance. She is not ill-appearing, toxic-appearing or diaphoretic.   HENT:      Head: Normocephalic and atraumatic.   Eyes:      General: No scleral icterus.     Conjunctiva/sclera: Conjunctivae normal.   Cardiovascular:      Rate and Rhythm: Normal rate.   Pulmonary:      Effort: Pulmonary effort is normal. No respiratory distress.   Skin:     Coloration: Skin is not pale.   Neurological:      Mental Status: She is alert. Mental status is at baseline.   Psychiatric:         Attention and " Perception: Attention and perception normal.         Mood and Affect: Mood and affect normal.         Speech: Speech normal.         Behavior: Behavior normal.         Cognition and Memory: Cognition and memory normal.         Judgment: Judgment normal.       Assessment:       1. Encounter for general adult medical examination with abnormal findings    2. Mixed hyperlipidemia    3. Essential hypertension    4. Influenza vaccination declined    5. History of TIA (transient ischemic attack)    6. Coronary artery disease involving coronary bypass graft of native heart without angina pectoris    7. Familial hypercholesterolemia    8. Hx of CABG    9. Stage 3a chronic kidney disease    10. Prediabetes          Plan:   Recent relevant labs results reviewed with patient.     Chronic neuropathy of scar - self management strategies. Gabapentin/ Lyrica trial in future if helpful. Patient declined until now.       1. Encounter for general adult medical examination with abnormal findings  - Risk and age appropriate anticipatory guidance. HM reviewed and updated. Recommendations discussed with patient as appropriate.     2. Mixed hyperlipidemia  -     rosuvastatin (CRESTOR) 40 MG Tab; Take 1 tablet (40 mg total) by mouth every evening.  Dispense: 90 tablet; Refill: 3  Continue tertiary prevention measures. Medically optimized    3. Essential hypertension  -     valsartan (DIOVAN) 320 MG tablet; Take 1 tablet (320 mg total) by mouth once daily.  Dispense: 90 tablet; Refill: 3  -     BASIC METABOLIC PANEL; Future; Expected date: 03/07/2024  - Chronic health condition is stable and controlled. Continue current medication regimen and relevant lifestyle modifications. Necessary medication refills addressed. Routine ongoing surveillance monitoring.     4. Influenza vaccination declined    5. History of TIA (transient ischemic attack)  6. Coronary artery disease involving coronary bypass graft of native heart without angina  pectoris  7. Familial hypercholesterolemia  8. Hx of CABG  Continue tertiary prevention measures. Medically optimized    9. Stage 3a chronic kidney disease  Mild improvement, continue with chronic disease mgmt    10. Prediabetes  -     BASIC METABOLIC PANEL; Future; Expected date: 03/07/2024  -     Hemoglobin A1C; Future; Expected date: 03/07/2024  Worse A1C. Previously did not tolerate Farxiga with vaginitis side effects. Given CKD, would not be ideal to restart Metformin.   Dietary changes and recheck. Discuss medication at next visit if appropriate.   Declined nutritionist referral.     Patient's questions answered. Plan reviewed with patient at the end of visit. Relevant precautions to chief complaint and reasons to seek further medical care or to contact the office sooner reviewed with patient.     Follow up in about 6 months (around 9/7/2024) for Hypertension Follow-up (A1C and BMP).

## 2024-03-09 ENCOUNTER — E-VISIT (OUTPATIENT)
Dept: FAMILY MEDICINE | Facility: CLINIC | Age: 64
End: 2024-03-09
Payer: MEDICAID

## 2024-03-09 DIAGNOSIS — J40 BRONCHITIS: ICD-10-CM

## 2024-03-09 DIAGNOSIS — R05.3 PERSISTENT COUGH FOR 3 WEEKS OR LONGER: Primary | ICD-10-CM

## 2024-03-11 PROCEDURE — 99422 OL DIG E/M SVC 11-20 MIN: CPT | Mod: ,,, | Performed by: FAMILY MEDICINE

## 2024-03-11 RX ORDER — DOXYCYCLINE 100 MG/1
100 CAPSULE ORAL EVERY 12 HOURS
Qty: 10 CAPSULE | Refills: 0 | Status: SHIPPED | OUTPATIENT
Start: 2024-03-11 | End: 2024-03-16

## 2024-03-11 RX ORDER — METHYLPREDNISOLONE 4 MG/1
TABLET ORAL
Qty: 21 EACH | Refills: 0 | Status: SHIPPED | OUTPATIENT
Start: 2024-03-11 | End: 2024-04-01

## 2024-03-11 NOTE — PROGRESS NOTES
Patient ID: Veronika Blackmon is a 63 y.o. female.    Chief Complaint: URI (Entered automatically based on patient selection in Patient Portal.)    The patient initiated a request through [employer] on 3/9/2024 for evaluation and management with a chief complaint of URI (Entered automatically based on patient selection in Patient Portal.)     I evaluated the questionnaire submission on 03/11/2024      Ohs Peq Evisit Upper Respitatory/Cough Questionnaire    3/9/2024  2:33 PM CST - Filed by Patient   Do you agree to participate in an E-Visit? Yes   If you have any of the following symptoms, please present to your local ER or call 911:  I acknowledge   What is the main issue that you would like for your doctor to address today? Still having dry cough   Are you able to take your vital signs? Yes   Systolic Blood Pressure: 140   Diastolic Blood Pressure: 70   Weight: 179   Height: 64   Pulse:    Temperature:    Respiration rate:    Pulse Oxygen:    What symptoms do you currently have?  Cough   Describe your cough: Dry   Have you ever smoked? I have never smoked   Have you had a fever? No   When did your symptoms first appear? 2/11/2024   In the last two weeks, have you been in close contact with someone who has COVID-19 or the Flu? No   In the last two weeks, have you worked or volunteered in a healthcare facility or as a ? Healthcare facilities include a hospital, medical or dental clinic, long-term care facility, or nursing home No   Do you live in a long-term care facility, nursing home, group home, or homeless shelter? No   List what you have done or taken to help your symptoms. Took promethazine   How severe are your symptoms? Moderate   Have your symptoms improved since they first appeared? No change   Have you taken an at home Covid test? No   Have you taken a Flu test? No   Have you been fully vaccinated for COVID? (2 Pfizer, 2 Moderna or 1 Cam & Cam vaccine injections) Yes   Have you received a  booster? Yes   Have you recieved a Flu shot? Yes   When did you recieve your Flu shot? 12/19/2023   Do you have transportation to get tested for COVID if it is indicated and ordered for you at an [facility] location? No   Provide any information you feel is important to your history not asked above    Please attach any relevant images or files          Encounter Diagnoses   Name Primary?    Persistent cough for 3 weeks or longer Yes    Bronchitis         No orders of the defined types were placed in this encounter.     Medications Ordered This Encounter   Medications    doxycycline (MONODOX) 100 MG capsule     Sig: Take 1 capsule (100 mg total) by mouth every 12 (twelve) hours. for 5 days     Dispense:  10 capsule     Refill:  0    methylPREDNISolone (MEDROL DOSEPACK) 4 mg tablet     Sig: use as directed     Dispense:  21 each     Refill:  0        Follow up if symptoms worsen or fail to improve.      E-Visit Time Tracking:    Day 1 Time (in minutes): 15    Total Time (in minutes): 15

## 2024-03-13 RX ORDER — PROMETHAZINE HYDROCHLORIDE AND DEXTROMETHORPHAN HYDROBROMIDE 6.25; 15 MG/5ML; MG/5ML
5 SYRUP ORAL EVERY 6 HOURS PRN
Qty: 240 ML | Refills: 0 | Status: SHIPPED | OUTPATIENT
Start: 2024-03-13

## 2024-03-17 ENCOUNTER — PATIENT MESSAGE (OUTPATIENT)
Dept: CARDIOLOGY | Facility: CLINIC | Age: 64
End: 2024-03-17
Payer: MEDICAID

## 2024-03-17 DIAGNOSIS — E78.01 FAMILIAL HYPERCHOLESTEROLEMIA: Primary | ICD-10-CM

## 2024-03-17 DIAGNOSIS — I25.810 CORONARY ARTERY DISEASE INVOLVING CORONARY BYPASS GRAFT OF NATIVE HEART WITHOUT ANGINA PECTORIS: ICD-10-CM

## 2024-03-18 RX ORDER — POTASSIUM CHLORIDE 600 MG/1
8 TABLET, FILM COATED, EXTENDED RELEASE ORAL 2 TIMES DAILY
Qty: 180 TABLET | Refills: 3 | Status: SHIPPED | OUTPATIENT
Start: 2024-03-18

## 2024-04-11 ENCOUNTER — PATIENT MESSAGE (OUTPATIENT)
Dept: OBSTETRICS AND GYNECOLOGY | Facility: CLINIC | Age: 64
End: 2024-04-11
Payer: MEDICAID

## 2024-05-22 DIAGNOSIS — E78.01 FAMILIAL HYPERCHOLESTEROLEMIA: Chronic | ICD-10-CM

## 2024-05-22 RX ORDER — ALIROCUMAB 75 MG/ML
75 INJECTION, SOLUTION SUBCUTANEOUS
Qty: 2 EACH | Refills: 6 | Status: ACTIVE | OUTPATIENT
Start: 2024-05-22

## 2024-07-10 ENCOUNTER — E-VISIT (OUTPATIENT)
Dept: FAMILY MEDICINE | Facility: CLINIC | Age: 64
End: 2024-07-10
Payer: MEDICAID

## 2024-07-10 DIAGNOSIS — N76.1 SUBACUTE VAGINITIS: Primary | ICD-10-CM

## 2024-07-10 DIAGNOSIS — B37.31 VAGINAL CANDIDIASIS: ICD-10-CM

## 2024-07-10 RX ORDER — CLOTRIMAZOLE AND BETAMETHASONE DIPROPIONATE 10; .64 MG/G; MG/G
CREAM TOPICAL 2 TIMES DAILY
Qty: 45 G | Refills: 0 | Status: SHIPPED | OUTPATIENT
Start: 2024-07-10

## 2024-07-10 RX ORDER — FLUCONAZOLE 150 MG/1
150 TABLET ORAL ONCE
Qty: 2 TABLET | Refills: 0 | Status: SHIPPED | OUTPATIENT
Start: 2024-07-10 | End: 2024-07-10

## 2024-07-10 NOTE — PROGRESS NOTES
Patient ID: Veronika Blackmon is a 64 y.o. female.    Chief Complaint: Vaginal Discharge (Entered automatically based on patient selection in [employer].)    The patient initiated a request through [employer] on 7/10/2024 for evaluation and management with a chief complaint of Vaginal Discharge (Entered automatically based on patient selection in [employer].)     I evaluated the questionnaire submission on 07/10/2024    Ohs Peq Evisit Vaginal Discharge    7/10/2024 11:12 AM CDT - Filed by Patient   Do you agree to participate in an E-Visit? Yes   If you have any of the following symptoms,  please do not complete an E-Visit,  schedule an appointment with your provider: I acknowledge   What is the main issue you would like addressed today? Vaginal irritaion   Which of the following are you experiencing? Vaginal Itching    Are you having pain while passing urine? No, I have no pain while urinating.   Which of the following applies to your vaginal discharge? I have no discharge.    Which of the following are you experiencing? None of the above   Do you have any sores on your genitals? No    Have you taken antibiotics recently? I have not been on any antibiotics    Do you use any of the following? Vaginal sprays   Which of the following applies to your menstrual period? I do not have menstrual periods   Have you had similar symptoms in the past? No, I have never had these symptoms.   Have you had a fever? No   During the last 2 months, have you had sexual contact with a specific person for the first time? Yes   Has a person with whom you have had sexual contact been recently told they have a disease possibly acquired through sex? No   Provide any additional information you feel is important. Itchy when urinating   Please attach any relevant images or files    Are you able to take your vital signs? Yes   Systolic Blood Pressure:    Diastolic Blood Pressure:    Weight:    Height:    Pulse:    Temperature:    Respiration rate:    Pulse  Oxygen:          Encounter Diagnoses   Name Primary?    Subacute vaginitis Yes    Vaginal candidiasis         No orders of the defined types were placed in this encounter.     Medications Ordered This Encounter   Medications    clotrimazole-betamethasone 1-0.05% (LOTRISONE) cream     Sig: Apply topically 2 (two) times daily. At skin outside vaginal area.     Dispense:  45 g     Refill:  0    fluconazole (DIFLUCAN) 150 MG Tab     Sig: Take 1 tablet (150 mg total) by mouth once. . May repeat in 1 week if does not resolve for 1 dose     Dispense:  2 tablet     Refill:  0        Follow up if symptoms worsen or fail to improve.      E-Visit Time Tracking:    Day 1 Time (in minutes): 11    Total Time (in minutes): 11

## 2024-07-26 ENCOUNTER — TELEPHONE (OUTPATIENT)
Dept: FAMILY MEDICINE | Facility: CLINIC | Age: 64
End: 2024-07-26
Payer: MEDICAID

## 2024-07-26 DIAGNOSIS — Z12.31 SCREENING MAMMOGRAM FOR BREAST CANCER: Primary | ICD-10-CM

## 2024-08-09 ENCOUNTER — HOSPITAL ENCOUNTER (OUTPATIENT)
Dept: RADIOLOGY | Facility: HOSPITAL | Age: 64
Discharge: HOME | End: 2024-08-09
Attending: FAMILY MEDICINE
Payer: MEDICAID

## 2024-08-09 DIAGNOSIS — Z12.31 SCREENING MAMMOGRAM FOR BREAST CANCER: ICD-10-CM

## 2024-08-09 PROCEDURE — 77063 BREAST TOMOSYNTHESIS BI: CPT | Mod: TC

## 2024-08-09 PROCEDURE — 77067 SCR MAMMO BI INCL CAD: CPT | Mod: TC

## 2024-08-09 PROCEDURE — 77067 SCR MAMMO BI INCL CAD: CPT | Mod: 26,,, | Performed by: RADIOLOGY

## 2024-08-09 PROCEDURE — 77063 BREAST TOMOSYNTHESIS BI: CPT | Mod: 26,,, | Performed by: RADIOLOGY

## 2024-10-31 ENCOUNTER — PATIENT OUTREACH (OUTPATIENT)
Dept: ADMINISTRATIVE | Facility: OTHER | Age: 64
End: 2024-10-31
Payer: MEDICAID

## 2024-11-01 ENCOUNTER — OFFICE VISIT (OUTPATIENT)
Dept: FAMILY MEDICINE | Facility: CLINIC | Age: 64
End: 2024-11-01
Payer: MEDICAID

## 2024-11-01 VITALS
BODY MASS INDEX: 31.71 KG/M2 | OXYGEN SATURATION: 99 % | SYSTOLIC BLOOD PRESSURE: 126 MMHG | HEART RATE: 67 BPM | DIASTOLIC BLOOD PRESSURE: 84 MMHG | WEIGHT: 184.75 LBS

## 2024-11-01 DIAGNOSIS — K64.9 BLEEDING HEMORRHOID: ICD-10-CM

## 2024-11-01 DIAGNOSIS — K21.9 GASTROESOPHAGEAL REFLUX DISEASE WITHOUT ESOPHAGITIS: ICD-10-CM

## 2024-11-01 DIAGNOSIS — E78.01 FAMILIAL HYPERCHOLESTEROLEMIA: Chronic | ICD-10-CM

## 2024-11-01 DIAGNOSIS — K59.09 CHRONIC CONSTIPATION: ICD-10-CM

## 2024-11-01 DIAGNOSIS — R73.03 PREDIABETES: ICD-10-CM

## 2024-11-01 DIAGNOSIS — N18.31 STAGE 3A CHRONIC KIDNEY DISEASE: ICD-10-CM

## 2024-11-01 DIAGNOSIS — K62.5 RECTAL BLEEDING: ICD-10-CM

## 2024-11-01 DIAGNOSIS — E78.2 MIXED HYPERLIPIDEMIA: ICD-10-CM

## 2024-11-01 DIAGNOSIS — I25.118 ATHEROSCLEROSIS OF NATIVE CORONARY ARTERY OF NATIVE HEART WITH STABLE ANGINA PECTORIS: ICD-10-CM

## 2024-11-01 DIAGNOSIS — M79.672 HEEL PAIN, BILATERAL: ICD-10-CM

## 2024-11-01 DIAGNOSIS — Z95.1 HX OF CABG: Chronic | ICD-10-CM

## 2024-11-01 DIAGNOSIS — M79.671 HEEL PAIN, BILATERAL: ICD-10-CM

## 2024-11-01 DIAGNOSIS — I25.810 CORONARY ARTERY DISEASE INVOLVING CORONARY BYPASS GRAFT OF NATIVE HEART WITHOUT ANGINA PECTORIS: ICD-10-CM

## 2024-11-01 DIAGNOSIS — R94.39 ABNORMAL CARDIOVASCULAR STRESS TEST: ICD-10-CM

## 2024-11-01 DIAGNOSIS — I10 ESSENTIAL HYPERTENSION: Primary | ICD-10-CM

## 2024-11-01 DIAGNOSIS — M72.2 PLANTAR FASCIITIS: ICD-10-CM

## 2024-11-01 DIAGNOSIS — Z86.73 HISTORY OF TIA (TRANSIENT ISCHEMIC ATTACK): ICD-10-CM

## 2024-11-01 DIAGNOSIS — Z86.74 HISTORY OF CARDIAC ARREST: ICD-10-CM

## 2024-11-01 PROCEDURE — 4010F ACE/ARB THERAPY RXD/TAKEN: CPT | Mod: CPTII,,, | Performed by: FAMILY MEDICINE

## 2024-11-01 PROCEDURE — 1160F RVW MEDS BY RX/DR IN RCRD: CPT | Mod: CPTII,,, | Performed by: FAMILY MEDICINE

## 2024-11-01 PROCEDURE — 99214 OFFICE O/P EST MOD 30 MIN: CPT | Mod: PBBFAC,PO | Performed by: FAMILY MEDICINE

## 2024-11-01 PROCEDURE — 3044F HG A1C LEVEL LT 7.0%: CPT | Mod: CPTII,,, | Performed by: FAMILY MEDICINE

## 2024-11-01 PROCEDURE — 3074F SYST BP LT 130 MM HG: CPT | Mod: CPTII,,, | Performed by: FAMILY MEDICINE

## 2024-11-01 PROCEDURE — 99214 OFFICE O/P EST MOD 30 MIN: CPT | Mod: S$PBB,,, | Performed by: FAMILY MEDICINE

## 2024-11-01 PROCEDURE — 3008F BODY MASS INDEX DOCD: CPT | Mod: CPTII,,, | Performed by: FAMILY MEDICINE

## 2024-11-01 PROCEDURE — 99999 PR PBB SHADOW E&M-EST. PATIENT-LVL IV: CPT | Mod: PBBFAC,,, | Performed by: FAMILY MEDICINE

## 2024-11-01 PROCEDURE — 3079F DIAST BP 80-89 MM HG: CPT | Mod: CPTII,,, | Performed by: FAMILY MEDICINE

## 2024-11-01 PROCEDURE — 1159F MED LIST DOCD IN RCRD: CPT | Mod: CPTII,,, | Performed by: FAMILY MEDICINE

## 2024-11-01 RX ORDER — ROSUVASTATIN CALCIUM 40 MG/1
40 TABLET, COATED ORAL NIGHTLY
Qty: 90 TABLET | Refills: 3 | Status: SHIPPED | OUTPATIENT
Start: 2024-11-01

## 2024-11-01 RX ORDER — HYDROCORTISONE 25 MG/G
CREAM TOPICAL 2 TIMES DAILY
Qty: 28 G | Refills: 2 | Status: SHIPPED | OUTPATIENT
Start: 2024-11-01

## 2024-11-01 RX ORDER — VALSARTAN 320 MG/1
320 TABLET ORAL DAILY
Qty: 90 TABLET | Refills: 3 | Status: SHIPPED | OUTPATIENT
Start: 2024-11-01

## 2024-11-01 RX ORDER — CLOPIDOGREL BISULFATE 75 MG/1
75 TABLET ORAL DAILY
Qty: 90 TABLET | Refills: 3 | Status: SHIPPED | OUTPATIENT
Start: 2024-11-01

## 2024-11-01 RX ORDER — DICLOFENAC SODIUM 10 MG/G
2 GEL TOPICAL 4 TIMES DAILY
Qty: 200 G | Refills: 0 | Status: SHIPPED | OUTPATIENT
Start: 2024-11-01

## 2024-11-01 RX ORDER — POLYETHYLENE GLYCOL 3350 17 G/17G
17 POWDER, FOR SOLUTION ORAL DAILY
Qty: 510 G | Refills: 1 | Status: SHIPPED | OUTPATIENT
Start: 2024-11-01

## 2024-11-01 RX ORDER — HYDROCORTISONE ACETATE 25 MG/1
25 SUPPOSITORY RECTAL 2 TIMES DAILY
Qty: 24 SUPPOSITORY | Refills: 3 | Status: SHIPPED | OUTPATIENT
Start: 2024-11-01

## 2024-11-01 RX ORDER — OMEPRAZOLE 40 MG/1
40 CAPSULE, DELAYED RELEASE ORAL DAILY
Qty: 90 CAPSULE | Refills: 3 | Status: SHIPPED | OUTPATIENT
Start: 2024-11-01

## 2024-11-01 RX ORDER — METOPROLOL SUCCINATE 50 MG/1
TABLET, EXTENDED RELEASE ORAL
Qty: 135 TABLET | Refills: 3 | Status: SHIPPED | OUTPATIENT
Start: 2024-11-01 | End: 2025-11-01

## 2024-11-01 NOTE — PROGRESS NOTES
Subjective:         Patient ID: Veronika Blackmon is a 64 y.o. female.    Chief Complaint: Hypertension    Patient Active Problem List   Diagnosis    RBBB    Atherosclerosis of native coronary artery of native heart    Familial hypercholesterolemia    Essential hypertension    Mixed hyperlipidemia    Prediabetes    History of cardiac arrest    Brain anoxic injury    Facial weakness    History of TIA (transient ischemic attack)    Hx of CABG    Stage 3a chronic kidney disease    Coronary artery disease involving coronary bypass graft of native heart without angina pectoris      HPI    Veronika is a 64 y.o. female    History of Present Illness    CHIEF COMPLAINT:  Veronika presents today for follow-up on hypertension.    HYPERTENSION MANAGEMENT:  She reports consistent adherence to her blood pressure medications, taking them at the same time every month without experiencing side effects such as lightheadedness, dizziness, or low blood pressure episodes. She is actively participating in a digital hypertension program, regularly checking her blood pressure as recommended. She continues metoprolol as one tablet in the morning and half a tablet in the evening, and requests a refill. She previously stopped amlodipine, stating she would consult her primary care physician first regarding blood pressure management.    MEDICATION REFILLS:  She requests a 90-day refill for Plavix.    GASTROINTESTINAL CONCERNS:  She reports rectal bleeding when wiping after bowel movements, but denies seeing blood in the stool itself. She describes a burning sensation in the rectal area but denies feeling hemorrhoids. Her last colonoscopy was in October 2022. She also reports experiencing constipation.    MUSCULOSKELETAL:  She reports bilateral heel pain when walking, present for 3-4 months. The pain varies with different shoes, with sandals being particularly painful. She maintains a regular walking routine, including daily walks to take her grandchild to  school five days a week, as well as evening walks.         Objective:     Vitals:    11/01/24 1313 11/01/24 1417   BP: 136/88 126/84   BP Location: Left arm Left arm   Patient Position: Sitting Sitting   Pulse: 67    SpO2: 99%    Weight: 83.8 kg (184 lb 11.9 oz)          Physical Exam  Vitals and nursing note reviewed.   Constitutional:       General: She is not in acute distress.     Appearance: Normal appearance. She is not ill-appearing, toxic-appearing or diaphoretic.   HENT:      Head: Normocephalic and atraumatic.   Eyes:      General: No scleral icterus.     Conjunctiva/sclera: Conjunctivae normal.   Cardiovascular:      Rate and Rhythm: Normal rate.   Pulmonary:      Effort: Pulmonary effort is normal. No respiratory distress.   Skin:     Coloration: Skin is not pale.   Neurological:      Mental Status: She is alert. Mental status is at baseline.   Psychiatric:         Attention and Perception: Attention and perception normal.         Mood and Affect: Mood and affect normal.         Speech: Speech normal.         Behavior: Behavior normal.         Cognition and Memory: Cognition and memory normal.         Judgment: Judgment normal.       Assessment:       1. Essential hypertension    2. Mixed hyperlipidemia    3. Familial hypercholesterolemia    4. Coronary artery disease involving coronary bypass graft of native heart without angina pectoris    5. Atherosclerosis of native coronary artery of native heart with stable angina pectoris    6. Stage 3a chronic kidney disease    7. Prediabetes    8. Hx of CABG    9. History of cardiac arrest    10. History of TIA (transient ischemic attack)    11. Abnormal cardiovascular stress test    12. Gastroesophageal reflux disease without esophagitis    13. Heel pain, bilateral    14. Plantar fasciitis    15. Rectal bleeding    16. Bleeding hemorrhoid    17. Chronic constipation          Plan:   Recent relevant labs results reviewed with patient.         Assessment & Plan     Considered plantar fasciitis as cause of patient's heel pain  Evaluated rectal bleeding, considering hemorrhoids as potential cause  Assessed blood pressure control, noting slightly elevated reading of 136/88  Reviewed recent colonoscopy results from October 2022  Considered adding amlodipine for blood pressure management, pending further review of why it was previously discontinued by Dr. Bauer    PLANTAR FASCIITIS:  - Explained plantar fasciitis and its relationship to foot pain.  - Described proper technique for plantar fascia stretching exercise.  - Veronika to perform specific foot exercises for plantar fasciitis.  - Recommend wearing supportive footwear, especially tennis shoes.  - Veronika to avoid sandals and barefoot walking.  - Started diclofenac gel for plantar fasciitis.  - Contact office if plantar fasciitis symptoms do not improve with current treatment plan.    CONSTIPATION:  - Veronika to mix fiber supplement (generic Miralax) with large glass of water daily for constipation.  - Started generic Miralax (powder), mix 1 capful with large glass of water daily.    RECTAL BLEEDING:  - Started hemorrhoid cream and suppositories for rectal bleeding.  - Referred to colorectal surgery for evaluation of rectal bleeding.    MEDICATIONS/SUPPLEMENTS:  - Refilled Plavix with 90-day supply.  - Continued metoprolol 1 tablet in morning, 1/2 tablet in evening.  - Increased Ameprazole to 40 mg, taking 1 tablet daily.    HYPERTENSION:  - Blood pressure rechecked in office.  - Continue monitoring blood pressure through digital hypertension program.         1. Essential hypertension  -     metoprolol succinate (TOPROL-XL) 50 MG 24 hr tablet; Take 1 tablet (50 mg total) by mouth every morning AND 0.5 tablets (25 mg total) every evening.  Dispense: 135 tablet; Refill: 3  -     valsartan (DIOVAN) 320 MG tablet; Take 1 tablet (320 mg total) by mouth once daily.  Dispense: 90 tablet; Refill: 3  -     Hepatic Function Panel; Future;  Expected date: 11/01/2024  -     Renal Function Panel; Future; Expected date: 11/01/2024  -     Lipid Panel; Future; Expected date: 11/01/2024  -     TSH; Future; Expected date: 11/01/2024  -     Hemoglobin A1C; Future; Expected date: 11/01/2024  -     CBC Auto Differential; Future; Expected date: 11/01/2024  - Chronic health condition is stable and controlled. Continue current medication regimen and relevant lifestyle modifications. Necessary medication refills addressed. Routine ongoing surveillance monitoring.     2. Mixed hyperlipidemia  3. Familial hypercholesterolemia  4. Coronary artery disease involving coronary bypass graft of native heart without angina pectoris  5. Atherosclerosis of native coronary artery of native heart with stable angina pectoris  -     rosuvastatin (CRESTOR) 40 MG Tab; Take 1 tablet (40 mg total) by mouth every evening.  Dispense: 90 tablet; Refill: 3  Medically managed  Controlled    6. Stage 3a chronic kidney disease  -     Renal Function Panel; Future; Expected date: 11/01/2024  -     TSH; Future; Expected date: 11/01/2024  -     Hemoglobin A1C; Future; Expected date: 11/01/2024  -     Microalbumin/Creatinine Ratio, Urine; Future; Expected date: 11/01/2024    7. Prediabetes  -     Hemoglobin A1C; Future; Expected date: 11/01/2024    8. Hx of CABG  -     clopidogreL (PLAVIX) 75 mg tablet; Take 1 tablet (75 mg total) by mouth once daily.  Dispense: 90 tablet; Refill: 3  -     metoprolol succinate (TOPROL-XL) 50 MG 24 hr tablet; Take 1 tablet (50 mg total) by mouth every morning AND 0.5 tablets (25 mg total) every evening.  Dispense: 135 tablet; Refill: 3  Medically managed    9. History of cardiac arrest  10. History of TIA (transient ischemic attack)  -     clopidogreL (PLAVIX) 75 mg tablet; Take 1 tablet (75 mg total) by mouth once daily.  Dispense: 90 tablet; Refill: 3  -     metoprolol succinate (TOPROL-XL) 50 MG 24 hr tablet; Take 1 tablet (50 mg total) by mouth every morning  AND 0.5 tablets (25 mg total) every evening.  Dispense: 135 tablet; Refill: 3    11. Abnormal cardiovascular stress test  S/p cards anjali    12. Gastroesophageal reflux disease without esophagitis  -     omeprazole (PRILOSEC) 40 MG capsule; Take 1 capsule (40 mg total) by mouth once daily.  Dispense: 90 capsule; Refill: 3    13. Heel pain, bilateral  14. Plantar fasciitis  -     diclofenac sodium (VOLTAREN) 1 % Gel; Apply 2 g topically 4 (four) times daily.  Dispense: 200 g; Refill: 0  Conservative treatment    15. Rectal bleeding  16. Bleeding hemorrhoid  -     hydrocortisone (ANUSOL-HC) 25 mg suppository; Place 1 suppository (25 mg total) rectally 2 (two) times daily.  Dispense: 24 suppository; Refill: 3  -     hydrocortisone 2.5 % cream; Apply topically 2 (two) times daily. To rectal area for hemorrhoids  Dispense: 28 g; Refill: 2  -     Ambulatory referral/consult to Colorectal Surgery; Future; Expected date: 11/08/2024  -     polyethylene glycol (GLYCOLAX) 17 gram/dose powder; Take 17 g by mouth once daily.  Dispense: 510 g; Refill: 1  Last colonoscopy reviewed.     17. Chronic constipation  -     polyethylene glycol (GLYCOLAX) 17 gram/dose powder; Take 17 g by mouth once daily.  Dispense: 510 g; Refill: 1    Patient's questions answered. Plan reviewed with patient at the end of visit. Relevant precautions to chief complaint and reasons to seek further medical care or to contact the office sooner reviewed with patient.     Follow up in about 6 months (around 5/1/2025) for Hypertension Follow-up, (prelabs).        Part of this note was dictated using voice recognition software. Please excuse any typographical errors.     This note was generated with the assistance of ambient listening technology. Verbal consent was obtained by the patient and accompanying visitor(s) for the recording of patient appointment to facilitate this note. I attest to having reviewed and edited the generated note for accuracy, though some  syntax or spelling errors may persist. Please contact the author of this note for any clarification.

## 2024-11-14 ENCOUNTER — PATIENT OUTREACH (OUTPATIENT)
Dept: ADMINISTRATIVE | Facility: OTHER | Age: 64
End: 2024-11-14
Payer: MEDICAID

## 2024-11-14 ENCOUNTER — TELEPHONE (OUTPATIENT)
Dept: PHARMACY | Facility: CLINIC | Age: 64
End: 2024-11-14
Payer: MEDICAID

## 2024-11-14 NOTE — TELEPHONE ENCOUNTER
We have reviewed Ms. Blackmon current medication list and/or insurance status. Unfortunately, The Pharmacy Patient Assistance Team is unable to assist at this time due to the following reasons      The Pharmacy Patient Assistance Team does not assist with specialty medication. Please send a new prescription to [facility] to initiate assistance and follow up with the Specialty Assistance Team.        Remy.        Marielena Armstrong  Pharmacy Patient Assistance Team

## 2024-11-14 NOTE — TELEPHONE ENCOUNTER
----- Message from Stephanie Samaniego sent at 11/14/2024 10:51 AM CST -----  Regarding: Patient Referral  Greetings,     Patient is in need of assistance with Medication ( alirocumab (PRALUENT PEN) 75 mg/mL PnIj). Please assist when available.     Thanks,   ROSA Brown

## 2024-11-14 NOTE — PROGRESS NOTES
CHW - Follow Up    This Community Health Worker completed a follow up visit with patient via telephone today.  Pt/Caregiver reported: Pt stated that she has a disconnect notice with date 10/31 (last month). Pt stated that she has called the numbers provided and was told that there is no funding available.   Community Health Worker provided: CHW called around to (7) different agencies and was either told there was no funds available or to c/b. CHW encouraged pt to call [facility] next week for an appt. Pt stated that she has received assistance from them in June of this year already (agency may not assist pt). Referral also placed in [facility] with [facility] [facility] [employer] ([facility])  Follow up required: Yes,   Follow-up Outreach - Due: 11/21/2024

## 2024-11-15 ENCOUNTER — PATIENT OUTREACH (OUTPATIENT)
Dept: ADMINISTRATIVE | Facility: OTHER | Age: 64
End: 2024-11-15
Payer: MEDICAID

## 2024-11-15 NOTE — PROGRESS NOTES
CHW - Follow Up    This Community Health Worker completed a follow up visit on behalf of patient via telephone today.  Pt/Caregiver reported: none  Community Health Worker provided: CHW received correspondence that [facility] does not assist with Specialty Medication. CHW forwarded information to [facility] team and will f/u. CHW lastly placed a call to [facility] to assist pt with utility bill, there was no answer and a v/m was left for a c/b.   [facility] c/b and provided contact information to their financial outreach team ([facility] [phone]). Information was provided to the pt.   Follow up required: Yes, utility assistance (if available)  Follow-up Outreach - Due: 11/22/2024

## 2024-11-21 ENCOUNTER — PATIENT OUTREACH (OUTPATIENT)
Dept: ADMINISTRATIVE | Facility: OTHER | Age: 64
End: 2024-11-21
Payer: MEDICAID

## 2024-11-21 NOTE — PROGRESS NOTES
CHW - Follow Up    This Community Health Worker completed a follow up visit with patient via telephone today.  Pt/Caregiver reported: Pt stated that she called [facility] and left a v/m, when they called her back she was told that there was no funding for utility assistance available.   Community Health Worker provided: CHW encouraged pt to call [facility] back to check if funding is available for assistance, pt voiced her understanding  Follow up required: Yes,   Follow-up Outreach - Due: 12/12/2024

## 2024-12-02 NOTE — PROGRESS NOTES
Subjective:   @Patient ID:  Veronika Blackmon is a 64 y.o. female who presents for evaluation of HTN, abnormal EKG, HLP       HPI:   12/2024:  FU.  She is doing well.  She denies any significant palpitation.  Rare sharp pain, mostly noncardiac.  Tolerating her medications well.  EKG today sinus rhythm with RBBB.  No PVCs    12/2023: F/U. She feels well. Occasional palpitations.  No angina. Last time Holter showed frequent PVCs and Toprol dose increased.  Still with frequent Pvcs by EKG today. 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 ROBIN done with mild to moderate PAD. Overall she is doing well. No chest pain. Occasional tightness with breathing. Occasional BRBPR. Had EGD and colonoscopy in 10/2022.         1/30/2023: Here for follow up. She has been doing well. She tries to stay active.   No chest pain with activities. She is enrolled in digital HTN clinic. BP is well per her home recordings. Today is elevated some. No syncope or pre syncope. Tolerating PCSK9 inhibitors           Historically:  Stress test was done that showed high risk findings with significant EKG changes very early in the stress and persistent in recovery. 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 During the cath she was noted to have frequent PVCs and metoprolol  dose was increased She was sent for CABG evaluation. She was seen by Dr. Mays and believe no good targets so she was declined. She wanted a 2nd opinion. While pending a 2nd opinion she had cardiac arrest  In 1/23/2022. It was believed to be arrhythmias related. She was arguing with her son. Initial concern about anoxic brain injury but she got extubated and recovered very well neurologically. She was transferred to [facility] for CABG which was done 1/31/2022 by Dr. Paz. She recovered very well and discharged 2/4/2022.  EF prior to CABG in [facility] was read as 55%    Admitted 1/23/2022 with cardiac arrest. It was believed to be  arrhythmias related. She was arguing with her son. Initial concern about anoxic brain injury but she got extubated and recovered very well neurologically. She was transferred to [facility] for CABG which was done 1/31/2022 by Dr. Paz. She recovered very well and discharged 2/4/2022.       EKG done as below and concerning lateral changes  No significant claudications     FH is significant for premature CAD, younger brother had MI in his 40s,  majority of her family with CAD, PAD, and DM.     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 Paz in [facility].  LIMA-LAD, SVG-D, SVG RI, SVG-OM. RCA  and was not bypassed.  ACEI held due HARSH.     She was discharged 2/2/2022  Had repeat Echo in [facility] 1/28/2022 EF was read as >55%, small pericardial effusion,     ROBIN 2/2023   Rt ROBIN 0.97 ---> 0.73 consistent with mild PAD  Lt ROBIN 1.04--->0.88 consistent with mild PAD  Rt TBI 0.43  Lt TBI 0.42       - Stress MPI 12/2/2021  . Findings concerning for reversible ischemia in the anterior wall.  There is an associated wall motion abnormality.  2. Possible separate area of reversible ischemia along the inferoseptal wall, though artifact at this location can occasionally give a similar appearance.  3. Left ventricular dysfunction.  Estimated ejection fraction 36% during stress and 45% during rest.  This report was flagged in Epic as abnormal    - [facility] 12/14/2021  There was three vessel coronary artery disease.  The ejection fraction was 50-55% by visual estimate.  The pre-procedure left ventricular end diastolic pressure was 7.  The Prox LAD lesion was 70% stenosed.  The Mid LAD lesion was 80% stenosed.  The Dist LAD lesion was 95% stenosed.  The 1st Diag lesion was 95% stenosed.  The 1st Mrg lesion was 90% stenosed.  The Prox Cx to Mid Cx lesion was 80% stenosed.  The Prox RCA to Mid RCA lesion was 100% stenosed.  The estimated blood loss was  none.     - Severe MVCAD   - CVT evaluation soon   - Increase Toprol to 25 mg bid  - ASA/Plavix/Statin         Echo 2/23/2022   The left ventricle is normal in size with mild concentric hypertrophy and normal systolic function.  There are segmental left ventricular wall motion abnormalities with inferobasilar hypokiesis.  There is abnormal septal wall motion consistent with post-operative status.  The estimated ejection fraction is 55%.  Grade I left ventricular diastolic dysfunction.  Mild aortic regurgitation.  Mild tricuspid regurgitation.  Mild pulmonic regurgitation.  Normal right ventricular size with normal right ventricular systolic function.  There is no pulmonary hypertension.  There is a moderate left pleural effusion.        - EKG  SR, RBBB, lateral TWI  - EKG 12/2024 NSR, RBBB      Event monitor 3/9/2022  At baseline, in the absence of symptoms, the rhythm was sinus with heart rate 94 beats per minute.  No symptom was reported throughout the month.  There were multiple activations, all of which showed sinus rhythm and some of which showed single PVCs.  The heart rate 80-95 beats per minute.     Impression:  Sinus rhythm, with occasional single PVCs.  No reported symptom.    Patient Active Problem List    Diagnosis Date Noted    Coronary artery disease involving coronary bypass graft of native heart without angina pectoris 03/24/2023    Stage 3a chronic kidney disease 07/01/2022    Hx of CABG 02/17/2022    Facial weakness     History of TIA (transient ischemic attack)     History of cardiac arrest 01/24/2022    Brain anoxic injury 01/24/2022    Prediabetes 01/04/2022    Familial hypercholesterolemia 12/27/2021    Essential hypertension 12/27/2021    Mixed hyperlipidemia 12/27/2021    Atherosclerosis of native coronary artery of native heart 12/22/2021     Formatting of this note might be different from the original.  Added automatically from request for surgery [id number]      RBBB 12/02/2021         Right  Arm BP - Sittin/90  Left Arm BP - Sittin/94      LAST HbA1c  Lab Results   Component Value Date    HGBA1C 6.4 (H) 2024       Lipid panel  Lab Results   Component Value Date    CHOL 157 2024    CHOL 138 2023    CHOL 191 2022     Lab Results   Component Value Date    HDL 76 (H) 2024    HDL 72 2023    HDL 67 2022     Lab Results   Component Value Date    LDLCALC 70.8 2024    LDLCALC 56.4 (L) 2023    LDLCALC 109.6 2022     Lab Results   Component Value Date    TRIG 51 2024    TRIG 48 2023    TRIG 72 2022     Lab Results   Component Value Date    CHOLHDL 48.4 2024    CHOLHDL 52.2 (H) 2023    CHOLHDL 35.1 2022            Review of Systems   Constitutional: Negative for chills and fever.   HENT:  Negative for hearing loss and nosebleeds.    Eyes:  Negative for blurred vision.   Cardiovascular:  Negative for chest pain, dyspnea on exertion, leg swelling and palpitations.   Respiratory:  Negative for hemoptysis and shortness of breath.    Hematologic/Lymphatic: Negative for bleeding problem.   Skin:  Negative for itching.   Musculoskeletal:  Negative for falls.   Gastrointestinal:  Negative for abdominal pain and hematochezia.   Genitourinary:  Negative for hematuria.   Neurological:  Negative for dizziness and loss of balance.   Psychiatric/Behavioral:  Negative for altered mental status and depression.        Objective:   Physical Exam  Constitutional:       Appearance: She is well-developed.   HENT:      Head: Normocephalic and atraumatic.   Eyes:      Conjunctiva/sclera: Conjunctivae normal.   Neck:      Vascular: No carotid bruit or JVD.   Cardiovascular:      Rate and Rhythm: Normal rate and regular rhythm.      Pulses:           Carotid pulses are 2+ on the right side and 2+ on the left side.       Radial pulses are 2+ on the right side and 2+ on the left side.        Dorsalis pedis pulses are 2+ on the right  side and 0 on the left side.      Heart sounds: Normal heart sounds. No murmur heard.     No friction rub. No gallop.      Comments: monophasic lt DP   Pulmonary:      Effort: Pulmonary effort is normal. No respiratory distress.      Breath sounds: Normal breath sounds. No stridor. No wheezing.   Musculoskeletal:      Cervical back: Neck supple.   Skin:     General: Skin is warm and dry.   Neurological:      Mental Status: She is alert and oriented to person, place, and time.   Psychiatric:         Behavior: Behavior normal.         Assessment:     1. History of cardiac arrest    2. Hx of CABG    3. Coronary artery disease involving coronary bypass graft of native heart without angina pectoris    4. Atherosclerosis of native coronary artery of native heart with stable angina pectoris    5. Essential hypertension    6. Familial hypercholesterolemia    7. Mixed hyperlipidemia    8. PVC's (premature ventricular contractions)          Plan:   1. CAD   History of cardiac arrest  Post CABG  Stable symptoms  Repeat echocardiogram  Continue current medical therapy  Repeat lipid panel    2. Frequent PVCs  Previously she was referred to EP and PET stress test ordered.  She did not see EP nor did the stress test   Overall she is doing well.  Repeat EKG today without significant PVCs  Continue Toprol 50 in a.m. and 25 mg p.m.  Repeat Holter monitor  Repeat echo    3. Hyperlipidemia  Continue statin and PCSK9 inhibitor  Repeat lipid panel    4. Hypertension  Reading elevated today however at home it is well-controlled  Instructed to monitor at home and keep log  Continue current regimen      5. Peripheral arterial disease     - Medical TTT for PAD. No significant claudications          I spent 5-10 minutes asking, assessing, assisting, arranging and advising heart healthy diet improvements. This included low-salt meals, portion control and health food alternatives. I also encourage 30 minutes of moderate exercise 3-4x a week.      -In today's visit, at least 4 established conditions that pose a risk to life or bodily function have been addressed and the conditions are severe.    -In today's visit, monitoring for drug toxicity was accomplished.     Six-months follow-up or sooner p.r.n.    Pertinent cardiac images and EKG reviewed independently.    Continue with current medical plan and lifestyle changes.  Return sooner for concerns or questions. If symptoms persist go to the ED  I have reviewed all pertinent data including patient's medical history in detail and updated the computerized patient record.     Orders Placed This Encounter   Procedures    Holter monitor - 48 hour     Standing Status:   Future     Standing Expiration Date:   12/3/2025    Echo     Standing Status:   Future     Standing Expiration Date:   12/3/2025     Order Specific Question:   Release to patient     Answer:   Immediate       Follow up as scheduled.     She expressed verbal understanding and agreed with the plan    Patient's Medications   New Prescriptions    No medications on file   Previous Medications    ALIROCUMAB (PRALUENT PEN) 75 MG/ML PNIJ    Inject 1 mL (75 mg total) into the skin every 14 (fourteen) days.    CLOPIDOGREL (PLAVIX) 75 MG TABLET    Take 1 tablet (75 mg total) by mouth once daily.    CLOTRIMAZOLE-BETAMETHASONE 1-0.05% (LOTRISONE) CREAM    Apply topically 2 (two) times daily. At skin outside vaginal area.    DICLOFENAC SODIUM (VOLTAREN) 1 % GEL    Apply 2 g topically 4 (four) times daily.    HYDROCORTISONE (ANUSOL-HC) 25 MG SUPPOSITORY    Place 1 suppository (25 mg total) rectally 2 (two) times daily.    HYDROCORTISONE 2.5 % CREAM    Apply topically 2 (two) times daily. To rectal area for hemorrhoids    METOPROLOL SUCCINATE (TOPROL-XL) 50 MG 24 HR TABLET    Take 1 tablet (50 mg total) by mouth every morning AND 0.5 tablets (25 mg total) every evening.    OMEPRAZOLE (PRILOSEC) 40 MG CAPSULE    Take 1 capsule (40 mg total) by mouth once daily.     POLYETHYLENE GLYCOL (GLYCOLAX) 17 GRAM/DOSE POWDER    Take 17 g by mouth once daily.    POTASSIUM CHLORIDE (KLOR-CON) 8 MEQ TBSR    Take 1 tablet (8 mEq total) by mouth 2 (two) times daily.    PROMETHAZINE-DEXTROMETHORPHAN (PROMETHAZINE-DM) 6.25-15 MG/5 ML SYRP    Take 5 mLs by mouth every 6 (six) hours as needed (cough).    ROSUVASTATIN (CRESTOR) 40 MG TAB    Take 1 tablet (40 mg total) by mouth every evening.    TRIAMCINOLONE ACETONIDE 0.1% (KENALOG) 0.1 % CREAM    Apply topically 2 (two) times daily.    VALSARTAN (DIOVAN) 320 MG TABLET    Take 1 tablet (320 mg total) by mouth once daily.   Modified Medications    No medications on file   Discontinued Medications    No medications on file

## 2024-12-03 ENCOUNTER — OFFICE VISIT (OUTPATIENT)
Dept: CARDIOLOGY | Facility: CLINIC | Age: 64
End: 2024-12-03
Payer: MEDICAID

## 2024-12-03 VITALS
BODY MASS INDEX: 29.53 KG/M2 | HEIGHT: 64 IN | HEART RATE: 60 BPM | WEIGHT: 173 LBS | DIASTOLIC BLOOD PRESSURE: 90 MMHG | SYSTOLIC BLOOD PRESSURE: 143 MMHG

## 2024-12-03 DIAGNOSIS — I10 ESSENTIAL HYPERTENSION: ICD-10-CM

## 2024-12-03 DIAGNOSIS — E78.01 FAMILIAL HYPERCHOLESTEROLEMIA: Chronic | ICD-10-CM

## 2024-12-03 DIAGNOSIS — Z86.73 HISTORY OF TIA (TRANSIENT ISCHEMIC ATTACK): ICD-10-CM

## 2024-12-03 DIAGNOSIS — I25.118 ATHEROSCLEROSIS OF NATIVE CORONARY ARTERY OF NATIVE HEART WITH STABLE ANGINA PECTORIS: ICD-10-CM

## 2024-12-03 DIAGNOSIS — I49.3 PVC'S (PREMATURE VENTRICULAR CONTRACTIONS): ICD-10-CM

## 2024-12-03 DIAGNOSIS — Z86.74 HISTORY OF CARDIAC ARREST: Primary | ICD-10-CM

## 2024-12-03 DIAGNOSIS — Z86.73 HISTORY OF TIA (TRANSIENT ISCHEMIC ATTACK): Primary | ICD-10-CM

## 2024-12-03 DIAGNOSIS — I25.810 CORONARY ARTERY DISEASE INVOLVING CORONARY BYPASS GRAFT OF NATIVE HEART WITHOUT ANGINA PECTORIS: ICD-10-CM

## 2024-12-03 DIAGNOSIS — E78.2 MIXED HYPERLIPIDEMIA: ICD-10-CM

## 2024-12-03 DIAGNOSIS — Z95.1 HX OF CABG: Chronic | ICD-10-CM

## 2024-12-03 DIAGNOSIS — R07.9 CHEST PAIN, UNSPECIFIED TYPE: ICD-10-CM

## 2024-12-03 PROCEDURE — 99213 OFFICE O/P EST LOW 20 MIN: CPT | Mod: PBBFAC,PN | Performed by: INTERNAL MEDICINE

## 2024-12-03 PROCEDURE — 93005 ELECTROCARDIOGRAM TRACING: CPT | Mod: PBBFAC,PN | Performed by: INTERNAL MEDICINE

## 2024-12-03 PROCEDURE — 99999 PR PBB SHADOW E&M-EST. PATIENT-LVL III: CPT | Mod: PBBFAC,,, | Performed by: INTERNAL MEDICINE

## 2024-12-04 LAB
OHS QRS DURATION: 138 MS
OHS QTC CALCULATION: 441 MS

## 2024-12-16 ENCOUNTER — DOCUMENTATION ONLY (OUTPATIENT)
Dept: HEMATOLOGY/ONCOLOGY | Facility: CLINIC | Age: 64
End: 2024-12-16
Payer: MEDICAID

## 2024-12-16 NOTE — PROGRESS NOTES
Called pt to inform her of her upcoming 1/7/25 appt with Dr Cazares. I reviewed date location and time, provided my direct contact information and encouraged them to call for any assistance.  Pt verbalized understanding.  Oncology Navigation   Intake  Type of Referral: Internal  Date of Referral: 12/16/24  Initial Nurse Navigator Contact: 12/16/24  Referral to Initial Contact Timeline (days): 0  First Appointment Available: 01/07/25  Appointment Date: 01/07/25  First Available Date vs. Scheduled Date (days): 0     Treatment     Surgical Oncologist: reji  Consult Date: 01/07/25                          Acuity      Follow Up  No follow-ups on file.

## 2024-12-27 DIAGNOSIS — E78.01 FAMILIAL HYPERCHOLESTEROLEMIA: Chronic | ICD-10-CM

## 2024-12-28 RX ORDER — ALIROCUMAB 75 MG/ML
75 INJECTION, SOLUTION SUBCUTANEOUS
Qty: 2 EACH | Refills: 6 | Status: ACTIVE | OUTPATIENT
Start: 2024-12-28

## 2024-12-30 ENCOUNTER — HOSPITAL ENCOUNTER (OUTPATIENT)
Dept: CARDIOLOGY | Facility: HOSPITAL | Age: 64
Discharge: HOME | End: 2024-12-30
Attending: INTERNAL MEDICINE
Payer: MEDICAID

## 2024-12-30 VITALS — WEIGHT: 173 LBS | HEIGHT: 64 IN | BODY MASS INDEX: 29.53 KG/M2

## 2024-12-30 DIAGNOSIS — E78.2 MIXED HYPERLIPIDEMIA: ICD-10-CM

## 2024-12-30 DIAGNOSIS — I25.810 CORONARY ARTERY DISEASE INVOLVING CORONARY BYPASS GRAFT OF NATIVE HEART WITHOUT ANGINA PECTORIS: ICD-10-CM

## 2024-12-30 DIAGNOSIS — Z86.74 HISTORY OF CARDIAC ARREST: ICD-10-CM

## 2024-12-30 DIAGNOSIS — I10 ESSENTIAL HYPERTENSION: ICD-10-CM

## 2024-12-30 DIAGNOSIS — Z95.1 HX OF CABG: Chronic | ICD-10-CM

## 2024-12-30 DIAGNOSIS — E78.01 FAMILIAL HYPERCHOLESTEROLEMIA: Chronic | ICD-10-CM

## 2024-12-30 DIAGNOSIS — I25.118 ATHEROSCLEROSIS OF NATIVE CORONARY ARTERY OF NATIVE HEART WITH STABLE ANGINA PECTORIS: ICD-10-CM

## 2024-12-30 DIAGNOSIS — I49.3 PVC'S (PREMATURE VENTRICULAR CONTRACTIONS): ICD-10-CM

## 2024-12-30 PROCEDURE — 93306 TTE W/DOPPLER COMPLETE: CPT

## 2024-12-30 PROCEDURE — 93306 TTE W/DOPPLER COMPLETE: CPT | Mod: 26,,, | Performed by: INTERNAL MEDICINE

## 2025-01-01 DIAGNOSIS — K64.9 BLEEDING HEMORRHOID: ICD-10-CM

## 2025-01-01 DIAGNOSIS — K62.5 RECTAL BLEEDING: ICD-10-CM

## 2025-01-01 LAB
APICAL FOUR CHAMBER EJECTION FRACTION: 66 %
APICAL TWO CHAMBER EJECTION FRACTION: 57 %
AV INDEX (PROSTH): 0.57
AV MEAN GRADIENT: 3.5 MMHG
AV PEAK GRADIENT: 6.8 MMHG
AV VALVE AREA BY VELOCITY RATIO: 1.9 CM²
AV VALVE AREA: 1.8 CM²
AV VELOCITY RATIO: 0.62
BSA FOR ECHO PROCEDURE: 1.88 M2
CV ECHO LV RWT: 0.47 CM
DOP CALC AO PEAK VEL: 1.3 M/S
DOP CALC AO VTI: 21.6 CM
DOP CALC LVOT AREA: 3.1 CM2
DOP CALC LVOT DIAMETER: 2 CM
DOP CALC LVOT PEAK VEL: 0.8 M/S
DOP CALC LVOT STROKE VOLUME: 38.9 CM3
DOP CALC MV VTI: 20.2 CM
DOP CALCLVOT PEAK VEL VTI: 12.4 CM
E WAVE DECELERATION TIME: 146.08 MSEC
E/A RATIO: 0.82
E/E' RATIO: 10.53 M/S
ECHO LV POSTERIOR WALL: 1 CM (ref 0.6–1.1)
FRACTIONAL SHORTENING: 30.2 % (ref 28–44)
INTERVENTRICULAR SEPTUM: 1.1 CM (ref 0.6–1.1)
LEFT ATRIUM AREA SYSTOLIC (APICAL 2 CHAMBER): 14.73 CM2
LEFT ATRIUM AREA SYSTOLIC (APICAL 4 CHAMBER): 16.22 CM2
LEFT ATRIUM VOLUME INDEX MOD: 21 ML/M2
LEFT ATRIUM VOLUME MOD: 38.68 ML
LEFT INTERNAL DIMENSION IN SYSTOLE: 3 CM (ref 2.1–4)
LEFT VENTRICLE DIASTOLIC VOLUME INDEX: 44.74 ML/M2
LEFT VENTRICLE DIASTOLIC VOLUME: 82.32 ML
LEFT VENTRICLE END DIASTOLIC VOLUME APICAL 2 CHAMBER: 53.45 ML
LEFT VENTRICLE END DIASTOLIC VOLUME APICAL 4 CHAMBER: 79.51 ML
LEFT VENTRICLE END SYSTOLIC VOLUME APICAL 2 CHAMBER: 36.73 ML
LEFT VENTRICLE END SYSTOLIC VOLUME APICAL 4 CHAMBER: 40.18 ML
LEFT VENTRICLE MASS INDEX: 82.9 G/M2
LEFT VENTRICLE SYSTOLIC VOLUME INDEX: 18.6 ML/M2
LEFT VENTRICLE SYSTOLIC VOLUME: 34.17 ML
LEFT VENTRICULAR INTERNAL DIMENSION IN DIASTOLE: 4.3 CM (ref 3.5–6)
LEFT VENTRICULAR MASS: 152.6 G
LV LATERAL E/E' RATIO: 9.88 M/S
LV SEPTAL E/E' RATIO: 11.29 M/S
LVED V (TEICH): 82.32 ML
LVES V (TEICH): 34.17 ML
LVOT MG: 1.27 MMHG
LVOT MV: 0.54 CM/S
MV MEAN GRADIENT: 2 MMHG
MV PEAK A VEL: 0.96 M/S
MV PEAK E VEL: 0.79 M/S
MV PEAK GRADIENT: 5 MMHG
MV STENOSIS PRESSURE HALF TIME: 49.11 MS
MV VALVE AREA BY CONTINUITY EQUATION: 1.93 CM2
MV VALVE AREA P 1/2 METHOD: 4.48 CM2
OHS CV RV/LV RATIO: 0.77 CM
OHS LV EJECTION FRACTION SIMPSONS BIPLANE MOD: 62 %
PISA TR MAX VEL: 3.15 M/S
RA PRESSURE ESTIMATED: 3 MMHG
RA VOL SYS: 45.87 ML
RIGHT ATRIAL AREA: 16.8 CM2
RIGHT ATRIUM VOLUME AREA LENGTH APICAL 4 CHAMBER: 40.52 ML
RIGHT VENTRICLE DIASTOLIC BASEL DIMENSION: 3.3 CM
RV TB RVSP: 6 MMHG
RV TISSUE DOPPLER FREE WALL SYSTOLIC VELOCITY 1 (APICAL 4 CHAMBER VIEW): 9.26 CM/S
SINUS: 2.83 CM
STJ: 2.73 CM
TDI LATERAL: 0.08 M/S
TDI SEPTAL: 0.07 M/S
TDI: 0.08 M/S
TR MAX PG: 40 MMHG
TRICUSPID ANNULAR PLANE SYSTOLIC EXCURSION: 1.44 CM
TV REST PULMONARY ARTERY PRESSURE: 43 MMHG
Z-SCORE OF LEFT VENTRICULAR DIMENSION IN END DIASTOLE: -1.72
Z-SCORE OF LEFT VENTRICULAR DIMENSION IN END SYSTOLE: -0.39

## 2025-01-02 RX ORDER — HYDROCORTISONE ACETATE 25 MG/1
25 SUPPOSITORY RECTAL 2 TIMES DAILY
Qty: 24 SUPPOSITORY | Refills: 3 | Status: SHIPPED | OUTPATIENT
Start: 2025-01-02

## 2025-01-02 NOTE — TELEPHONE ENCOUNTER
Refill Routing Note   Medication(s) are not appropriate for processing by [facility] for the following reason(s):        Outside of protocol    [facility] action(s):  Route               Appointments  past 12m or future 3m with PCP    Date Provider   Last Visit   11/1/2024 Sahara Carrillo MD   Next Visit   5/5/2025 Sahara Carrillo MD   ED visits in past 90 days: 0        Note composed:9:01 AM 01/02/2025

## 2025-01-04 ENCOUNTER — OFFICE VISIT (OUTPATIENT)
Dept: FAMILY MEDICINE | Facility: CLINIC | Age: 65
End: 2025-01-04
Payer: MEDICAID

## 2025-01-04 VITALS
DIASTOLIC BLOOD PRESSURE: 82 MMHG | OXYGEN SATURATION: 98 % | WEIGHT: 187.81 LBS | TEMPERATURE: 99 F | HEIGHT: 64 IN | BODY MASS INDEX: 32.06 KG/M2 | SYSTOLIC BLOOD PRESSURE: 140 MMHG | HEART RATE: 62 BPM

## 2025-01-04 DIAGNOSIS — R05.1 ACUTE COUGH: Primary | ICD-10-CM

## 2025-01-04 DIAGNOSIS — J02.9 SORE THROAT: ICD-10-CM

## 2025-01-04 DIAGNOSIS — J30.9 ALLERGIC RHINITIS, UNSPECIFIED SEASONALITY, UNSPECIFIED TRIGGER: ICD-10-CM

## 2025-01-04 LAB
CTP QC/QA: YES
S PYO RRNA THROAT QL PROBE: NEGATIVE

## 2025-01-04 PROCEDURE — 99999PBSHW POCT RAPID STREP A: Mod: PBBFAC,,,

## 2025-01-04 PROCEDURE — 3079F DIAST BP 80-89 MM HG: CPT | Mod: CPTII,,, | Performed by: FAMILY MEDICINE

## 2025-01-04 PROCEDURE — 99214 OFFICE O/P EST MOD 30 MIN: CPT | Mod: S$PBB,,, | Performed by: FAMILY MEDICINE

## 2025-01-04 PROCEDURE — 1159F MED LIST DOCD IN RCRD: CPT | Mod: CPTII,,, | Performed by: FAMILY MEDICINE

## 2025-01-04 PROCEDURE — 99999 PR PBB SHADOW E&M-EST. PATIENT-LVL IV: CPT | Mod: PBBFAC,,, | Performed by: FAMILY MEDICINE

## 2025-01-04 PROCEDURE — 3008F BODY MASS INDEX DOCD: CPT | Mod: CPTII,,, | Performed by: FAMILY MEDICINE

## 2025-01-04 PROCEDURE — 1160F RVW MEDS BY RX/DR IN RCRD: CPT | Mod: CPTII,,, | Performed by: FAMILY MEDICINE

## 2025-01-04 PROCEDURE — 99214 OFFICE O/P EST MOD 30 MIN: CPT | Mod: PBBFAC,PO | Performed by: FAMILY MEDICINE

## 2025-01-04 PROCEDURE — 87880 STREP A ASSAY W/OPTIC: CPT | Mod: PBBFAC,PO | Performed by: FAMILY MEDICINE

## 2025-01-04 PROCEDURE — 3077F SYST BP >= 140 MM HG: CPT | Mod: CPTII,,, | Performed by: FAMILY MEDICINE

## 2025-01-04 RX ORDER — FLUTICASONE PROPIONATE 50 MCG
2 SPRAY, SUSPENSION (ML) NASAL DAILY
Qty: 16 G | Refills: 11 | Status: SHIPPED | OUTPATIENT
Start: 2025-01-04

## 2025-01-04 RX ORDER — CETIRIZINE HYDROCHLORIDE 10 MG/1
10 TABLET ORAL DAILY
Qty: 90 TABLET | Refills: 3 | Status: SHIPPED | OUTPATIENT
Start: 2025-01-04

## 2025-01-04 NOTE — PROGRESS NOTES
.Subjective     Patient ID: Veronika Blackmon is a 64 y.o. female.    Chief Complaint: Sore Throat and Cough    HPI  History of Present Illness    CHIEF COMPLAINT:  Veronika presents today with a sore throat and cough for three days.    UPPER RESPIRATORY SYMPTOMS:  She reports nasal congestion with significant mucus production, post-nasal drainage, and a runny nose that worsens with weather changes. Cough symptoms are exacerbated when lying down due to mucus accumulation. She has used Flonase nasal spray in the past but not this episode. She denies fever, shortness of breath, chest pain, nausea, or vomiting.    CARDIOVASCULAR HISTORY:  She had CABG surgery almost four years ago.    ALLERGIES:  She denies history of diagnosed allergies but reports chronic, intermittent runny nose with postnasal drainage.    ROS:  General: -fever, -chills, -fatigue, -weight gain, -weight loss  Eyes: -vision changes, -redness, -discharge  ENT: -ear pain, +nasal congestion, +sore throat, +runny nose  Cardiovascular: -chest pain, -palpitations, -lower extremity edema  Respiratory: +cough, -shortness of breath  Gastrointestinal: -abdominal pain, -nausea, -vomiting, -diarrhea, -constipation, -blood in stool  Genitourinary: -dysuria, -hematuria, -frequency  Musculoskeletal: -joint pain, -muscle pain  Skin: -rash, -lesion  Neurological: -headache, -dizziness, -numbness, -tingling  Psychiatric: -anxiety, -depression, -sleep difficulty        Objective     Vitals:    01/04/25 0927   BP: (!) 140/82   Pulse: 62   Temp: 98.6 °F (37 °C)      Current Outpatient Medications   Medication Sig Dispense Refill    alirocumab (PRALUENT PEN) 75 mg/mL Ledy Inject 1 mL (75 mg total) into the skin every 14 (fourteen) days. 2 each 6    clopidogreL (PLAVIX) 75 mg tablet Take 1 tablet (75 mg total) by mouth once daily. 90 tablet 3    metoprolol succinate (TOPROL-XL) 50 MG 24 hr tablet Take 1 tablet (50 mg total) by mouth every morning AND 0.5 tablets (25 mg total) every  evening. 135 tablet 3    promethazine-dextromethorphan (PROMETHAZINE-DM) 6.25-15 mg/5 mL Syrp Take 5 mLs by mouth every 6 (six) hours as needed (cough). 240 mL 0    rosuvastatin (CRESTOR) 40 MG Tab Take 1 tablet (40 mg total) by mouth every evening. 90 tablet 3    valsartan (DIOVAN) 320 MG tablet Take 1 tablet (320 mg total) by mouth once daily. 90 tablet 3    cetirizine (ZYRTEC) 10 MG tablet Take 1 tablet (10 mg total) by mouth once daily. 90 tablet 3    clotrimazole-betamethasone 1-0.05% (LOTRISONE) cream Apply topically 2 (two) times daily. At skin outside vaginal area. (Patient not taking: Reported on 1/4/2025) 45 g 0    diclofenac sodium (VOLTAREN) 1 % Gel Apply 2 g topically 4 (four) times daily. (Patient not taking: Reported on 1/4/2025) 200 g 0    fluticasone propionate (FLONASE) 50 mcg/actuation nasal spray 2 sprays (100 mcg total) by Each Nostril route once daily. 16 g 11    hydrocortisone (ANUSOL-HC) 25 mg suppository Place 1 suppository (25 mg total) rectally 2 (two) times daily. (Patient not taking: Reported on 1/4/2025) 24 suppository 3    hydrocortisone 2.5 % cream Apply topically 2 (two) times daily. To rectal area for hemorrhoids (Patient not taking: Reported on 1/4/2025) 28 g 2    omeprazole (PRILOSEC) 40 MG capsule Take 1 capsule (40 mg total) by mouth once daily. (Patient not taking: Reported on 1/4/2025) 90 capsule 3    polyethylene glycol (GLYCOLAX) 17 gram/dose powder Take 17 g by mouth once daily. (Patient not taking: Reported on 1/4/2025) 510 g 1    potassium chloride (KLOR-CON) 8 MEQ TbSR Take 1 tablet (8 mEq total) by mouth 2 (two) times daily. (Patient not taking: Reported on 1/4/2025) 180 tablet 3    triamcinolone acetonide 0.1% (KENALOG) 0.1 % cream Apply topically 2 (two) times daily. 45 g 1     No current facility-administered medications for this visit.      Physical Exam    General: No acute distress. Well-developed. Well-nourished.  Eyes: EOMI. Sclerae anicteric.  HENT:  Normocephalic. Atraumatic. Nares patent. Moist oral mucosa. Nasal congestion present. Turbinates swollen with clear drainage. No pharyngeal erythema. No pus in throat.  Ears: Bilateral TMs clear. Bilateral EACs clear.  Cardiovascular: Regular-regular. No murmurs. No rubs. No gallops. Normal S1, S2.   Respiratory: Normal respiratory effort. Clear to auscultation bilaterally. No rales. No rhonchi. No wheezing. Reduced air movement on left side.  Abdomen: Soft. Non-tender. Non-distended. Normoactive bowel sounds.  Musculoskeletal: No  obvious deformity.  Extremities: No lower extremity edema.  Neurological: Alert & oriented x3. No slurred speech. Normal gait.  Psychiatric: Normal mood. Normal affect. Good insight. Good judgment.  Skin: Warm. Dry. No rash.        Assessment and Plan     Acute cough        - Teaching done on cough including possible causes, treatment and warning symptoms.  Most likely due to untreated AR.  Will adequately treat AR and see if cough resolves.    Sore throat  -     Teaching done on ST including possible causes, treatment and warning symptoms.  Most likely due to untreated AR.  Will adequately treat AR and see if cough resolves.  -     POCT rapid strep A - negative    Allergic rhinitis, unspecified seasonality, unspecified trigger  -     cetirizine (ZYRTEC) 10 MG tablet; Take 1 tablet (10 mg total) by mouth once daily.  Dispense: 90 tablet; Refill: 3  -     fluticasone propionate (FLONASE) 50 mcg/actuation nasal spray; 2 sprays (100 mcg total) by Each Nostril route once daily.  Dispense: 16 g; Refill: 11    Assessment & Plan    IMPRESSION:  - Assessed patient's symptoms of sore throat and cough, ruling out strep throat based on negative swab test and absence of throat redness or pus  - Evaluated cardiac status, noting patient remains in atrial fibrillation with premature beats  - Determined patient's symptoms likely due to allergies, primarily manifesting as excessive mucus production  -  Will initiate allergy treatment to address persistent cough and congestion    ALLERGIES:  - Observed swollen nasal passages with mucus during exam.  - Diagnosed allergies based on symptoms of mucus production and nasal congestion.  - Explained that mucus is the primary symptom of allergies, not just itchy eyes or sneezing.  - Described how mucus can cause coughing, especially when supine, due to accumulation in the throat.  - Clarified that clear mucus indicates allergies, while yellow or green mucus may suggest an upper respiratory infection.  - Educated the patient that allergies can develop over time and worsen with age.  - Instructed on proper technique for nasal spray administration, emphasizing angling towards the cheek to avoid tasting the medication.  - Prescribed Zyrtec (cetirizine) 1 tablet daily for allergy symptoms.  - Prescribed Flonase (fluticasone) nasal spray, 2 sprays in each nostril daily.  - Instructed to use both medications continuously for at least 1-2 months, not as needed.  - Advised to angle nasal spray towards cheek during administration to avoid tasting it.  - Recommend repeating the spray if taste is noticed.    FOLLOW UP:  - Scheduled follow up in 2 months to assess treatment efficacy.         This note was generated with the assistance of ambient listening technology. Verbal consent was obtained by the patient and accompanying visitor(s) for the recording of patient appointment to facilitate this note. I attest to having reviewed and edited the generated note for accuracy, though some syntax or spelling errors may persist. Please contact the author of this note for any clarification.

## 2025-01-07 ENCOUNTER — OFFICE VISIT (OUTPATIENT)
Dept: SURGERY | Facility: CLINIC | Age: 65
End: 2025-01-07
Payer: MEDICAID

## 2025-01-07 ENCOUNTER — HOSPITAL ENCOUNTER (OUTPATIENT)
Dept: CARDIOLOGY | Facility: HOSPITAL | Age: 65
Discharge: HOME | End: 2025-01-07
Attending: INTERNAL MEDICINE
Payer: MEDICAID

## 2025-01-07 VITALS
RESPIRATION RATE: 16 BRPM | BODY MASS INDEX: 31.99 KG/M2 | WEIGHT: 187.38 LBS | HEIGHT: 64 IN | SYSTOLIC BLOOD PRESSURE: 180 MMHG | HEART RATE: 65 BPM | DIASTOLIC BLOOD PRESSURE: 89 MMHG

## 2025-01-07 DIAGNOSIS — I49.3 PVC'S (PREMATURE VENTRICULAR CONTRACTIONS): ICD-10-CM

## 2025-01-07 DIAGNOSIS — K64.9 BLEEDING HEMORRHOID: ICD-10-CM

## 2025-01-07 DIAGNOSIS — K62.5 RECTAL BLEEDING: ICD-10-CM

## 2025-01-07 LAB
OHS CV EVENT MONITOR DAY: 0
OHS CV HOLTER LENGTH MINUTES: 0

## 2025-01-07 PROCEDURE — 46600 DIAGNOSTIC ANOSCOPY SPX: CPT | Mod: PBBFAC | Performed by: STUDENT IN AN ORGANIZED HEALTH CARE EDUCATION/TRAINING PROGRAM

## 2025-01-07 PROCEDURE — 1159F MED LIST DOCD IN RCRD: CPT | Mod: CPTII,,, | Performed by: STUDENT IN AN ORGANIZED HEALTH CARE EDUCATION/TRAINING PROGRAM

## 2025-01-07 PROCEDURE — 3077F SYST BP >= 140 MM HG: CPT | Mod: CPTII,,, | Performed by: STUDENT IN AN ORGANIZED HEALTH CARE EDUCATION/TRAINING PROGRAM

## 2025-01-07 PROCEDURE — 93225 XTRNL ECG REC<48 HRS REC: CPT

## 2025-01-07 PROCEDURE — 3079F DIAST BP 80-89 MM HG: CPT | Mod: CPTII,,, | Performed by: STUDENT IN AN ORGANIZED HEALTH CARE EDUCATION/TRAINING PROGRAM

## 2025-01-07 PROCEDURE — 99999 PR PBB SHADOW E&M-EST. PATIENT-LVL IV: CPT | Mod: PBBFAC,,, | Performed by: STUDENT IN AN ORGANIZED HEALTH CARE EDUCATION/TRAINING PROGRAM

## 2025-01-07 PROCEDURE — 46600 DIAGNOSTIC ANOSCOPY SPX: CPT | Mod: S$PBB,,, | Performed by: STUDENT IN AN ORGANIZED HEALTH CARE EDUCATION/TRAINING PROGRAM

## 2025-01-07 PROCEDURE — 99204 OFFICE O/P NEW MOD 45 MIN: CPT | Mod: 25,S$PBB,, | Performed by: STUDENT IN AN ORGANIZED HEALTH CARE EDUCATION/TRAINING PROGRAM

## 2025-01-07 PROCEDURE — 3008F BODY MASS INDEX DOCD: CPT | Mod: CPTII,,, | Performed by: STUDENT IN AN ORGANIZED HEALTH CARE EDUCATION/TRAINING PROGRAM

## 2025-01-07 PROCEDURE — 99214 OFFICE O/P EST MOD 30 MIN: CPT | Mod: PBBFAC,25 | Performed by: STUDENT IN AN ORGANIZED HEALTH CARE EDUCATION/TRAINING PROGRAM

## 2025-01-07 NOTE — PROGRESS NOTES
[facility]  Colon and Rectal Surgery    [address]  [place], [place]  Tel: [phone]  Fax: [phone]  [email or web address]   MD Bong Doll MD Brian Kann, MD W. Forrest Johnston, MD Matthew Giglia, MD Jennifer Paruch, MD William Kethman, MD Danielle Kay, MD     Patient name: Veronika Blackmon   YOB: 1960   MRN: [id number]    It was a pleasure seeing Ms. Blackmon in the Colon and Rectal Surgery clinic here at [facility].     As you know, Ms. Blackmon is a 64 year old woman with a history of HTN, HLD, CAD requiring CAB in 2022, TIA  who presents for evaluation of rectal bleeding . She takes Plavix. She reports rectal bleeding when wiping after bowel movements, but denies seeing blood in the stool itself. These symptoms are intermittent and really only when she eats red gravy or spicy foods. She notes that the bleeding started after she began her blood thinners. She describes a burning sensation in the rectal area but denies feeling hemorrhoids. She also reports experiencing constipation intermittently depending on what she eats with sometimes straining. She has two bowel movements per day - she doesn't take any stool softeners or fiber supplements. Seh denies NSAID use, anoreceptive intercourse, or history of anal STIs. She denies any pelvic radiation. She denies any family history of CRC.     Colonoscopy - 2022  The perianal and digital rectal examinations were normal.   A 2 mm polyp was found in the hepatic flexure. The polyp was sessile. The polyp was removed with a cold biopsy forceps. Resection and retrieval were complete. Verification of patient identification for the specimen was done. Estimated blood loss was minimal.   A localized area of moderately altered vascular, congested and ulcerated mucosa was found in the rectum extending from anal verge to approximately 10 cm. Biopsies were taken with a cold forceps for histology.  Verification of patient identification for the specimen was done. Estimated blood loss was minimal.       3. Colon, hepatic flexure polyp (polypectomy):   Colonic mucosa with hyperplastic surface changes   Multiple deeper levels examined   4. Rectum (biopsy):   Rectal mucosa with active inflammation and ischemic type injury   No dysplasia, no malignancy     Wt Readings from Last 3 Encounters:   01/07/25 85 kg (187 lb 6.3 oz)   01/04/25 85.2 kg (187 lb 13.3 oz)   12/30/24 78.5 kg (173 lb)     The patient was informed of the availability of a certified  without charge. A certified  was not necessary for this visit.    Review of Systems  See pertinent review of systems above    Past Medical History:   Diagnosis Date    Acute hypoxemic respiratory failure     Anemia 01/27/2022    Anoxic brain injury     Anticoagulant long-term use     CKD (chronic kidney disease)     HLD (hyperlipidemia)     HTN (hypertension)     Prediabetes     TIA (transient ischemic attack)      Past Surgical History:   Procedure Laterality Date    CARDIAC SURGERY  01/31/2022    [id number]    COLONOSCOPY N/A 10/12/2022    Procedure: COLONOSCOPY Suprep;  Surgeon: Kostas Ramírez MD;  Location: [facility] ENDO;  Service: Endoscopy;  Laterality: N/A;    CORONARY ANGIOGRAPHY N/A 12/14/2021    Procedure: ANGIOGRAM, CORONARY ARTERY;  Surgeon: Jm Escobar MD;  Location: [facility] CATH LAB/EP;  Service: Cardiology;  Laterality: N/A;    ESOPHAGOGASTRODUODENOSCOPY N/A 10/12/2022    Procedure: EGD (ESOPHAGOGASTRODUODENOSCOPY);  Surgeon: Kostas Ramírez MD;  Location: [facility] ENDO;  Service: Endoscopy;  Laterality: N/A;    LEFT HEART CATHETERIZATION Left 12/14/2021    Procedure: Left heart cath;  Surgeon: Jm Escobar MD;  Location: [facility] CATH LAB/EP;  Service: Cardiology;  Laterality: Left;     Family History   Problem Relation Name Age of Onset    Heart attack Brother  62     Social History     Tobacco Use    Smoking  status: Never    Smokeless tobacco: Never   Substance Use Topics    Alcohol use: Yes     Comment: red wine one in while    Drug use: No     Review of patient's allergies indicates:  No Known Allergies    Current Outpatient Medications on File Prior to Visit   Medication Sig Dispense Refill    alirocumab (PRALUENT PEN) 75 mg/mL PnIj Inject 1 mL (75 mg total) into the skin every 14 (fourteen) days. 2 each 6    cetirizine (ZYRTEC) 10 MG tablet Take 1 tablet (10 mg total) by mouth once daily. 90 tablet 3    clopidogreL (PLAVIX) 75 mg tablet Take 1 tablet (75 mg total) by mouth once daily. 90 tablet 3    fluticasone propionate (FLONASE) 50 mcg/actuation nasal spray 2 sprays (100 mcg total) by Each Nostril route once daily. 16 g 11    metoprolol succinate (TOPROL-XL) 50 MG 24 hr tablet Take 1 tablet (50 mg total) by mouth every morning AND 0.5 tablets (25 mg total) every evening. 135 tablet 3    omeprazole (PRILOSEC) 40 MG capsule Take 1 capsule (40 mg total) by mouth once daily. 90 capsule 3    rosuvastatin (CRESTOR) 40 MG Tab Take 1 tablet (40 mg total) by mouth every evening. 90 tablet 3    valsartan (DIOVAN) 320 MG tablet Take 1 tablet (320 mg total) by mouth once daily. 90 tablet 3    clotrimazole-betamethasone 1-0.05% (LOTRISONE) cream Apply topically 2 (two) times daily. At skin outside vaginal area. (Patient not taking: Reported on 1/7/2025) 45 g 0    diclofenac sodium (VOLTAREN) 1 % Gel Apply 2 g topically 4 (four) times daily. (Patient not taking: Reported on 1/7/2025) 200 g 0    hydrocortisone (ANUSOL-HC) 25 mg suppository Place 1 suppository (25 mg total) rectally 2 (two) times daily. (Patient not taking: Reported on 1/7/2025) 24 suppository 3    hydrocortisone 2.5 % cream Apply topically 2 (two) times daily. To rectal area for hemorrhoids (Patient not taking: Reported on 1/7/2025) 28 g 2    polyethylene glycol (GLYCOLAX) 17 gram/dose powder Take 17 g by mouth once daily. (Patient not taking: Reported on  "1/7/2025) 510 g 1    potassium chloride (KLOR-CON) 8 MEQ TbSR Take 1 tablet (8 mEq total) by mouth 2 (two) times daily. (Patient not taking: Reported on 1/7/2025) 180 tablet 3    promethazine-dextromethorphan (PROMETHAZINE-DM) 6.25-15 mg/5 mL Syrp Take 5 mLs by mouth every 6 (six) hours as needed (cough). (Patient not taking: Reported on 1/7/2025) 240 mL 0    triamcinolone acetonide 0.1% (KENALOG) 0.1 % cream Apply topically 2 (two) times daily. 45 g 1     No current facility-administered medications on file prior to visit.     Physical Examination  BP (!) 180/89 (BP Location: Left arm, Patient Position: Sitting)   Pulse 65   Resp 16   Ht 5' 4" (1.626 m)   Wt 85 kg (187 lb 6.3 oz)   LMP 03/07/2018   BMI 32.17 kg/m²      A chaperone was present for the physical examination.    Constitutional: well developed, no cough, no dyspnea, alert, and no acute distress    Head: Normocephalic, no lesions, without obvious abnormality  Eye: Normal external eye, conjunctiva, and lids  Cardiovascular: regular rate and regular rhythm  Respiratory: normal air entry  Gastrointestinal: soft, non-tender    Perianal Skin: Normal  Digital Rectal Exam:  Low resting tone  Normal squeeze pressure   Relaxation with bear down is present  Mass(es) appreciated: almost columnar like circumferential edema felt in the distal rectum, no hard masses appreciated    Musculoskeletal: full range of motion without pain  Neurologic: alert, oriented, normal speech, no focal findings or movement disorder noted  Psychiatric: appropriate, normal mood    Anoscopy Procedure Note  Pre-procedure diagnosis: Rectal bleeding  Post-procedure diagnosis: See findings    Procedure: Anoscopy    Surgeon: Mason Cazares MD    Specimen: None    Findings: Moderate distal rectal inflammation    Procedure Description:   A digital rectal exam was first performed and then a lubricated lighted anoscope was then inserted and a 4 quadrant evaluation was performed. The patient " tolerated procedure well without complications.    Assessment and Plan of Care    Thank you again for referring Ms. Blackmon to my care. In summary, Ms. Blackmon is a 64 year old woman presenting with constipation and likely hemorrhoidal disease, although abnormal findings on colonoscopy noted above and our exam today confirmed worse inflammation than prior. We discussed treatment options and have provided the following recommendations:    Recommend repeat colonoscopy given worsening findings on anoscopy in comparison to endoscopic images from 2022 - although low risk, will plan to perform infectious swabs  We had a discussion about conservative management options for symptoms related to hemorrhoids. Irritation or itching can be treated with Lidocaine cream/ointment, over-the-counter Hydrocortisone suppositories or ointments, and warm baths or soaking. Of note hydrocortisone, should not be used for longer than a week due to side effects. Bleeding often improves by reducing hard stools and straining - Fiber supplementation (Metamucil 20-30 grams daily) and hydration (1.5 - 2 L daily) are effective. If you suffer from constipation, Miralax can be taken as a stool softener. Regular exercise, avoiding constipating medications (narcotic pain medications), limiting alcohol and fatty foods, reduced time on the toilet and focusing on not straining can also be effective.     Please do not hesitate to contact me if you have any questions.      Mason Cazares MD, FACS, FASCRS  Department of Colon & Rectal Surgery  [facility]

## 2025-01-15 ENCOUNTER — PATIENT MESSAGE (OUTPATIENT)
Dept: CARDIOLOGY | Facility: CLINIC | Age: 65
End: 2025-01-15
Payer: MEDICAID

## 2025-01-16 DIAGNOSIS — Z95.1 HX OF CABG: Chronic | ICD-10-CM

## 2025-01-16 DIAGNOSIS — Z86.73 HISTORY OF TIA (TRANSIENT ISCHEMIC ATTACK): ICD-10-CM

## 2025-01-16 DIAGNOSIS — I49.3 PVC'S (PREMATURE VENTRICULAR CONTRACTIONS): Primary | ICD-10-CM

## 2025-01-16 DIAGNOSIS — I10 ESSENTIAL HYPERTENSION: ICD-10-CM

## 2025-01-16 RX ORDER — METOPROLOL SUCCINATE 50 MG/1
TABLET, EXTENDED RELEASE ORAL
Qty: 180 TABLET | Refills: 3 | Status: SHIPPED | OUTPATIENT
Start: 2025-01-16 | End: 2026-01-16

## 2025-01-27 ENCOUNTER — PATIENT MESSAGE (OUTPATIENT)
Dept: CARDIOLOGY | Facility: CLINIC | Age: 65
End: 2025-01-27
Payer: MEDICAID

## 2025-01-27 ENCOUNTER — TELEPHONE (OUTPATIENT)
Dept: ENDOSCOPY | Facility: HOSPITAL | Age: 65
End: 2025-01-27
Payer: MEDICAID

## 2025-01-27 DIAGNOSIS — K62.5 RECTAL BLEEDING: Primary | ICD-10-CM

## 2025-01-27 DIAGNOSIS — Z86.0100 HX OF COLONIC POLYPS: ICD-10-CM

## 2025-01-27 RX ORDER — POLYETHYLENE GLYCOL 3350, SODIUM SULFATE ANHYDROUS, SODIUM BICARBONATE, SODIUM CHLORIDE, POTASSIUM CHLORIDE 236; 22.74; 6.74; 5.86; 2.97 G/4L; G/4L; G/4L; G/4L; G/4L
4 POWDER, FOR SOLUTION ORAL ONCE
Qty: 4000 ML | Refills: 0 | Status: SHIPPED | OUTPATIENT
Start: 2025-01-27 | End: 2025-01-27

## 2025-01-27 NOTE — TELEPHONE ENCOUNTER
"  Procedure: Colonoscopy     Diagnosis: Surveillance colonoscopy - Hx of colon polyps     Procedure Timing: Within 4 weeks (Urgent)     *If within 4 weeks selected, please william as high priority*     *If greater than 12 weeks, please select "5-12 weeks" and delay sending until 3 months prior to requested date*     Location: With me     Additional Scheduling Information: No scheduling concerns     Prep Specifications:Standard prep     Is the patient taking a GLP-1 Agonist:no     Have you attached a patient to this message: yes   "

## 2025-01-27 NOTE — TELEPHONE ENCOUNTER
Referral for procedure from [facility]      Spoke to pt to schedule procedure(s) Colonoscopy       Physician to perform procedure(s) Dr. PATRICIA Cazares  Date of Procedure (s) 2/18/25  Arrival Time 6:00 AM  Time of Procedure(s) 7:00 AM   Location of Procedure(s) [facility]  Type of Rx Prep sent to patient: PEG  Instructions provided to [facility] via [facility]    Patient was informed on the following information and verbalized understanding. Screening questionnaire reviewed with patient and complete. If procedure requires anesthesia, a responsible adult needs to be present to accompany the patient home, patient cannot drive after receiving anesthesia. Appointment details are tentative, especially check-in time. Patient will receive a prep-op call 7 days prior to confirm check-in time for procedure. If applicable the patient should contact their pharmacy to verify Rx for procedure prep is ready for pick-up. Patient was advised to call the scheduling department at [phone] if pharmacy states no Rx is available. Patient was advised to call the endoscopy scheduling department if any questions or concerns arise.       Endoscopy Scheduling Department

## 2025-01-28 ENCOUNTER — OFFICE VISIT (OUTPATIENT)
Dept: FAMILY MEDICINE | Facility: CLINIC | Age: 65
End: 2025-01-28
Payer: MEDICAID

## 2025-01-28 ENCOUNTER — TELEPHONE (OUTPATIENT)
Dept: ENDOSCOPY | Facility: HOSPITAL | Age: 65
End: 2025-01-28
Payer: MEDICAID

## 2025-01-28 ENCOUNTER — PATIENT OUTREACH (OUTPATIENT)
Dept: ADMINISTRATIVE | Facility: OTHER | Age: 65
End: 2025-01-28
Payer: MEDICAID

## 2025-01-28 ENCOUNTER — TELEPHONE (OUTPATIENT)
Dept: ELECTROPHYSIOLOGY | Facility: CLINIC | Age: 65
End: 2025-01-28
Payer: MEDICAID

## 2025-01-28 VITALS
BODY MASS INDEX: 32.41 KG/M2 | HEIGHT: 64 IN | SYSTOLIC BLOOD PRESSURE: 124 MMHG | OXYGEN SATURATION: 100 % | WEIGHT: 189.81 LBS | DIASTOLIC BLOOD PRESSURE: 88 MMHG | HEART RATE: 61 BPM

## 2025-01-28 DIAGNOSIS — B96.89 BACTERIAL URI: Primary | ICD-10-CM

## 2025-01-28 DIAGNOSIS — J06.9 BACTERIAL URI: Primary | ICD-10-CM

## 2025-01-28 PROCEDURE — 99215 OFFICE O/P EST HI 40 MIN: CPT | Mod: PBBFAC,PO

## 2025-01-28 PROCEDURE — 1160F RVW MEDS BY RX/DR IN RCRD: CPT | Mod: CPTII,,,

## 2025-01-28 PROCEDURE — 3079F DIAST BP 80-89 MM HG: CPT | Mod: CPTII,,,

## 2025-01-28 PROCEDURE — 3008F BODY MASS INDEX DOCD: CPT | Mod: CPTII,,,

## 2025-01-28 PROCEDURE — 3074F SYST BP LT 130 MM HG: CPT | Mod: CPTII,,,

## 2025-01-28 PROCEDURE — 1159F MED LIST DOCD IN RCRD: CPT | Mod: CPTII,,,

## 2025-01-28 PROCEDURE — 99999 PR PBB SHADOW E&M-EST. PATIENT-LVL V: CPT | Mod: PBBFAC,,,

## 2025-01-28 PROCEDURE — 99214 OFFICE O/P EST MOD 30 MIN: CPT | Mod: S$PBB,,,

## 2025-01-28 PROCEDURE — 4010F ACE/ARB THERAPY RXD/TAKEN: CPT | Mod: CPTII,,,

## 2025-01-28 RX ORDER — AMOXICILLIN AND CLAVULANATE POTASSIUM 875; 125 MG/1; MG/1
1 TABLET, FILM COATED ORAL EVERY 12 HOURS
Qty: 14 TABLET | Refills: 0 | Status: SHIPPED | OUTPATIENT
Start: 2025-01-28 | End: 2025-02-04

## 2025-01-28 RX ORDER — BENZONATATE 100 MG/1
100 CAPSULE ORAL 3 TIMES DAILY PRN
Qty: 30 CAPSULE | Refills: 0 | Status: SHIPPED | OUTPATIENT
Start: 2025-01-28 | End: 2025-02-07

## 2025-01-28 NOTE — PROGRESS NOTES
CHW - Outreach Attempt    Community Health Worker left a voicemail message for 1st attempt to contact patient regarding: f/u  Community Health Worker to attempt to contact patient on: 2/4

## 2025-01-28 NOTE — TELEPHONE ENCOUNTER
Spoke with patient.  She increased her Metoprolol to 50 mg twice daily on 01/27/2025.  She has an appointment with EP in March but they are working to see if they can fit her in this Friday.

## 2025-01-28 NOTE — TELEPHONE ENCOUNTER
Pt confirmed appt. Pt answered newpt questions.   Called pt to address new patient consult questions. Patient confirmed:    Have you ever seen a doctor outside of the [facility] system pertaining to your heart? If yes, where? no  Do you have a device implanted in your chest such as a pacemaker, defibrillator or loop recorder? no  Have you had any recent testing done for your heart such as holter, stress test, echo, heart cath or EKG? yes  Have you ever had surgery for an arrhythmia before such as cardioversion, ablation, EP study or tilt table test? No

## 2025-01-28 NOTE — PROGRESS NOTES
01/12/2020  [facility]    1/28/2025      Subjective:       Patient ID:  Veronika is a 64 y.o. female .  has a past medical history of Acute hypoxemic respiratory failure, Anemia, Anoxic brain injury, Anticoagulant long-term use, CKD (chronic kidney disease), HLD (hyperlipidemia), HTN (hypertension), Prediabetes, and TIA (transient ischemic attack).    History of Present Illness    CHIEF COMPLAINT:  Ms. Blackmon presents today for persistent cough.    RESPIRATORY:  She reports persistent cough producing clear to light green mucus, which worsened after exposure to snow. She denies shortness of breath, fever, or sore throat despite frequent coughing. She has been using Flonase nasal spray as prescribed by Dr. Caal, who also prescribed Zyrtec in early January .      ROS:  General: -fever, -chills, -fatigue, -weight gain, -weight loss  Eyes: -vision changes, -redness, -discharge  ENT: -ear pain, -nasal congestion, -sore throat  Cardiovascular: -chest pain, -palpitations, -lower extremity edema  Respiratory: +cough, -shortness of breath  Gastrointestinal: -abdominal pain, -nausea, -vomiting, -diarrhea, -constipation, -blood in stool  Genitourinary: -dysuria, -hematuria, -frequency  Musculoskeletal: -joint pain, -muscle pain  Skin: -rash, -lesion  Neurological: -headache, -dizziness, -numbness, -tingling  Psychiatric: -anxiety, -depression, -sleep difficulty             Patient Active Problem List   Diagnosis    RBBB    Atherosclerosis of native coronary artery of native heart    Familial hypercholesterolemia    Essential hypertension    Mixed hyperlipidemia    Prediabetes    History of cardiac arrest    Brain anoxic injury    Facial weakness    History of TIA (transient ischemic attack)    Hx of CABG    Stage 3a chronic kidney disease    Coronary artery disease involving coronary bypass graft of native heart without angina pectoris         Last HgbA1C:    Lab Results   Component Value Date     HGBA1C 6.4 (H) 03/05/2024    HGBA1C 6.1 (H) 09/05/2023    HGBA1C 6.3 (H) 01/28/2022         Last Lipid Panel:    Lab Results   Component Value Date    HDL 76 (H) 03/05/2024    HDL 72 02/06/2023    HDL 67 04/01/2022       Lab Results   Component Value Date    LDLCALC 70.8 03/05/2024    LDLCALC 56.4 (L) 02/06/2023    LDLCALC 109.6 04/01/2022       Lab Results   Component Value Date    TRIG 51 03/05/2024    TRIG 48 02/06/2023    TRIG 72 04/01/2022       Lab Results   Component Value Date    CHOLHDL 48.4 03/05/2024    CHOLHDL 52.2 (H) 02/06/2023    CHOLHDL 35.1 04/01/2022         Review of patient's allergies indicates:  No Known Allergies     Medication List with Changes/Refills   New Medications    AMOXICILLIN-CLAVULANATE 875-125MG (AUGMENTIN) 875-125 MG PER TABLET    Take 1 tablet by mouth every 12 (twelve) hours. for 7 days    BENZONATATE (TESSALON) 100 MG CAPSULE    Take 1 capsule (100 mg total) by mouth 3 (three) times daily as needed for Cough.   Current Medications    ALIROCUMAB (PRALUENT PEN) 75 MG/ML PNIJ    Inject 1 mL (75 mg total) into the skin every 14 (fourteen) days.    CETIRIZINE (ZYRTEC) 10 MG TABLET    Take 1 tablet (10 mg total) by mouth once daily.    CLOPIDOGREL (PLAVIX) 75 MG TABLET    Take 1 tablet (75 mg total) by mouth once daily.    CLOTRIMAZOLE-BETAMETHASONE 1-0.05% (LOTRISONE) CREAM    Apply topically 2 (two) times daily. At skin outside vaginal area.    DICLOFENAC SODIUM (VOLTAREN) 1 % GEL    Apply 2 g topically 4 (four) times daily.    FLUTICASONE PROPIONATE (FLONASE) 50 MCG/ACTUATION NASAL SPRAY    2 sprays (100 mcg total) by Each Nostril route once daily.    HYDROCORTISONE (ANUSOL-HC) 25 MG SUPPOSITORY    Place 1 suppository (25 mg total) rectally 2 (two) times daily.    HYDROCORTISONE 2.5 % CREAM    Apply topically 2 (two) times daily. To rectal area for hemorrhoids    METOPROLOL SUCCINATE (TOPROL-XL) 50 MG 24 HR TABLET    Take 1 tablet (50 mg total) by mouth every morning AND 1 tablet  "(50 mg total) every evening.    OMEPRAZOLE (PRILOSEC) 40 MG CAPSULE    Take 1 capsule (40 mg total) by mouth once daily.    POLYETHYLENE GLYCOL (GLYCOLAX) 17 GRAM/DOSE POWDER    Take 17 g by mouth once daily.    POTASSIUM CHLORIDE (KLOR-CON) 8 MEQ TBSR    Take 1 tablet (8 mEq total) by mouth 2 (two) times daily.    PROMETHAZINE-DEXTROMETHORPHAN (PROMETHAZINE-DM) 6.25-15 MG/5 ML SYRP    Take 5 mLs by mouth every 6 (six) hours as needed (cough).    ROSUVASTATIN (CRESTOR) 40 MG TAB    Take 1 tablet (40 mg total) by mouth every evening.    TRIAMCINOLONE ACETONIDE 0.1% (KENALOG) 0.1 % CREAM    Apply topically 2 (two) times daily.    VALSARTAN (DIOVAN) 320 MG TABLET    Take 1 tablet (320 mg total) by mouth once daily.               Objective:      /88 (BP Location: Left arm, Patient Position: Sitting)   Pulse 61   Ht 5' 4" (1.626 m)   Wt 86.1 kg (189 lb 13.1 oz)   LMP 03/07/2018   SpO2 100%   BMI 32.58 kg/m²   Estimated body mass index is 32.58 kg/m² as calculated from the following:    Height as of this encounter: 5' 4" (1.626 m).    Weight as of this encounter: 86.1 kg (189 lb 13.1 oz).    Physical Exam  Vitals reviewed.   Constitutional:       Appearance: Normal appearance.   HENT:      Head: Normocephalic and atraumatic.      Nose: Congestion present. No rhinorrhea.      Mouth/Throat:      Mouth: Mucous membranes are moist.      Pharynx: Posterior oropharyngeal erythema present.   Eyes:      Conjunctiva/sclera: Conjunctivae normal.   Cardiovascular:      Rate and Rhythm: Normal rate and regular rhythm.      Heart sounds: Normal heart sounds.   Pulmonary:      Effort: Pulmonary effort is normal. No respiratory distress.      Breath sounds: Normal breath sounds.   Musculoskeletal:         General: Normal range of motion.      Cervical back: Normal range of motion.   Neurological:      Mental Status: She is alert and oriented to person, place, and time.   Psychiatric:         Mood and Affect: Mood normal. "             Assessment and Plan:     Veronika was seen today for cough.    Diagnoses and all orders for this visit:    Bacterial URI  -     amoxicillin-clavulanate 875-125mg (AUGMENTIN) 875-125 mg per tablet; Take 1 tablet by mouth every 12 (twelve) hours. for 7 days  -     benzonatate (TESSALON) 100 MG capsule; Take 1 capsule (100 mg total) by mouth 3 (three) times daily as needed for Cough.          Assessment & Plan    - Suspected bacterial infection due to prolonged cough duration (>10 days)  - Considered allergic component based on previous assessment by Dr. Caal  - Assessed for signs of pneumonia or other respiratory complications  - Evaluated effectiveness of current allergy medications (Flonase, Zyrtec)   Assessed the patient's condition, noting cough and cold symptoms with clear to light green mucus.   Ms. Blackmon denies shortness of breath or fever.   Performed physical exam, including auscultation of the patient's breathing and checking for sinus tenderness.   Prescribed Tessalon Pearls (cough suppressant) up to 3 times daily as needed.   Recommend OTC Mucinex (expectorant) as needed for mucus.   Advised increasing fluid intake and using honey for cough relief.   Recommend using hot tea with honey to soothe cough.      The patient was informed of the following statements     Emergency Care:Seek immediate medical attention in the emergency room if you experience any new or worsening symptoms, or if your current condition significantly changes or becomes more severe.  Patient Acknowledgment: Patient verbalizes understanding of the plan and agrees to proceed with the recommended care.    Follow Up:  If symptoms , worsen, or if you develop a high fever, significant shortness of breath, or chest pain, seek medical attention to rule out further complications           Other Orders Placed This Visit:  No orders of the defined types were placed in this encounter.        This note was generated with the assistance of  ambient listening technology. Verbal consent was obtained by the patient and accompanying visitor(s) for the recording of patient appointment to facilitate this note. I attest to having reviewed and edited the generated note for accuracy, though some syntax or spelling errors may persist. Please contact the author of this note for any clarification.    I spent a total of 30 minutes on the day of the visit.This includes face to face time and non-face to face time preparing to see the patient (eg, review of tests), obtaining and/or reviewing separately obtained history, documenting clinical information in the electronic or other health record, independently interpreting results and communicating results to the patient/family/caregiver, or care coordinator.      Kostas Ibrahim PA-C

## 2025-01-28 NOTE — TELEPHONE ENCOUNTER
----- Message from ODIN Kwok sent at 2025  4:36 PM CST -----  Regardin25 Colonoscopy on 2nd floor  The patient is currently under an internal cardiologist, gilda Love. Is patient okay to hold blood thinner Plavix (clopidogrel) for their upcoming scheduled Colonoscopy on 25.             Notes: Hold for 5 days

## 2025-01-28 NOTE — TELEPHONE ENCOUNTER
To hold Plavix for 5 days before the procedure and restart postprocedure    Sincerely,  Jm Escobar MD.   Interventional Cardiologist  [facility] [place]

## 2025-01-28 NOTE — TELEPHONE ENCOUNTER
Dear Dr Escobar,    Patient has a scheduled procedure Colonoscopy on 02/18/2025 and is currently taking a blood thinner. In order to ensure patient safety, we would like to confirm that the patient can place their blood thinner medication on hold for the procedure. Can he/she discontinue Plavix (clopidogrel) for a minimum of 5 days prior to the procedure?     Thank you for your prompt reply.    [facility] Endoscopy Scheduling

## 2025-01-28 NOTE — PATIENT INSTRUCTIONS
For symptom relief, I recommend  Zyrtec in am  Augmentin twice a day for 7 days.   flonase daily as needed  Tessalon Pearls   Mucinex OTC as needed   Increase water intake to 64-80 oz daily to help thin mucus.  Tylenol tablets as needed for fever, headaches, sore throat, ear pain, bodyaches, and/or nasal/sinus inflammation.     If symptoms , worsen, or if you develop a high fever, significant shortness of breath, or chest pain, seek medical attention to rule out further complications

## 2025-02-04 DIAGNOSIS — B37.31 VAGINAL CANDIDIASIS: ICD-10-CM

## 2025-02-04 RX ORDER — TOLNAFTATE 1 %
AEROSOL, POWDER (GRAM) TOPICAL
Qty: 21 G | Refills: 0 | Status: SHIPPED | OUTPATIENT
Start: 2025-02-04

## 2025-02-04 NOTE — TELEPHONE ENCOUNTER
Patient C/O vaginal itching. No discharge, no redness, swelling, no burning.. States she has had this before and Dr. Carrillo treated it with a vaginal cream. Request for Clotrimazole sent to  Dr. Carrillo.

## 2025-02-04 NOTE — TELEPHONE ENCOUNTER
----- Message from Ariadna sent at 2/4/2025 10:16 AM CST -----  Type:  Patient Returning Call    Who Called:Pt   Would the patient rather a call back or a response via [facility]? Call back   Best Call Back Number:[phone]  Additional Information: pt is calling for Dr Carrillo to call  in the medication she gave her before for itching .. Pt states she do not remember the name of the medication       [facility]/pharmacy #[id number] - [place] - [facility] [address]   Phone: [phone]  Fax: [phone]

## 2025-02-10 NOTE — PROGRESS NOTES
Spoke to patient for pre-call to confirm scheduled Colonoscopy. Patient verbalized understanding of all instructions, including:  Patient is taking Plavix (clopidogrel), instructed to take last dose on:  2/12/25.  Approval to hold Plavix (clopidogrel) received from  Jm Escobar MD.

## 2025-02-17 ENCOUNTER — PATIENT OUTREACH (OUTPATIENT)
Dept: ADMINISTRATIVE | Facility: OTHER | Age: 65
End: 2025-02-17
Payer: MEDICAID

## 2025-02-17 ENCOUNTER — TELEPHONE (OUTPATIENT)
Dept: ENDOSCOPY | Facility: HOSPITAL | Age: 65
End: 2025-02-17
Payer: MEDICAID

## 2025-02-17 NOTE — PROGRESS NOTES
CHW - Outreach Attempt    Community Health Worker left a voicemail message for 2nd attempt to contact patient regarding: f/u utility  Community Health Worker to attempt to contact patient on: 2/24

## 2025-02-17 NOTE — TELEPHONE ENCOUNTER
Pt telephoned with questions regarding her bowel prep for colonoscopy scheduled tomorrow 2/18/25.  Spoke with pt and all questions answered.  Pt verbalized understanding.  Pt confirmed she is holding her Plavix as instructed.

## 2025-02-18 ENCOUNTER — ANESTHESIA EVENT (OUTPATIENT)
Dept: ENDOSCOPY | Facility: HOSPITAL | Age: 65
End: 2025-02-18
Payer: MEDICAID

## 2025-02-18 ENCOUNTER — ANESTHESIA (OUTPATIENT)
Dept: ENDOSCOPY | Facility: HOSPITAL | Age: 65
End: 2025-02-18
Payer: MEDICAID

## 2025-02-18 ENCOUNTER — RESULTS FOLLOW-UP (OUTPATIENT)
Dept: FAMILY MEDICINE | Facility: CLINIC | Age: 65
End: 2025-02-18

## 2025-02-18 ENCOUNTER — HOSPITAL ENCOUNTER (OUTPATIENT)
Facility: HOSPITAL | Age: 65
Discharge: HOME | End: 2025-02-18
Attending: STUDENT IN AN ORGANIZED HEALTH CARE EDUCATION/TRAINING PROGRAM | Admitting: STUDENT IN AN ORGANIZED HEALTH CARE EDUCATION/TRAINING PROGRAM
Payer: MEDICAID

## 2025-02-18 VITALS
SYSTOLIC BLOOD PRESSURE: 125 MMHG | TEMPERATURE: 98 F | WEIGHT: 183 LBS | BODY MASS INDEX: 31.24 KG/M2 | RESPIRATION RATE: 18 BRPM | OXYGEN SATURATION: 100 % | HEIGHT: 64 IN | HEART RATE: 59 BPM | DIASTOLIC BLOOD PRESSURE: 73 MMHG

## 2025-02-18 DIAGNOSIS — K62.5 RECTAL BLEED: ICD-10-CM

## 2025-02-18 PROCEDURE — 45380 COLONOSCOPY AND BIOPSY: CPT | Performed by: STUDENT IN AN ORGANIZED HEALTH CARE EDUCATION/TRAINING PROGRAM

## 2025-02-18 PROCEDURE — 27201012 HC FORCEPS, HOT/COLD, DISP: Performed by: STUDENT IN AN ORGANIZED HEALTH CARE EDUCATION/TRAINING PROGRAM

## 2025-02-18 PROCEDURE — 63600175 PHARM REV CODE 636 W HCPCS: Performed by: NURSE ANESTHETIST, CERTIFIED REGISTERED

## 2025-02-18 PROCEDURE — 37000009 HC ANESTHESIA EA ADD 15 MINS: Performed by: STUDENT IN AN ORGANIZED HEALTH CARE EDUCATION/TRAINING PROGRAM

## 2025-02-18 PROCEDURE — 88305 TISSUE EXAM BY PATHOLOGIST: CPT | Mod: 26,,, | Performed by: STUDENT IN AN ORGANIZED HEALTH CARE EDUCATION/TRAINING PROGRAM

## 2025-02-18 PROCEDURE — 88305 TISSUE EXAM BY PATHOLOGIST: CPT | Performed by: STUDENT IN AN ORGANIZED HEALTH CARE EDUCATION/TRAINING PROGRAM

## 2025-02-18 PROCEDURE — 45380 COLONOSCOPY AND BIOPSY: CPT | Mod: ,,, | Performed by: STUDENT IN AN ORGANIZED HEALTH CARE EDUCATION/TRAINING PROGRAM

## 2025-02-18 PROCEDURE — 25000003 PHARM REV CODE 250: Performed by: NURSE ANESTHETIST, CERTIFIED REGISTERED

## 2025-02-18 PROCEDURE — 88342 IMHCHEM/IMCYTCHM 1ST ANTB: CPT | Performed by: STUDENT IN AN ORGANIZED HEALTH CARE EDUCATION/TRAINING PROGRAM

## 2025-02-18 PROCEDURE — 88342 IMHCHEM/IMCYTCHM 1ST ANTB: CPT | Mod: 26,,, | Performed by: STUDENT IN AN ORGANIZED HEALTH CARE EDUCATION/TRAINING PROGRAM

## 2025-02-18 PROCEDURE — 37000008 HC ANESTHESIA 1ST 15 MINUTES: Performed by: STUDENT IN AN ORGANIZED HEALTH CARE EDUCATION/TRAINING PROGRAM

## 2025-02-18 RX ORDER — SODIUM CHLORIDE 0.9 % (FLUSH) 0.9 %
3 SYRINGE (ML) INJECTION EVERY 6 HOURS
Status: DISCONTINUED | OUTPATIENT
Start: 2025-02-18 | End: 2025-02-18 | Stop reason: HOSPADM

## 2025-02-18 RX ORDER — PROPOFOL 10 MG/ML
VIAL (ML) INTRAVENOUS
Status: DISCONTINUED | OUTPATIENT
Start: 2025-02-18 | End: 2025-02-18

## 2025-02-18 RX ORDER — ONDANSETRON HYDROCHLORIDE 2 MG/ML
4 INJECTION, SOLUTION INTRAVENOUS DAILY PRN
Status: DISCONTINUED | OUTPATIENT
Start: 2025-02-18 | End: 2025-02-18 | Stop reason: HOSPADM

## 2025-02-18 RX ORDER — PROCHLORPERAZINE EDISYLATE 5 MG/ML
5 INJECTION INTRAMUSCULAR; INTRAVENOUS EVERY 30 MIN PRN
Status: DISCONTINUED | OUTPATIENT
Start: 2025-02-18 | End: 2025-02-18 | Stop reason: HOSPADM

## 2025-02-18 RX ORDER — LIDOCAINE HYDROCHLORIDE 20 MG/ML
INJECTION INTRAVENOUS
Status: DISCONTINUED | OUTPATIENT
Start: 2025-02-18 | End: 2025-02-18

## 2025-02-18 RX ORDER — PROPOFOL 10 MG/ML
VIAL (ML) INTRAVENOUS CONTINUOUS PRN
Status: DISCONTINUED | OUTPATIENT
Start: 2025-02-18 | End: 2025-02-18

## 2025-02-18 RX ORDER — SODIUM CHLORIDE 9 MG/ML
INJECTION, SOLUTION INTRAVENOUS CONTINUOUS
Status: DISCONTINUED | OUTPATIENT
Start: 2025-02-18 | End: 2025-02-18 | Stop reason: HOSPADM

## 2025-02-18 RX ORDER — GLUCAGON 1 MG
1 KIT INJECTION
Status: DISCONTINUED | OUTPATIENT
Start: 2025-02-18 | End: 2025-02-18 | Stop reason: HOSPADM

## 2025-02-18 RX ADMIN — LIDOCAINE HYDROCHLORIDE 30 MG: 20 INJECTION INTRAVENOUS at 08:02

## 2025-02-18 RX ADMIN — PROPOFOL 20 MG: 10 INJECTION, EMULSION INTRAVENOUS at 08:02

## 2025-02-18 RX ADMIN — PROPOFOL 100 MCG/KG/MIN: 10 INJECTION, EMULSION INTRAVENOUS at 08:02

## 2025-02-18 RX ADMIN — PROPOFOL 50 MG: 10 INJECTION, EMULSION INTRAVENOUS at 08:02

## 2025-02-18 RX ADMIN — SODIUM CHLORIDE: 0.9 INJECTION, SOLUTION INTRAVENOUS at 08:02

## 2025-02-18 NOTE — ANESTHESIA PREPROCEDURE EVALUATION
"                                                                                                             02/18/2025  Veronika Blackmon is a 64 y.o., female.    Pre-operative evaluation for Procedure(s) (LRB):  COLONOSCOPY (N/A)      No diagnosis found.    Review of patient's allergies indicates:  No Known Allergies    Prescriptions Prior to Admission[1]      0.9% NaCl   Intravenous Continuous           Medications Ordered Prior to Encounter[2]    Past Medical History:  No date: Acute hypoxemic respiratory failure  01/27/2022: Anemia  No date: Anoxic brain injury  No date: Anticoagulant long-term use  No date: CKD (chronic kidney disease)  No date: HLD (hyperlipidemia)  No date: HTN (hypertension)  No date: Prediabetes  No date: TIA (transient ischemic attack)    Past Surgical History:   Procedure Laterality Date    CARDIAC SURGERY  01/31/2022    [id number]    COLONOSCOPY N/A 10/12/2022    Procedure: COLONOSCOPY Suprep;  Surgeon: Kostas Ramírez MD;  Location: [facility] ENDO;  Service: Endoscopy;  Laterality: N/A;    CORONARY ANGIOGRAPHY N/A 12/14/2021    Procedure: ANGIOGRAM, CORONARY ARTERY;  Surgeon: Jm Escobar MD;  Location: [facility] CATH LAB/EP;  Service: Cardiology;  Laterality: N/A;    ESOPHAGOGASTRODUODENOSCOPY N/A 10/12/2022    Procedure: EGD (ESOPHAGOGASTRODUODENOSCOPY);  Surgeon: Kostas Ramírez MD;  Location: [facility];  Service: Endoscopy;  Laterality: N/A;    LEFT HEART CATHETERIZATION Left 12/14/2021    Procedure: Left heart cath;  Surgeon: Jm Escobar MD;  Location: [facility] CATH LAB/EP;  Service: Cardiology;  Laterality: Left;       Tobacco Use History[3]    Social History     Substance and Sexual Activity   Alcohol Use Yes    Comment: red wine one in while       Physical Activity: Sufficiently Active (10/31/2024)    Exercise Vital Sign     Days of Exercise per Week: 5 days     Minutes of Exercise per Session: 50 min       No birth history on file.  No results for input(s): "HCT" in the " "last 72 hours.  No results for input(s): "PLT" in the last 72 hours.  No results for input(s): "K" in the last 72 hours.  No results for input(s): "CREATININE" in the last 72 hours.  No results for input(s): "GLU" in the last 72 hours.  No results for input(s): "PT" in the last 72 hours.  There were no vitals filed for this visit.                    Pre-op Assessment          Review of Systems  Anesthesia Hx:  No problems with previous Anesthesia                Hematology/Oncology:  Hematology Normal   Oncology Normal                Hematology Comments: On plavix for CAD, took last Wednesday                    Cardiovascular:     Hypertension, well controlled   Denies MI. CAD  asymptomatic CABG/stent (bypass 2022,)    Denies Angina.         Walks 10 blocks twice a day                           Pulmonary:  Pulmonary Normal   Denies COPD.  Denies Asthma.   Denies Shortness of breath.                  Renal/:   Denies Chronic Renal Disease.                Hepatic/GI:      Denies Liver Disease.               Neurological:  Denies TIA.  Denies CVA.    Denies Seizures.    2022 anoxic brain injury with cardiac arrest, doing well                            Endocrine:  Denies Diabetes.               Physical Exam  General: Well nourished, Cooperative, Alert and Oriented    Airway:  Mallampati: II   Mouth Opening: Normal  TM Distance: Normal  Tongue: Normal  Neck ROM: Normal ROM        Anesthesia Plan  Type of Anesthesia, risks & benefits discussed:    Anesthesia Type: Gen Natural Airway  Intra-op Monitoring Plan: Standard ASA Monitors  Post Op Pain Control Plan: IV/PO Opioids PRN  Induction:  IV  Informed Consent: Informed consent signed with the Patient and all parties understand the risks and agree with anesthesia plan.  All questions answered.   ASA Score: 2  Day of Surgery Review of History & Physical: H&P Update referred to the surgeon/provider.    Ready For Surgery From Anesthesia Perspective.     .    inal airway " type: Endotracheal airwaySuccessful airway: Oral ETT  Cuffed: Cuffed  Successful intubation technique: Direct laryngoscopy  Facilitating devices/methods: Cricoid pressure  Endotracheal tube insertion site: Oral  Blade: Conde  Blade size: #3  Placement verified by: auscultation, CO2 detection and chest rise  Cormack-Lehane Classification: grade I - full view of glottis  ETT size: 7.5mm  Inital cuff pressure (cm H2O): MOP  ETT to lips (cm): 21  Measured from: Lips  Lips/Dentition: Unchanged  Secured Method: Pink tape       Vent. Rate :  57 BPM     Atrial Rate :  57 BPM      P-R Int : 238 ms          QRS Dur : 138 ms       QT Int : 454 ms       P-R-T Axes :  72  79 -34 degrees     QTcB Int : 441 ms     Sinus bradycardia with 1st degree A-V block   Possible Left atrial enlargement   Right bundle branch block   T wave abnormality, consider inferolateral ischemia   Abnormal ECG   When compared with ECG of 06-Dec-2023 11:16,   Premature ventricular complexes are no longer Present   Right bundle branch block is now Present   Criteria for Inferior infarct are no longer Present   Confirmed by Jm Escobar ([id number]) on 12/4/2024 6:09:01 PM       12/2024    Left Ventricle: The left ventricle is normal in size. There is concentric remodeling. Normal wall motion. There is normal systolic function. Quantitated ejection fraction is 62%. There is normal diastolic function.    Right Ventricle: Normal right ventricular cavity size. Systolic function is mildly reduced.    Aortic Valve: The aortic valve is a trileaflet valve. There is no significant regurgitation.    Mitral Valve: There is trace regurgitation.    Tricuspid Valve: There is moderate regurgitation.    Pulmonic Valve: There is moderate regurgitation.    Aorta: Aortic root is normal in size measuring 2.83 cm.    Pulmonary Artery: The estimated pulmonary artery systolic pressure is 43 mmHg.    IVC/SVC: Normal venous pressure at 3 mmHg.    Pericardium: There is no pericardial  effusion.       Diagnostic  Dominance: Right    Left Anterior Descending   Prox LAD lesion is 70% stenosed.   Mid LAD lesion is 80% stenosed.   Dist LAD lesion is 95% stenosed.      First Diagonal Branch   1st Diag lesion is 95% stenosed.      Left Circumflex   Prox Cx to Mid Cx lesion is 80% stenosed.      First Obtuse Marginal Branch   1st Mrg lesion is 90% stenosed.      Right Coronary Artery   Prox RCA to Mid RCA lesion is 100% stenosed. There was chronic total occlusion.      Right Posterior Descending Artery   Collaterals   RPDA filled by collaterals from 2nd Sept.      Collaterals   RPDA filled by collaterals from 1st Sept.         Right Posterior Atrioventricular Artery   Collaterals   RPAV filled by collaterals from Mid Cx.         Intervention     No interventions have been documented.          [1]   Medications Prior to Admission   Medication Sig Dispense Refill Last Dose/Taking    alirocumab (PRALUENT PEN) 75 mg/mL PnIj Inject 1 mL (75 mg total) into the skin every 14 (fourteen) days. 2 each 6     cetirizine (ZYRTEC) 10 MG tablet Take 1 tablet (10 mg total) by mouth once daily. 90 tablet 3     clopidogreL (PLAVIX) 75 mg tablet Take 1 tablet (75 mg total) by mouth once daily. (Patient taking differently: Take 75 mg by mouth 2 (two) times a day.) 90 tablet 3     clotrimazole (CLOTRIMAZOLE 3 DAY) 2 % Insert nightly PV x 3 nights 21 g 0     clotrimazole-betamethasone 1-0.05% (LOTRISONE) cream Apply topically 2 (two) times daily. At skin outside vaginal area. (Patient not taking: Reported on 1/4/2025) 45 g 0     diclofenac sodium (VOLTAREN) 1 % Gel Apply 2 g topically 4 (four) times daily. (Patient not taking: Reported on 1/4/2025) 200 g 0     fluticasone propionate (FLONASE) 50 mcg/actuation nasal spray 2 sprays (100 mcg total) by Each Nostril route once daily. 16 g 11     hydrocortisone (ANUSOL-HC) 25 mg suppository Place 1 suppository (25 mg total) rectally 2 (two) times daily. (Patient not taking:  Reported on 1/4/2025) 24 suppository 3     hydrocortisone 2.5 % cream Apply topically 2 (two) times daily. To rectal area for hemorrhoids (Patient not taking: Reported on 1/4/2025) 28 g 2     metoprolol succinate (TOPROL-XL) 50 MG 24 hr tablet Take 1 tablet (50 mg total) by mouth every morning AND 1 tablet (50 mg total) every evening. 180 tablet 3     omeprazole (PRILOSEC) 40 MG capsule Take 1 capsule (40 mg total) by mouth once daily. 90 capsule 3     polyethylene glycol (GLYCOLAX) 17 gram/dose powder Take 17 g by mouth once daily. (Patient not taking: Reported on 1/4/2025) 510 g 1     potassium chloride (KLOR-CON) 8 MEQ TbSR Take 1 tablet (8 mEq total) by mouth 2 (two) times daily. (Patient not taking: Reported on 1/4/2025) 180 tablet 3     promethazine-dextromethorphan (PROMETHAZINE-DM) 6.25-15 mg/5 mL Syrp Take 5 mLs by mouth every 6 (six) hours as needed (cough). (Patient not taking: Reported on 1/28/2025) 240 mL 0     rosuvastatin (CRESTOR) 40 MG Tab Take 1 tablet (40 mg total) by mouth every evening. 90 tablet 3     triamcinolone acetonide 0.1% (KENALOG) 0.1 % cream Apply topically 2 (two) times daily. 45 g 1     valsartan (DIOVAN) 320 MG tablet Take 1 tablet (320 mg total) by mouth once daily. 90 tablet 3    [2]   No current facility-administered medications on file prior to encounter.     Current Outpatient Medications on File Prior to Encounter   Medication Sig Dispense Refill    alirocumab (PRALUENT PEN) 75 mg/mL PnIj Inject 1 mL (75 mg total) into the skin every 14 (fourteen) days. 2 each 6    cetirizine (ZYRTEC) 10 MG tablet Take 1 tablet (10 mg total) by mouth once daily. 90 tablet 3    clopidogreL (PLAVIX) 75 mg tablet Take 1 tablet (75 mg total) by mouth once daily. (Patient taking differently: Take 75 mg by mouth 2 (two) times a day.) 90 tablet 3    clotrimazole-betamethasone 1-0.05% (LOTRISONE) cream Apply topically 2 (two) times daily. At skin outside vaginal area. (Patient not taking: Reported on  1/4/2025) 45 g 0    diclofenac sodium (VOLTAREN) 1 % Gel Apply 2 g topically 4 (four) times daily. (Patient not taking: Reported on 1/4/2025) 200 g 0    fluticasone propionate (FLONASE) 50 mcg/actuation nasal spray 2 sprays (100 mcg total) by Each Nostril route once daily. 16 g 11    hydrocortisone (ANUSOL-HC) 25 mg suppository Place 1 suppository (25 mg total) rectally 2 (two) times daily. (Patient not taking: Reported on 1/4/2025) 24 suppository 3    hydrocortisone 2.5 % cream Apply topically 2 (two) times daily. To rectal area for hemorrhoids (Patient not taking: Reported on 1/4/2025) 28 g 2    metoprolol succinate (TOPROL-XL) 50 MG 24 hr tablet Take 1 tablet (50 mg total) by mouth every morning AND 1 tablet (50 mg total) every evening. 180 tablet 3    omeprazole (PRILOSEC) 40 MG capsule Take 1 capsule (40 mg total) by mouth once daily. 90 capsule 3    polyethylene glycol (GLYCOLAX) 17 gram/dose powder Take 17 g by mouth once daily. (Patient not taking: Reported on 1/4/2025) 510 g 1    potassium chloride (KLOR-CON) 8 MEQ TbSR Take 1 tablet (8 mEq total) by mouth 2 (two) times daily. (Patient not taking: Reported on 1/4/2025) 180 tablet 3    promethazine-dextromethorphan (PROMETHAZINE-DM) 6.25-15 mg/5 mL Syrp Take 5 mLs by mouth every 6 (six) hours as needed (cough). (Patient not taking: Reported on 1/28/2025) 240 mL 0    rosuvastatin (CRESTOR) 40 MG Tab Take 1 tablet (40 mg total) by mouth every evening. 90 tablet 3    triamcinolone acetonide 0.1% (KENALOG) 0.1 % cream Apply topically 2 (two) times daily. 45 g 1    valsartan (DIOVAN) 320 MG tablet Take 1 tablet (320 mg total) by mouth once daily. 90 tablet 3   [3]   Social History  Tobacco Use   Smoking Status Never   Smokeless Tobacco Never

## 2025-02-18 NOTE — H&P
Short Stay Endoscopy History and Physical    PCP - Sahara Carrillo MD  Referring Physician - Rissa Rascon RN  No address on file    Procedure - Colonoscopy  ASA - per anesthesia  Mallampati - per anesthesia  History of Anesthesia problems - no  Family history Anesthesia problems -  no   Plan of anesthesia - General    HPI  64 y.o. female  Reason for procedure:   Rectal bleeding [K62.5]  Hx of colonic polyps [Z86.0100]        ROS:  Constitutional: No fevers, chills, No weight loss  CV: No chest pain  Pulm: No cough, No shortness of breath  GI: see HPI    Medical History:  has a past medical history of Acute hypoxemic respiratory failure, Anemia (01/27/2022), Anoxic brain injury, Anticoagulant long-term use, CKD (chronic kidney disease), HLD (hyperlipidemia), HTN (hypertension), Prediabetes, and TIA (transient ischemic attack).    Surgical History:  has a past surgical history that includes Left heart catheterization (Left, 12/14/2021); Coronary angiography (N/A, 12/14/2021); Cardiac surgery (01/31/2022); Colonoscopy (N/A, 10/12/2022); and Esophagogastroduodenoscopy (N/A, 10/12/2022).    Family History: family history includes Heart attack (age of onset: 62) in her brother..    Social History:  reports that she has never smoked. She has never used smokeless tobacco. She reports current alcohol use. She reports that she does not use drugs.    Review of patient's allergies indicates:   Allergen Reactions    Amoxicillin Itching       Medications:   Prescriptions Prior to Admission[1]    Physical Exam:    Vital Signs:   Vitals:    02/18/25 0647   BP: (!) 167/93   Pulse: 80   Resp: 18   Temp: 98.2 °F (36.8 °C)       General Appearance: Well appearing in no acute distress  Abdomen: Soft, non tender, non distended with normal bowel sounds, no masses    Labs:  Lab Results   Component Value Date    WBC 5.45 03/05/2024    HGB 13.0 03/05/2024    HCT 41.9 03/05/2024     03/05/2024    CHOL 157 03/05/2024    TRIG 51  03/05/2024    HDL 76 (H) 03/05/2024    ALT 25 03/05/2024    AST 31 03/05/2024     03/05/2024    K 4.3 03/05/2024     03/05/2024    CREATININE 1.2 03/05/2024    BUN 19 03/05/2024    CO2 26 03/05/2024    TSH 1.154 11/30/2021    INR 1.0 06/16/2022    HGBA1C 6.4 (H) 03/05/2024       I have explained the risks and benefits of this endoscopic procedure to the patient including but not limited to bleeding, inflammation, infection, perforation, and death.      Elliot Holcomb MD       [1]   Medications Prior to Admission   Medication Sig Dispense Refill Last Dose/Taking    metoprolol succinate (TOPROL-XL) 50 MG 24 hr tablet Take 1 tablet (50 mg total) by mouth every morning AND 1 tablet (50 mg total) every evening. 180 tablet 3 2/18/2025 Morning    omeprazole (PRILOSEC) 40 MG capsule Take 1 capsule (40 mg total) by mouth once daily. 90 capsule 3 2/17/2025    rosuvastatin (CRESTOR) 40 MG Tab Take 1 tablet (40 mg total) by mouth every evening. 90 tablet 3 2/17/2025    valsartan (DIOVAN) 320 MG tablet Take 1 tablet (320 mg total) by mouth once daily. 90 tablet 3 2/17/2025    alirocumab (PRALUENT PEN) 75 mg/mL PnIj Inject 1 mL (75 mg total) into the skin every 14 (fourteen) days. 2 each 6     cetirizine (ZYRTEC) 10 MG tablet Take 1 tablet (10 mg total) by mouth once daily. 90 tablet 3     clopidogreL (PLAVIX) 75 mg tablet Take 1 tablet (75 mg total) by mouth once daily. (Patient taking differently: Take 75 mg by mouth 2 (two) times a day.) 90 tablet 3 2/12/2025    clotrimazole (CLOTRIMAZOLE 3 DAY) 2 % Insert nightly PV x 3 nights 21 g 0     fluticasone propionate (FLONASE) 50 mcg/actuation nasal spray 2 sprays (100 mcg total) by Each Nostril route once daily. 16 g 11     triamcinolone acetonide 0.1% (KENALOG) 0.1 % cream Apply topically 2 (two) times daily. 45 g 1     [DISCONTINUED] clotrimazole-betamethasone 1-0.05% (LOTRISONE) cream Apply topically 2 (two) times daily. At skin outside vaginal area. (Patient  not taking: Reported on 1/4/2025) 45 g 0     [DISCONTINUED] diclofenac sodium (VOLTAREN) 1 % Gel Apply 2 g topically 4 (four) times daily. (Patient not taking: Reported on 1/4/2025) 200 g 0     [DISCONTINUED] hydrocortisone (ANUSOL-HC) 25 mg suppository Place 1 suppository (25 mg total) rectally 2 (two) times daily. (Patient not taking: Reported on 1/4/2025) 24 suppository 3     [DISCONTINUED] hydrocortisone 2.5 % cream Apply topically 2 (two) times daily. To rectal area for hemorrhoids (Patient not taking: Reported on 1/4/2025) 28 g 2     [DISCONTINUED] polyethylene glycol (GLYCOLAX) 17 gram/dose powder Take 17 g by mouth once daily. (Patient not taking: Reported on 1/4/2025) 510 g 1     [DISCONTINUED] potassium chloride (KLOR-CON) 8 MEQ TbSR Take 1 tablet (8 mEq total) by mouth 2 (two) times daily. (Patient not taking: Reported on 1/4/2025) 180 tablet 3     [DISCONTINUED] promethazine-dextromethorphan (PROMETHAZINE-DM) 6.25-15 mg/5 mL Syrp Take 5 mLs by mouth every 6 (six) hours as needed (cough). (Patient not taking: Reported on 1/28/2025) 240 mL 0

## 2025-02-18 NOTE — H&P
COLONOSCOPY HISTORY AND PHYSICAL EXAM    Procedure : Colonoscopy      INDICATIONS: rectal bleeding    Family Hx of CRC: none    Last Colonoscopy:  10/2022  Findings:       The perianal and digital rectal examinations were normal.        A 2 mm polyp was found in the hepatic flexure. The polyp was        sessile. The polyp was removed with a cold biopsy forceps. Resection        and retrieval were complete. Verification of patient identification        for the specimen was done. Estimated blood loss was minimal.        A localized area of moderately altered vascular, congested and        ulcerated mucosa was found in the rectum extending from anal verge        to approximately 10 cm. Biopsies were taken with a cold forceps for        histology. Verification of patient identification for the specimen        was done. Estimated blood loss was minimal.   Path:  --Colon, hepatic flexure polyp (polypectomy):   Colonic mucosa with hyperplastic surface changes   Multiple deeper levels examined   --Rectum (biopsy):   Rectal mucosa with active inflammation and ischemic type injury   No dysplasia, no malignancy        Past Medical History:   Diagnosis Date    Acute hypoxemic respiratory failure     Anemia 01/27/2022    Anoxic brain injury     Anticoagulant long-term use     CKD (chronic kidney disease)     HLD (hyperlipidemia)     HTN (hypertension)     Prediabetes     TIA (transient ischemic attack)      Sedation Problems: NO  Family History   Problem Relation Name Age of Onset    Heart attack Brother  62     Fam Hx of Sedation Problems: NO  Social History[1]    Review of Systems - Negative except   Respiratory ROS: no dyspnea  Cardiovascular ROS: no exertional chest pain  Gastrointestinal ROS: NO abdominal discomfort,  YES  rectal bleeding  Musculoskeletal ROS: no muscular pain  Neurological ROS: no recent stroke    Physical Exam:  [facility] 03/07/2018   General: no distress  Head: normocephalic  Mallampati Score   Neck: supple,  symmetrical, trachea midline  Lungs:  clear to auscultation bilaterally and normal respiratory effort  Heart: regular rate and rhythm  Abdomen: soft, non-tender non-distented; bowel sounds normal; no masses,  no organomegaly  Extremities: no cyanosis or edema, or clubbing    ASA:  III    PLAN  COLONOSCOPY.    SedationPlan :MAC    The details of the procedure, the possible need for biopsy or polypectomy and the potential risks including bleeding, perforation, missed polyps were discussed in detail.           [1]   Social History  Socioeconomic History    Marital status:    Tobacco Use    Smoking status: Never    Smokeless tobacco: Never   Substance and Sexual Activity    Alcohol use: Yes     Comment: red wine one in while    Drug use: No     Social Drivers of Health     Financial Resource Strain: High Risk (10/31/2024)    Overall Financial Resource Strain (CARDIA)     Difficulty of Paying Living Expenses: Hard   Food Insecurity: Food Insecurity Present (10/31/2024)    Hunger Vital Sign     Worried About Running Out of Food in the Last Year: Sometimes true     Ran Out of Food in the Last Year: Sometimes true   Transportation Needs: No Transportation Needs (10/31/2024)    TRANSPORTATION NEEDS     Transportation : No   Physical Activity: Sufficiently Active (10/31/2024)    Exercise Vital Sign     Days of Exercise per Week: 5 days     Minutes of Exercise per Session: 50 min   Stress: Stress Concern Present (10/31/2024)    [employer] - Occupational Stress Questionnaire     Feeling of Stress : To some extent   Housing Stability: High Risk (10/31/2024)    Housing Stability Vital Sign     Unable to Pay for Housing in the Last Year: Yes     Homeless in the Last Year: Yes

## 2025-02-18 NOTE — TRANSFER OF CARE
"Anesthesia Transfer of Care Note    Patient: Veronika Blackmon    Procedure(s) Performed: Procedure(s) (LRB):  COLONOSCOPY (N/A)    Patient location: PACU    Anesthesia Type: general    Transport from OR: Transported from OR on room air with adequate spontaneous ventilation    Post pain: adequate analgesia    Post assessment: no apparent anesthetic complications and tolerated procedure well    Post vital signs: stable    Level of consciousness: awake and alert    Nausea/Vomiting: no nausea/vomiting    Complications: none    Transfer of care protocol was followed      Last vitals: Visit Vitals  BP (!) 167/93 (BP Location: Left arm, Patient Position: Lying)   Pulse 80   Temp 36.8 °C (98.2 °F) (Temporal)   Resp 18   Ht 5' 4" (1.626 m)   Wt 83 kg (183 lb)   LMP 03/07/2018   SpO2 99%   BMI 31.41 kg/m²     "

## 2025-02-18 NOTE — ANESTHESIA POSTPROCEDURE EVALUATION
Anesthesia Post Evaluation    Patient: Veronika Blackmon    Procedure(s) Performed: Procedure(s) (LRB):  COLONOSCOPY (N/A)    Final Anesthesia Type: general      Patient location during evaluation: PACU  Patient participation: Yes- Able to Participate  Level of consciousness: awake and alert and oriented  Post-procedure vital signs: reviewed and stable  Pain management: adequate  Airway patency: patent    PONV status at discharge: No PONV  Anesthetic complications: no      Cardiovascular status: stable  Respiratory status: unassisted, spontaneous ventilation and room air  Hydration status: euvolemic  Follow-up not needed.              Vitals Value Taken Time   /70 02/18/25 09:18   Temp 36.7 °C (98 °F) 02/18/25 09:18   Pulse 61 02/18/25 09:52   Resp 20 02/18/25 09:52   SpO2 100 % 02/18/25 09:52   Vitals shown include unfiled device data.      No case tracking events are documented in the log.      Pain/Kasandra Score: Kasandra Score: 10 (2/18/2025  9:30 AM)

## 2025-02-18 NOTE — PROVATION PATIENT INSTRUCTIONS
Discharge Summary/Instructions after an Endoscopic Procedure  Patient Name: Veronika Blackmon  Patient MRN: [id number]  Patient YOB: 1960 Tuesday, February 18, 2025  Elliot Holcomb MD  Dear patient,  As a result of recent federal legislation (The Federal Cures Act), you may   receive lab or pathology results from your procedure in your [facility]   account before your physician is able to contact you. Your physician or   their representative will relay the results to you with their   recommendations at their soonest availability.  Thank you,  RESTRICTIONS:  During your procedure today, you received medications for sedation.  These   medications may affect your judgment, balance and coordination.  Therefore,   for 24 hours, you have the following restrictions:   - DO NOT drive a car, operate machinery, make legal/financial decisions,   sign important papers or drink alcohol.    ACTIVITY:  Today: no heavy lifting, straining or running due to procedural   sedation/anesthesia.  The following day: return to full activity including work.  DIET:  Eat and drink normally unless instructed otherwise.     TREATMENT FOR COMMON SIDE EFFECTS:  - Mild abdominal pain, nausea, belching, bloating or excessive gas:  rest,   eat lightly and use a heating pad.  - Sore Throat: treat with throat lozenges and/or gargle with warm salt   water.  - Because air was used during the procedure, expelling large amounts of air   from your rectum or belching is normal.  - If a bowel prep was taken, you may not have a bowel movement for 1-3 days.    This is normal.  SYMPTOMS TO WATCH FOR AND REPORT TO YOUR PHYSICIAN:  1. Abdominal pain or bloating, other than gas cramps.  2. Chest pain.  3. Back pain.  4. Signs of infection such as: chills or fever occurring within 24 hours   after the procedure.  5. Rectal bleeding, which would show as bright red, maroon, or black stools.   (A tablespoon of blood from the rectum is not serious, especially  if   hemorrhoids are present.)  6. Vomiting.  7. Weakness or dizziness.  GO DIRECTLY TO THE NEAREST EMERGENCY ROOM IF YOU HAVE ANY OF THE FOLLOWING:      Difficulty breathing              Chills and/or fever over 101 F   Persistent vomiting and/or vomiting blood   Severe abdominal pain   Severe chest pain   Black, tarry stools   Bleeding- more than one tablespoon   Any other symptom or condition that you feel may need urgent attention  Your doctor recommends these additional instructions:  If any biopsies were taken, your doctors clinic will contact you in 1 to 2   weeks with any results.  - Patient has a contact number available for emergencies.  The signs and   symptoms of potential delayed complications were discussed with the   patient.  Return to normal activities tomorrow.  Written discharge   instructions were provided to the patient.   - Discharge patient to home.   - Resume previous diet.   - Continue present medications.   - Await pathology results.   - Repeat colonoscopy in 10 years for screening purposes.  For questions, problems or results please call your physician - Elliot Holcomb MD at Work:  [phone].  [facility], EMERGENCY ROOM PHONE NUMBER: [phone]  IF A COMPLICATION OR EMERGENCY SITUATION ARISES AND YOU ARE UNABLE TO REACH   YOUR PHYSICIAN - GO DIRECTLY TO THE EMERGENCY ROOM.  Elliot Holcomb MD  2/18/2025 9:27:15 AM  This report has been verified and signed electronically.  Dear patient,  As a result of recent federal legislation (The Federal Cures Act), you may   receive lab or pathology results from your procedure in your [facility]   account before your physician is able to contact you. Your physician or   their representative will relay the results to you with their   recommendations at their soonest availability.  Thank you,  PROVATION

## 2025-02-21 LAB
FINAL PATHOLOGIC DIAGNOSIS: NORMAL
GROSS: NORMAL
Lab: NORMAL
MICROSCOPIC EXAM: NORMAL

## 2025-02-25 ENCOUNTER — PATIENT OUTREACH (OUTPATIENT)
Dept: ADMINISTRATIVE | Facility: OTHER | Age: 65
End: 2025-02-25
Payer: MEDICAID

## 2025-03-06 ENCOUNTER — PATIENT OUTREACH (OUTPATIENT)
Dept: ADMINISTRATIVE | Facility: OTHER | Age: 65
End: 2025-03-06
Payer: MEDICAID

## 2025-03-06 NOTE — PROGRESS NOTES
CHW - Follow Up    This Community Health Worker completed a follow up visit with patient via telephone today.  Pt/Caregiver reported: Pt re-enrolled in program and wanting to know Housing status  Community Health Worker provided: CHW informed pt of her waiting list position   Follow up required: Yes, PRN Housing status  Follow-up Outreach - Due: 3/20/2025   Waiting List:  Section 8 Waiting List  Application Date:  01/29/2024  Bedrooms:  Status:  Pending  Control Number:  [id number]  Waiting List Position  157

## 2025-03-10 ENCOUNTER — PATIENT MESSAGE (OUTPATIENT)
Dept: FAMILY MEDICINE | Facility: CLINIC | Age: 65
End: 2025-03-10
Payer: MEDICAID

## 2025-03-14 ENCOUNTER — OFFICE VISIT (OUTPATIENT)
Dept: CARDIOLOGY | Facility: CLINIC | Age: 65
End: 2025-03-14
Payer: MEDICAID

## 2025-03-14 VITALS
WEIGHT: 183 LBS | HEIGHT: 64 IN | BODY MASS INDEX: 31.24 KG/M2 | SYSTOLIC BLOOD PRESSURE: 164 MMHG | HEART RATE: 59 BPM | DIASTOLIC BLOOD PRESSURE: 92 MMHG

## 2025-03-14 DIAGNOSIS — I49.3 PVC'S (PREMATURE VENTRICULAR CONTRACTIONS): ICD-10-CM

## 2025-03-14 DIAGNOSIS — Z95.1 HX OF CABG: Chronic | ICD-10-CM

## 2025-03-14 DIAGNOSIS — I10 ESSENTIAL HYPERTENSION: ICD-10-CM

## 2025-03-14 DIAGNOSIS — I49.3 FREQUENT PVCS: Primary | ICD-10-CM

## 2025-03-14 DIAGNOSIS — N18.31 STAGE 3A CHRONIC KIDNEY DISEASE: ICD-10-CM

## 2025-03-14 DIAGNOSIS — I25.810 CORONARY ARTERY DISEASE INVOLVING CORONARY BYPASS GRAFT OF NATIVE HEART WITHOUT ANGINA PECTORIS: ICD-10-CM

## 2025-03-14 DIAGNOSIS — I47.29 NONSUSTAINED VENTRICULAR TACHYCARDIA: ICD-10-CM

## 2025-03-14 PROCEDURE — 99999 PR PBB SHADOW E&M-EST. PATIENT-LVL III: CPT | Mod: PBBFAC,,, | Performed by: INTERNAL MEDICINE

## 2025-03-14 PROCEDURE — 99213 OFFICE O/P EST LOW 20 MIN: CPT | Mod: PBBFAC,PN | Performed by: INTERNAL MEDICINE

## 2025-03-14 NOTE — PROGRESS NOTES
Subjective   Patient ID:  Veronika Blackmon is a 64 y.o. female who presents for evaluation of PVCs      HPI  I had the pleasure of seeing Mrs Blackmon today in our electrophysiology clinic in consultation for her PVCs. As you are aware she is a 64 year-old woman with coronary artery disease with preserved LV function s/p CABG in 2022, hypertension, and frequent PVCs also with nsVT on monitoring. Had an abnormal stress test in 2022. Coronary angiogram noted multivessel disease. She was turned down by Dr Mays for surgery as he felt there were no good targets. Went to Dr. Paz at [facility] for a second opinion. While waiting for that appointment she was arguing with her son and apparently had an out-of-hospital arrest. Resuscitated and hospitalized at [facility]. Underwent CABG. Since that time has been following with Dr. Escobar. Noted palpitations a few years ago. Holter noted 20% PVC burden. Initiated on metoprolol. Symptomatically improved but recent holter noted ~13% PVC burden.    ECHO 12/2024: normal LVEF, mildly reduced RV function, moderate MA, moderate TR  Holter 1/2025: Sinus rhythm with average rate of 69 bpm, 12.9% PVC burden, 9 episodes of 4 beat nsVT (mostly AIVR, one reached 122 bpm average).    I reviewed available ECGs in EPIC which show sinus rhythm with intermittent RBBB, at times with PVCs (PVC 1: RB, concordant, inferior axis. PVC 2: RB, V6 transition, mid-axis)    Review of Systems   Constitutional: Negative for fever and malaise/fatigue.   HENT:  Negative for congestion and sore throat.    Eyes:  Negative for blurred vision and visual disturbance.   Cardiovascular:  Negative for chest pain, dyspnea on exertion, irregular heartbeat, near-syncope, palpitations and syncope.   Respiratory:  Negative for cough and shortness of breath.    Hematologic/Lymphatic: Negative for bleeding problem. Does not bruise/bleed easily.   Skin: Negative.    Musculoskeletal: Negative.    Gastrointestinal:  Negative for bloating,  abdominal pain, hematochezia and melena.   Neurological:  Negative for focal weakness and weakness.   Psychiatric/Behavioral: Negative.            Objective     Physical Exam  Vitals reviewed.   Constitutional:       General: She is not in acute distress.     Appearance: She is well-developed. She is not diaphoretic.   HENT:      Head: Normocephalic and atraumatic.   Eyes:      General:         Right eye: No discharge.         Left eye: No discharge.      Conjunctiva/sclera: Conjunctivae normal.   Cardiovascular:      Rate and Rhythm: Normal rate and regular rhythm. Occasional Extrasystoles are present.     Heart sounds: No murmur heard.     No friction rub. No gallop.   Pulmonary:      Effort: Pulmonary effort is normal. No respiratory distress.      Breath sounds: Normal breath sounds. No wheezing or rales.   Abdominal:      General: Bowel sounds are normal. There is no distension.      Palpations: Abdomen is soft.      Tenderness: There is no abdominal tenderness.   Musculoskeletal:      Cervical back: Neck supple.   Skin:     General: Skin is warm and dry.   Neurological:      Mental Status: She is alert and oriented to person, place, and time.   Psychiatric:         Behavior: Behavior normal.         Thought Content: Thought content normal.         Judgment: Judgment normal.            Assessment and Plan     1. Frequent PVCs    2. Nonsustained ventricular tachycardia    3. Coronary artery disease involving coronary bypass graft of native heart without angina pectoris    4. Hx of CABG    5. Essential hypertension    6. Stage 3a chronic kidney disease    7. PVC's (premature ventricular contractions)        Plan:  In summary, Mrs Blackmon is a 64 year-old woman with coronary artery disease with preserved LV function s/p CABG in 2022, hypertension, and frequent PVCs also with nsVT on monitoring (mostly AIVR). 1 morphology is cardiac summit in location. The other is likely mid-basal septum. Discussed treatment  indications and options. She has felt better on metoprolol. Would continue. Discussed mapping/ablation as an alternative. Discussed success rates. Discussed risk of AV block and needing a pacemaker with the mid-basal septal morphology. Since EF is normal and she is asymptomatic recommend watchful monitoring and continuing metoprolol 50bid. Would get a holter monitor in 1 year with return visit.    Thank you for allowing me to participate in the care of this patient. Please do not hesitate to call me with any questions or concerns.    Jaswant Manzo MD, PhD  Cardiac Electrophysiology

## 2025-04-02 ENCOUNTER — TELEPHONE (OUTPATIENT)
Dept: SURGERY | Facility: CLINIC | Age: 65
End: 2025-04-02
Payer: MEDICAID

## 2025-04-02 DIAGNOSIS — K62.89 RECTAL INFLAMMATION: Primary | ICD-10-CM

## 2025-04-02 NOTE — TELEPHONE ENCOUNTER
She is not having any diarrhea - occasionally she will see blood in her stool with spicy foods  She does not smoke tobacco  Medications she is taking are reviewed, she does take Plavix (indicated for CAD)  She denies any rectal prolapse type symptoms    Recommend CTA       Mason Cazares MD, FACS, FASCRS  Department of Colon & Rectal Surgery  [facility]

## 2025-04-09 ENCOUNTER — HOSPITAL ENCOUNTER (OUTPATIENT)
Dept: RADIOLOGY | Facility: HOSPITAL | Age: 65
Discharge: HOME | End: 2025-04-09
Attending: STUDENT IN AN ORGANIZED HEALTH CARE EDUCATION/TRAINING PROGRAM
Payer: MEDICAID

## 2025-04-09 DIAGNOSIS — K62.89 RECTAL INFLAMMATION: ICD-10-CM

## 2025-04-09 PROCEDURE — 74174 CTA ABD&PLVS W/CONTRAST: CPT | Mod: TC

## 2025-04-09 PROCEDURE — 25500020 PHARM REV CODE 255: Performed by: STUDENT IN AN ORGANIZED HEALTH CARE EDUCATION/TRAINING PROGRAM

## 2025-04-09 PROCEDURE — 74174 CTA ABD&PLVS W/CONTRAST: CPT | Mod: 26,,, | Performed by: RADIOLOGY

## 2025-04-09 RX ADMIN — IOHEXOL 100 ML: 350 INJECTION, SOLUTION INTRAVENOUS at 08:04

## 2025-04-19 ENCOUNTER — PATIENT MESSAGE (OUTPATIENT)
Dept: ADMINISTRATIVE | Facility: OTHER | Age: 65
End: 2025-04-19
Payer: MEDICAID

## 2025-04-29 ENCOUNTER — LAB VISIT (OUTPATIENT)
Dept: LAB | Facility: HOSPITAL | Age: 65
End: 2025-04-29
Attending: FAMILY MEDICINE
Payer: MEDICAID

## 2025-04-29 ENCOUNTER — PATIENT OUTREACH (OUTPATIENT)
Dept: ADMINISTRATIVE | Facility: OTHER | Age: 65
End: 2025-04-29
Payer: MEDICAID

## 2025-04-29 DIAGNOSIS — I10 ESSENTIAL HYPERTENSION: ICD-10-CM

## 2025-04-29 DIAGNOSIS — R73.03 PREDIABETES: ICD-10-CM

## 2025-04-29 DIAGNOSIS — N18.31 STAGE 3A CHRONIC KIDNEY DISEASE: ICD-10-CM

## 2025-04-29 LAB
ABSOLUTE EOSINOPHIL (OHS): 0.08 K/UL
ABSOLUTE MONOCYTE (OHS): 0.49 K/UL (ref 0.3–1)
ABSOLUTE NEUTROPHIL COUNT (OHS): 1.72 K/UL (ref 1.8–7.7)
ALBUMIN SERPL BCP-MCNC: 3.8 G/DL (ref 3.5–5.2)
ALBUMIN/CREAT UR: 100 UG/MG
ALP SERPL-CCNC: 84 UNIT/L (ref 40–150)
ALT SERPL W/O P-5'-P-CCNC: 14 UNIT/L (ref 10–44)
ANION GAP (OHS): 11 MMOL/L (ref 8–16)
AST SERPL-CCNC: 24 UNIT/L (ref 11–45)
BASOPHILS # BLD AUTO: 0.05 K/UL
BASOPHILS NFR BLD AUTO: 0.8 %
BILIRUB DIRECT SERPL-MCNC: 0.2 MG/DL (ref 0.1–0.3)
BILIRUB SERPL-MCNC: 0.5 MG/DL (ref 0.1–1)
BUN SERPL-MCNC: 14 MG/DL (ref 8–23)
CALCIUM SERPL-MCNC: 9.2 MG/DL (ref 8.7–10.5)
CHLORIDE SERPL-SCNC: 105 MMOL/L (ref 95–110)
CHOLEST SERPL-MCNC: 187 MG/DL (ref 120–199)
CHOLEST/HDLC SERPL: 2.3 {RATIO} (ref 2–5)
CO2 SERPL-SCNC: 26 MMOL/L (ref 23–29)
CREAT SERPL-MCNC: 1.2 MG/DL (ref 0.5–1.4)
CREAT UR-MCNC: 154 MG/DL (ref 15–325)
EAG (OHS): 146 MG/DL (ref 68–131)
ERYTHROCYTE [DISTWIDTH] IN BLOOD BY AUTOMATED COUNT: 15.9 % (ref 11.5–14.5)
GFR SERPLBLD CREATININE-BSD FMLA CKD-EPI: 51 ML/MIN/1.73/M2
GLUCOSE SERPL-MCNC: 127 MG/DL (ref 70–110)
HBA1C MFR BLD: 6.7 % (ref 4–5.6)
HCT VFR BLD AUTO: 43.7 % (ref 37–48.5)
HDLC SERPL-MCNC: 80 MG/DL (ref 40–75)
HDLC SERPL: 42.8 % (ref 20–50)
HGB BLD-MCNC: 13.4 GM/DL (ref 12–16)
IMM GRANULOCYTES # BLD AUTO: 0.01 K/UL (ref 0–0.04)
IMM GRANULOCYTES NFR BLD AUTO: 0.2 % (ref 0–0.5)
LDLC SERPL CALC-MCNC: 86.4 MG/DL (ref 63–159)
LYMPHOCYTES # BLD AUTO: 3.8 K/UL (ref 1–4.8)
MCH RBC QN AUTO: 29.3 PG (ref 27–31)
MCHC RBC AUTO-ENTMCNC: 30.7 G/DL (ref 32–36)
MCV RBC AUTO: 96 FL (ref 82–98)
MICROALBUMIN UR-MCNC: 154 UG/ML (ref ?–5000)
NONHDLC SERPL-MCNC: 107 MG/DL
NUCLEATED RBC (/100WBC) (OHS): 0 /100 WBC
PHOSPHATE SERPL-MCNC: 3 MG/DL (ref 2.7–4.5)
PLATELET # BLD AUTO: 248 K/UL (ref 150–450)
PMV BLD AUTO: 11.4 FL (ref 9.2–12.9)
POTASSIUM SERPL-SCNC: 3.4 MMOL/L (ref 3.5–5.1)
PROT SERPL-MCNC: 7.4 GM/DL (ref 6–8.4)
RBC # BLD AUTO: 4.57 M/UL (ref 4–5.4)
RELATIVE EOSINOPHIL (OHS): 1.3 %
RELATIVE LYMPHOCYTE (OHS): 61.8 % (ref 18–48)
RELATIVE MONOCYTE (OHS): 8 % (ref 4–15)
RELATIVE NEUTROPHIL (OHS): 27.9 % (ref 38–73)
SODIUM SERPL-SCNC: 142 MMOL/L (ref 136–145)
TRIGL SERPL-MCNC: 103 MG/DL (ref 30–150)
TSH SERPL-ACNC: 1.95 UIU/ML (ref 0.4–4)
WBC # BLD AUTO: 6.15 K/UL (ref 3.9–12.7)

## 2025-04-29 PROCEDURE — 85025 COMPLETE CBC W/AUTO DIFF WBC: CPT

## 2025-04-29 PROCEDURE — 84443 ASSAY THYROID STIM HORMONE: CPT

## 2025-04-29 PROCEDURE — 82465 ASSAY BLD/SERUM CHOLESTEROL: CPT

## 2025-04-29 PROCEDURE — 36415 COLL VENOUS BLD VENIPUNCTURE: CPT | Mod: PO

## 2025-04-29 PROCEDURE — 84100 ASSAY OF PHOSPHORUS: CPT

## 2025-04-29 PROCEDURE — 82040 ASSAY OF SERUM ALBUMIN: CPT

## 2025-04-29 PROCEDURE — 82310 ASSAY OF CALCIUM: CPT

## 2025-04-29 PROCEDURE — 83036 HEMOGLOBIN GLYCOSYLATED A1C: CPT

## 2025-04-29 PROCEDURE — 82043 UR ALBUMIN QUANTITATIVE: CPT

## 2025-04-29 NOTE — PROGRESS NOTES
CHW - Follow Up    This Community Health Worker completed a follow up visit with patient via telephone today.  Pt/Caregiver reported: Pt declined wanting to apply for [facility] waiting list.   Community Health Worker provided: CHW called pt to let her know of a Housing opening. CHW provided pt with her housing status for [place]  Follow up required: Yes, PRN  Follow-up Outreach - Due: 5/26/2025     Waiting List:  Section 8 Waiting List  Application Date:  01/29/2024  Bedrooms:  Status:  Pending  Control Number:  [id number]  Waiting List Position  157

## 2025-05-05 ENCOUNTER — OFFICE VISIT (OUTPATIENT)
Dept: FAMILY MEDICINE | Facility: CLINIC | Age: 65
End: 2025-05-05
Payer: MEDICAID

## 2025-05-05 VITALS
DIASTOLIC BLOOD PRESSURE: 88 MMHG | WEIGHT: 194.69 LBS | HEART RATE: 64 BPM | HEIGHT: 64 IN | OXYGEN SATURATION: 99 % | BODY MASS INDEX: 33.24 KG/M2 | SYSTOLIC BLOOD PRESSURE: 132 MMHG

## 2025-05-05 DIAGNOSIS — E11.69 HYPERLIPIDEMIA ASSOCIATED WITH TYPE 2 DIABETES MELLITUS: ICD-10-CM

## 2025-05-05 DIAGNOSIS — I25.118 ATHEROSCLEROSIS OF NATIVE CORONARY ARTERY OF NATIVE HEART WITH STABLE ANGINA PECTORIS: ICD-10-CM

## 2025-05-05 DIAGNOSIS — E11.59 HYPERTENSION ASSOCIATED WITH DIABETES: ICD-10-CM

## 2025-05-05 DIAGNOSIS — I15.2 HYPERTENSION ASSOCIATED WITH DIABETES: ICD-10-CM

## 2025-05-05 DIAGNOSIS — N18.31 TYPE 2 DIABETES MELLITUS WITH STAGE 3A CHRONIC KIDNEY DISEASE, WITH LONG-TERM CURRENT USE OF INSULIN: ICD-10-CM

## 2025-05-05 DIAGNOSIS — E11.29 MICROALBUMINURIA DUE TO TYPE 2 DIABETES MELLITUS: ICD-10-CM

## 2025-05-05 DIAGNOSIS — Z86.73 HISTORY OF TIA (TRANSIENT ISCHEMIC ATTACK): ICD-10-CM

## 2025-05-05 DIAGNOSIS — Z12.31 ENCOUNTER FOR SCREENING MAMMOGRAM FOR BREAST CANCER: ICD-10-CM

## 2025-05-05 DIAGNOSIS — E11.22 TYPE 2 DIABETES MELLITUS WITH STAGE 3A CHRONIC KIDNEY DISEASE, WITH LONG-TERM CURRENT USE OF INSULIN: ICD-10-CM

## 2025-05-05 DIAGNOSIS — R80.9 MICROALBUMINURIA DUE TO TYPE 2 DIABETES MELLITUS: ICD-10-CM

## 2025-05-05 DIAGNOSIS — N18.31 STAGE 3A CHRONIC KIDNEY DISEASE: ICD-10-CM

## 2025-05-05 DIAGNOSIS — Z95.1 HX OF CABG: Chronic | ICD-10-CM

## 2025-05-05 DIAGNOSIS — Z79.4 TYPE 2 DIABETES MELLITUS WITH STAGE 3A CHRONIC KIDNEY DISEASE, WITH LONG-TERM CURRENT USE OF INSULIN: ICD-10-CM

## 2025-05-05 DIAGNOSIS — E78.5 HYPERLIPIDEMIA ASSOCIATED WITH TYPE 2 DIABETES MELLITUS: ICD-10-CM

## 2025-05-05 DIAGNOSIS — Z00.01 ENCOUNTER FOR GENERAL ADULT MEDICAL EXAMINATION WITH ABNORMAL FINDINGS: Primary | ICD-10-CM

## 2025-05-05 DIAGNOSIS — I25.810 CORONARY ARTERY DISEASE INVOLVING CORONARY BYPASS GRAFT OF NATIVE HEART WITHOUT ANGINA PECTORIS: ICD-10-CM

## 2025-05-05 PROBLEM — R73.03 PREDIABETES: Status: RESOLVED | Noted: 2022-01-04 | Resolved: 2025-05-05

## 2025-05-05 PROCEDURE — 99999 PR PBB SHADOW E&M-EST. PATIENT-LVL IV: CPT | Mod: PBBFAC,,, | Performed by: FAMILY MEDICINE

## 2025-05-05 PROCEDURE — 3044F HG A1C LEVEL LT 7.0%: CPT | Mod: CPTII,,, | Performed by: FAMILY MEDICINE

## 2025-05-05 PROCEDURE — 1159F MED LIST DOCD IN RCRD: CPT | Mod: CPTII,,, | Performed by: FAMILY MEDICINE

## 2025-05-05 PROCEDURE — 99213 OFFICE O/P EST LOW 20 MIN: CPT | Mod: S$PBB,25,, | Performed by: FAMILY MEDICINE

## 2025-05-05 PROCEDURE — 3075F SYST BP GE 130 - 139MM HG: CPT | Mod: CPTII,,, | Performed by: FAMILY MEDICINE

## 2025-05-05 PROCEDURE — 4010F ACE/ARB THERAPY RXD/TAKEN: CPT | Mod: CPTII,,, | Performed by: FAMILY MEDICINE

## 2025-05-05 PROCEDURE — 99396 PREV VISIT EST AGE 40-64: CPT | Mod: S$PBB,,, | Performed by: FAMILY MEDICINE

## 2025-05-05 PROCEDURE — 3066F NEPHROPATHY DOC TX: CPT | Mod: CPTII,,, | Performed by: FAMILY MEDICINE

## 2025-05-05 PROCEDURE — 3060F POS MICROALBUMINURIA REV: CPT | Mod: CPTII,,, | Performed by: FAMILY MEDICINE

## 2025-05-05 PROCEDURE — 99214 OFFICE O/P EST MOD 30 MIN: CPT | Mod: PBBFAC,PO | Performed by: FAMILY MEDICINE

## 2025-05-05 PROCEDURE — 3079F DIAST BP 80-89 MM HG: CPT | Mod: CPTII,,, | Performed by: FAMILY MEDICINE

## 2025-05-05 PROCEDURE — 1160F RVW MEDS BY RX/DR IN RCRD: CPT | Mod: CPTII,,, | Performed by: FAMILY MEDICINE

## 2025-05-05 PROCEDURE — 3008F BODY MASS INDEX DOCD: CPT | Mod: CPTII,,, | Performed by: FAMILY MEDICINE

## 2025-05-05 NOTE — PROGRESS NOTES
"Subjective:         Patient ID: Veronika Blackmon is a 65 y.o. female.    Chief Complaint: Hypertension    Patient Active Problem List   Diagnosis    RBBB    Atherosclerosis of native coronary artery of native heart    Familial hypercholesterolemia    Hypertension associated with diabetes    Hyperlipidemia associated with type 2 diabetes mellitus    History of cardiac arrest    Brain anoxic injury    Facial weakness    History of TIA (transient ischemic attack)    Hx of CABG    Stage 3a chronic kidney disease    Coronary artery disease involving coronary bypass graft of native heart without angina pectoris    Frequent PVCs    Nonsustained ventricular tachycardia      KITTY Banda is a 65 y.o. female    History of Present Illness    CHIEF COMPLAINT:  Veronika presents today for blood pressure follow-up    MEDICAL HISTORY:  She has a history of heart disease and prior cardiac surgery.    POST-SURGICAL SYMPTOMS:  She reports constant itching at the surgical scar site, which occasionally causes her to scratch in public settings.    DIET AND EXERCISE:  She manages weight through dietary restriction and continues walking for exercise.    LABS:  A1C has shown steady increase over the past year from 6.1 to 6.4 to current 6.7. Thyroid studies were normal. LDL was 86. Liver enzymes have improved from previously elevated values.       Objective:     Vitals:    05/05/25 1239 05/05/25 1340   BP: (!) 134/90 132/88   BP Location: Left arm Left arm   Patient Position: Sitting Sitting   Pulse: 64    SpO2: 99%    Weight: 88.3 kg (194 lb 10.7 oz)    Height: 5' 4" (1.626 m)          Physical Exam  Vitals and nursing note reviewed.   Constitutional:       General: She is not in acute distress.     Appearance: Normal appearance. She is not ill-appearing, toxic-appearing or diaphoretic.   HENT:      Head: Normocephalic and atraumatic.   Eyes:      General: No scleral icterus.     Conjunctiva/sclera: Conjunctivae normal.   Cardiovascular:      Rate " and Rhythm: Normal rate.      Heart sounds: Normal heart sounds. No murmur heard.  Pulmonary:      Effort: Pulmonary effort is normal. No respiratory distress.   Skin:     Coloration: Skin is not pale.   Neurological:      Mental Status: She is alert. Mental status is at baseline.   Psychiatric:         Attention and Perception: Attention and perception normal.         Mood and Affect: Mood and affect normal.         Speech: Speech normal.         Behavior: Behavior normal.         Cognition and Memory: Cognition and memory normal.         Judgment: Judgment normal.       Assessment:       1. Encounter for general adult medical examination with abnormal findings    2. Type 2 diabetes mellitus with stage 3a chronic kidney disease, with long-term current use of insulin    3. Hyperlipidemia associated with type 2 diabetes mellitus    4. Hypertension associated with diabetes    5. Coronary artery disease involving coronary bypass graft of native heart without angina pectoris    6. History of TIA (transient ischemic attack)    7. Atherosclerosis of native coronary artery of native heart with stable angina pectoris    8. Hx of CABG    9. Stage 3a chronic kidney disease    10. Encounter for screening mammogram for breast cancer          Plan:   Recent relevant labs results reviewed with patient.         Assessment & Plan    Assessed BP, initially elevated but planned to recheck after allowing time to stabilize.  Chronic renal disease stage 3A is stable.  Liver enzymes have improved.  LDL at 86, determined goal should be below 70 due to history of heart surgery.  Diagnosed diabetes based on A1C of 6.7 (trending up from previous results of 6.1 and 6.4), explained that A1C above 6.5 indicates diabetes and current level represents very mild diabetes.  Diabetes can likely be controlled without medication through weight loss and dietary changes, will reassess in 3-4 months to evaluate if lifestyle modifications are  effective.    CHRONIC ISCHEMIC HEART DISEASE:  - Prescribed maximum dosage of both cardiac medications for management of heart disease.  - Established cholesterol goal of below 70 due to history of cardiac surgery and heart disease.    HYPERTENSION:  - Monitored blood pressure which is slightly elevated after patient was walking in the sun.  - Advised the patient to maintain low salt intake.    PREDIABETES/DIABETES MANAGEMENT:  - Current A1c of 6.7 indicates diabetes, with progressive increase over the last year (6.1, 6.4, 6.7).  - Plan to control diabetes through weight loss and dietary modifications without pharmacological intervention.  - Advised patient to maintain low sugar intake and follow up in 3 months to reassess diabetes management.    PRURITUS:  - Veronika reports occasional pruritus at  scar site.  - Recommend Biofreeze roll-on (cooling version) or icy hot for relief.    WEIGHT MANAGEMENT AND LIFESTYLE:  - Veronika to implement strict diet for weight loss and continue walking for exercise.    FOLLOW-UP PLAN:  - Ordered lab work to be completed before next appointment.  - Follow up in 4 months for reassessment of weight loss efforts and lab results.  - Veronika instructed not to miss the appointment.         1. Encounter for general adult medical examination with abnormal findings  - Risk and age appropriate anticipatory guidance. HM reviewed and updated. Recommendations discussed with patient as appropriate.     2. Type 2 diabetes mellitus with stage 3a chronic kidney disease, with long-term current use of insulin  -     Comprehensive Metabolic Panel; Future; Expected date: 2025  -     Lipid Panel; Future; Expected date: 2025  -     Hemoglobin A1C; Future; Expected date: 2025  New dx  At this time  A1C < 7. Work on weight loss, consider addition of Metformin and/or GLP1a at next visit if efforts with diet and exercise not effective in 4 visits.     3. Hyperlipidemia associated with type 2  diabetes mellitus  Statin and Praluent.   Goal < 70 with cardiac history.   Adjust diet and recheck with A1C in 3 months.     4. Hypertension associated with diabetes  -     Comprehensive Metabolic Panel; Future; Expected date: 05/05/2025  -     Lipid Panel; Future; Expected date: 05/05/2025  -     Hemoglobin A1C; Future; Expected date: 05/05/2025    5. Coronary artery disease involving coronary bypass graft of native heart without angina pectoris  6. History of TIA (transient ischemic attack)  7. Atherosclerosis of native coronary artery of native heart with stable angina pectoris  8. Hx of CABG  Medically managed    9. Stage 3a chronic kidney disease  Stable - treat chronic medical conditions    Patient's questions answered. Plan reviewed with patient at the end of visit. Relevant precautions to chief complaint and reasons to seek further medical care or to contact the office sooner reviewed with patient.     Follow up in about 4 months (around 9/5/2025) for Diabetes Follow-up, Hypertension Follow-up, (prelabs).        Part of this note was dictated using voice recognition software. Please excuse any typographical errors.     This note was generated with the assistance of ambient listening technology. Verbal consent was obtained by the patient and accompanying visitor(s) for the recording of patient appointment to facilitate this note. I attest to having reviewed and edited the generated note for accuracy, though some syntax or spelling errors may persist. Please contact the author of this note for any clarification.

## 2025-06-02 DIAGNOSIS — I10 ESSENTIAL HYPERTENSION: ICD-10-CM

## 2025-06-02 DIAGNOSIS — Z95.1 HX OF CABG: Chronic | ICD-10-CM

## 2025-06-02 DIAGNOSIS — Z86.73 HISTORY OF TIA (TRANSIENT ISCHEMIC ATTACK): ICD-10-CM

## 2025-06-02 RX ORDER — METOPROLOL SUCCINATE 50 MG/1
TABLET, EXTENDED RELEASE ORAL
Qty: 180 TABLET | Refills: 3 | Status: SHIPPED | OUTPATIENT
Start: 2025-06-02 | End: 2026-06-02

## 2025-07-23 DIAGNOSIS — E78.01 FAMILIAL HYPERCHOLESTEROLEMIA: Chronic | ICD-10-CM

## 2025-07-23 RX ORDER — ALIROCUMAB 75 MG/ML
75 INJECTION, SOLUTION SUBCUTANEOUS
Qty: 2 EACH | Refills: 6 | Status: ACTIVE | OUTPATIENT
Start: 2025-07-23

## 2025-08-11 ENCOUNTER — HOSPITAL ENCOUNTER (OUTPATIENT)
Dept: RADIOLOGY | Facility: HOSPITAL | Age: 65
Discharge: HOME | End: 2025-08-11
Attending: FAMILY MEDICINE
Payer: MEDICAID

## 2025-08-11 DIAGNOSIS — Z12.31 ENCOUNTER FOR SCREENING MAMMOGRAM FOR BREAST CANCER: ICD-10-CM

## 2025-08-11 PROCEDURE — 77067 SCR MAMMO BI INCL CAD: CPT | Mod: 26,,, | Performed by: RADIOLOGY

## 2025-08-11 PROCEDURE — 77067 SCR MAMMO BI INCL CAD: CPT | Mod: TC

## 2025-08-11 PROCEDURE — 77063 BREAST TOMOSYNTHESIS BI: CPT | Mod: 26,,, | Performed by: RADIOLOGY

## 2025-08-21 ENCOUNTER — PATIENT OUTREACH (OUTPATIENT)
Dept: ADMINISTRATIVE | Facility: OTHER | Age: 65
End: 2025-08-21
Payer: MEDICAID

## 2025-09-02 ENCOUNTER — LAB VISIT (OUTPATIENT)
Dept: LAB | Facility: HOSPITAL | Age: 65
End: 2025-09-02
Attending: FAMILY MEDICINE
Payer: MEDICAID

## 2025-09-02 DIAGNOSIS — N18.31 TYPE 2 DIABETES MELLITUS WITH STAGE 3A CHRONIC KIDNEY DISEASE, WITH LONG-TERM CURRENT USE OF INSULIN: ICD-10-CM

## 2025-09-02 DIAGNOSIS — I15.2 HYPERTENSION ASSOCIATED WITH DIABETES: ICD-10-CM

## 2025-09-02 DIAGNOSIS — E11.22 TYPE 2 DIABETES MELLITUS WITH STAGE 3A CHRONIC KIDNEY DISEASE, WITH LONG-TERM CURRENT USE OF INSULIN: ICD-10-CM

## 2025-09-02 DIAGNOSIS — E11.59 HYPERTENSION ASSOCIATED WITH DIABETES: ICD-10-CM

## 2025-09-02 DIAGNOSIS — Z79.4 TYPE 2 DIABETES MELLITUS WITH STAGE 3A CHRONIC KIDNEY DISEASE, WITH LONG-TERM CURRENT USE OF INSULIN: ICD-10-CM

## 2025-09-02 LAB
ALBUMIN SERPL BCP-MCNC: 4.1 G/DL (ref 3.5–5.2)
ALP SERPL-CCNC: 77 UNIT/L (ref 40–150)
ALT SERPL W/O P-5'-P-CCNC: 17 UNIT/L (ref 0–55)
ANION GAP (OHS): 8 MMOL/L (ref 8–16)
AST SERPL-CCNC: 30 UNIT/L (ref 0–50)
BILIRUB SERPL-MCNC: 0.6 MG/DL (ref 0.1–1)
BUN SERPL-MCNC: 18 MG/DL (ref 8–23)
CALCIUM SERPL-MCNC: 9.6 MG/DL (ref 8.7–10.5)
CHLORIDE SERPL-SCNC: 106 MMOL/L (ref 95–110)
CHOLEST SERPL-MCNC: 153 MG/DL (ref 120–199)
CHOLEST/HDLC SERPL: 2 {RATIO} (ref 2–5)
CO2 SERPL-SCNC: 26 MMOL/L (ref 23–29)
CREAT SERPL-MCNC: 1.2 MG/DL (ref 0.5–1.4)
EAG (OHS): 146 MG/DL (ref 68–131)
GFR SERPLBLD CREATININE-BSD FMLA CKD-EPI: 51 ML/MIN/1.73/M2
GLUCOSE SERPL-MCNC: 127 MG/DL (ref 70–110)
HBA1C MFR BLD: 6.7 % (ref 4–5.6)
HDLC SERPL-MCNC: 77 MG/DL (ref 40–75)
HDLC SERPL: 50.3 % (ref 20–50)
LDLC SERPL CALC-MCNC: 60.6 MG/DL (ref 63–159)
NONHDLC SERPL-MCNC: 76 MG/DL
POTASSIUM SERPL-SCNC: 3.9 MMOL/L (ref 3.5–5.1)
PROT SERPL-MCNC: 7.1 GM/DL (ref 6–8.4)
SODIUM SERPL-SCNC: 140 MMOL/L (ref 136–145)
TRIGL SERPL-MCNC: 77 MG/DL (ref 30–150)

## 2025-09-02 PROCEDURE — 83036 HEMOGLOBIN GLYCOSYLATED A1C: CPT

## 2025-09-02 PROCEDURE — 80061 LIPID PANEL: CPT

## 2025-09-02 PROCEDURE — 36415 COLL VENOUS BLD VENIPUNCTURE: CPT | Mod: PO

## 2025-09-02 PROCEDURE — 82040 ASSAY OF SERUM ALBUMIN: CPT

## 2025-09-05 PROBLEM — E11.22 TYPE 2 DIABETES MELLITUS WITH STAGE 3A CHRONIC KIDNEY DISEASE, WITH LONG-TERM CURRENT USE OF INSULIN: Status: ACTIVE | Noted: 2025-09-05

## 2025-09-05 PROBLEM — K21.9 GASTROESOPHAGEAL REFLUX DISEASE WITHOUT ESOPHAGITIS: Status: ACTIVE | Noted: 2025-09-05

## 2025-09-05 PROBLEM — E66.811 CLASS 1 OBESITY DUE TO EXCESS CALORIES WITH SERIOUS COMORBIDITY AND BODY MASS INDEX (BMI) OF 32.0 TO 32.9 IN ADULT: Status: ACTIVE | Noted: 2025-09-05

## 2025-09-05 PROBLEM — Z79.4 TYPE 2 DIABETES MELLITUS WITH STAGE 3A CHRONIC KIDNEY DISEASE, WITH LONG-TERM CURRENT USE OF INSULIN: Status: ACTIVE | Noted: 2025-09-05

## 2025-09-05 PROBLEM — K64.9 HEMORRHOIDS: Status: ACTIVE | Noted: 2025-09-05

## 2025-09-05 PROBLEM — N18.31 TYPE 2 DIABETES MELLITUS WITH STAGE 3A CHRONIC KIDNEY DISEASE, WITH LONG-TERM CURRENT USE OF INSULIN: Status: ACTIVE | Noted: 2025-09-05

## 2025-09-05 PROBLEM — E66.09 CLASS 1 OBESITY DUE TO EXCESS CALORIES WITH SERIOUS COMORBIDITY AND BODY MASS INDEX (BMI) OF 32.0 TO 32.9 IN ADULT: Status: ACTIVE | Noted: 2025-09-05

## (undated) DEVICE — CATH IMPULSE 5FR PIGTAIL 125CM

## (undated) DEVICE — Device

## (undated) DEVICE — HEMOSTAT VASC BAND REG 24CM

## (undated) DEVICE — PAD DEFIB CADENCE ADULT R2

## (undated) DEVICE — COVER BAND BAG 28 X 12

## (undated) DEVICE — KIT LEFT HEART MANIFOLD CUSTOM

## (undated) DEVICE — CATH RADIAL TIG 6FR 4.0 110CM

## (undated) DEVICE — KIT GLIDESHEATH SLEND 6FR 10CM

## (undated) DEVICE — GUIDEWIRE WHOLEY STD STR 260CM

## (undated) DEVICE — CONTRAST VISIPAQUE 150ML

## (undated) DEVICE — SEE MEDLINE ITEM 157187